# Patient Record
Sex: FEMALE | Race: BLACK OR AFRICAN AMERICAN | NOT HISPANIC OR LATINO | Employment: PART TIME | URBAN - METROPOLITAN AREA
[De-identification: names, ages, dates, MRNs, and addresses within clinical notes are randomized per-mention and may not be internally consistent; named-entity substitution may affect disease eponyms.]

---

## 2017-07-17 ENCOUNTER — LAB VISIT (OUTPATIENT)
Dept: LAB | Facility: HOSPITAL | Age: 57
End: 2017-07-17
Attending: INTERNAL MEDICINE
Payer: OTHER GOVERNMENT

## 2017-07-17 ENCOUNTER — OFFICE VISIT (OUTPATIENT)
Dept: INTERNAL MEDICINE | Facility: CLINIC | Age: 57
End: 2017-07-17
Payer: OTHER GOVERNMENT

## 2017-07-17 VITALS
BODY MASS INDEX: 44.59 KG/M2 | SYSTOLIC BLOOD PRESSURE: 146 MMHG | HEART RATE: 77 BPM | HEIGHT: 65 IN | DIASTOLIC BLOOD PRESSURE: 73 MMHG | WEIGHT: 267.63 LBS | TEMPERATURE: 97 F

## 2017-07-17 DIAGNOSIS — E66.01 SEVERE OBESITY (BMI >= 40): ICD-10-CM

## 2017-07-17 DIAGNOSIS — E66.01 SEVERE OBESITY (BMI >= 40): Primary | ICD-10-CM

## 2017-07-17 DIAGNOSIS — E55.9 VITAMIN D DEFICIENCY: ICD-10-CM

## 2017-07-17 DIAGNOSIS — T75.89XA EXPOSURE, INITIAL ENCOUNTER: ICD-10-CM

## 2017-07-17 DIAGNOSIS — K63.5 POLYP OF DESCENDING COLON, UNSPECIFIED TYPE: ICD-10-CM

## 2017-07-17 LAB
25(OH)D3+25(OH)D2 SERPL-MCNC: 12 NG/ML
ALBUMIN SERPL BCP-MCNC: 3.2 G/DL
ALP SERPL-CCNC: 100 U/L
ALT SERPL W/O P-5'-P-CCNC: 15 U/L
ANION GAP SERPL CALC-SCNC: 7 MMOL/L
AST SERPL-CCNC: 16 U/L
BASOPHILS # BLD AUTO: 0.01 K/UL
BASOPHILS NFR BLD: 0.1 %
BILIRUB SERPL-MCNC: 0.4 MG/DL
BUN SERPL-MCNC: 14 MG/DL
CALCIUM SERPL-MCNC: 9.3 MG/DL
CHLORIDE SERPL-SCNC: 102 MMOL/L
CHOLEST/HDLC SERPL: 4.4 {RATIO}
CO2 SERPL-SCNC: 32 MMOL/L
CREAT SERPL-MCNC: 0.9 MG/DL
DIFFERENTIAL METHOD: ABNORMAL
EOSINOPHIL # BLD AUTO: 0.1 K/UL
EOSINOPHIL NFR BLD: 1 %
ERYTHROCYTE [DISTWIDTH] IN BLOOD BY AUTOMATED COUNT: 15.9 %
EST. GFR  (AFRICAN AMERICAN): >60 ML/MIN/1.73 M^2
EST. GFR  (NON AFRICAN AMERICAN): >60 ML/MIN/1.73 M^2
GLUCOSE SERPL-MCNC: 88 MG/DL
HCT VFR BLD AUTO: 41.9 %
HDL/CHOLESTEROL RATIO: 22.6 %
HDLC SERPL-MCNC: 155 MG/DL
HDLC SERPL-MCNC: 35 MG/DL
HGB BLD-MCNC: 12.4 G/DL
LDLC SERPL CALC-MCNC: 102 MG/DL
LYMPHOCYTES # BLD AUTO: 3.8 K/UL
LYMPHOCYTES NFR BLD: 46.8 %
MCH RBC QN AUTO: 26.3 PG
MCHC RBC AUTO-ENTMCNC: 29.6 %
MCV RBC AUTO: 89 FL
MONOCYTES # BLD AUTO: 0.6 K/UL
MONOCYTES NFR BLD: 7.2 %
NEUTROPHILS # BLD AUTO: 3.7 K/UL
NEUTROPHILS NFR BLD: 44.8 %
NONHDLC SERPL-MCNC: 120 MG/DL
PLATELET # BLD AUTO: 272 K/UL
PMV BLD AUTO: 11.7 FL
POTASSIUM SERPL-SCNC: 4.5 MMOL/L
PROT SERPL-MCNC: 7.1 G/DL
RBC # BLD AUTO: 4.72 M/UL
SODIUM SERPL-SCNC: 141 MMOL/L
T4 FREE SERPL-MCNC: 1.04 NG/DL
TRIGL SERPL-MCNC: 90 MG/DL
TSH SERPL DL<=0.005 MIU/L-ACNC: 0.39 UIU/ML
WBC # BLD AUTO: 8.2 K/UL

## 2017-07-17 PROCEDURE — 80053 COMPREHEN METABOLIC PANEL: CPT

## 2017-07-17 PROCEDURE — 84443 ASSAY THYROID STIM HORMONE: CPT

## 2017-07-17 PROCEDURE — 85025 COMPLETE CBC W/AUTO DIFF WBC: CPT

## 2017-07-17 PROCEDURE — 82306 VITAMIN D 25 HYDROXY: CPT

## 2017-07-17 PROCEDURE — 84439 ASSAY OF FREE THYROXINE: CPT

## 2017-07-17 PROCEDURE — 99999 PR PBB SHADOW E&M-NEW PATIENT-LVL II: CPT | Mod: PBBFAC,,, | Performed by: INTERNAL MEDICINE

## 2017-07-17 PROCEDURE — 86703 HIV-1/HIV-2 1 RESULT ANTBDY: CPT

## 2017-07-17 PROCEDURE — 83036 HEMOGLOBIN GLYCOSYLATED A1C: CPT

## 2017-07-17 PROCEDURE — 99202 OFFICE O/P NEW SF 15 MIN: CPT | Mod: PBBFAC,PN | Performed by: INTERNAL MEDICINE

## 2017-07-17 PROCEDURE — 36415 COLL VENOUS BLD VENIPUNCTURE: CPT | Mod: PO

## 2017-07-17 PROCEDURE — 99204 OFFICE O/P NEW MOD 45 MIN: CPT | Mod: S$PBB,,, | Performed by: INTERNAL MEDICINE

## 2017-07-17 PROCEDURE — 80061 LIPID PANEL: CPT

## 2017-07-17 RX ORDER — AMLODIPINE BESYLATE AND ATORVASTATIN CALCIUM 10; 40 MG/1; MG/1
1 TABLET, FILM COATED ORAL DAILY
COMMUNITY
End: 2017-07-17 | Stop reason: ALTCHOICE

## 2017-07-17 RX ORDER — ALBUTEROL SULFATE 0.63 MG/3ML
0.63 SOLUTION RESPIRATORY (INHALATION) EVERY 6 HOURS PRN
COMMUNITY
End: 2019-01-12

## 2017-07-17 RX ORDER — AMLODIPINE AND BENAZEPRIL HYDROCHLORIDE 10; 40 MG/1; MG/1
1 CAPSULE ORAL DAILY
Refills: 11 | COMMUNITY
Start: 2017-04-26 | End: 2018-02-26 | Stop reason: SDUPTHER

## 2017-07-17 RX ORDER — FUROSEMIDE 40 MG/1
40 TABLET ORAL 2 TIMES DAILY
COMMUNITY
End: 2018-08-27 | Stop reason: SDUPTHER

## 2017-07-17 RX ORDER — ORPHENADRINE CITRATE 100 MG/1
100 TABLET, EXTENDED RELEASE ORAL 2 TIMES DAILY
COMMUNITY
End: 2019-01-21

## 2017-07-17 NOTE — PROGRESS NOTES
"Subjective:      Patient ID: Baylee Muñoz is a 57 y.o. female.    Chief Complaint: Establish Care and Neck Pain (back of neck)      HPI  Ms. Muñoz presents to [facility] primary care. She reports having neck pain over the past 3 days, which occurs when rotating her head to the right, describes it as a "pulling" sensation. She reports taking Naprosyn and 1 BC powder.     She also reports having a cramp in the front of her distal left leg, which moved to her proximal leg, which she though might be a blood clot.     Past Medical History:   Diagnosis Date    Allergy     Arthritis     Asthma     Depression     Diverticulitis 2005    Gastritis     Hypertension     Severe obesity (BMI >= 40)      Past Surgical History:   Procedure Laterality Date    CHOLECYSTECTOMY      cyst removal       Social History     Social History    Marital status:      Spouse name: N/A    Number of children: N/A    Years of education: N/A     Occupational History    Not on file.     Social History Main Topics    Smoking status: Light Tobacco Smoker     Packs/day: 0.25     Years: 27.00    Smokeless tobacco: Never Used    Alcohol use No    Drug use: No    Sexual activity: No     Other Topics Concern    Not on file     Social History Narrative    No narrative on file     Family History   Problem Relation Age of Onset    Stroke Mother     Heart disease Mother     Kidney disease Father     Heart disease Father     Hypertension Father     HIV Sister     COPD Sister     Bipolar disorder Sister        Current Outpatient Prescriptions:     albuterol (ACCUNEB) 0.63 mg/3 mL Nebu, Take 0.63 mg by nebulization every 6 (six) hours as needed. Rescue, Disp: , Rfl:     amlodipine-benazepril (LOTREL) 10-40 mg per capsule, Take 1 capsule by mouth once daily at 6am., Disp: , Rfl: 11    furosemide (LASIX) 40 MG tablet, Take 40 mg by mouth 2 (two) times daily., Disp: , Rfl:     orphenadrine (NORFLEX) 100 mg tablet, Take 100 mg " "by mouth 2 (two) times daily., Disp: , Rfl:     Review of patient's allergies indicates:   Allergen Reactions    Coconut     Dairy aid [lactase]     Iodine and iodide containing products     Penicillins      Review of Systems   Cardiovascular: Negative, except intermittent CP over the past 2 years.  Respiratory: Negative, except intermittent sob over the past 2 years.   Psychiatric/Behavioral: Negative, except her usual depressive sx.     Objective:     BP (!) 146/73 (BP Location: Left arm, Patient Position: Sitting, BP Method: Manual)   Pulse 77   Temp 96.9 °F (36.1 °C) (Tympanic)   Ht 5' 5" (1.651 m)   Wt 121.4 kg (267 lb 10.2 oz)   BMI 44.54 kg/m²     Physical Exam  GEN: A&O fully, NAD  PSYC: Normal affect  CV: RRR, no M/G/R  PULM: CTA bilaterally, no wheezes, rales  GI: S/NT/ND, normal bowel sounds  EXT: No C/C/E    Assessment:     1. Severe obesity (BMI >= 40): Discussed strategies for lifestyle modifications, including systematically increasing water, yogurt, whole fruits, unsalted nuts, non-starchy vegetables, beans, weight logging and physical activity; and avoiding potatoes, red/processed meats, sugar sweetened beverages, and fast food. She plans to completely replace potatoes with brocholi and Pepsi with water.    2. Vitamin D deficiency    3.      Cramps, neck, leg: Likely 2/2 dehydration.   4.      HM: Due to colonoscopy since last was 5 years ago, which had polyps.  5.      CP: Will check stress test. She has an appointment with a cardiologist, Dr. Chacon on 7/17/17.  6.      HTN: Not at goal. She skipped taking her amlodipine/benazepril this am, which I recommended she take.           Will check in 2 weeks.    Plan:   Severe obesity (BMI >= 40)  -     Hemoglobin A1c; Future; Expected date: 07/17/2017  -     CBC auto differential; Future; Expected date: 07/17/2017  -     Comprehensive metabolic panel; Future; Expected date: 07/17/2017  -     Lipid panel; Future; Expected date: 07/17/2017  -     " T4, free; Future; Expected date: 07/17/2017  -     TSH; Future; Expected date: 07/17/2017  -     Vitamin D; Future; Expected date: 07/17/2017  -     Cardiac treadmill stress test; Future    Vitamin D deficiency  -     Vitamin D; Future; Expected date: 07/17/2017    Polyp of descending colon, unspecified type  -     Case request GI: COLONOSCOPY      RTC in 2 weeks RE HTN or sooner as needed

## 2017-07-18 DIAGNOSIS — E55.9 VITAMIN D DEFICIENCY: Primary | ICD-10-CM

## 2017-07-18 LAB
ESTIMATED AVG GLUCOSE: 120 MG/DL
HBA1C MFR BLD HPLC: 5.8 %
HIV 1+2 AB+HIV1 P24 AG SERPL QL IA: NEGATIVE

## 2017-07-18 RX ORDER — ERGOCALCIFEROL 1.25 MG/1
50000 CAPSULE ORAL
Qty: 8 CAPSULE | Refills: 1 | Status: SHIPPED | OUTPATIENT
Start: 2017-07-18 | End: 2017-11-27 | Stop reason: SDUPTHER

## 2017-07-18 NOTE — LETTER
July 18, 2017    Baylee Wanda  [address]  [facility] [address]             [facility]  [address] [place] [id number]  Phone: [phone] Dear Ms. Baylee Muñoz:    Below are the results from your recent visit:    Resulted Orders   Hemoglobin A1c   Result Value Ref Range    Hemoglobin A1C 5.8 (H) 4.0 - 5.6 %      Comment:      According to ADA guidelines, hemoglobin A1c <7.0% represents  optimal control in non-pregnant diabetic patients. Different  metrics may apply to specific patient populations.   Standards of Medical Care in Diabetes-2016.  For the purpose of screening for the presence of diabetes:  <5.7%     Consistent with the absence of diabetes  5.7-6.4%  Consistent with increasing risk for diabetes   (prediabetes)  >or=6.5%  Consistent with diabetes  Currently, no consensus exists for use of hemoglobin A1c  for diagnosis of diabetes for children.  This Hemoglobin A1c assay has significant interference with fetal   hemoglobin   (HbF). The results are invalid for patients with abnormal amounts of   HbF,   including those with known Hereditary Persistence   of Fetal Hemoglobin. Heterozygous hemoglobin variants (HbAS, HbAC,   HbAD, HbAE, HbA2) do not significantly interfere with this assay;   however, presence of multiple variants in a sample may impact the %   interference.      Estimated Avg Glucose 120 68 - 131 mg/dL   CBC auto differential   Result Value Ref Range    WBC 8.20 3.90 - 12.70 K/uL    RBC 4.72 4.00 - 5.40 M/uL    Hemoglobin 12.4 12.0 - 16.0 g/dL    Hematocrit 41.9 37.0 - 48.5 %    MCV 89 82 - 98 fL    MCH 26.3 (L) 27.0 - 31.0 pg    MCHC 29.6 (L) 32.0 - 36.0 %    RDW 15.9 (H) 11.5 - 14.5 %    Platelets 272 150 - 350 K/uL    MPV 11.7 9.2 - 12.9 fL    Gran # 3.7 1.8 - 7.7 K/uL    Lymph # 3.8 1.0 - 4.8 K/uL    Mono # 0.6 0.3 - 1.0 K/uL    Eos # 0.1 0.0 - 0.5 K/uL    Baso # 0.01 0.00 - 0.20 K/uL    Gran% 44.8 38.0 - 73.0 %    Lymph% 46.8 18.0 - 48.0 %    Mono% 7.2  4.0 - 15.0 %    Eosinophil% 1.0 0.0 - 8.0 %    Basophil% 0.1 0.0 - 1.9 %    Differential Method Automated    Comprehensive metabolic panel   Result Value Ref Range    Sodium 141 136 - 145 mmol/L    Potassium 4.5 3.5 - 5.1 mmol/L    Chloride 102 95 - 110 mmol/L    CO2 32 (H) 23 - 29 mmol/L    Glucose 88 70 - 110 mg/dL    BUN, Bld 14 6 - 20 mg/dL    Creatinine 0.9 0.5 - 1.4 mg/dL    Calcium 9.3 8.7 - 10.5 mg/dL    Total Protein 7.1 6.0 - 8.4 g/dL    Albumin 3.2 (L) 3.5 - 5.2 g/dL    Total Bilirubin 0.4 0.1 - 1.0 mg/dL      Comment:      For infants and newborns, interpretation of results should be based  on gestational age, weight and in agreement with clinical  observations.  Premature Infant recommended reference ranges:  Up to 24 hours.............<8.0 mg/dL  Up to 48 hours............<12.0 mg/dL  3-5 days..................<15.0 mg/dL  6-29 days.................<15.0 mg/dL      Alkaline Phosphatase 100 55 - 135 U/L    AST 16 10 - 40 U/L    ALT 15 10 - 44 U/L    Anion Gap 7 (L) 8 - 16 mmol/L    eGFR if African American >60.0 >60 mL/min/1.73 m^2    eGFR if non African American >60.0 >60 mL/min/1.73 m^2      Comment:      Calculation used to obtain the estimated glomerular filtration  rate (eGFR) is the CKD-EPI equation. Since race is unknown   in our information system, the eGFR values for   -American and Non--American patients are given   for each creatinine result.     Lipid panel   Result Value Ref Range    Cholesterol 155 120 - 199 mg/dL      Comment:      The National Cholesterol Education Program (NCEP) has set the  following guidelines (reference ranges) for Cholesterol:  Optimal.....................<200 mg/dL  Borderline High.............200-239 mg/dL  High........................> or = 240 mg/dL      Triglycerides 90 30 - 150 mg/dL      Comment:      The National Cholesterol Education Program (NCEP) has set the  following guidelines (reference values) for  triglycerides:  Normal......................<150 mg/dL  Borderline High.............150-199 mg/dL  High........................200-499 mg/dL      HDL 35 (L) 40 - 75 mg/dL      Comment:      The National Cholesterol Education Program (NCEP) has set the  following guidelines (reference values) for HDL Cholesterol:  Low...............<40 mg/dL  Optimal...........>60 mg/dL      LDL Cholesterol 102.0 63.0 - 159.0 mg/dL      Comment:      The National Cholesterol Education Program (NCEP) has set the  following guidelines (reference values) for LDL Cholesterol:  Optimal.......................<130 mg/dL  Borderline High...............130-159 mg/dL  High..........................160-189 mg/dL  Very High.....................>190 mg/dL      HDL/Chol Ratio 22.6 20.0 - 50.0 %    Total Cholesterol/HDL Ratio 4.4 2.0 - 5.0    Non-HDL Cholesterol 120 mg/dL      Comment:      Risk category and Non-HDL cholesterol goals:  Coronary heart disease (CHD)or equivalent (10-year risk of CHD >20%):  Non-HDL cholesterol goal     <130 mg/dL  Two or more CHD risk factors and 10-year risk of CHD <= 20%:  Non-HDL cholesterol goal     <160 mg/dL  0 to 1 CHD risk factor:  Non-HDL cholesterol goal     <190 mg/dL     T4, free   Result Value Ref Range    Free T4 1.04 0.71 - 1.51 ng/dL   TSH   Result Value Ref Range    TSH 0.387 (L) 0.400 - 4.000 uIU/mL   Vitamin D   Result Value Ref Range    Vit D, 25-Hydroxy 12 (L) 30 - 96 ng/mL      Comment:      Vitamin D deficiency.........<10 ng/mL                              Vitamin D insufficiency......10-29 ng/mL       Vitamin D sufficiency........> or equal to 30 ng/mL  Vitamin D toxicity............>100 ng/mL     HIV-1 and HIV-2 antibodies   Result Value Ref Range    HIV 1/2 Ag/Ab Negative Negative     Your results are normal.  Please don't hesitate to call our office if you have any questions or concerns.      Sincerely,        Washington Cm MD

## 2017-07-18 NOTE — LETTER
July 18, 2017    Baylee Wanda  [address]  [facility] [address]             [facility]  [address] [place] [id number]  Phone: [phone] Dear Ms. Baylee Muñoz:    Below are the results from your recent visit:    Resulted Orders   Hemoglobin A1c   Result Value Ref Range    Hemoglobin A1C 5.8 (H) 4.0 - 5.6 %      Comment:      According to ADA guidelines, hemoglobin A1c <7.0% represents  optimal control in non-pregnant diabetic patients. Different  metrics may apply to specific patient populations.   Standards of Medical Care in Diabetes-2016.  For the purpose of screening for the presence of diabetes:  <5.7%     Consistent with the absence of diabetes  5.7-6.4%  Consistent with increasing risk for diabetes   (prediabetes)  >or=6.5%  Consistent with diabetes  Currently, no consensus exists for use of hemoglobin A1c  for diagnosis of diabetes for children.  This Hemoglobin A1c assay has significant interference with fetal   hemoglobin   (HbF). The results are invalid for patients with abnormal amounts of   HbF,   including those with known Hereditary Persistence   of Fetal Hemoglobin. Heterozygous hemoglobin variants (HbAS, HbAC,   HbAD, HbAE, HbA2) do not significantly interfere with this assay;   however, presence of multiple variants in a sample may impact the %   interference.      Estimated Avg Glucose 120 68 - 131 mg/dL   CBC auto differential   Result Value Ref Range    WBC 8.20 3.90 - 12.70 K/uL    RBC 4.72 4.00 - 5.40 M/uL    Hemoglobin 12.4 12.0 - 16.0 g/dL    Hematocrit 41.9 37.0 - 48.5 %    MCV 89 82 - 98 fL    MCH 26.3 (L) 27.0 - 31.0 pg    MCHC 29.6 (L) 32.0 - 36.0 %    RDW 15.9 (H) 11.5 - 14.5 %    Platelets 272 150 - 350 K/uL    MPV 11.7 9.2 - 12.9 fL    Gran # 3.7 1.8 - 7.7 K/uL    Lymph # 3.8 1.0 - 4.8 K/uL    Mono # 0.6 0.3 - 1.0 K/uL    Eos # 0.1 0.0 - 0.5 K/uL    Baso # 0.01 0.00 - 0.20 K/uL    Gran% 44.8 38.0 - 73.0 %    Lymph% 46.8 18.0 - 48.0 %    Mono% 7.2  4.0 - 15.0 %    Eosinophil% 1.0 0.0 - 8.0 %    Basophil% 0.1 0.0 - 1.9 %    Differential Method Automated    Comprehensive metabolic panel   Result Value Ref Range    Sodium 141 136 - 145 mmol/L    Potassium 4.5 3.5 - 5.1 mmol/L    Chloride 102 95 - 110 mmol/L    CO2 32 (H) 23 - 29 mmol/L    Glucose 88 70 - 110 mg/dL    BUN, Bld 14 6 - 20 mg/dL    Creatinine 0.9 0.5 - 1.4 mg/dL    Calcium 9.3 8.7 - 10.5 mg/dL    Total Protein 7.1 6.0 - 8.4 g/dL    Albumin 3.2 (L) 3.5 - 5.2 g/dL    Total Bilirubin 0.4 0.1 - 1.0 mg/dL      Comment:      For infants and newborns, interpretation of results should be based  on gestational age, weight and in agreement with clinical  observations.  Premature Infant recommended reference ranges:  Up to 24 hours.............<8.0 mg/dL  Up to 48 hours............<12.0 mg/dL  3-5 days..................<15.0 mg/dL  6-29 days.................<15.0 mg/dL      Alkaline Phosphatase 100 55 - 135 U/L    AST 16 10 - 40 U/L    ALT 15 10 - 44 U/L    Anion Gap 7 (L) 8 - 16 mmol/L    eGFR if African American >60.0 >60 mL/min/1.73 m^2    eGFR if non African American >60.0 >60 mL/min/1.73 m^2      Comment:      Calculation used to obtain the estimated glomerular filtration  rate (eGFR) is the CKD-EPI equation. Since race is unknown   in our information system, the eGFR values for   -American and Non--American patients are given   for each creatinine result.     Lipid panel   Result Value Ref Range    Cholesterol 155 120 - 199 mg/dL      Comment:      The National Cholesterol Education Program (NCEP) has set the  following guidelines (reference ranges) for Cholesterol:  Optimal.....................<200 mg/dL  Borderline High.............200-239 mg/dL  High........................> or = 240 mg/dL      Triglycerides 90 30 - 150 mg/dL      Comment:      The National Cholesterol Education Program (NCEP) has set the  following guidelines (reference values) for  "triglycerides:  Normal......................<150 mg/dL  Borderline High.............150-199 mg/dL  High........................200-499 mg/dL      HDL 35 (L) 40 - 75 mg/dL      Comment:      The National Cholesterol Education Program (NCEP) has set the  following guidelines (reference values) for HDL Cholesterol:  Low...............<40 mg/dL  Optimal...........>60 mg/dL      LDL Cholesterol 102.0 63.0 - 159.0 mg/dL      Comment:      The National Cholesterol Education Program (NCEP) has set the  following guidelines (reference values) for LDL Cholesterol:  Optimal.......................<130 mg/dL  Borderline High...............130-159 mg/dL  High..........................160-189 mg/dL  Very High.....................>190 mg/dL      HDL/Chol Ratio 22.6 20.0 - 50.0 %    Total Cholesterol/HDL Ratio 4.4 2.0 - 5.0    Non-HDL Cholesterol 120 mg/dL      Comment:      Risk category and Non-HDL cholesterol goals:  Coronary heart disease (CHD)or equivalent (10-year risk of CHD >20%):  Non-HDL cholesterol goal     <130 mg/dL  Two or more CHD risk factors and 10-year risk of CHD <= 20%:  Non-HDL cholesterol goal     <160 mg/dL  0 to 1 CHD risk factor:  Non-HDL cholesterol goal     <190 mg/dL     T4, free   Result Value Ref Range    Free T4 1.04 0.71 - 1.51 ng/dL   TSH   Result Value Ref Range    TSH 0.387 (L) 0.400 - 4.000 uIU/mL   Vitamin D   Result Value Ref Range    Vit D, 25-Hydroxy 12 (L) 30 - 96 ng/mL      Comment:      Vitamin D deficiency.........<10 ng/mL                              Vitamin D insufficiency......10-29 ng/mL       Vitamin D sufficiency........> or equal to 30 ng/mL  Vitamin D toxicity............>100 ng/mL       Your results are abnormal.      Regarding your low HDL (High Density Lipoprotein or "Good Cholesterol"; normally 40-75 mg/dL in men and 50-75 mg/dL in women), this increases your risk for stroke and heart attack. I recommend:    1. Increase dietary olives, olive oil and unsalted nuts.  2. Increase " "your favorite physical activity by 5-30 minutes per day.   3. If you smoke or are around second-hand smoke, avoid it.      Regarding your vitamin D deficiency, this may be due to your skin's low absorption of UV light &/or your weight if you are overweight. Vitamin D deficiency increases your risk for bone fracture, chronic fatigue, cardiovascular disease and colon cancer. I have sent a prescription for vitamin D2 50,000 IU (International Units) to be taken as 1 tablet by mouth per week over the next 2 months (8 tablets total). In two months, I recommend you take over-the-counter vitamin D3 1000 IU 1 tablet by mouth daily at least throughout the winter months. It is best to purchase a brand that has the "USP" or "NSF" emblem, which ensures an effective and safe supplement.      Regarding your elevated estimated average blood sugar, I recommend:    1. Increase water intake 6-8 glasses per day.  2. Increase vegetables (but, avoid potatoes, yams & corn).   3. Avoid white bread / rice / pasta.  4. Avoid bakery goods i.e. Pastries, donuts, pizza, etc.  5. Increase physical activity to 30 minutes at least 3 times per week.  6. Avoid alcohol & sugar sweetened drinks i.e. Soda (even 100% fruit juice)   7. Avoid processed foods with trans fats ("partically hydrogenated oils")      Regarding your high BMI (Body Mass Index; normally 18-24.9 kg/m2) and weight, this increases your chances of early suffering and death. I encourage these permanent lifestyle modifications in the coming months-years:    1. Increase your favorite physical activity 5-30 minutes/day (i.e. tracking steps)  2. Increase water intake to 6-8 bottles/day  3. Avoid potatoes (i.e. fries, chips, baked potatoes, potato salad)   4. Avoid meat (i.e. cold cuts, steak, hamburgers, hot dogs, meade, beef)  5. Increase yogurt intake to >3 servings/day  6. Increase whole fruit to >3 servings/day (i.e. berries, melons, apples, etc.)  7. Eat 3 servings of unsalted " nuts/day (i.e. 16 almonds/serving 2-3 times/day)  8. Increase whole grains & non-starchy vegetables (i.e. chickpeas, olives, etc.)   9. Log your weight (i.e. with a free dax) every morning (goal: net -3 lbs/month).      Your other labs are either unremarkable or normal, including kidney, liver and thyroid function.        Please don't hesitate to call our office if you have any questions or concerns.      Sincerely,        Washington Cm MD

## 2017-07-18 NOTE — ADDENDUM NOTE
Encounter addended by: Washington Cm MD on: 7/18/2017 12:50 PM<BR>    Actions taken: Letter status changed

## 2017-07-21 DIAGNOSIS — Z12.31 OTHER SCREENING MAMMOGRAM: ICD-10-CM

## 2017-08-14 ENCOUNTER — OFFICE VISIT (OUTPATIENT)
Dept: INTERNAL MEDICINE | Facility: CLINIC | Age: 57
End: 2017-08-14
Payer: OTHER GOVERNMENT

## 2017-08-14 VITALS
HEIGHT: 65 IN | RESPIRATION RATE: 16 BRPM | WEIGHT: 266.13 LBS | BODY MASS INDEX: 44.34 KG/M2 | HEART RATE: 99 BPM | OXYGEN SATURATION: 100 % | TEMPERATURE: 99 F

## 2017-08-14 DIAGNOSIS — Z72.0 TOBACCO ABUSE: ICD-10-CM

## 2017-08-14 DIAGNOSIS — K04.7 ABSCESSED TOOTH: Primary | ICD-10-CM

## 2017-08-14 DIAGNOSIS — E66.01 SEVERE OBESITY (BMI >= 40): ICD-10-CM

## 2017-08-14 DIAGNOSIS — E55.9 VITAMIN D DEFICIENCY: ICD-10-CM

## 2017-08-14 DIAGNOSIS — Z71.89 COUNSELING ON HEALTH PROMOTION AND DISEASE PREVENTION: ICD-10-CM

## 2017-08-14 PROBLEM — I10 HYPERTENSION: Status: ACTIVE | Noted: 2017-08-14

## 2017-08-14 PROCEDURE — 99213 OFFICE O/P EST LOW 20 MIN: CPT | Mod: PBBFAC,PN | Performed by: INTERNAL MEDICINE

## 2017-08-14 PROCEDURE — 99214 OFFICE O/P EST MOD 30 MIN: CPT | Mod: S$PBB,,, | Performed by: INTERNAL MEDICINE

## 2017-08-14 PROCEDURE — 3008F BODY MASS INDEX DOCD: CPT | Mod: ,,, | Performed by: INTERNAL MEDICINE

## 2017-08-14 PROCEDURE — 99999 PR PBB SHADOW E&M-EST. PATIENT-LVL III: CPT | Mod: PBBFAC,,, | Performed by: INTERNAL MEDICINE

## 2017-08-14 RX ORDER — CLINDAMYCIN HYDROCHLORIDE 300 MG/1
300 CAPSULE ORAL EVERY 8 HOURS
Qty: 21 CAPSULE | Refills: 0 | Status: SHIPPED | OUTPATIENT
Start: 2017-08-14 | End: 2017-08-21

## 2017-08-14 RX ORDER — FAMOTIDINE 20 MG/1
TABLET, FILM COATED ORAL
COMMUNITY
Start: 2017-05-27 | End: 2019-01-12

## 2017-08-14 RX ORDER — IBUPROFEN 200 MG
1 TABLET ORAL DAILY
Qty: 14 PATCH | Refills: 1 | Status: SHIPPED | OUTPATIENT
Start: 2017-08-14 | End: 2019-01-12

## 2017-08-14 NOTE — PROGRESS NOTES
Subjective:      Patient ID: Baylee Muñoz is a 57 y.o. female.    Chief Complaint: Follow-up; Obesity; and Dental Pain      HPI  Ms. Muñoz is a patient of mine, who presents for f/u on HTN, obesity and now p/w dental pain. She reports having tooth pain over the past week. She feels she may have injured her filling while try to floss.     Past Medical History:   Diagnosis Date    Allergy     Arthritis     Asthma     Depression     Diverticulitis 2005    Gastritis     Hypertension     Severe obesity (BMI >= 40)     Smoking      Past Surgical History:   Procedure Laterality Date    CHOLECYSTECTOMY      cyst removal       Social History     Social History    Marital status:      Spouse name: N/A    Number of children: N/A    Years of education: N/A     Occupational History    Not on file.     Social History Main Topics    Smoking status: Light Tobacco Smoker     Packs/day: 0.25     Years: 27.00    Smokeless tobacco: Never Used    Alcohol use No    Drug use: No    Sexual activity: No     Other Topics Concern    Not on file     Social History Narrative    No narrative on file     Family History   Problem Relation Age of Onset    Stroke Mother     Heart disease Mother     Kidney disease Father     Heart disease Father     Hypertension Father     HIV Sister     COPD Sister     Bipolar disorder Sister        Current Outpatient Prescriptions:     albuterol (ACCUNEB) 0.63 mg/3 mL Nebu, Take 0.63 mg by nebulization every 6 (six) hours as needed. Rescue, Disp: , Rfl:     amlodipine-benazepril (LOTREL) 10-40 mg per capsule, Take 1 capsule by mouth once daily at 6am., Disp: , Rfl: 11    ergocalciferol (ERGOCALCIFEROL) 50,000 unit Cap, Take 1 capsule (50,000 Units total) by mouth every 7 days., Disp: 8 capsule, Rfl: 1    famotidine (PEPCID) 20 MG tablet, , Disp: , Rfl:     furosemide (LASIX) 40 MG tablet, Take 40 mg by mouth 2 (two) times daily., Disp: , Rfl:     orphenadrine (NORFLEX)  "100 mg tablet, Take 100 mg by mouth 2 (two) times daily., Disp: , Rfl:     clindamycin (CLEOCIN) 300 MG capsule, Take 1 capsule (300 mg total) by mouth every 8 (eight) hours., Disp: 21 capsule, Rfl: 0    nicotine (NICODERM CQ) 14 mg/24 hr, Place 1 patch onto the skin once daily., Disp: 14 patch, Rfl: 1    Review of patient's allergies indicates:   Allergen Reactions    Coconut     Dairy aid [lactase]     Iodine and iodide containing products     Penicillins      Review of Systems   Negative.     Objective:     Pulse 99   Temp 98.7 °F (37.1 °C) (Tympanic)   Resp 16   Ht 5' 5" (1.651 m)   Wt 120.7 kg (266 lb 1.5 oz)   SpO2 100%   BMI 44.28 kg/m²     Physical Exam  GEN: A&O fully, NAD  PSYC: Normal affect  HEENT: OP: Clear, no LAD, left mandibular (lateral incisor/pre-molar) is TTP    Lab Results   Component Value Date    WBC 8.20 07/17/2017    HGB 12.4 07/17/2017    HCT 41.9 07/17/2017     07/17/2017    CHOL 155 07/17/2017    TRIG 90 07/17/2017    HDL 35 (L) 07/17/2017    ALT 15 07/17/2017    AST 16 07/17/2017     07/17/2017    K 4.5 07/17/2017     07/17/2017    CREATININE 0.9 07/17/2017    BUN 14 07/17/2017    CO2 32 (H) 07/17/2017    TSH 0.387 (L) 07/17/2017    HGBA1C 5.8 (H) 07/17/2017       Assessment:      1. Abscessed tooth: Likely partially treated with doxycycline 100 mg over the past 4 days. Risks and benefits discussed and patient chose to move forward with clindamycin 300 mg TID x7d and to f/u with a dentist, which she plans to do either in  or in [place].   2. Severe obesity (BMI >= 40): Congratulated her on her 1 lb wt loss since last visits. Discussed strategies for lifestyle modifications, including systematically increasing physical activity, water; and avoiding potatoes, sugar sweetened beverages, red/processed meats, and fast food; and increasing yogurt, whole fruits, whole grains, non-starchy vegetables, beans, weight logging.   3. Vitamin D deficiency    4.      " HTN: At goal. Continue current regimen.  5.      Tobacco use: Only smoking 3-4 cigarettes per day. She plans to quit in 2 weeks. Will prescribe                 Plan:   Abscessed tooth  -     clindamycin (CLEOCIN) 300 MG capsule; Take 1 capsule (300 mg total) by mouth every 8 (eight) hours.  Dispense: 21 capsule; Refill: 0    Severe obesity (BMI >= 40)    Vitamin D deficiency    Counseling on health promotion and disease prevention  -     Ambulatory consult to Obstetrics / Gynecology  -     Case request GI: COLONOSCOPY    Tobacco abuse  -     nicotine (NICODERM CQ) 14 mg/24 hr; Place 1 patch onto the skin once daily.  Dispense: 14 patch; Refill: 1      RTC in RE 3 months or sooner as needed

## 2017-08-15 ENCOUNTER — OFFICE VISIT (OUTPATIENT)
Dept: INTERNAL MEDICINE | Facility: CLINIC | Age: 57
End: 2017-08-15
Payer: OTHER GOVERNMENT

## 2017-08-15 VITALS
WEIGHT: 266.13 LBS | HEIGHT: 65 IN | DIASTOLIC BLOOD PRESSURE: 72 MMHG | SYSTOLIC BLOOD PRESSURE: 129 MMHG | OXYGEN SATURATION: 98 % | RESPIRATION RATE: 20 BRPM | BODY MASS INDEX: 44.34 KG/M2 | HEART RATE: 79 BPM | TEMPERATURE: 99 F

## 2017-08-15 DIAGNOSIS — M25.561 ACUTE PAIN OF RIGHT KNEE: Primary | ICD-10-CM

## 2017-08-15 PROCEDURE — 99213 OFFICE O/P EST LOW 20 MIN: CPT | Mod: PBBFAC,PN | Performed by: INTERNAL MEDICINE

## 2017-08-15 PROCEDURE — 99999 PR PBB SHADOW E&M-EST. PATIENT-LVL III: CPT | Mod: PBBFAC,,, | Performed by: INTERNAL MEDICINE

## 2017-08-15 PROCEDURE — 3008F BODY MASS INDEX DOCD: CPT | Mod: ,,, | Performed by: INTERNAL MEDICINE

## 2017-08-15 PROCEDURE — 99213 OFFICE O/P EST LOW 20 MIN: CPT | Mod: S$PBB,,, | Performed by: INTERNAL MEDICINE

## 2017-08-15 RX ORDER — DEXTROMETHORPHAN HYDROBROMIDE, GUAIFENESIN 5; 100 MG/5ML; MG/5ML
650 LIQUID ORAL EVERY 8 HOURS
Refills: 0 | COMMUNITY
Start: 2017-08-15 | End: 2019-02-28

## 2017-08-15 RX ORDER — DICLOFENAC SODIUM 10 MG/G
2 GEL TOPICAL 4 TIMES DAILY
Qty: 100 G | Refills: 11 | Status: SHIPPED | OUTPATIENT
Start: 2017-08-15 | End: 2017-08-28 | Stop reason: SDUPTHER

## 2017-08-15 NOTE — PROGRESS NOTES
Subjective:      Patient ID: Baylee Muñoz is a 57 y.o. female.    Chief Complaint: Knee Pain (right**) and Toe Pain (right**)      HPI  Ms. Muñoz is a patient of mine, who presents for right knee and toe pain. She reports stubbing her right toe and then falling, while twisting at the right knee while her right foot was planted. She has been able to bear her weight, but walks with a limp.     Past Medical History:   Diagnosis Date    Allergy     Arthritis     Asthma     Depression     Diverticulitis 2005    Gastritis     Hypertension     Severe obesity (BMI >= 40)     Smoking      Past Surgical History:   Procedure Laterality Date    CHOLECYSTECTOMY      cyst removal       Social History     Social History    Marital status:      Spouse name: N/A    Number of children: N/A    Years of education: N/A     Occupational History    Not on file.     Social History Main Topics    Smoking status: Light Tobacco Smoker     Packs/day: 0.25     Years: 27.00    Smokeless tobacco: Never Used    Alcohol use No    Drug use: No    Sexual activity: No     Other Topics Concern    Not on file     Social History Narrative    No narrative on file     Family History   Problem Relation Age of Onset    Stroke Mother     Heart disease Mother     Kidney disease Father     Heart disease Father     Hypertension Father     HIV Sister     COPD Sister     Bipolar disorder Sister        Current Outpatient Prescriptions:     albuterol (ACCUNEB) 0.63 mg/3 mL Nebu, Take 0.63 mg by nebulization every 6 (six) hours as needed. Rescue, Disp: , Rfl:     amlodipine-benazepril (LOTREL) 10-40 mg per capsule, Take 1 capsule by mouth once daily at 6am., Disp: , Rfl: 11    clindamycin (CLEOCIN) 300 MG capsule, Take 1 capsule (300 mg total) by mouth every 8 (eight) hours., Disp: 21 capsule, Rfl: 0    ergocalciferol (ERGOCALCIFEROL) 50,000 unit Cap, Take 1 capsule (50,000 Units total) by mouth every 7 days., Disp: 8  "capsule, Rfl: 1    famotidine (PEPCID) 20 MG tablet, , Disp: , Rfl:     furosemide (LASIX) 40 MG tablet, Take 40 mg by mouth 2 (two) times daily., Disp: , Rfl:     nicotine (NICODERM CQ) 14 mg/24 hr, Place 1 patch onto the skin once daily., Disp: 14 patch, Rfl: 1    orphenadrine (NORFLEX) 100 mg tablet, Take 100 mg by mouth 2 (two) times daily., Disp: , Rfl:     acetaminophen (TYLENOL) 650 MG TbSR, Take 1 tablet (650 mg total) by mouth every 8 (eight) hours., Disp: , Rfl: 0    leg brace (KNEE SUPPORT BRACE) Misc, 1 application by Misc.(Non-Drug; Combo Route) route once daily., Disp: 1 each, Rfl: 0    Review of patient's allergies indicates:   Allergen Reactions    Penicillins Hives    Coconut     Dairy aid [lactase]     Iodine and iodide containing products      Review of Systems   Negative.     Objective:     /72 (BP Location: Left arm, Patient Position: Sitting, BP Method: Medium (Automatic))   Pulse 79   Temp 98.9 °F (37.2 °C) (Tympanic)   Resp 20   Ht 5' 5" (1.651 m)   Wt 120.7 kg (266 lb 1.5 oz)   SpO2 98%   BMI 44.28 kg/m²     Physical Exam  GEN: A&O fully, NAD  PSYC: Normal affect  MSK: Right knee pain with valgus maneuver/stress; right toe with medial, distal echymosis and erythema in a 1x1 cm region. No pain with flexion of right 1st distal phalynx    Lab Results   Component Value Date    WBC 8.20 07/17/2017    HGB 12.4 07/17/2017    HCT 41.9 07/17/2017     07/17/2017    CHOL 155 07/17/2017    TRIG 90 07/17/2017    HDL 35 (L) 07/17/2017    ALT 15 07/17/2017    AST 16 07/17/2017     07/17/2017    K 4.5 07/17/2017     07/17/2017    CREATININE 0.9 07/17/2017    BUN 14 07/17/2017    CO2 32 (H) 07/17/2017    TSH 0.387 (L) 07/17/2017    HGBA1C 5.8 (H) 07/17/2017       Assessment:      1. Acute pain of right knee: Likely grade 1 sprain injury of her right knee ligaments. Will tx supportively now and if not improved in 1 week, will consider MRI.     Plan:   Acute pain of " right knee  -     acetaminophen (TYLENOL) 650 MG TbSR; Take 1 tablet (650 mg total) by mouth every 8 (eight) hours.; Refill: 0  -     CRUTCHES FOR HOME USE    Other orders  -     leg brace (KNEE SUPPORT BRACE) Misc; 1 application by Misc.(Non-Drug; Combo Route) route once daily.  Dispense: 1 each; Refill: 0      RTC already scheduled or sooner as needed

## 2017-08-28 ENCOUNTER — OFFICE VISIT (OUTPATIENT)
Dept: INTERNAL MEDICINE | Facility: CLINIC | Age: 57
End: 2017-08-28
Payer: OTHER GOVERNMENT

## 2017-08-28 ENCOUNTER — LAB VISIT (OUTPATIENT)
Dept: LAB | Facility: HOSPITAL | Age: 57
End: 2017-08-28
Attending: INTERNAL MEDICINE
Payer: OTHER GOVERNMENT

## 2017-08-28 VITALS
TEMPERATURE: 98 F | HEIGHT: 65 IN | BODY MASS INDEX: 44.22 KG/M2 | DIASTOLIC BLOOD PRESSURE: 58 MMHG | HEART RATE: 96 BPM | SYSTOLIC BLOOD PRESSURE: 120 MMHG | OXYGEN SATURATION: 97 % | WEIGHT: 265.44 LBS

## 2017-08-28 DIAGNOSIS — M25.561 ACUTE PAIN OF RIGHT KNEE: ICD-10-CM

## 2017-08-28 DIAGNOSIS — M25.561 ACUTE PAIN OF RIGHT KNEE: Primary | ICD-10-CM

## 2017-08-28 LAB
ANION GAP SERPL CALC-SCNC: 9 MMOL/L
BUN SERPL-MCNC: 11 MG/DL
CALCIUM SERPL-MCNC: 8.9 MG/DL
CHLORIDE SERPL-SCNC: 102 MMOL/L
CO2 SERPL-SCNC: 32 MMOL/L
CREAT SERPL-MCNC: 1 MG/DL
EST. GFR  (AFRICAN AMERICAN): >60 ML/MIN/1.73 M^2
EST. GFR  (NON AFRICAN AMERICAN): >60 ML/MIN/1.73 M^2
GLUCOSE SERPL-MCNC: 125 MG/DL
POTASSIUM SERPL-SCNC: 3.9 MMOL/L
SODIUM SERPL-SCNC: 143 MMOL/L

## 2017-08-28 PROCEDURE — 3008F BODY MASS INDEX DOCD: CPT | Mod: ,,, | Performed by: INTERNAL MEDICINE

## 2017-08-28 PROCEDURE — 99999 PR PBB SHADOW E&M-EST. PATIENT-LVL III: CPT | Mod: PBBFAC,,, | Performed by: INTERNAL MEDICINE

## 2017-08-28 PROCEDURE — 36415 COLL VENOUS BLD VENIPUNCTURE: CPT | Mod: PO

## 2017-08-28 PROCEDURE — 80048 BASIC METABOLIC PNL TOTAL CA: CPT

## 2017-08-28 PROCEDURE — 99213 OFFICE O/P EST LOW 20 MIN: CPT | Mod: PBBFAC,PN | Performed by: INTERNAL MEDICINE

## 2017-08-28 PROCEDURE — 99213 OFFICE O/P EST LOW 20 MIN: CPT | Mod: S$PBB,,, | Performed by: INTERNAL MEDICINE

## 2017-08-28 RX ORDER — DICLOFENAC SODIUM 10 MG/G
2 GEL TOPICAL 4 TIMES DAILY
Qty: 100 G | Refills: 11 | Status: SHIPPED | OUTPATIENT
Start: 2017-08-28 | End: 2019-01-12

## 2017-08-28 NOTE — PROGRESS NOTES
Subjective:      Patient ID: Baylee Muñoz is a 57 y.o. female.    Chief Complaint: Knee Pain (follow up)      HPI  Ms. Muñoz is a patient of mine, who presents for f/u on right knee pain. She reports not being able to  her Voltaren gel due to insurance and prefers it is sent to [facility]. She notes her right knee pain and swelling has not improved, but has become a little worse, which she attributes to walking at work. She has been using crutches at home. Pain worse with trying to cross her legs while sitting or when trying to stand up from a sitting position.     Past Medical History:   Diagnosis Date    Allergy     Arthritis     Asthma     Depression     Diverticulitis 2005    Gastritis     Hypertension     Severe obesity (BMI >= 40)     Smoking      Past Surgical History:   Procedure Laterality Date    CHOLECYSTECTOMY      cyst removal       Social History     Social History    Marital status:      Spouse name: N/A    Number of children: N/A    Years of education: N/A     Occupational History    Not on file.     Social History Main Topics    Smoking status: Light Tobacco Smoker     Packs/day: 0.25     Years: 27.00    Smokeless tobacco: Never Used    Alcohol use No    Drug use: No    Sexual activity: No     Other Topics Concern    Not on file     Social History Narrative    No narrative on file     Family History   Problem Relation Age of Onset    Stroke Mother     Heart disease Mother     Kidney disease Father     Heart disease Father     Hypertension Father     HIV Sister     COPD Sister     Bipolar disorder Sister        Current Outpatient Prescriptions:     albuterol (ACCUNEB) 0.63 mg/3 mL Nebu, Take 0.63 mg by nebulization every 6 (six) hours as needed. Rescue, Disp: , Rfl:     amlodipine-benazepril (LOTREL) 10-40 mg per capsule, Take 1 capsule by mouth once daily at 6am., Disp: , Rfl: 11    ergocalciferol (ERGOCALCIFEROL) 50,000 unit Cap, Take 1 capsule  "(50,000 Units total) by mouth every 7 days., Disp: 8 capsule, Rfl: 1    famotidine (PEPCID) 20 MG tablet, , Disp: , Rfl:     furosemide (LASIX) 40 MG tablet, Take 40 mg by mouth 2 (two) times daily., Disp: , Rfl:     leg brace (KNEE SUPPORT BRACE) Misc, 1 application by Misc.(Non-Drug; Combo Route) route once daily., Disp: 1 each, Rfl: 0    acetaminophen (TYLENOL) 650 MG TbSR, Take 1 tablet (650 mg total) by mouth every 8 (eight) hours., Disp: , Rfl: 0    diclofenac sodium 1 % Gel, Apply 2 g topically 4 (four) times daily., Disp: 100 g, Rfl: 11    nicotine (NICODERM CQ) 14 mg/24 hr, Place 1 patch onto the skin once daily., Disp: 14 patch, Rfl: 1    orphenadrine (NORFLEX) 100 mg tablet, Take 100 mg by mouth 2 (two) times daily., Disp: , Rfl:     Review of patient's allergies indicates:   Allergen Reactions    Penicillins Hives    Coconut     Dairy aid [lactase]     Iodine and iodide containing products      Review of Systems   Negative.     Objective:     BP (!) 120/58 (BP Location: Left arm, Patient Position: Sitting, BP Method: Large (Automatic))   Pulse 96   Temp 98 °F (36.7 °C) (Tympanic)   Ht 5' 5" (1.651 m)   Wt 120.4 kg (265 lb 6.9 oz)   SpO2 97%   BMI 44.17 kg/m²     Physical Exam  GEN: A&O fully, NAD  PSYC: Normal affect  MSK: Right knee pain with valgus maneuver.     Lab Results   Component Value Date    WBC 8.20 07/17/2017    HGB 12.4 07/17/2017    HCT 41.9 07/17/2017     07/17/2017    CHOL 155 07/17/2017    TRIG 90 07/17/2017    HDL 35 (L) 07/17/2017    ALT 15 07/17/2017    AST 16 07/17/2017     07/17/2017    K 4.5 07/17/2017     07/17/2017    CREATININE 0.9 07/17/2017    BUN 14 07/17/2017    CO2 32 (H) 07/17/2017    TSH 0.387 (L) 07/17/2017    HGBA1C 5.8 (H) 07/17/2017       Assessment:      1. Acute pain of right knee: Given worsening over the past month (injury 8/13/17) and h/o right knee orthoscopic surgery in the past with Dr. Khoa Soria, will check MRI and refer " to Dr. Soria.      Plan:   Acute pain of right knee  -     MRI Lower Extremity Joint W WO Contrast Right; Future; Expected date: 08/28/2017  -     Basic metabolic panel; Future; Expected date: 08/28/2017  -     diclofenac sodium 1 % Gel; Apply 2 g topically 4 (four) times daily.  Dispense: 100 g; Refill: 11  -     Cancel: Ambulatory consult to Orthopedics  -     Ambulatory consult to Orthopedics        RTC in 4 weeks RE right knee pain and HM or sooner as needed

## 2017-09-06 ENCOUNTER — TELEPHONE (OUTPATIENT)
Dept: RADIOLOGY | Facility: HOSPITAL | Age: 57
End: 2017-09-06

## 2017-09-14 ENCOUNTER — TELEPHONE (OUTPATIENT)
Dept: INTERNAL MEDICINE | Facility: CLINIC | Age: 57
End: 2017-09-14

## 2017-09-14 RX ORDER — HYDROXYZINE PAMOATE 50 MG/1
50 CAPSULE ORAL ONCE AS NEEDED
Qty: 2 CAPSULE | Refills: 0 | Status: SHIPPED | OUTPATIENT
Start: 2017-09-14 | End: 2017-09-14

## 2017-09-14 NOTE — TELEPHONE ENCOUNTER
Pt is requesting that something be sent to her pharmacy so that she may be calm during her MRI please advise

## 2017-09-28 ENCOUNTER — TELEPHONE (OUTPATIENT)
Dept: RADIOLOGY | Facility: HOSPITAL | Age: 57
End: 2017-09-28

## 2017-09-29 ENCOUNTER — HOSPITAL ENCOUNTER (OUTPATIENT)
Dept: RADIOLOGY | Facility: HOSPITAL | Age: 57
Discharge: HOME | End: 2017-09-29
Attending: INTERNAL MEDICINE
Payer: OTHER GOVERNMENT

## 2017-09-29 DIAGNOSIS — M25.561 ACUTE PAIN OF RIGHT KNEE: ICD-10-CM

## 2017-09-29 PROCEDURE — 73721 MRI JNT OF LWR EXTRE W/O DYE: CPT | Mod: TC,PO,RT

## 2017-09-29 PROCEDURE — 73721 MRI JNT OF LWR EXTRE W/O DYE: CPT | Mod: 26,RT,, | Performed by: RADIOLOGY

## 2017-10-04 ENCOUNTER — TELEPHONE (OUTPATIENT)
Dept: INTERNAL MEDICINE | Facility: CLINIC | Age: 57
End: 2017-10-04

## 2017-10-04 NOTE — TELEPHONE ENCOUNTER
----- Message from Sirisha Bowens sent at 10/4/2017  3:26 PM CDT -----  Contact: pt  She's calling to get her MRI results, please advise [phone] (home)

## 2017-10-06 ENCOUNTER — TELEPHONE (OUTPATIENT)
Dept: INTERNAL MEDICINE | Facility: CLINIC | Age: 57
End: 2017-10-06

## 2017-10-06 NOTE — TELEPHONE ENCOUNTER
----- Message from Aida Martinez sent at 10/5/2017  3:03 PM CDT -----  Contact: self [phone]  States that she is returning call please call back at [phone]//thank you acc

## 2017-11-27 DIAGNOSIS — E55.9 VITAMIN D DEFICIENCY: ICD-10-CM

## 2017-11-27 RX ORDER — ERGOCALCIFEROL 1.25 MG/1
50000 CAPSULE ORAL
Qty: 8 CAPSULE | Refills: 3 | Status: SHIPPED | OUTPATIENT
Start: 2017-11-27 | End: 2018-08-27 | Stop reason: SDUPTHER

## 2017-11-27 NOTE — TELEPHONE ENCOUNTER
----- Message from Yaanae Stiles sent at 11/27/2017  8:49 AM CST -----  Contact: Patient  1. What is the name of the medication you are requesting? Vitamin D  2. What is the dose? Does not know  3. How do you take the medication? Orally, topically, etc? orally  4. How often do you take this medication? every 7 days  5. Do you need a 30 day or 90 day supply? 90  6. How many refills are you requesting? 1  7. What is your preferred pharmacy and location of the [facility]? [facility]  8. Who can we contact with further questions? Patient at [phone]

## 2017-12-05 ENCOUNTER — TELEPHONE (OUTPATIENT)
Dept: INTERNAL MEDICINE | Facility: CLINIC | Age: 57
End: 2017-12-05

## 2017-12-05 NOTE — TELEPHONE ENCOUNTER
Pt is requesting that something be sent to her pharmacy for an abscess tooth please advise on if pt needs an apt./db**

## 2017-12-11 ENCOUNTER — PATIENT OUTREACH (OUTPATIENT)
Dept: ADMINISTRATIVE | Facility: HOSPITAL | Age: 57
End: 2017-12-11

## 2017-12-11 NOTE — PROGRESS NOTES
Spoke with pt re scheduling mammogram. Pt has md appt 12/12/18 with Dr. Cm and will discuss at md appt.

## 2017-12-12 ENCOUNTER — OFFICE VISIT (OUTPATIENT)
Dept: INTERNAL MEDICINE | Facility: CLINIC | Age: 57
End: 2017-12-12
Payer: OTHER GOVERNMENT

## 2017-12-12 VITALS
SYSTOLIC BLOOD PRESSURE: 115 MMHG | WEIGHT: 264.13 LBS | BODY MASS INDEX: 44.01 KG/M2 | HEART RATE: 88 BPM | TEMPERATURE: 97 F | OXYGEN SATURATION: 98 % | RESPIRATION RATE: 18 BRPM | DIASTOLIC BLOOD PRESSURE: 58 MMHG | HEIGHT: 65 IN

## 2017-12-12 DIAGNOSIS — K04.7 TOOTH ABSCESS: Primary | ICD-10-CM

## 2017-12-12 DIAGNOSIS — Z71.89 COUNSELING ON HEALTH PROMOTION AND DISEASE PREVENTION: ICD-10-CM

## 2017-12-12 PROCEDURE — 90732 PPSV23 VACC 2 YRS+ SUBQ/IM: CPT | Mod: PBBFAC,PN

## 2017-12-12 PROCEDURE — 99999 PR PBB SHADOW E&M-EST. PATIENT-LVL IV: CPT | Mod: PBBFAC,,, | Performed by: INTERNAL MEDICINE

## 2017-12-12 PROCEDURE — 99214 OFFICE O/P EST MOD 30 MIN: CPT | Mod: PBBFAC,PN | Performed by: INTERNAL MEDICINE

## 2017-12-12 PROCEDURE — 99214 OFFICE O/P EST MOD 30 MIN: CPT | Mod: S$PBB,,, | Performed by: INTERNAL MEDICINE

## 2017-12-12 RX ORDER — SULFAMETHOXAZOLE AND TRIMETHOPRIM 800; 160 MG/1; MG/1
1 TABLET ORAL 2 TIMES DAILY
Qty: 20 TABLET | Refills: 0 | Status: SHIPPED | OUTPATIENT
Start: 2017-12-12 | End: 2017-12-26

## 2017-12-12 RX ORDER — CLINDAMYCIN HYDROCHLORIDE 300 MG/1
300 CAPSULE ORAL EVERY 8 HOURS
Qty: 30 CAPSULE | Refills: 0 | Status: SHIPPED | OUTPATIENT
Start: 2017-12-12 | End: 2017-12-26

## 2017-12-12 NOTE — PROGRESS NOTES
Subjective:      Patient ID: Baylee Muñoz is a 57 y.o. female.    Chief Complaint: Follow-up (1 month**) and Dental Pain      HPI  Ms. Muñoz is a patient of mine, who presents for tooth pain over the past 1 month. Her bottom left tooth has been draining pus and she hasn't been able to see her dentist due to financial reason until next month. +chills. +pain. She has been taking Advil and rinsing it with   Peroxide and Dr. Kirkland.     Past Medical History:   Diagnosis Date    Allergy     Arthritis     Asthma     Depression     Diverticulitis 2005    Gastritis     Hypertension     Severe obesity (BMI >= 40)     Smoking      Past Surgical History:   Procedure Laterality Date    CHOLECYSTECTOMY      cyst removal       Social History     Social History    Marital status:      Spouse name: N/A    Number of children: N/A    Years of education: N/A     Occupational History    Not on file.     Social History Main Topics    Smoking status: Light Tobacco Smoker     Packs/day: 0.25     Years: 27.00    Smokeless tobacco: Never Used    Alcohol use No    Drug use: No    Sexual activity: No     Other Topics Concern    Not on file     Social History Narrative    No narrative on file     Family History   Problem Relation Age of Onset    Stroke Mother     Heart disease Mother     Kidney disease Father     Heart disease Father     Hypertension Father     HIV Sister     COPD Sister     Bipolar disorder Sister        Current Outpatient Prescriptions:     acetaminophen (TYLENOL) 650 MG TbSR, Take 1 tablet (650 mg total) by mouth every 8 (eight) hours., Disp: , Rfl: 0    albuterol (ACCUNEB) 0.63 mg/3 mL Nebu, Take 0.63 mg by nebulization every 6 (six) hours as needed. Rescue, Disp: , Rfl:     amlodipine-benazepril (LOTREL) 10-40 mg per capsule, Take 1 capsule by mouth once daily at 6am., Disp: , Rfl: 11    ergocalciferol (ERGOCALCIFEROL) 50,000 unit Cap, Take 1 capsule (50,000 Units total) by mouth  "every 7 days., Disp: 8 capsule, Rfl: 3    famotidine (PEPCID) 20 MG tablet, , Disp: , Rfl:     furosemide (LASIX) 40 MG tablet, Take 40 mg by mouth 2 (two) times daily., Disp: , Rfl:     orphenadrine (NORFLEX) 100 mg tablet, Take 100 mg by mouth 2 (two) times daily., Disp: , Rfl:     clindamycin (CLEOCIN) 300 MG capsule, Take 1 capsule (300 mg total) by mouth every 8 (eight) hours., Disp: 30 capsule, Rfl: 0    diclofenac sodium 1 % Gel, Apply 2 g topically 4 (four) times daily., Disp: 100 g, Rfl: 11    nicotine (NICODERM CQ) 14 mg/24 hr, Place 1 patch onto the skin once daily., Disp: 14 patch, Rfl: 1    sulfamethoxazole-trimethoprim 800-160mg (BACTRIM DS) 800-160 mg Tab, Take 1 tablet by mouth 2 (two) times daily., Disp: 20 tablet, Rfl: 0    Review of patient's allergies indicates:   Allergen Reactions    Penicillins Hives    Coconut     Dairy aid [lactase]     Iodine and iodide containing products       Review of Systems   Constitutional: Negative.   Cardiovascular: Negative.   Respiratory: Negative.   Gastrointestinal: Negative.   Genitourinary: Negative.   Musculoskeletal: Negative.   Derm: Negative.  Allergic/Immunologic: Negative.   Endocrine: Negative.   Hematological: Negative.   Neurological: Negative.   Psychiatric/Behavioral: Negative.     Objective:     BP (!) 115/58 (BP Location: Left arm, Patient Position: Sitting, BP Method: Large (Automatic))   Pulse 88   Temp 97.2 °F (36.2 °C) (Tympanic)   Resp 18   Ht 5' 5" (1.651 m)   Wt 119.8 kg (264 lb 1.8 oz)   SpO2 98%   BMI 43.95 kg/m²     Physical Exam  GEN: A&O fully, NAD  PSYC: Normal affect  HEENT: OP: left mandibular pus draining from 3x3 mm papule on exterior gingiva, +anterior LAD    Lab Results   Component Value Date    WBC 8.20 07/17/2017    HGB 12.4 07/17/2017    HCT 41.9 07/17/2017     07/17/2017    CHOL 155 07/17/2017    TRIG 90 07/17/2017    HDL 35 (L) 07/17/2017    ALT 15 07/17/2017    AST 16 07/17/2017     " 08/28/2017    K 3.9 08/28/2017     08/28/2017    CREATININE 1.0 08/28/2017    BUN 11 08/28/2017    CO2 32 (H) 08/28/2017    TSH 0.387 (L) 07/17/2017    HGBA1C 5.8 (H) 07/17/2017       Assessment:      1. Tooth abscess: Risks and benefits discussed and patient chose to move forward with clindamycin TID and bactrim DS BID x10 days. Encouraged to take an OTC probiotic supplement.      Plan:   Tooth abscess  -     clindamycin (CLEOCIN) 300 MG capsule; Take 1 capsule (300 mg total) by mouth every 8 (eight) hours.  Dispense: 30 capsule; Refill: 0  -     sulfamethoxazole-trimethoprim 800-160mg (BACTRIM DS) 800-160 mg Tab; Take 1 tablet by mouth 2 (two) times daily.  Dispense: 20 tablet; Refill: 0    Counseling on health promotion and disease prevention  -     (In Office Administered) Pneumococcal Polysaccharide Vaccine (23 Valent) (SQ/IM)      RTC in 3 months RE obesity or sooner as needed

## 2017-12-26 ENCOUNTER — OFFICE VISIT (OUTPATIENT)
Dept: URGENT CARE | Facility: CLINIC | Age: 57
End: 2017-12-26
Payer: OTHER GOVERNMENT

## 2017-12-26 ENCOUNTER — TELEPHONE (OUTPATIENT)
Dept: URGENT CARE | Facility: CLINIC | Age: 57
End: 2017-12-26

## 2017-12-26 ENCOUNTER — HOSPITAL ENCOUNTER (OUTPATIENT)
Dept: RADIOLOGY | Facility: HOSPITAL | Age: 57
Discharge: HOME | End: 2017-12-26
Attending: PHYSICIAN ASSISTANT
Payer: OTHER GOVERNMENT

## 2017-12-26 VITALS
OXYGEN SATURATION: 98 % | BODY MASS INDEX: 43.34 KG/M2 | WEIGHT: 260.13 LBS | HEART RATE: 94 BPM | HEIGHT: 65 IN | SYSTOLIC BLOOD PRESSURE: 115 MMHG | DIASTOLIC BLOOD PRESSURE: 75 MMHG | TEMPERATURE: 100 F

## 2017-12-26 DIAGNOSIS — B34.9 VIRAL SYNDROME: Primary | ICD-10-CM

## 2017-12-26 DIAGNOSIS — R05.9 COUGH: ICD-10-CM

## 2017-12-26 DIAGNOSIS — Z20.828 EXPOSURE TO THE FLU: ICD-10-CM

## 2017-12-26 DIAGNOSIS — R06.02 SHORTNESS OF BREATH: Primary | ICD-10-CM

## 2017-12-26 LAB
CTP QC/QA: YES
FLUAV AG NPH QL: NEGATIVE
FLUBV AG NPH QL: NEGATIVE

## 2017-12-26 PROCEDURE — 99214 OFFICE O/P EST MOD 30 MIN: CPT | Mod: S$PBB,,, | Performed by: PHYSICIAN ASSISTANT

## 2017-12-26 PROCEDURE — 99213 OFFICE O/P EST LOW 20 MIN: CPT | Mod: PBBFAC,25,PN | Performed by: PHYSICIAN ASSISTANT

## 2017-12-26 PROCEDURE — 99999 PR PBB SHADOW E&M-EST. PATIENT-LVL III: CPT | Mod: PBBFAC,,, | Performed by: PHYSICIAN ASSISTANT

## 2017-12-26 RX ORDER — ALBUTEROL SULFATE 90 UG/1
2 AEROSOL, METERED RESPIRATORY (INHALATION) EVERY 6 HOURS PRN
Qty: 1 INHALER | Refills: 0 | Status: SHIPPED | OUTPATIENT
Start: 2017-12-26 | End: 2017-12-27 | Stop reason: SDUPTHER

## 2017-12-26 RX ORDER — PROMETHAZINE HYDROCHLORIDE AND DEXTROMETHORPHAN HYDROBROMIDE 6.25; 15 MG/5ML; MG/5ML
5 SYRUP ORAL NIGHTLY PRN
Qty: 120 ML | Refills: 0 | Status: SHIPPED | OUTPATIENT
Start: 2017-12-26 | End: 2019-02-28

## 2017-12-26 RX ORDER — BENZONATATE 200 MG/1
200 CAPSULE ORAL 3 TIMES DAILY PRN
Qty: 30 CAPSULE | Refills: 0 | Status: SHIPPED | OUTPATIENT
Start: 2017-12-26 | End: 2018-01-05

## 2017-12-26 RX ORDER — FLUTICASONE PROPIONATE 50 MCG
2 SPRAY, SUSPENSION (ML) NASAL DAILY
Qty: 1 BOTTLE | Refills: 0 | Status: SHIPPED | OUTPATIENT
Start: 2017-12-26 | End: 2017-12-27

## 2017-12-26 NOTE — TELEPHONE ENCOUNTER
Spoke with patient regarding CXR.  Explain findings of possible bronchitis and discuss that this is likely still a viral process.  Will send inhaler to use as needed.  If symptoms persist or worsen in the next few days, follow up with PCP.  Patient expresses understanding and agrees with treatment plan.

## 2017-12-26 NOTE — PATIENT INSTRUCTIONS
"  Viral Syndrome (Adult)  A viral illness may cause a number of symptoms. The symptoms depend on the part of the body that the virus affects. If it settles in your nose, throat, and lungs, it may cause cough, sore throat, congestion, and sometimes headache. If it settles in your stomach and intestinal tract, it may cause vomiting and diarrhea. Sometimes it causes vague symptoms like "aching all over," feeling tired, loss of appetite, or fever.  A viral illness usually lasts 1 to 2 weeks, but sometimes it lasts longer. In some cases, a more serious infection can look like a viral syndrome in the first few days of the illness. You may need another exam and additional tests to know the difference. Watch for the warning signs listed below.  Home care  Follow these guidelines for taking care of yourself at home:  · If symptoms are severe, rest at home for the first 2 to 3 days.  · Stay away from cigarette smoke - both your smoke and the smoke from others.  · You may use over-the-counter acetaminophen or ibuprofen for fever, muscle aching, and headache, unless another medicine was prescribed for this. If you have chronic liver or kidney disease or ever had a stomach ulcer or GI bleeding, talk with your doctor before using these medicines. No one who is younger than 18 and ill with a fever should take aspirin. It may cause severe disease or death.  · Your appetite may be poor, so a light diet is fine. Avoid dehydration by drinking 8 to 12 8-ounce glasses of fluids each day. This may include water; orange juice; lemonade; apple, grape, and cranberry juice; clear fruit drinks; electrolyte replacement and sports drinks; and decaffeinated teas and coffee. If you have been diagnosed with a kidney disease, ask your doctor how much and what types of fluids you should drink to prevent dehydration. If you have kidney disease, drinking too much fluid can cause it build up in the your body and be dangerous to your " health.  · Over-the-counter remedies won't shorten the length of the illness but may be helpful for cough, sore throat; and nasal and sinus congestion. Don't use decongestants if you have high blood pressure.  Follow-up care  Follow up with your healthcare provider if you do not improve over the next week.  Call 911  Get emergency medical care if any of the following occur:  · Convulsion  · Feeling weak, dizzy, or like you are going to faint  · Chest pain, shortness of breath, wheezing, or difficulty breathing  When to seek medical advice  Call your healthcare provider right away if any of these occur:  · Cough with lots of colored sputum (mucus) or blood in your sputum  · Chest pain, shortness of breath, wheezing, or difficulty breathing  · Severe headache; face, neck, or ear pain  · Severe, constant pain in the lower right side of your belly (abdominal)  · Continued vomiting (cant keep liquids down)  · Frequent diarrhea (more than 5 times a day); blood (red or black color) or mucus in diarrhea  · Feeling weak, dizzy, or like you are going to faint  · Extreme thirst  · Fever of 100.4°F (38°C) or higher, or as directed by your healthcare provider  Date Last Reviewed: 9/25/2015  © 5525-8224 [employer]. [address] [place] [address], [place] [address]. All rights reserved. This information is not intended as a substitute for professional medical care. Always follow your healthcare professional's instructions.    Acetaminophen or ibuprofen will help ease your fever, muscle aches, and headache. Dont give aspirin to anyone younger than 18 who has the flu. Aspirin can harm the liver.  Nausea and loss of appetite are common. Eat light meals. Drink 6 to 8 glasses of liquids every day. Good choices are water, sport drinks, soft drinks without caffeine, juices, tea, and soup. Extra fluids will also help loosen secretions in your nose and lungs.  Over-the-counter cold medicines will not make the illness go away  faster. But the medicines may help with coughing, sore throat, and congestion in your nose and sinuses. Dont use a decongestant if you have high blood pressure.  Stay home until your fever has been gone for at least 24 hours without using medicine to reduce fever.  If symptoms worsen or do not improve, follow up with PCP.   For severe symptoms (fever unresolved by OTC meds, chest pain, difficulty breathing, dehydration of intractable nausea/vomiting), go to the ER.

## 2017-12-26 NOTE — PROGRESS NOTES
"Subjective:      Patient ID: Baylee Muñoz is a 57 y.o. female.    Chief Complaint: Fever and Chills    Fever    This is a new problem. The current episode started in the past 7 days. Associated symptoms include congestion, coughing (productive), diarrhea, ear pain (L), headaches, a sore throat and wheezing (improving). Pertinent negatives include no abdominal pain, nausea, rash or vomiting. Treatments tried: Nyquil. The treatment provided no relief.   Risk factors: sick contacts (grandchild sick)      Review of Systems   Constitutional: Positive for chills, diaphoresis and fever.   HENT: Positive for congestion, ear pain (L), rhinorrhea and sore throat. Negative for sinus pain and sinus pressure.    Respiratory: Positive for cough (productive), shortness of breath and wheezing (improving).    Gastrointestinal: Positive for diarrhea. Negative for abdominal pain, constipation, nausea and vomiting.   Musculoskeletal:        Body aches   Skin: Negative for rash.   Neurological: Positive for dizziness, light-headedness and headaches.       Objective:   /75   Pulse 94   Temp 99.9 °F (37.7 °C) (Tympanic)   Ht 5' 5" (1.651 m)   Wt 118 kg (260 lb 2.3 oz)   SpO2 98%   BMI 43.29 kg/m²   Physical Exam   Constitutional: She appears well-developed and well-nourished. She does not appear ill. No distress.   HENT:   Head: Normocephalic and atraumatic.   Right Ear: Tympanic membrane and ear canal normal. Tympanic membrane is not erythematous. No middle ear effusion.   Left Ear: Tympanic membrane and ear canal normal. Tympanic membrane is not erythematous.  No middle ear effusion.   Nose: Nose normal. Right sinus exhibits no maxillary sinus tenderness and no frontal sinus tenderness. Left sinus exhibits no maxillary sinus tenderness and no frontal sinus tenderness.   Mouth/Throat: Uvula is midline and oropharynx is clear and moist.   Cardiovascular: Normal rate, regular rhythm and normal heart sounds.    No murmur " heard.  Pulmonary/Chest: Effort normal. No respiratory distress. She has decreased breath sounds (possibly d/t body habitus). She has no wheezes. She has no rhonchi. She has no rales.   Lymphadenopathy:     She has no cervical adenopathy.   Skin: Skin is warm and dry. No rash noted. She is not diaphoretic.   Psychiatric: She has a normal mood and affect. Her speech is normal and behavior is normal. Thought content normal.     Assessment:      1. Viral syndrome    2. Cough    3. Exposure to the flu       Plan:   Viral syndrome  -     fluticasone (FLONASE) 50 mcg/actuation nasal spray; 2 sprays by Each Nare route once daily.  Dispense: 1 Bottle; Refill: 0    Cough  -     X-Ray Chest PA And Lateral; Future; Expected date: 12/26/2017  -     benzonatate (TESSALON) 200 MG capsule; Take 1 capsule (200 mg total) by mouth 3 (three) times daily as needed for Cough.  Dispense: 30 capsule; Refill: 0  -     promethazine-dextromethorphan (PROMETHAZINE-DM) 6.25-15 mg/5 mL Syrp; Take 5 mLs by mouth nightly as needed (Cough).  Dispense: 120 mL; Refill: 0    Exposure to the flu  -     POCT Influenza A/B    Reviewed negative flu.  Due to duration of illness, do not feel Tamiflu would be beneficial in treating symptoms given change of false negative.   Will call with any abnormality of CXR.   Discussed this is likely viral illness.  Recommend symptomatic treatment.     Discussed with patient taking cough syrup at night due to potential drowsiness.  Explained that patient should monitor their BP and watch for signs of elevated BP (headache, dizziness, light headedness, etc.)  If symptoms occur, discontinue this medication.      Gave handout on viral syndrome.  Printed AVS and reviewed treatment plan in detail.    Discussed worsening signs/symptoms and when to return to clinic or go to ED.   Patient expresses understanding and agrees with treatment plan.

## 2017-12-27 ENCOUNTER — TELEPHONE (OUTPATIENT)
Dept: INTERNAL MEDICINE | Facility: CLINIC | Age: 57
End: 2017-12-27

## 2017-12-27 ENCOUNTER — NURSE TRIAGE (OUTPATIENT)
Dept: ADMINISTRATIVE | Facility: CLINIC | Age: 57
End: 2017-12-27

## 2017-12-27 ENCOUNTER — OFFICE VISIT (OUTPATIENT)
Dept: INTERNAL MEDICINE | Facility: CLINIC | Age: 57
End: 2017-12-27
Payer: OTHER GOVERNMENT

## 2017-12-27 VITALS
TEMPERATURE: 98 F | DIASTOLIC BLOOD PRESSURE: 60 MMHG | BODY MASS INDEX: 44.01 KG/M2 | OXYGEN SATURATION: 94 % | HEART RATE: 62 BPM | RESPIRATION RATE: 20 BRPM | SYSTOLIC BLOOD PRESSURE: 115 MMHG | WEIGHT: 264.13 LBS | HEIGHT: 65 IN

## 2017-12-27 DIAGNOSIS — J45.21 MILD INTERMITTENT ASTHMA WITH ACUTE EXACERBATION: Primary | ICD-10-CM

## 2017-12-27 DIAGNOSIS — J20.9 ACUTE BRONCHITIS, UNSPECIFIED ORGANISM: Primary | ICD-10-CM

## 2017-12-27 DIAGNOSIS — R06.02 SHORTNESS OF BREATH: ICD-10-CM

## 2017-12-27 PROCEDURE — 99213 OFFICE O/P EST LOW 20 MIN: CPT | Mod: PBBFAC,PN | Performed by: INTERNAL MEDICINE

## 2017-12-27 PROCEDURE — 96372 THER/PROPH/DIAG INJ SC/IM: CPT | Mod: PBBFAC,PN

## 2017-12-27 PROCEDURE — 99213 OFFICE O/P EST LOW 20 MIN: CPT | Mod: S$PBB,,, | Performed by: INTERNAL MEDICINE

## 2017-12-27 PROCEDURE — 99999 PR PBB SHADOW E&M-EST. PATIENT-LVL III: CPT | Mod: PBBFAC,,, | Performed by: INTERNAL MEDICINE

## 2017-12-27 RX ORDER — GUAIFENESIN 600 MG/1
600 TABLET, EXTENDED RELEASE ORAL 2 TIMES DAILY
Qty: 60 TABLET | Refills: 2 | Status: SHIPPED | OUTPATIENT
Start: 2017-12-27 | End: 2018-12-27

## 2017-12-27 RX ORDER — METHYLPREDNISOLONE ACETATE 80 MG/ML
80 INJECTION, SUSPENSION INTRA-ARTICULAR; INTRALESIONAL; INTRAMUSCULAR; SOFT TISSUE
Status: COMPLETED | OUTPATIENT
Start: 2017-12-27 | End: 2017-12-27

## 2017-12-27 RX ORDER — ALBUTEROL SULFATE 90 UG/1
2 AEROSOL, METERED RESPIRATORY (INHALATION) EVERY 6 HOURS PRN
Qty: 1 INHALER | Refills: 11 | Status: SHIPPED | OUTPATIENT
Start: 2017-12-27 | End: 2018-01-05 | Stop reason: SDUPTHER

## 2017-12-27 RX ADMIN — METHYLPREDNISOLONE ACETATE 80 MG: 80 INJECTION, SUSPENSION INTRALESIONAL; INTRAMUSCULAR; INTRASYNOVIAL; SOFT TISSUE at 03:12

## 2017-12-27 NOTE — TELEPHONE ENCOUNTER
----- Message from Ania Valdez sent at 12/27/2017  7:56 AM CST -----  Pt is requesting a call from nurse to discuss some health concerns.          Please call pt back at [phone]

## 2017-12-27 NOTE — PROGRESS NOTES
Subjective:      Patient ID: Baylee Muñoz is a 57 y.o. female.    Chief Complaint: Injections      HPI  Ms. Muñoz is a patient of mine, who presents for hemoptasis. She reports the reddish colored sputum has resolved today. She had also noted some mild tongue swelling, which has also subsided. She continues to cough and has a sore throat/neck.    Past Medical History:   Diagnosis Date    Allergy     Arthritis     Asthma     Depression     Diverticulitis 2005    Gastritis     Hypertension     Severe obesity (BMI >= 40)     Smoking      Past Surgical History:   Procedure Laterality Date    CHOLECYSTECTOMY      cyst removal       Social History     Social History    Marital status:      Spouse name: N/A    Number of children: N/A    Years of education: N/A     Occupational History    Not on file.     Social History Main Topics    Smoking status: Light Tobacco Smoker     Packs/day: 0.25     Years: 27.00    Smokeless tobacco: Never Used    Alcohol use No    Drug use: No    Sexual activity: No     Other Topics Concern    Not on file     Social History Narrative    No narrative on file     Family History   Problem Relation Age of Onset    Stroke Mother     Heart disease Mother     Kidney disease Father     Heart disease Father     Hypertension Father     HIV Sister     COPD Sister     Bipolar disorder Sister        Current Outpatient Prescriptions:     acetaminophen (TYLENOL) 650 MG TbSR, Take 1 tablet (650 mg total) by mouth every 8 (eight) hours., Disp: , Rfl: 0    albuterol (ACCUNEB) 0.63 mg/3 mL Nebu, Take 0.63 mg by nebulization every 6 (six) hours as needed. Rescue, Disp: , Rfl:     albuterol 90 mcg/actuation inhaler, Inhale 2 puffs into the lungs every 6 (six) hours as needed for Wheezing., Disp: 1 Inhaler, Rfl: 11    amlodipine-benazepril (LOTREL) 10-40 mg per capsule, Take 1 capsule by mouth once daily at 6am., Disp: , Rfl: 11    benzonatate (TESSALON) 200 MG capsule,  "Take 1 capsule (200 mg total) by mouth 3 (three) times daily as needed for Cough., Disp: 30 capsule, Rfl: 0    ergocalciferol (ERGOCALCIFEROL) 50,000 unit Cap, Take 1 capsule (50,000 Units total) by mouth every 7 days., Disp: 8 capsule, Rfl: 3    famotidine (PEPCID) 20 MG tablet, , Disp: , Rfl:     furosemide (LASIX) 40 MG tablet, Take 40 mg by mouth 2 (two) times daily., Disp: , Rfl:     nicotine (NICODERM CQ) 14 mg/24 hr, Place 1 patch onto the skin once daily., Disp: 14 patch, Rfl: 1    orphenadrine (NORFLEX) 100 mg tablet, Take 100 mg by mouth 2 (two) times daily., Disp: , Rfl:     promethazine-dextromethorphan (PROMETHAZINE-DM) 6.25-15 mg/5 mL Syrp, Take 5 mLs by mouth nightly as needed (Cough)., Disp: 120 mL, Rfl: 0    diclofenac sodium 1 % Gel, Apply 2 g topically 4 (four) times daily., Disp: 100 g, Rfl: 11    guaiFENesin (MUCINEX) 600 mg 12 hr tablet, Take 1 tablet (600 mg total) by mouth 2 (two) times daily., Disp: 60 tablet, Rfl: 2    Current Facility-Administered Medications:     methylPREDNISolone acetate injection 80 mg, 80 mg, Intramuscular, 1 time in Clinic/HOD, [facility] C[facility] MD    Review of patient's allergies indicates:   Allergen Reactions    Penicillins Hives    Coconut     Dairy aid [lactase]     Iodine and iodide containing products         Review of Systems   Negative.     Objective:     /60 (BP Location: Left arm, Patient Position: Sitting, BP Method: Large (Automatic))   Pulse 62   Temp 98.4 °F (36.9 °C) (Tympanic)   Resp 20   Ht 5' 5" (1.651 m)   Wt 119.8 kg (264 lb 1.8 oz)   SpO2 (!) 94%   BMI 43.95 kg/m²     Physical Exam  GEN: A&O fully, NAD  PSYC: Normal affect  HEENT: OP: Clear, no LAD, no thyroid masses  CV: RRR, no M/G/R  PULM: +wheezes bilaterally, no rales      Lab Results   Component Value Date    WBC 8.20 07/17/2017    HGB 12.4 07/17/2017    HCT 41.9 07/17/2017     07/17/2017    CHOL 155 07/17/2017    TRIG 90 07/17/2017    HDL 35 (L) 07/17/2017 "    ALT 15 07/17/2017    AST 16 07/17/2017     08/28/2017    K 3.9 08/28/2017     08/28/2017    CREATININE 1.0 08/28/2017    BUN 11 08/28/2017    CO2 32 (H) 08/28/2017    TSH 0.387 (L) 07/17/2017    HGBA1C 5.8 (H) 07/17/2017       Assessment:      1. Acute bronchitis, unspecified organism             Likely viral etiology. Risks and benefits discussed and patient chose to move forward with steroid inj            & Mucinex.     Plan:   Acute bronchitis, unspecified organism  -     methylPREDNISolone acetate injection 80 mg; Inject 1 mL (80 mg total) into the muscle one time.  -     guaiFENesin (MUCINEX) 600 mg 12 hr tablet; Take 1 tablet (600 mg total) by mouth 2 (two) times daily.  Dispense: 60 tablet; Refill: 2      RTC as needed

## 2017-12-27 NOTE — TELEPHONE ENCOUNTER
Returned pt's phone call no answer unable to leave VM message from RN in regards to pt has been sent to doctor./db**

## 2017-12-27 NOTE — TELEPHONE ENCOUNTER
Reason for Disposition   [1] Coughed up blood AND [2] > 1 tablespoon (15 ml) (Exception: blood-tinged sputum)    Protocols used:  COUGHING UP BLOOD-RIMA Samuelssa is calling to report she is coughing up grapefruit colored sputum.  States is more than 1 teaspoon at a time.  States is not streaked with mucous but is coughing this up pure.  Advised to go to ER.

## 2018-01-05 ENCOUNTER — TELEPHONE (OUTPATIENT)
Dept: INTERNAL MEDICINE | Facility: CLINIC | Age: 58
End: 2018-01-05

## 2018-01-05 DIAGNOSIS — R06.02 SHORTNESS OF BREATH: ICD-10-CM

## 2018-01-05 RX ORDER — ALBUTEROL SULFATE 90 UG/1
2 AEROSOL, METERED RESPIRATORY (INHALATION) EVERY 6 HOURS PRN
Qty: 3 INHALER | Refills: 11 | Status: SHIPPED | OUTPATIENT
Start: 2018-01-05 | End: 2019-02-11 | Stop reason: SDUPTHER

## 2018-01-05 NOTE — TELEPHONE ENCOUNTER
----- Message from Sirisha Bowens sent at 1/5/2018  9:40 AM CST -----  Contact: pt  She's calling stating that her Rx for ProAir inhaler was sent to [facility] for only 1 when she usually gets 3 at a time, she stated that she had to pay $20 for that 1 when its normally 3 for that price, please advise [phone] (home)

## 2018-01-05 NOTE — TELEPHONE ENCOUNTER
Pt asking can you send two more inhalers into the pharmacy because the price is better with three instead of just one inhalers. Please advise./db**

## 2018-01-11 ENCOUNTER — HOSPITAL ENCOUNTER (OUTPATIENT)
Dept: RADIOLOGY | Facility: HOSPITAL | Age: 58
Discharge: HOME | End: 2018-01-11
Attending: INTERNAL MEDICINE
Payer: OTHER GOVERNMENT

## 2018-01-11 DIAGNOSIS — Z12.31 SCREENING MAMMOGRAM, ENCOUNTER FOR: ICD-10-CM

## 2018-01-11 PROCEDURE — 77067 SCR MAMMO BI INCL CAD: CPT | Mod: 26,,, | Performed by: RADIOLOGY

## 2018-01-11 PROCEDURE — 77067 SCR MAMMO BI INCL CAD: CPT | Mod: TC

## 2018-02-06 ENCOUNTER — TELEPHONE (OUTPATIENT)
Dept: INTERNAL MEDICINE | Facility: CLINIC | Age: 58
End: 2018-02-06

## 2018-02-06 NOTE — TELEPHONE ENCOUNTER
Spoke with pt, let her the two antibiotics that were prescribed to her in December. Pt verbalized understanding.

## 2018-02-06 NOTE — TELEPHONE ENCOUNTER
----- Message from Suzy Childs sent at 2/6/2018  3:27 PM CST -----  Contact: patient  Calling concerning what is the name of antibiotic patient was on in December 2017. Please call patient ASAP today URGENT!! @ [phone]. Thanks, erika

## 2018-02-26 ENCOUNTER — PATIENT OUTREACH (OUTPATIENT)
Dept: ADMINISTRATIVE | Facility: HOSPITAL | Age: 58
End: 2018-02-26

## 2018-02-26 RX ORDER — AMLODIPINE AND BENAZEPRIL HYDROCHLORIDE 10; 40 MG/1; MG/1
1 CAPSULE ORAL DAILY
Qty: 30 CAPSULE | Refills: 0 | Status: SHIPPED | OUTPATIENT
Start: 2018-02-26 | End: 2018-02-28 | Stop reason: SDUPTHER

## 2018-02-26 NOTE — TELEPHONE ENCOUNTER
Spoke with pt, she stated she needs 2 weeks of Lotensin sent into [facility] in [place] to last her until her [facility] (90 day refill) is approved from [facility] and sent in. Last appt was 12/27/17. Please review and advise.

## 2018-02-26 NOTE — TELEPHONE ENCOUNTER
30 day supply sent.  Please see if NP can take care of these issues.  Coverage should be at the location.

## 2018-02-26 NOTE — TELEPHONE ENCOUNTER
----- Message from Suzy Childs sent at 2/26/2018  9:39 AM CST -----  Contact: patient  Requesting a renewal: Need two weeks of Lotrel (/Amlodipine/Benazepril)    [id number] - [place], [place] - [address] [facility] [address]  [address]   [place] [address]  Phone: [phone] Fax: [phone]    1. What is the name of the medication you are requesting? Lotrel  2. What is the dose? 10/40 mg  3. How do you take the medication? Orally, topically, etc? oral  4. How often do you take this medication? 1 x a day  5. Do you need a 30 day or 90 day supply? 90 day  6. How many refills are you requesting? 4  7. What is your preferred pharmacy and location of the pharmacy?   [facility] - [address] [address]  Phone: [phone] Fax: [phone]    8. Who can we contact with further questions? Patient @ [phone]. Thanks, erika

## 2018-02-28 NOTE — TELEPHONE ENCOUNTER
Received refill request from [facility] for  Amlodipine/benazepril 10/40mg- last visit 12/27/2017.

## 2018-03-05 RX ORDER — AMLODIPINE AND BENAZEPRIL HYDROCHLORIDE 10; 40 MG/1; MG/1
1 CAPSULE ORAL DAILY
Qty: 90 CAPSULE | Refills: 3 | Status: SHIPPED | OUTPATIENT
Start: 2018-03-05 | End: 2018-08-27 | Stop reason: SDUPTHER

## 2018-08-27 ENCOUNTER — LAB VISIT (OUTPATIENT)
Dept: LAB | Facility: HOSPITAL | Age: 58
End: 2018-08-27
Attending: INTERNAL MEDICINE
Payer: OTHER GOVERNMENT

## 2018-08-27 ENCOUNTER — OFFICE VISIT (OUTPATIENT)
Dept: INTERNAL MEDICINE | Facility: CLINIC | Age: 58
End: 2018-08-27
Payer: OTHER GOVERNMENT

## 2018-08-27 ENCOUNTER — APPOINTMENT (OUTPATIENT)
Dept: RADIOLOGY | Facility: HOSPITAL | Age: 58
End: 2018-08-27
Attending: INTERNAL MEDICINE
Payer: OTHER GOVERNMENT

## 2018-08-27 VITALS
OXYGEN SATURATION: 98 % | BODY MASS INDEX: 44.63 KG/M2 | RESPIRATION RATE: 18 BRPM | TEMPERATURE: 99 F | WEIGHT: 267.88 LBS | HEIGHT: 65 IN | DIASTOLIC BLOOD PRESSURE: 68 MMHG | HEART RATE: 88 BPM | SYSTOLIC BLOOD PRESSURE: 130 MMHG

## 2018-08-27 DIAGNOSIS — Z00.00 PHYSICAL EXAM, ANNUAL: Primary | ICD-10-CM

## 2018-08-27 DIAGNOSIS — M25.572 CHRONIC PAIN OF BOTH ANKLES: ICD-10-CM

## 2018-08-27 DIAGNOSIS — M25.571 CHRONIC PAIN OF BOTH ANKLES: ICD-10-CM

## 2018-08-27 DIAGNOSIS — E55.9 VITAMIN D DEFICIENCY: ICD-10-CM

## 2018-08-27 DIAGNOSIS — G89.29 CHRONIC PAIN OF BOTH ANKLES: ICD-10-CM

## 2018-08-27 DIAGNOSIS — Z00.00 PHYSICAL EXAM, ANNUAL: ICD-10-CM

## 2018-08-27 LAB
25(OH)D3+25(OH)D2 SERPL-MCNC: 20 NG/ML
ALBUMIN SERPL BCP-MCNC: 3.4 G/DL
ALP SERPL-CCNC: 91 U/L
ALT SERPL W/O P-5'-P-CCNC: 13 U/L
ANION GAP SERPL CALC-SCNC: 8 MMOL/L
AST SERPL-CCNC: 16 U/L
BASOPHILS # BLD AUTO: 0.02 K/UL
BASOPHILS NFR BLD: 0.2 %
BILIRUB SERPL-MCNC: 0.3 MG/DL
BUN SERPL-MCNC: 14 MG/DL
CALCIUM SERPL-MCNC: 9.3 MG/DL
CHLORIDE SERPL-SCNC: 102 MMOL/L
CHOLEST SERPL-MCNC: 157 MG/DL
CHOLEST/HDLC SERPL: 4.8 {RATIO}
CO2 SERPL-SCNC: 34 MMOL/L
CREAT SERPL-MCNC: 1.2 MG/DL
DIFFERENTIAL METHOD: ABNORMAL
EOSINOPHIL # BLD AUTO: 0.1 K/UL
EOSINOPHIL NFR BLD: 0.8 %
ERYTHROCYTE [DISTWIDTH] IN BLOOD BY AUTOMATED COUNT: 16.6 %
EST. GFR  (AFRICAN AMERICAN): 58.4 ML/MIN/1.73 M^2
EST. GFR  (NON AFRICAN AMERICAN): 50.6 ML/MIN/1.73 M^2
GLUCOSE SERPL-MCNC: 69 MG/DL
HCT VFR BLD AUTO: 44.8 %
HDLC SERPL-MCNC: 33 MG/DL
HDLC SERPL: 21 %
HGB BLD-MCNC: 12.8 G/DL
IMM GRANULOCYTES # BLD AUTO: 0.02 K/UL
IMM GRANULOCYTES NFR BLD AUTO: 0.2 %
LDLC SERPL CALC-MCNC: 92 MG/DL
LYMPHOCYTES # BLD AUTO: 3.1 K/UL
LYMPHOCYTES NFR BLD: 37.5 %
MCH RBC QN AUTO: 26.5 PG
MCHC RBC AUTO-ENTMCNC: 28.6 G/DL
MCV RBC AUTO: 93 FL
MONOCYTES # BLD AUTO: 0.7 K/UL
MONOCYTES NFR BLD: 8.3 %
NEUTROPHILS # BLD AUTO: 4.4 K/UL
NEUTROPHILS NFR BLD: 53 %
NONHDLC SERPL-MCNC: 124 MG/DL
NRBC BLD-RTO: 0 /100 WBC
PLATELET # BLD AUTO: 272 K/UL
PMV BLD AUTO: 12.7 FL
POTASSIUM SERPL-SCNC: 4.4 MMOL/L
PROT SERPL-MCNC: 7.4 G/DL
RBC # BLD AUTO: 4.83 M/UL
SODIUM SERPL-SCNC: 144 MMOL/L
T4 FREE SERPL-MCNC: 0.94 NG/DL
TRIGL SERPL-MCNC: 160 MG/DL
TSH SERPL DL<=0.005 MIU/L-ACNC: 0.59 UIU/ML
WBC # BLD AUTO: 8.27 K/UL

## 2018-08-27 PROCEDURE — 82306 VITAMIN D 25 HYDROXY: CPT

## 2018-08-27 PROCEDURE — 80053 COMPREHEN METABOLIC PANEL: CPT

## 2018-08-27 PROCEDURE — 36415 COLL VENOUS BLD VENIPUNCTURE: CPT | Mod: PO

## 2018-08-27 PROCEDURE — 73110 X-RAY EXAM OF WRIST: CPT | Mod: 26,LT,, | Performed by: RADIOLOGY

## 2018-08-27 PROCEDURE — 84443 ASSAY THYROID STIM HORMONE: CPT

## 2018-08-27 PROCEDURE — 73110 X-RAY EXAM OF WRIST: CPT | Mod: TC,PO,LT

## 2018-08-27 PROCEDURE — 73610 X-RAY EXAM OF ANKLE: CPT | Mod: 26,50,, | Performed by: RADIOLOGY

## 2018-08-27 PROCEDURE — 99213 OFFICE O/P EST LOW 20 MIN: CPT | Mod: PBBFAC,PN,25 | Performed by: INTERNAL MEDICINE

## 2018-08-27 PROCEDURE — 85025 COMPLETE CBC W/AUTO DIFF WBC: CPT

## 2018-08-27 PROCEDURE — 80061 LIPID PANEL: CPT

## 2018-08-27 PROCEDURE — 84439 ASSAY OF FREE THYROXINE: CPT

## 2018-08-27 PROCEDURE — 73610 X-RAY EXAM OF ANKLE: CPT | Mod: 50,TC,PO

## 2018-08-27 PROCEDURE — 99396 PREV VISIT EST AGE 40-64: CPT | Mod: S$PBB,,, | Performed by: INTERNAL MEDICINE

## 2018-08-27 PROCEDURE — 99999 PR PBB SHADOW E&M-EST. PATIENT-LVL III: CPT | Mod: PBBFAC,,, | Performed by: INTERNAL MEDICINE

## 2018-08-27 RX ORDER — NAPROXEN 500 MG/1
500 TABLET ORAL 2 TIMES DAILY
Start: 2018-08-27 | End: 2019-01-15

## 2018-08-27 RX ORDER — AMLODIPINE AND BENAZEPRIL HYDROCHLORIDE 10; 40 MG/1; MG/1
1 CAPSULE ORAL DAILY
Qty: 90 CAPSULE | Refills: 3 | Status: SHIPPED | OUTPATIENT
Start: 2018-08-27 | End: 2019-01-12

## 2018-08-27 RX ORDER — ERGOCALCIFEROL 1.25 MG/1
50000 CAPSULE ORAL
Qty: 8 CAPSULE | Refills: 3 | Status: SHIPPED | OUTPATIENT
Start: 2018-08-27 | End: 2018-11-15 | Stop reason: SDUPTHER

## 2018-08-27 RX ORDER — FUROSEMIDE 40 MG/1
40 TABLET ORAL 2 TIMES DAILY PRN
Qty: 90 TABLET | Refills: 3 | Status: SHIPPED | OUTPATIENT
Start: 2018-08-27 | End: 2021-02-01

## 2018-08-27 NOTE — PROGRESS NOTES
Subjective:      Patient ID: Baylee Muñoz is a 58 y.o. female.    Chief Complaint: Annual Exam      HPI     Ms. Baylee Muñoz is a patient of [facility] Cm MD, who presents for annual.    She reports having intermittent pains in her ankles and left wrist pain, for which she has been taking Naproxen 500 mg 1 po qd.     She reports dropping her phone or other items ~1x/day, which is more than in the past.      Past Medical History:   Diagnosis Date    Allergy     Arthritis     Asthma     Depression     Diverticulitis 2005    Gastritis     Hypertension     Severe obesity (BMI >= 40)     Smoking      Past Surgical History:   Procedure Laterality Date    CHOLECYSTECTOMY      cyst removal       Social History     Socioeconomic History    Marital status:      Spouse name: Not on file    Number of children: Not on file    Years of education: Not on file    Highest education level: Not on file   Social Needs    Financial resource strain: Not on file    Food insecurity - worry: Not on file    Food insecurity - inability: Not on file    Transportation needs - medical: Not on file    Transportation needs - non-medical: Not on file   Occupational History    Not on file   Tobacco Use    Smoking status: Light Tobacco Smoker     Packs/day: 0.25     Years: 27.00     Pack years: 6.75    Smokeless tobacco: Never Used   Substance and Sexual Activity    Alcohol use: No    Drug use: No    Sexual activity: No   Other Topics Concern    Not on file   Social History Narrative    Not on file     Family History   Problem Relation Age of Onset    Stroke Mother     Heart disease Mother     Kidney disease Father     Heart disease Father     Hypertension Father     HIV Sister     COPD Sister     Bipolar disorder Sister        Current Outpatient Medications:     acetaminophen (TYLENOL) 650 MG TbSR, Take 1 tablet (650 mg total) by mouth every 8 (eight) hours., Disp: , Rfl: 0    albuterol (ACCUNEB)  "0.63 mg/3 mL Nebu, Take 0.63 mg by nebulization every 6 (six) hours as needed. Rescue, Disp: , Rfl:     albuterol 90 mcg/actuation inhaler, Inhale 2 puffs into the lungs every 6 (six) hours as needed for Wheezing., Disp: 3 Inhaler, Rfl: 11    amlodipine-benazepril (LOTREL) 10-40 mg per capsule, Take 1 capsule by mouth once daily., Disp: 90 capsule, Rfl: 3    ergocalciferol (ERGOCALCIFEROL) 50,000 unit Cap, Take 1 capsule (50,000 Units total) by mouth every 7 days., Disp: 8 capsule, Rfl: 3    famotidine (PEPCID) 20 MG tablet, , Disp: , Rfl:     furosemide (LASIX) 40 MG tablet, Take 1 tablet (40 mg total) by mouth 2 (two) times daily as needed., Disp: 90 tablet, Rfl: 3    guaiFENesin (MUCINEX) 600 mg 12 hr tablet, Take 1 tablet (600 mg total) by mouth 2 (two) times daily., Disp: 60 tablet, Rfl: 2    nicotine (NICODERM CQ) 14 mg/24 hr, Place 1 patch onto the skin once daily., Disp: 14 patch, Rfl: 1    orphenadrine (NORFLEX) 100 mg tablet, Take 100 mg by mouth 2 (two) times daily., Disp: , Rfl:     promethazine-dextromethorphan (PROMETHAZINE-DM) 6.25-15 mg/5 mL Syrp, Take 5 mLs by mouth nightly as needed (Cough)., Disp: 120 mL, Rfl: 0    diclofenac sodium 1 % Gel, Apply 2 g topically 4 (four) times daily., Disp: 100 g, Rfl: 11    naproxen (NAPROSYN) 500 MG tablet, Take 1 tablet (500 mg total) by mouth 2 (two) times daily., Disp: , Rfl:     Review of patient's allergies indicates:   Allergen Reactions    Penicillins Hives    Coconut     Dairy aid [lactase]     Iodine and iodide containing products         Review of Systems   All remaining systems negative    Objective:     /68 (BP Location: Left arm, Patient Position: Sitting, BP Method: Large (Manual))   Pulse 88   Temp 98.7 °F (37.1 °C) (Tympanic)   Resp 18   Ht 5' 5" (1.651 m)   Wt 121.5 kg (267 lb 13.7 oz)   SpO2 98%   BMI 44.57 kg/m²     Physical Exam  GEN: A&O fully, NAD  PSYC: Normal affect  HEENT: OP: Clear, no LAD, no thyroid masses, " auditory canals and TMs WNL  CV: RRR, no M/G/R  PULM: CTA bilaterally, no wheezes, rales, crackles   GI: S/NT/ND, normal bowel sounds  EXT: No C/C/E, normal DP pulses bilaterally  NEURO: CN II-XII intact, 5/5 strength globally, no sensory losses, normal tandem gait, normal Romberg, 2+ DTRs globally      Lab Results   Component Value Date    WBC 8.20 07/17/2017    HGB 12.4 07/17/2017    HCT 41.9 07/17/2017     07/17/2017    CHOL 155 07/17/2017    TRIG 90 07/17/2017    HDL 35 (L) 07/17/2017    LDLCALC 102.0 07/17/2017    ALT 15 07/17/2017    AST 16 07/17/2017     08/28/2017    K 3.9 08/28/2017     08/28/2017    CREATININE 1.0 08/28/2017    BUN 11 08/28/2017    CO2 32 (H) 08/28/2017    CALCIUM 8.9 08/28/2017    HGBA1C 5.8 (H) 07/17/2017       Assessment:      1. Physical exam, annual    2. Chronic pain of both ankles    3. Vitamin D deficiency        Plan:   Physical exam, annual  -     CBC auto differential; Future; Expected date: 08/27/2018  -     Comprehensive metabolic panel; Future; Expected date: 08/27/2018  -     Lipid panel; Future; Expected date: 08/27/2018  -     TSH; Future; Expected date: 08/27/2018  -     Vitamin D; Future; Expected date: 08/27/2018  -     T4, free; Future; Expected date: 08/27/2018    Chronic pain of both ankles  -     naproxen (NAPROSYN) 500 MG tablet; Take 1 tablet (500 mg total) by mouth 2 (two) times daily.  -     X-Ray Ankle Complete Bilateral; Future; Expected date: 08/27/2018  -     X-Ray Wrist Complete 3 views Left; Future; Expected date: 08/27/2018    Vitamin D deficiency  -     ergocalciferol (ERGOCALCIFEROL) 50,000 unit Cap; Take 1 capsule (50,000 Units total) by mouth every 7 days.  Dispense: 8 capsule; Refill: 3    Other orders  -     amlodipine-benazepril (LOTREL) 10-40 mg per capsule; Take 1 capsule by mouth once daily.  Dispense: 90 capsule; Refill: 3  -     furosemide (LASIX) 40 MG tablet; Take 1 tablet (40 mg total) by mouth 2 (two) times daily as  needed.  Dispense: 90 tablet; Refill: 3        No Follow-up on file.    I spent 25 minutes of time with patient 50% or more of which was discussing labs and plans of care.

## 2018-08-31 ENCOUNTER — PATIENT OUTREACH (OUTPATIENT)
Dept: ADMINISTRATIVE | Facility: HOSPITAL | Age: 58
End: 2018-08-31

## 2018-11-13 ENCOUNTER — PATIENT OUTREACH (OUTPATIENT)
Dept: ADMINISTRATIVE | Facility: HOSPITAL | Age: 58
End: 2018-11-13

## 2018-11-15 ENCOUNTER — PATIENT OUTREACH (OUTPATIENT)
Dept: ADMINISTRATIVE | Facility: HOSPITAL | Age: 58
End: 2018-11-15

## 2018-11-15 DIAGNOSIS — E55.9 VITAMIN D DEFICIENCY: ICD-10-CM

## 2018-11-15 DIAGNOSIS — Z12.11 SCREENING FOR COLON CANCER: Primary | ICD-10-CM

## 2018-11-15 RX ORDER — ERGOCALCIFEROL 1.25 MG/1
50000 CAPSULE ORAL
Qty: 8 CAPSULE | Refills: 3 | Status: SHIPPED | OUTPATIENT
Start: 2018-11-15 | End: 2021-02-01

## 2018-11-15 NOTE — PROGRESS NOTES
Contacted patient to follow up on overdue Colonoscopy. Patient opted for a fit kit. Patient will  fit kit from the office on 11/16/18.

## 2018-11-15 NOTE — TELEPHONE ENCOUNTER
----- Message from Chary Maher sent at 11/15/2018 12:02 PM CST -----  Contact: Baylee [phone]  1. What is the name of the medication you are requesting? Vitamin D  2. What is the dose?   3. How do you take the medication? Orally, topically, etc? Orally  4. How often do you take this medication? daily  5. Do you need a 30 day or 90 day supply? 30  6. How many refills are you requesting? 1  7. What is your preferred pharmacy and location of the pharmacy?   8. Who can we contact with further questions? patient

## 2019-01-04 ENCOUNTER — TELEPHONE (OUTPATIENT)
Dept: INTERNAL MEDICINE | Facility: CLINIC | Age: 59
End: 2019-01-04

## 2019-01-04 NOTE — TELEPHONE ENCOUNTER
Spoke with santo and apt was set for pt on the 14th of jan for hospital follow up. Santo vocied understanding and stated that she will inform the pt of date and time of apt./db**

## 2019-01-04 NOTE — TELEPHONE ENCOUNTER
----- Message from Will Allen sent at 1/4/2019 11:08 AM CST -----  Contact: Galilea( [facility] )   Trying to get pt worked in next week for hospital f/u. Pt admitted for asthma exacerbation pt admitted 1/3.       [phone]

## 2019-01-12 ENCOUNTER — HOSPITAL ENCOUNTER (EMERGENCY)
Facility: HOSPITAL | Age: 59
Discharge: HOME | End: 2019-01-12
Attending: FAMILY MEDICINE
Payer: OTHER GOVERNMENT

## 2019-01-12 ENCOUNTER — NURSE TRIAGE (OUTPATIENT)
Dept: ADMINISTRATIVE | Facility: CLINIC | Age: 59
End: 2019-01-12

## 2019-01-12 VITALS
OXYGEN SATURATION: 93 % | BODY MASS INDEX: 44.44 KG/M2 | HEIGHT: 65 IN | TEMPERATURE: 99 F | RESPIRATION RATE: 50 BRPM | HEART RATE: 96 BPM | SYSTOLIC BLOOD PRESSURE: 142 MMHG | WEIGHT: 266.75 LBS | DIASTOLIC BLOOD PRESSURE: 77 MMHG

## 2019-01-12 DIAGNOSIS — R06.02 SOB (SHORTNESS OF BREATH): ICD-10-CM

## 2019-01-12 DIAGNOSIS — B37.0 ORAL THRUSH: Primary | ICD-10-CM

## 2019-01-12 LAB
ALBUMIN SERPL BCP-MCNC: 3.3 G/DL
ALLENS TEST: ABNORMAL
ALP SERPL-CCNC: 86 U/L
ALT SERPL W/O P-5'-P-CCNC: 26 U/L
ANION GAP SERPL CALC-SCNC: 12 MMOL/L
AST SERPL-CCNC: 23 U/L
BACTERIA #/AREA URNS HPF: ABNORMAL /HPF
BASOPHILS # BLD AUTO: 0.01 K/UL
BASOPHILS NFR BLD: 0.1 %
BILIRUB SERPL-MCNC: 0.5 MG/DL
BILIRUB UR QL STRIP: NEGATIVE
BNP SERPL-MCNC: 13 PG/ML
BUN SERPL-MCNC: 22 MG/DL
CALCIUM SERPL-MCNC: 9.8 MG/DL
CHLORIDE SERPL-SCNC: 100 MMOL/L
CLARITY UR: CLEAR
CO2 SERPL-SCNC: 33 MMOL/L
COLOR UR: YELLOW
CREAT SERPL-MCNC: 1.1 MG/DL
D DIMER PPP IA.FEU-MCNC: 0.29 MG/L FEU
DELSYS: ABNORMAL
DIFFERENTIAL METHOD: ABNORMAL
EOSINOPHIL # BLD AUTO: 0 K/UL
EOSINOPHIL NFR BLD: 0.1 %
ERYTHROCYTE [DISTWIDTH] IN BLOOD BY AUTOMATED COUNT: 17.2 %
EST. GFR  (AFRICAN AMERICAN): >60 ML/MIN/1.73 M^2
EST. GFR  (NON AFRICAN AMERICAN): 56 ML/MIN/1.73 M^2
FIO2: 21
GLUCOSE SERPL-MCNC: 106 MG/DL
GLUCOSE UR QL STRIP: NEGATIVE
HCO3 UR-SCNC: 35.6 MMOL/L (ref 24–28)
HCT VFR BLD AUTO: 44.8 %
HGB BLD-MCNC: 13.6 G/DL
HGB UR QL STRIP: ABNORMAL
KETONES UR QL STRIP: NEGATIVE
LEUKOCYTE ESTERASE UR QL STRIP: NEGATIVE
LYMPHOCYTES # BLD AUTO: 4.8 K/UL
LYMPHOCYTES NFR BLD: 36.3 %
MCH RBC QN AUTO: 27.6 PG
MCHC RBC AUTO-ENTMCNC: 30.4 G/DL
MCV RBC AUTO: 91 FL
MICROSCOPIC COMMENT: ABNORMAL
MODE: ABNORMAL
MONOCYTES # BLD AUTO: 1 K/UL
MONOCYTES NFR BLD: 7.9 %
NEUTROPHILS # BLD AUTO: 7.3 K/UL
NEUTROPHILS NFR BLD: 55.6 %
NITRITE UR QL STRIP: NEGATIVE
PCO2 BLDA: 38.7 MMHG (ref 35–45)
PH SMN: 7.57 [PH] (ref 7.35–7.45)
PH UR STRIP: 7 [PH] (ref 5–8)
PLATELET # BLD AUTO: 315 K/UL
PMV BLD AUTO: 11.3 FL
PO2 BLDA: 76 MMHG (ref 80–100)
POC BE: 14 MMOL/L
POC SATURATED O2: 97 % (ref 95–100)
POTASSIUM SERPL-SCNC: 4.1 MMOL/L
PROT SERPL-MCNC: 7 G/DL
PROT UR QL STRIP: NEGATIVE
RBC # BLD AUTO: 4.92 M/UL
RBC #/AREA URNS HPF: 5 /HPF (ref 0–4)
SAMPLE: ABNORMAL
SITE: ABNORMAL
SODIUM SERPL-SCNC: 145 MMOL/L
SP GR UR STRIP: 1.01 (ref 1–1.03)
SQUAMOUS #/AREA URNS HPF: 2 /HPF
TROPONIN I SERPL DL<=0.01 NG/ML-MCNC: <0.006 NG/ML
URN SPEC COLLECT METH UR: ABNORMAL
UROBILINOGEN UR STRIP-ACNC: NEGATIVE EU/DL
WBC # BLD AUTO: 13.11 K/UL
WBC #/AREA URNS HPF: 0 /HPF (ref 0–5)

## 2019-01-12 PROCEDURE — 96365 THER/PROPH/DIAG IV INF INIT: CPT

## 2019-01-12 PROCEDURE — 25000242 PHARM REV CODE 250 ALT 637 W/ HCPCS: Performed by: FAMILY MEDICINE

## 2019-01-12 PROCEDURE — 99285 EMERGENCY DEPT VISIT HI MDM: CPT | Mod: 25

## 2019-01-12 PROCEDURE — 93010 EKG 12-LEAD: ICD-10-PCS | Mod: ,,, | Performed by: INTERNAL MEDICINE

## 2019-01-12 PROCEDURE — 94640 AIRWAY INHALATION TREATMENT: CPT

## 2019-01-12 PROCEDURE — 85025 COMPLETE CBC W/AUTO DIFF WBC: CPT

## 2019-01-12 PROCEDURE — 80053 COMPREHEN METABOLIC PANEL: CPT

## 2019-01-12 PROCEDURE — 63600175 PHARM REV CODE 636 W HCPCS: Performed by: FAMILY MEDICINE

## 2019-01-12 PROCEDURE — 96375 TX/PRO/DX INJ NEW DRUG ADDON: CPT

## 2019-01-12 PROCEDURE — 83880 ASSAY OF NATRIURETIC PEPTIDE: CPT

## 2019-01-12 PROCEDURE — 36600 WITHDRAWAL OF ARTERIAL BLOOD: CPT

## 2019-01-12 PROCEDURE — 84484 ASSAY OF TROPONIN QUANT: CPT

## 2019-01-12 PROCEDURE — 82803 BLOOD GASES ANY COMBINATION: CPT

## 2019-01-12 PROCEDURE — 99900035 HC TECH TIME PER 15 MIN (STAT)

## 2019-01-12 PROCEDURE — 81000 URINALYSIS NONAUTO W/SCOPE: CPT

## 2019-01-12 PROCEDURE — 36415 COLL VENOUS BLD VENIPUNCTURE: CPT

## 2019-01-12 PROCEDURE — 93010 ELECTROCARDIOGRAM REPORT: CPT | Mod: ,,, | Performed by: INTERNAL MEDICINE

## 2019-01-12 PROCEDURE — 85379 FIBRIN DEGRADATION QUANT: CPT

## 2019-01-12 PROCEDURE — 96366 THER/PROPH/DIAG IV INF ADDON: CPT

## 2019-01-12 RX ORDER — FLUCONAZOLE 200 MG/1
200 TABLET ORAL DAILY
Qty: 7 TABLET | Refills: 0 | Status: SHIPPED | OUTPATIENT
Start: 2019-01-12 | End: 2019-01-19

## 2019-01-12 RX ORDER — IPRATROPIUM BROMIDE AND ALBUTEROL SULFATE 2.5; .5 MG/3ML; MG/3ML
3 SOLUTION RESPIRATORY (INHALATION)
Status: COMPLETED | OUTPATIENT
Start: 2019-01-12 | End: 2019-01-12

## 2019-01-12 RX ORDER — HYDROCODONE BITARTRATE AND ACETAMINOPHEN 5; 325 MG/1; MG/1
1 TABLET ORAL
Status: DISCONTINUED | OUTPATIENT
Start: 2019-01-12 | End: 2019-01-12

## 2019-01-12 RX ORDER — ATORVASTATIN CALCIUM 40 MG/1
40 TABLET, FILM COATED ORAL DAILY
COMMUNITY
End: 2019-02-28 | Stop reason: SDUPTHER

## 2019-01-12 RX ORDER — PREDNISONE 10 MG/1
10 TABLET ORAL DAILY
COMMUNITY
End: 2019-01-21

## 2019-01-12 RX ORDER — FUROSEMIDE 10 MG/ML
40 INJECTION INTRAMUSCULAR; INTRAVENOUS
Status: COMPLETED | OUTPATIENT
Start: 2019-01-12 | End: 2019-01-12

## 2019-01-12 RX ORDER — FLUCONAZOLE 2 MG/ML
200 INJECTION, SOLUTION INTRAVENOUS
Status: COMPLETED | OUTPATIENT
Start: 2019-01-12 | End: 2019-01-12

## 2019-01-12 RX ORDER — ASPIRIN 81 MG/1
81 TABLET ORAL DAILY
COMMUNITY
End: 2019-02-28 | Stop reason: SDUPTHER

## 2019-01-12 RX ADMIN — IPRATROPIUM BROMIDE AND ALBUTEROL SULFATE 3 ML: .5; 3 SOLUTION RESPIRATORY (INHALATION) at 07:01

## 2019-01-12 RX ADMIN — FUROSEMIDE 40 MG: 10 INJECTION, SOLUTION INTRAVENOUS at 07:01

## 2019-01-12 RX ADMIN — FLUCONAZOLE, SODIUM CHLORIDE 200 MG: 2 INJECTION INTRAVENOUS at 07:01

## 2019-01-12 NOTE — TELEPHONE ENCOUNTER
"Release from hospital-   On antibiotics and steroids.  noticed mouth pain for about 1 week.  Now sees whites patches to tongue and cheeck and very uncomfortable to eat.  Care advise given.  encouraged to go to urgent care or [facility] open till 3pm..  Voiced understanding.    Reason for Disposition   White patches that stick to tongue or inner cheek    Answer Assessment - Initial Assessment Questions  1. ONSET: "When did the mouth start hurting?" (e.g., hours or days ago)       noticied it in hospital-  About 1 week  2. SEVERITY: "How bad is the pain?" (Scale 1-10; mild, moderate or severe)    - MILD (1-3):  doesn't interfere with eating or normal activities    - MODERATE (4-7): interferes with eating some solids and normal activities    - SEVERE (8-10):  excruciating pain, interferes with most normal activities    - SEVERE DYSPHAGIA: can't swallow liquids, drooling  7-8  3. SORES: "Are there any sores or ulcers in the mouth?" If so, ask: "What part of the mouth are the sores in?"    no  4. FEVER: "Do you have a fever?" If so, ask: "What is your temperature, how was it measured, and when did it start?"      no  5. CAUSE: "What do you think is causing the mouth pain?"  Thrush-  Being on antibiotics and steroids  6. OTHER SYMPTOMS: "Do you have any other symptoms?" (e.g., difficulty breathing)      Yes because tongue gets stuck to roof of mouth and get dry mouth    Protocols used: ST MOUTH PAIN-A-AH      "

## 2019-01-13 NOTE — ED PROVIDER NOTES
SCRIBE #1 NOTE: I, Stefania Laws, am scribing for, and in the presence of, Merle Oliveira MD. I have scribed the HPI, ROS, and PEx.      SCRIBE #2 NOTE: I, Fadia Kaye, am scribing for, and in the presence of,  Dom Mcknight MD. I have scribed the remaining portions of the note not scribed by Scribe #1.      History   No chief complaint on file.    Review of patient's allergies indicates:   Allergen Reactions    Penicillins Hives    Coconut     Dairy aid [lactase]     Iodine and iodide containing products          History of Present Illness     HPI    1/12/2019, 6:48 PM  History obtained from the patient      History of Present Illness: Baylee Muñoz is a 58 y.o. female patient with a PMHx of diverticulitis, dyslipidemia, diastolic heart failure, asthma, CKD, depression, HTN, and severe obesity who presents to the Emergency Department for evaluation of SOB which onset suddenly just PTA. Pt states she has had white areas on her tongue with extreme dryness. Symptoms are constant and moderate in severity. No mitigating or exacerbating factors reported. It is noted pt's O2 sat was 88-89 prior to O2 tx. Associated sxs include wheezing and bilat leg swelling. Patient denies any cough, CP, fever, chills, abd pain, n/v/d, weakness, fatigue, HA, and all other sxs at this time. Prior Tx includes inhaler with minimal relief. Pt notes she finished rx abx x2 days ago. Pt also notes she does not have at home oxygen. No further complaints or concerns at this time.         Arrival mode: Personal vehicle     PCP: Washington Cm MD        Past Medical History:  Past Medical History:   Diagnosis Date    Allergy     Arthritis     Asthma     Admit to [facility] 1/6/19 by Dr. Michael Randall for asthma exaceration, acute bronchitis, acute dCHF, hypoxia, elevated troponin/BNP (thought to be 2/2 asthma exac & acute dCHF by cards), tx'd w/ IV lasix, duonebs, IV solumedrol and DC'd on Z-prema, prednisone taper    CKD (chronic  kidney disease) stage 3, GFR 30-59 ml/min     vs ARF; encouraged to increase water intake and avoid NSAIDS & contrast dye    Depression     Diastolic heart failure 01/06/2019    Dx'd at [facility] during asthma exacerbation    Diverticulitis 2005    Dyslipidemia 08/28/2018    Gastritis     Hypertension     Severe obesity (BMI >= 40)     Smoking     Vitamin D deficiency        Past Surgical History:  Past Surgical History:   Procedure Laterality Date    cyst removal           Family History:  Family History   Problem Relation Age of Onset    Stroke Mother     Heart disease Mother     Kidney disease Father     Heart disease Father     Hypertension Father     HIV Sister     COPD Sister     Bipolar disorder Sister        Social History:  Social History     Tobacco Use    Smoking status: Former Smoker     Packs/day: 0.25     Years: 27.00     Pack years: 6.75    Smokeless tobacco: Never Used   Substance and Sexual Activity    Alcohol use: No    Drug use: No    Sexual activity: No        Review of Systems     Review of Systems   Constitutional: Negative for chills, fatigue and fever.   HENT: Negative for sore throat.         (+) white areas on tongue   (+) mouth dryness   Respiratory: Positive for shortness of breath and wheezing. Negative for cough.    Cardiovascular: Positive for leg swelling (bilat). Negative for chest pain.   Gastrointestinal: Negative for abdominal pain, diarrhea, nausea and vomiting.   Genitourinary: Negative for dysuria.   Musculoskeletal: Negative for back pain.   Skin: Negative for rash.   Neurological: Negative for weakness and headaches.   Hematological: Does not bruise/bleed easily.   All other systems reviewed and are negative.     Physical Exam     Initial Vitals [01/12/19 1807]   BP Pulse Resp Temp SpO2   (!) 164/95 82 20 98.5 °F (36.9 °C) (!) 89 %      MAP       --          Physical Exam  Nursing Notes and Vital Signs Reviewed.  Constitutional: Patient is in no acute  "distress. Well-developed and well-nourished.  Head: Atraumatic. Normocephalic.  Eyes: PERRL. EOM intact. Conjunctivae are not pale. No scleral icterus.  ENT: Mucous membranes are moist. Oropharynx is clear and symmetric. Oral thrush on tongue.     Neck: Supple. Full ROM. No lymphadenopathy.  Cardiovascular: Regular rate. Regular rhythm. No murmurs, rubs, or gallops. Distal pulses are 2+ and symmetric. 1+ edema in bilat legs.   Pulmonary/Chest:  No rales. Inspiratory and expiratory wheezing at bilat bases with decreased breath sounds at bilat bases.   Abdominal: Soft and non-distended.  There is no tenderness.  No rebound, guarding, or rigidity. Good bowel sounds.  Genitourinary: No CVA tenderness  Musculoskeletal: Moves all extremities. No obvious deformities. No edema. No calf tenderness.  Skin: Warm and dry.  Neurological:  Alert, awake, and appropriate.  Normal speech.  No acute focal neurological deficits are appreciated.  Psychiatric: Normal affect. Good eye contact. Appropriate in content.     ED Course   Procedures  ED Vital Signs:  Vitals:    01/12/19 1807 01/12/19 1821 01/12/19 1828 01/12/19 1849   BP: (!) 164/95  134/76 (!) 144/80   Pulse: 82 84 77 85   Resp: 20 20     Temp: 98.5 °F (36.9 °C)      SpO2: (!) 89% 95% (!) 92% 95%   Weight: 121 kg (266 lb 12.1 oz)      Height: 5' 5" (1.651 m)       01/12/19 1900 01/12/19 1905 01/12/19 1910 01/12/19 1951   BP:    (!) 142/77   Pulse: 77 90 74 75   Resp: 20 18 18    Temp:       SpO2: 99% 100% 100% (!) 94%   Weight:       Height:        01/12/19 2013 01/12/19 2017 01/12/19 2026 01/12/19 2035   BP:       Pulse: 79 76 77 93   Resp:       Temp:       SpO2: (!) 93% (!) 88% (!) 90% (!) 91%   Weight:       Height:        01/12/19 2120   BP:    Pulse: 96   Resp: (!) 50   Temp:    SpO2: (!) 93%   Weight:    Height:        Abnormal Lab Results:  Labs Reviewed   CBC W/ AUTO DIFFERENTIAL - Abnormal; Notable for the following components:       Result Value    WBC 13.11 (*)  "    MCHC 30.4 (*)     RDW 17.2 (*)     All other components within normal limits   COMPREHENSIVE METABOLIC PANEL - Abnormal; Notable for the following components:    CO2 33 (*)     BUN, Bld 22 (*)     Albumin 3.3 (*)     eGFR if non  56 (*)     All other components within normal limits   URINALYSIS - Abnormal; Notable for the following components:    Occult Blood UA 1+ (*)     All other components within normal limits   URINALYSIS MICROSCOPIC - Abnormal; Notable for the following components:    RBC, UA 5 (*)     All other components within normal limits   ISTAT PROCEDURE - Abnormal; Notable for the following components:    POC PH 7.571 (*)     POC PO2 76 (*)     POC HCO3 35.6 (*)     All other components within normal limits   TROPONIN I   B-TYPE NATRIURETIC PEPTIDE   D DIMER, QUANTITATIVE   D DIMER, QUANTITATIVE        All Lab Results:  Results for orders placed or performed during the hospital encounter of 01/12/19   CBC auto differential   Result Value Ref Range    WBC 13.11 (H) 3.90 - 12.70 K/uL    RBC 4.92 4.00 - 5.40 M/uL    Hemoglobin 13.6 12.0 - 16.0 g/dL    Hematocrit 44.8 37.0 - 48.5 %    MCV 91 82 - 98 fL    MCH 27.6 27.0 - 31.0 pg    MCHC 30.4 (L) 32.0 - 36.0 g/dL    RDW 17.2 (H) 11.5 - 14.5 %    Platelets 315 150 - 350 K/uL    MPV 11.3 9.2 - 12.9 fL    Gran # (ANC) 7.3 1.8 - 7.7 K/uL    Lymph # 4.8 1.0 - 4.8 K/uL    Mono # 1.0 0.3 - 1.0 K/uL    Eos # 0.0 0.0 - 0.5 K/uL    Baso # 0.01 0.00 - 0.20 K/uL    Gran% 55.6 38.0 - 73.0 %    Lymph% 36.3 18.0 - 48.0 %    Mono% 7.9 4.0 - 15.0 %    Eosinophil% 0.1 0.0 - 8.0 %    Basophil% 0.1 0.0 - 1.9 %    Differential Method Automated    Comprehensive metabolic panel   Result Value Ref Range    Sodium 145 136 - 145 mmol/L    Potassium 4.1 3.5 - 5.1 mmol/L    Chloride 100 95 - 110 mmol/L    CO2 33 (H) 23 - 29 mmol/L    Glucose 106 70 - 110 mg/dL    BUN, Bld 22 (H) 6 - 20 mg/dL    Creatinine 1.1 0.5 - 1.4 mg/dL    Calcium 9.8 8.7 - 10.5 mg/dL    Total  Protein 7.0 6.0 - 8.4 g/dL    Albumin 3.3 (L) 3.5 - 5.2 g/dL    Total Bilirubin 0.5 0.1 - 1.0 mg/dL    Alkaline Phosphatase 86 55 - 135 U/L    AST 23 10 - 40 U/L    ALT 26 10 - 44 U/L    Anion Gap 12 8 - 16 mmol/L    eGFR if African American >60 >60 mL/min/1.73 m^2    eGFR if non African American 56 (A) >60 mL/min/1.73 m^2   Urinalysis - Clean Catch   Result Value Ref Range    Specimen UA Urine, Clean Catch     Color, UA Yellow Yellow, Straw, Martha    Appearance, UA Clear Clear    pH, UA 7.0 5.0 - 8.0    Specific Gravity, UA 1.010 1.005 - 1.030    Protein, UA Negative Negative    Glucose, UA Negative Negative    Ketones, UA Negative Negative    Bilirubin (UA) Negative Negative    Occult Blood UA 1+ (A) Negative    Nitrite, UA Negative Negative    Urobilinogen, UA Negative <2.0 EU/dL    Leukocytes, UA Negative Negative   Troponin I   Result Value Ref Range    Troponin I <0.006 0.000 - 0.026 ng/mL   B-Type natriuretic peptide (BNP)   Result Value Ref Range    BNP 13 0 - 99 pg/mL   Urinalysis Microscopic   Result Value Ref Range    RBC, UA 5 (H) 0 - 4 /hpf    WBC, UA 0 0 - 5 /hpf    Bacteria, UA None None-Occ /hpf    Squam Epithel, UA 2 /hpf    Microscopic Comment SEE COMMENT    D dimer, quantitative   Result Value Ref Range    D-Dimer 0.29 <0.50 mg/L FEU   ISTAT PROCEDURE   Result Value Ref Range    POC PH 7.571 (HH) 7.35 - 7.45    POC PCO2 38.7 35 - 45 mmHg    POC PO2 76 (L) 80 - 100 mmHg    POC HCO3 35.6 (H) 24 - 28 mmol/L    POC BE 14 -2 to 2 mmol/L    POC SATURATED O2 97 95 - 100 %    Sample ARTERIAL     Site LR     Allens Test Pass     DelSys Room Air     Mode SPONT     FiO2 21          Imaging Results:  Imaging Results          X-Ray Chest AP Portable (Final result)  Result time 01/12/19 18:55:47    Final result by Matt Nice MD (01/12/19 18:55:47)                 Impression:      Stable chest x-ray.      Electronically signed by: Matt Nice MD  Date:    01/12/2019  Time:    18:55             Narrative:     EXAMINATION:  XR CHEST AP PORTABLE    CLINICAL HISTORY:  Chest Pain;    FINDINGS:  Comparison study 12/26/2017.  No change.  Normal sized heart.  No congestion.  Lungs are clear.  Degenerative spine.                                 The EKG was ordered, reviewed, and independently interpreted by the ED provider.  Interpretation time: 1828  Rate: 73 BPM  Rhythm: normal sinus rhythm  Interpretation: No acute ST changes. No STEMI.         The Emergency Provider reviewed the vital signs and test results, which are outlined above.     ED Discussion     7:43 PM: Dr. Oliveira transfers care of pt to Dr. Mcknight pending lab results.    8:13 PM: Re-evaluated pt. Pt is resting comfortably and is in no acute distress.  Pt states that she is feeling better at this time.  D/w pt all pertinent results. D/w pt any concerns expressed at this time. Answered all questions. Pt expresses understanding at this time.    9:43 PM: Reassessed pt at this time.  Pt is awake, alert, and in NAD at this time. Discussed with pt all pertinent ED information and results. Discussed pt dx and plan of tx. Gave pt all f/u and return to the ED instructions. All questions and concerns were addressed at this time. Pt expresses understanding of information and instructions, and is comfortable with plan to discharge. Pt is stable for discharge.    I discussed with patient and/or family/caretaker that evaluation in the ED does not suggest any emergent or life threatening medical conditions requiring immediate intervention beyond what was provided in the ED, and I believe patient is safe for discharge.  Regardless, an unremarkable evaluation in the ED does not preclude the development or presence of a serious of life threatening condition. As such, patient was instructed to return immediately for any worsening or change in current symptoms.    ED Medication(s):  Medications   albuterol-ipratropium 2.5 mg-0.5 mg/3 mL nebulizer solution 3 mL (3 mLs  Nebulization Given 1/12/19 1910)   fluconazole (DIFLUCAN) IVPB 200 mg 100 mL (0 mg Intravenous Stopped 1/12/19 2144)   furosemide injection 40 mg (40 mg Intravenous Given 1/12/19 1928)     Current Discharge Medication List         Discharge Medication List as of 1/12/2019  9:26 PM      START taking these medications    Details   fluconazole (DIFLUCAN) 200 MG Tab Take 1 tablet (200 mg total) by mouth once daily. for 7 days, Starting Sat 1/12/2019, Until Sat 1/19/2019, Print         CONTINUE these medications which have NOT CHANGED    Details   acetaminophen (TYLENOL) 650 MG TbSR Take 1 tablet (650 mg total) by mouth every 8 (eight) hours., Starting Tue 8/15/2017, OTC      albuterol 90 mcg/actuation inhaler Inhale 2 puffs into the lungs every 6 (six) hours as needed for Wheezing., Starting Fri 1/5/2018, Normal      aspirin (ECOTRIN) 81 MG EC tablet Take 81 mg by mouth once daily., Historical Med      atorvastatin (LIPITOR) 40 MG tablet Take 40 mg by mouth once daily., Historical Med      ergocalciferol (ERGOCALCIFEROL) 50,000 unit Cap Take 1 capsule (50,000 Units total) by mouth every 7 days., Starting Thu 11/15/2018, Normal      furosemide (LASIX) 40 MG tablet Take 1 tablet (40 mg total) by mouth 2 (two) times daily as needed., Starting Mon 8/27/2018, Normal      orphenadrine (NORFLEX) 100 mg tablet Take 100 mg by mouth 2 (two) times daily., Historical Med      predniSONE (DELTASONE) 10 MG tablet Take 10 mg by mouth once daily., Historical Med      naproxen (NAPROSYN) 500 MG tablet Take 1 tablet (500 mg total) by mouth 2 (two) times daily., Starting Mon 8/27/2018, No Print      promethazine-dextromethorphan (PROMETHAZINE-DM) 6.25-15 mg/5 mL Syrp Take 5 mLs by mouth nightly as needed (Cough)., Starting Tue 12/26/2017, Normal         STOP taking these medications       albuterol (ACCUNEB) 0.63 mg/3 mL Nebu Comments:   Reason for Stopping:         amlodipine-benazepril (LOTREL) 10-40 mg per capsule Comments:   Reason  for Stopping:         diclofenac sodium 1 % Gel Comments:   Reason for Stopping:         famotidine (PEPCID) 20 MG tablet Comments:   Reason for Stopping:         nicotine (NICODERM CQ) 14 mg/24 hr Comments:   Reason for Stopping:                 Follow-up Information     Washington Cm MD. Call in 1 day.    Specialty:  Internal Medicine  Contact information:  [id number]  [place] [id number]  [phone]             [facility] - .    Specialty:  Emergency Medicine  Why:  If symptoms worsen  Contact information:  [address]  [facility] [address] [phone]                       Medical Decision Making:   Clinical Tests:   Lab Tests: Reviewed and Ordered  Radiological Study: Reviewed and Ordered  Medical Tests: Reviewed and Ordered             Scribe Attestation:   Scribe #1: I performed the above scribed service and the documentation accurately describes the services I performed. I attest to the accuracy of the note.     Attending:   Physician Attestation Statement for Scribe #1: I, Merle Oliveira MD, personally performed the services described in this documentation, as scribed by Stefania Laws, in my presence, and it is both accurate and complete.       Scribe Attestation:   Scribe #2: I performed the above scribed service and the documentation accurately describes the services I performed. I attest to the accuracy of the note.    Attending Attestation:           Physician Attestation for Scribe:    Physician Attestation Statement for Scribe #2: I, Dom Mcknight MD, reviewed documentation, as scribed by Fadia Kaye in my presence, and it is both accurate and complete. I also acknowledge and confirm the content of the note done by Scribe #1.           Clinical Impression       ICD-10-CM ICD-9-CM   1. Oral thrush B37.0 112.0   2. SOB (shortness of breath) R06.02 786.05       Disposition:   Disposition: Discharged  Condition: Stable       Dom  GLENNA Mcknight MD  01/12/19 8540

## 2019-01-13 NOTE — PROGRESS NOTES
Respiratory here for eval. Spo2 on RA 92-95%. She appears unlabored, normal breathing. BBS slightly decreased--no wheezing heard. Will await for orders per ER MD at this time.

## 2019-01-15 ENCOUNTER — OFFICE VISIT (OUTPATIENT)
Dept: INTERNAL MEDICINE | Facility: CLINIC | Age: 59
End: 2019-01-15
Payer: OTHER GOVERNMENT

## 2019-01-15 ENCOUNTER — LAB VISIT (OUTPATIENT)
Dept: LAB | Facility: HOSPITAL | Age: 59
End: 2019-01-15
Attending: INTERNAL MEDICINE
Payer: OTHER GOVERNMENT

## 2019-01-15 VITALS
WEIGHT: 269.19 LBS | HEIGHT: 65 IN | BODY MASS INDEX: 44.85 KG/M2 | DIASTOLIC BLOOD PRESSURE: 78 MMHG | SYSTOLIC BLOOD PRESSURE: 116 MMHG

## 2019-01-15 DIAGNOSIS — E55.9 VITAMIN D DEFICIENCY: ICD-10-CM

## 2019-01-15 DIAGNOSIS — I10 ESSENTIAL HYPERTENSION: ICD-10-CM

## 2019-01-15 DIAGNOSIS — Z12.39 SCREENING FOR BREAST CANCER: ICD-10-CM

## 2019-01-15 DIAGNOSIS — R73.9 HYPERGLYCEMIA: ICD-10-CM

## 2019-01-15 DIAGNOSIS — Z12.11 SCREENING FOR COLON CANCER: Primary | ICD-10-CM

## 2019-01-15 DIAGNOSIS — J45.41 MODERATE PERSISTENT ASTHMA WITH EXACERBATION: ICD-10-CM

## 2019-01-15 LAB
ALBUMIN SERPL BCP-MCNC: 3 G/DL
ALP SERPL-CCNC: 93 U/L
ALT SERPL W/O P-5'-P-CCNC: 21 U/L
ANION GAP SERPL CALC-SCNC: 7 MMOL/L
AST SERPL-CCNC: 18 U/L
BASOPHILS # BLD AUTO: 0.01 K/UL
BASOPHILS NFR BLD: 0.1 %
BILIRUB SERPL-MCNC: 0.4 MG/DL
BUN SERPL-MCNC: 18 MG/DL
CALCIUM SERPL-MCNC: 9.8 MG/DL
CHLORIDE SERPL-SCNC: 102 MMOL/L
CO2 SERPL-SCNC: 36 MMOL/L
CREAT SERPL-MCNC: 1 MG/DL
DIFFERENTIAL METHOD: ABNORMAL
EOSINOPHIL # BLD AUTO: 0.1 K/UL
EOSINOPHIL NFR BLD: 0.7 %
ERYTHROCYTE [DISTWIDTH] IN BLOOD BY AUTOMATED COUNT: 17.2 %
EST. GFR  (AFRICAN AMERICAN): >60 ML/MIN/1.73 M^2
EST. GFR  (NON AFRICAN AMERICAN): >60 ML/MIN/1.73 M^2
GLUCOSE SERPL-MCNC: 94 MG/DL
HCT VFR BLD AUTO: 44.9 %
HGB BLD-MCNC: 13 G/DL
IMM GRANULOCYTES # BLD AUTO: 0.03 K/UL
IMM GRANULOCYTES NFR BLD AUTO: 0.3 %
LYMPHOCYTES # BLD AUTO: 4.2 K/UL
LYMPHOCYTES NFR BLD: 38.6 %
MCH RBC QN AUTO: 26.7 PG
MCHC RBC AUTO-ENTMCNC: 29 G/DL
MCV RBC AUTO: 92 FL
MONOCYTES # BLD AUTO: 1 K/UL
MONOCYTES NFR BLD: 8.9 %
NEUTROPHILS # BLD AUTO: 5.6 K/UL
NEUTROPHILS NFR BLD: 51.4 %
NRBC BLD-RTO: 0 /100 WBC
PLATELET # BLD AUTO: 292 K/UL
PMV BLD AUTO: 12.4 FL
POTASSIUM SERPL-SCNC: 4.9 MMOL/L
PROT SERPL-MCNC: 6.8 G/DL
RBC # BLD AUTO: 4.87 M/UL
SODIUM SERPL-SCNC: 145 MMOL/L
WBC # BLD AUTO: 10.93 K/UL

## 2019-01-15 PROCEDURE — 99213 OFFICE O/P EST LOW 20 MIN: CPT | Mod: PBBFAC,PN | Performed by: INTERNAL MEDICINE

## 2019-01-15 PROCEDURE — 99999 PR PBB SHADOW E&M-EST. PATIENT-LVL III: ICD-10-PCS | Mod: PBBFAC,,, | Performed by: INTERNAL MEDICINE

## 2019-01-15 PROCEDURE — 99999 PR PBB SHADOW E&M-EST. PATIENT-LVL III: CPT | Mod: PBBFAC,,, | Performed by: INTERNAL MEDICINE

## 2019-01-15 PROCEDURE — 36415 COLL VENOUS BLD VENIPUNCTURE: CPT | Mod: PO

## 2019-01-15 PROCEDURE — 82306 VITAMIN D 25 HYDROXY: CPT

## 2019-01-15 PROCEDURE — 99495 TRANSJ CARE MGMT MOD F2F 14D: CPT | Mod: PBBFAC,PN | Performed by: INTERNAL MEDICINE

## 2019-01-15 PROCEDURE — 80053 COMPREHEN METABOLIC PANEL: CPT

## 2019-01-15 PROCEDURE — 85025 COMPLETE CBC W/AUTO DIFF WBC: CPT

## 2019-01-15 PROCEDURE — 99214 PR OFFICE/OUTPT VISIT, EST, LEVL IV, 30-39 MIN: ICD-10-PCS | Mod: S$PBB,,, | Performed by: INTERNAL MEDICINE

## 2019-01-15 PROCEDURE — 99214 OFFICE O/P EST MOD 30 MIN: CPT | Mod: S$PBB,,, | Performed by: INTERNAL MEDICINE

## 2019-01-15 PROCEDURE — 83036 HEMOGLOBIN GLYCOSYLATED A1C: CPT

## 2019-01-15 RX ORDER — ALBUTEROL SULFATE 1.25 MG/3ML
1.25 SOLUTION RESPIRATORY (INHALATION) EVERY 6 HOURS PRN
Qty: 1 BOX | Refills: 0 | Status: SHIPPED | OUTPATIENT
Start: 2019-01-15 | End: 2020-01-15

## 2019-01-15 NOTE — PROGRESS NOTES
Subjective:      Patient ID: Baylee Muñoz is a 58 y.o. female.    Chief Complaint: Hospital Follow Up      HPI     Ms. Baylee Muñoz is a patient of Washington Cm MD, who presents for hospital f/u at [facility], at which time she was found to have asthma exacerbation, was tx'd with nebs, which helped and Z-pack, which she completed, but 7 days later was seen in the ER here on 1/12/19 for oral discomfort, which was dx'd to be oral thrush and was tx'd with IV fluconazole and IV lasix and O2 2L, which was changed to PO and she was DC home with diflucan 200 1 po qd x7d, furosemide 40 mg bid (previously taken prn). She reports her mouth feels blistered inside.       Past Medical History:   Diagnosis Date    Allergy     Arthritis     Asthma     Admit to [facility] 1/6/19 by Dr. Michael Randall for asthma exaceration, acute bronchitis, acute dCHF, hypoxia, elevated troponin/BNP (thought to be 2/2 asthma exac & acute dCHF by cards), tx'd w/ IV lasix, duonebs, IV solumedrol and DC'd on Z-prema, prednisone taper    CKD (chronic kidney disease) stage 3, GFR 30-59 ml/min     vs ARF; encouraged to increase water intake and avoid NSAIDS & contrast dye    Depression     Diastolic heart failure 01/06/2019    Dx'd at [facility] during asthma exacerbation    Diverticulitis 2005    Dyslipidemia 08/28/2018    Former smoker     Quit 12/31/18; smoked 0.12 packs/day x 37 years    Gastritis     Hypertension     Severe obesity (BMI >= 40)     Vitamin D deficiency      Past Surgical History:   Procedure Laterality Date    cyst removal       Social History     Socioeconomic History    Marital status:      Spouse name: Not on file    Number of children: Not on file    Years of education: Not on file    Highest education level: Not on file   Social Needs    Financial resource strain: Not on file    Food insecurity - worry: Not on file    Food insecurity - inability: Not on file    Transportation needs - medical: Not on file     Transportation needs - non-medical: Not on file   Occupational History    Not on file   Tobacco Use    Smoking status: Former Smoker     Packs/day: 0.25     Years: 27.00     Pack years: 6.75    Smokeless tobacco: Never Used   Substance and Sexual Activity    Alcohol use: No    Drug use: No    Sexual activity: No   Other Topics Concern    Not on file   Social History Narrative    Not on file     Family History   Problem Relation Age of Onset    Stroke Mother     Heart disease Mother     Kidney disease Father     Heart disease Father     Hypertension Father     HIV Sister     COPD Sister     Bipolar disorder Sister        Current Outpatient Medications:     acetaminophen (TYLENOL) 650 MG TbSR, Take 1 tablet (650 mg total) by mouth every 8 (eight) hours., Disp: , Rfl: 0    albuterol 90 mcg/actuation inhaler, Inhale 2 puffs into the lungs every 6 (six) hours as needed for Wheezing., Disp: 3 Inhaler, Rfl: 11    aspirin (ECOTRIN) 81 MG EC tablet, Take 81 mg by mouth once daily., Disp: , Rfl:     atorvastatin (LIPITOR) 40 MG tablet, Take 40 mg by mouth once daily., Disp: , Rfl:     ergocalciferol (ERGOCALCIFEROL) 50,000 unit Cap, Take 1 capsule (50,000 Units total) by mouth every 7 days., Disp: 8 capsule, Rfl: 3    fluconazole (DIFLUCAN) 200 MG Tab, Take 1 tablet (200 mg total) by mouth once daily. for 7 days, Disp: 7 tablet, Rfl: 0    albuterol (ACCUNEB) 1.25 mg/3 mL Nebu, Take 3 mLs (1.25 mg total) by nebulization every 6 (six) hours as needed. Rescue, Disp: 1 Box, Rfl: 0    furosemide (LASIX) 40 MG tablet, Take 1 tablet (40 mg total) by mouth 2 (two) times daily as needed., Disp: 90 tablet, Rfl: 3    orphenadrine (NORFLEX) 100 mg tablet, Take 100 mg by mouth 2 (two) times daily., Disp: , Rfl:     predniSONE (DELTASONE) 10 MG tablet, Take 10 mg by mouth once daily., Disp: , Rfl:     promethazine-dextromethorphan (PROMETHAZINE-DM) 6.25-15 mg/5 mL Syrp, Take 5 mLs by mouth nightly as needed  "(Cough)., Disp: 120 mL, Rfl: 0    Review of patient's allergies indicates:   Allergen Reactions    Penicillins Hives    Coconut     Dairy aid [lactase]     Iodine and iodide containing products         Review of Systems   All remaining systems negative    Objective:     /78 (BP Location: Left arm, Patient Position: Sitting, BP Method: Large (Manual))   Ht 5' 5" (1.651 m)   Wt 122.1 kg (269 lb 2.9 oz)   BMI 44.79 kg/m²     Physical Exam  GEN: A&O fully, NAD  PSYC: Normal affect  HEENT: OP: mild white exudate on tongue    Lab Results   Component Value Date    WBC 13.11 (H) 01/12/2019    HGB 13.6 01/12/2019    HCT 44.8 01/12/2019     01/12/2019    CHOL 157 08/27/2018    TRIG 160 (H) 08/27/2018    HDL 33 (L) 08/27/2018    LDLCALC 92.0 08/27/2018    ALT 26 01/12/2019    AST 23 01/12/2019     01/12/2019    K 4.1 01/12/2019     01/12/2019    CREATININE 1.1 01/12/2019    BUN 22 (H) 01/12/2019    CO2 33 (H) 01/12/2019    CALCIUM 9.8 01/12/2019    HGBA1C 5.8 (H) 07/17/2017       Assessment:      1. Screening for colon cancer    2. Screening for breast cancer    3. Hyperglycemia    4. Essential hypertension    5. Vitamin D deficiency    6. Moderate persistent asthma with exacerbation        Plan:   Screening for colon cancer  -     Case request GI: COLONOSCOPY    Screening for breast cancer  -     Mammo Digital Screening Bilat; Future; Expected date: 01/15/2019  -     Ambulatory consult to Obstetrics / Gynecology    Hyperglycemia  -     Hemoglobin A1c; Future; Expected date: 01/15/2019    Essential hypertension  -     CBC auto differential; Future; Expected date: 01/15/2019  -     Comprehensive metabolic panel; Future; Expected date: 01/15/2019    Vitamin D deficiency  -     Vitamin D; Future; Expected date: 01/15/2019    Moderate persistent asthma with exacerbation  -     albuterol (ACCUNEB) 1.25 mg/3 mL Nebu; Take 3 mLs (1.25 mg total) by nebulization every 6 (six) hours as needed. Rescue  " Dispense: 1 Box; Refill: 0  -     NEBULIZER FOR HOME USE        Follow-up if symptoms worsen or fail to improve, for FU on obesity.    I spent 25 minutes of time with patient 50% or more of which was discussing labs and plans of care.

## 2019-01-16 ENCOUNTER — TELEPHONE (OUTPATIENT)
Dept: INTERNAL MEDICINE | Facility: CLINIC | Age: 59
End: 2019-01-16

## 2019-01-16 DIAGNOSIS — J45.41 MODERATE PERSISTENT ASTHMA WITH EXACERBATION: Primary | ICD-10-CM

## 2019-01-16 LAB
25(OH)D3+25(OH)D2 SERPL-MCNC: 23 NG/ML
ESTIMATED AVG GLUCOSE: 131 MG/DL
HBA1C MFR BLD HPLC: 6.2 %

## 2019-01-16 NOTE — TELEPHONE ENCOUNTER
Pt came into lobby today stating she bought the Albuterol ordered yesterday but she does not have a machine to put the albuterol in. Pt wants to know if  will send in a order for a machine? Or was this suppose to be an inhaler. Please review and advise.

## 2019-01-17 ENCOUNTER — HOSPITAL ENCOUNTER (EMERGENCY)
Facility: HOSPITAL | Age: 59
Discharge: HOME | End: 2019-01-17
Attending: EMERGENCY MEDICINE
Payer: OTHER GOVERNMENT

## 2019-01-17 VITALS
WEIGHT: 269.19 LBS | SYSTOLIC BLOOD PRESSURE: 140 MMHG | OXYGEN SATURATION: 95 % | RESPIRATION RATE: 20 BRPM | DIASTOLIC BLOOD PRESSURE: 80 MMHG | BODY MASS INDEX: 44.85 KG/M2 | HEIGHT: 65 IN | TEMPERATURE: 98 F | HEART RATE: 78 BPM

## 2019-01-17 DIAGNOSIS — K57.92 DIVERTICULITIS: Primary | ICD-10-CM

## 2019-01-17 LAB
ALBUMIN SERPL BCP-MCNC: 3.3 G/DL
ALP SERPL-CCNC: 78 U/L
ALT SERPL W/O P-5'-P-CCNC: 20 U/L
ANION GAP SERPL CALC-SCNC: 10 MMOL/L
AST SERPL-CCNC: 18 U/L
BASOPHILS # BLD AUTO: 0.01 K/UL
BASOPHILS NFR BLD: 0.1 %
BILIRUB SERPL-MCNC: 0.5 MG/DL
BILIRUB UR QL STRIP: NEGATIVE
BUN SERPL-MCNC: 18 MG/DL
CALCIUM SERPL-MCNC: 10.5 MG/DL
CHLORIDE SERPL-SCNC: 99 MMOL/L
CLARITY UR: CLEAR
CO2 SERPL-SCNC: 35 MMOL/L
COLOR UR: YELLOW
CREAT SERPL-MCNC: 1 MG/DL
DIFFERENTIAL METHOD: ABNORMAL
EOSINOPHIL # BLD AUTO: 0.1 K/UL
EOSINOPHIL NFR BLD: 0.6 %
ERYTHROCYTE [DISTWIDTH] IN BLOOD BY AUTOMATED COUNT: 17.3 %
EST. GFR  (AFRICAN AMERICAN): >60 ML/MIN/1.73 M^2
EST. GFR  (NON AFRICAN AMERICAN): >60 ML/MIN/1.73 M^2
GLUCOSE SERPL-MCNC: 82 MG/DL
GLUCOSE UR QL STRIP: NEGATIVE
HCT VFR BLD AUTO: 43 %
HGB BLD-MCNC: 12.9 G/DL
HGB UR QL STRIP: ABNORMAL
KETONES UR QL STRIP: NEGATIVE
LEUKOCYTE ESTERASE UR QL STRIP: NEGATIVE
LYMPHOCYTES # BLD AUTO: 3.6 K/UL
LYMPHOCYTES NFR BLD: 37.8 %
MCH RBC QN AUTO: 27.4 PG
MCHC RBC AUTO-ENTMCNC: 30 G/DL
MCV RBC AUTO: 92 FL
MICROSCOPIC COMMENT: ABNORMAL
MONOCYTES # BLD AUTO: 0.7 K/UL
MONOCYTES NFR BLD: 7 %
NEUTROPHILS # BLD AUTO: 5.2 K/UL
NEUTROPHILS NFR BLD: 54.5 %
NITRITE UR QL STRIP: NEGATIVE
PH UR STRIP: 7 [PH] (ref 5–8)
PLATELET # BLD AUTO: 268 K/UL
PMV BLD AUTO: 11.1 FL
POTASSIUM SERPL-SCNC: 4.7 MMOL/L
PROT SERPL-MCNC: 7.1 G/DL
PROT UR QL STRIP: NEGATIVE
RBC # BLD AUTO: 4.7 M/UL
RBC #/AREA URNS HPF: 11 /HPF (ref 0–4)
SODIUM SERPL-SCNC: 144 MMOL/L
SP GR UR STRIP: 1.01 (ref 1–1.03)
URN SPEC COLLECT METH UR: ABNORMAL
UROBILINOGEN UR STRIP-ACNC: NEGATIVE EU/DL
WBC # BLD AUTO: 9.58 K/UL

## 2019-01-17 PROCEDURE — 99284 EMERGENCY DEPT VISIT MOD MDM: CPT | Mod: 25

## 2019-01-17 PROCEDURE — 81000 URINALYSIS NONAUTO W/SCOPE: CPT

## 2019-01-17 PROCEDURE — 85025 COMPLETE CBC W/AUTO DIFF WBC: CPT

## 2019-01-17 PROCEDURE — 36000 PLACE NEEDLE IN VEIN: CPT

## 2019-01-17 PROCEDURE — 80053 COMPREHEN METABOLIC PANEL: CPT

## 2019-01-17 RX ORDER — TRAMADOL HYDROCHLORIDE 50 MG/1
50 TABLET ORAL EVERY 6 HOURS PRN
Qty: 12 TABLET | Refills: 0 | Status: SHIPPED | OUTPATIENT
Start: 2019-01-17 | End: 2019-02-25 | Stop reason: SDUPTHER

## 2019-01-17 RX ORDER — METRONIDAZOLE 500 MG/1
500 TABLET ORAL 3 TIMES DAILY
Qty: 21 TABLET | Refills: 0 | Status: SHIPPED | OUTPATIENT
Start: 2019-01-17 | End: 2019-01-24

## 2019-01-17 RX ORDER — CIPROFLOXACIN 500 MG/1
500 TABLET ORAL 2 TIMES DAILY
Qty: 20 TABLET | Refills: 0 | Status: SHIPPED | OUTPATIENT
Start: 2019-01-17 | End: 2019-01-27

## 2019-01-17 NOTE — ED PROVIDER NOTES
"Encounter Date: 1/17/2019       History     Chief Complaint   Patient presents with    Flank Pain     Pt states, "I am having pain in my left side."     58 year old female with complaint of left side pain X 5 days.  Reports gradual onset of pain.  No fall. No trauma. No fever or chills.  Reports pain worse with movement and laying down.  Reports pain relief with Advil.            Review of patient's allergies indicates:   Allergen Reactions    Penicillins Hives    Coconut     Dairy aid [lactase]     Iodine and iodide containing products      Past Medical History:   Diagnosis Date    Allergy     Arthritis     Asthma     Admit to [facility] 1/6/19 by Dr. Michael Randall for asthma exaceration, acute bronchitis, acute dCHF, hypoxia, elevated troponin/BNP (thought to be 2/2 asthma exac & acute dCHF by cards), tx'd w/ IV lasix, duonebs, IV solumedrol and DC'd on Z-prema, prednisone taper    CKD (chronic kidney disease) stage 3, GFR 30-59 ml/min     vs ARF; encouraged to increase water intake and avoid NSAIDS & contrast dye    Depression     Diastolic heart failure 01/06/2019    Dx'd at [facility] during asthma exacerbation    Diverticulitis 2005    Dyslipidemia 08/28/2018    Former smoker     Quit 12/31/18; smoked 0.12 packs/day x 37 years    Gastritis     Hypertension     Severe obesity (BMI >= 40)     Vitamin D deficiency      Past Surgical History:   Procedure Laterality Date    cyst removal       Family History   Problem Relation Age of Onset    Stroke Mother     Heart disease Mother     Kidney disease Father     Heart disease Father     Hypertension Father     HIV Sister     COPD Sister     Bipolar disorder Sister      Social History     Tobacco Use    Smoking status: Former Smoker     Packs/day: 0.25     Years: 27.00     Pack years: 6.75    Smokeless tobacco: Never Used   Substance Use Topics    Alcohol use: No    Drug use: No     Review of Systems   Constitutional: Negative for fever.   HENT: " Negative for sore throat.    Respiratory: Negative for shortness of breath.    Cardiovascular: Negative for chest pain.   Gastrointestinal: Positive for abdominal pain. Negative for nausea.   Genitourinary: Negative for dysuria.   Musculoskeletal: Negative for back pain.   Skin: Negative for rash.   Neurological: Negative for weakness.   Hematological: Does not bruise/bleed easily.       Physical Exam     Initial Vitals [01/17/19 1219]   BP Pulse Resp Temp SpO2   (!) 163/73 80 20 98.1 °F (36.7 °C) 96 %      MAP       --         Physical Exam    Nursing note and vitals reviewed.  Constitutional: She appears well-developed and well-nourished.   HENT:   Head: Normocephalic and atraumatic.   Eyes: Conjunctivae and EOM are normal. Pupils are equal, round, and reactive to light.   Neck: Normal range of motion. Neck supple.   Cardiovascular: Normal rate, regular rhythm, normal heart sounds and intact distal pulses.   Pulmonary/Chest: Breath sounds normal.   Abdominal: Soft. There is tenderness. There is no rebound and no guarding.   Mild left lower abdominal tenderness, no guarding or rebound   Musculoskeletal: Normal range of motion.   Neurological: She is alert and oriented to person, place, and time. She has normal strength and normal reflexes.   Skin: Skin is warm and dry.   Psychiatric: She has a normal mood and affect. Her behavior is normal. Thought content normal.         ED Course   Procedures  Labs Reviewed   CBC W/ AUTO DIFFERENTIAL - Abnormal; Notable for the following components:       Result Value    MCHC 30.0 (*)     RDW 17.3 (*)     All other components within normal limits   COMPREHENSIVE METABOLIC PANEL - Abnormal; Notable for the following components:    CO2 35 (*)     Albumin 3.3 (*)     All other components within normal limits   URINALYSIS - Abnormal; Notable for the following components:    Occult Blood UA 2+ (*)     All other components within normal limits   URINALYSIS MICROSCOPIC - Abnormal;  Notable for the following components:    RBC, UA 11 (*)     All other components within normal limits          Imaging Results          CT Renal Stone Study Abd Pelvis WO (Final result)  Result time 01/17/19 14:25:33    Final result by Michael Lowe MD (01/17/19 14:25:33)                 Impression:      Moderate constipation and extensive diverticulosis throughout the large bowel.  Minimal stranding is seen within the descending colon therefore mild diverticulitis is not entirely excluded.  No evidence of perforation or free air. Shotty nodes are seen within the mesentery and the left hemiabdomen which may be reactive.    All CT scans at this facility use dose modulation, iterative reconstruction and/or weight based dosing when appropriate to reduce radiation dose to as low as reasonably achievable.      Electronically signed by: Michael Lowe MD  Date:    01/17/2019  Time:    14:25             Narrative:    EXAMINATION:  CT RENAL STONE STUDY ABD PELVIS WO    CLINICAL HISTORY:  Abdominal pain, unspecified;    TECHNIQUE:  Routine 5 mm non-contrast images of the abdomen and pelvis were done.  Sagittal and coronal reformats were also submitted for interpretation.    COMPARISON:  None    FINDINGS:  The lung bases are unremarkable.  There is no pleural fluid present.  The visualized portions of the heart appear normal.    The liver is normal in size and attenuation with no focal hepatic abnormality.  The gallbladder shows no evidence of stones or pericholecystic fluid.  There is no intra-or extrahepatic biliary ductal dilatation.    The spleen, pancreas, and adrenal glands are unremarkable.    The kidneys are normal in size and location.  There is no evidence of hydronephrosis. Bladder is grossly unremarkable.    Stomach is unremarkable.   The visualized loops of small bowel show no evidence of obstruction or inflammation. Large bowel demonstrates mild constipation.  Extensive diverticulosis is seen throughout the  descending and sigmoid colon as well as within the ascending colon.  No evidence of appendicitis.  There is no ascites, free fluid, or intraperitoneal free air.  Small fat containing umbilical hernia.    Shotty nodes are seen within the mesentery and the left hemiabdomen which may be reactive.  There is no evidence of lymph node enlargement in the abdomen or pelvis.    The abdominal aorta is normal in course and caliber with mild atherosclerotic calcifications.    Mild multilevel degenerative changes greatest at L5/S1.    The extraperitoneal soft tissues are unremarkable.                                        2:29 PM  Pt resting comfortably, will treat for diverticulitis, will return for [facility]              Clinical Impression:   The encounter diagnosis was Diverticulitis.                             Derek Mercedes NP  01/17/19 4517

## 2019-01-20 ENCOUNTER — HOSPITAL ENCOUNTER (EMERGENCY)
Facility: HOSPITAL | Age: 59
Discharge: HOME | End: 2019-01-20
Attending: EMERGENCY MEDICINE
Payer: OTHER GOVERNMENT

## 2019-01-20 VITALS
OXYGEN SATURATION: 97 % | DIASTOLIC BLOOD PRESSURE: 55 MMHG | WEIGHT: 267 LBS | RESPIRATION RATE: 15 BRPM | TEMPERATURE: 98 F | SYSTOLIC BLOOD PRESSURE: 118 MMHG | HEART RATE: 77 BPM | BODY MASS INDEX: 44.48 KG/M2 | HEIGHT: 65 IN

## 2019-01-20 DIAGNOSIS — K62.5 RECTAL BLEEDING: Primary | ICD-10-CM

## 2019-01-20 DIAGNOSIS — E66.01 MORBID OBESITY: ICD-10-CM

## 2019-01-20 DIAGNOSIS — K57.30 DIVERTICULOSIS OF LARGE INTESTINE WITHOUT HEMORRHAGE: ICD-10-CM

## 2019-01-20 LAB
ALBUMIN SERPL BCP-MCNC: 3.1 G/DL
ALP SERPL-CCNC: 74 U/L
ALT SERPL W/O P-5'-P-CCNC: 18 U/L
ANION GAP SERPL CALC-SCNC: 9 MMOL/L
APTT BLDCRRT: 26.8 SEC
AST SERPL-CCNC: 16 U/L
BASOPHILS # BLD AUTO: 0.01 K/UL
BASOPHILS NFR BLD: 0.1 %
BILIRUB SERPL-MCNC: 0.4 MG/DL
BUN SERPL-MCNC: 13 MG/DL
CALCIUM SERPL-MCNC: 9.4 MG/DL
CHLORIDE SERPL-SCNC: 102 MMOL/L
CO2 SERPL-SCNC: 33 MMOL/L
CREAT SERPL-MCNC: 1.1 MG/DL
DIFFERENTIAL METHOD: ABNORMAL
EOSINOPHIL # BLD AUTO: 0.1 K/UL
EOSINOPHIL NFR BLD: 0.7 %
ERYTHROCYTE [DISTWIDTH] IN BLOOD BY AUTOMATED COUNT: 17.1 %
EST. GFR  (AFRICAN AMERICAN): >60 ML/MIN/1.73 M^2
EST. GFR  (NON AFRICAN AMERICAN): 56 ML/MIN/1.73 M^2
GLUCOSE SERPL-MCNC: 203 MG/DL
HCT VFR BLD AUTO: 43.3 %
HGB BLD-MCNC: 12.8 G/DL
INR PPP: 0.9
LYMPHOCYTES # BLD AUTO: 2.5 K/UL
LYMPHOCYTES NFR BLD: 36.6 %
MCH RBC QN AUTO: 27.2 PG
MCHC RBC AUTO-ENTMCNC: 29.6 G/DL
MCV RBC AUTO: 92 FL
MONOCYTES # BLD AUTO: 0.5 K/UL
MONOCYTES NFR BLD: 6.6 %
NEUTROPHILS # BLD AUTO: 3.8 K/UL
NEUTROPHILS NFR BLD: 56 %
PLATELET # BLD AUTO: 271 K/UL
PMV BLD AUTO: 11.6 FL
POTASSIUM SERPL-SCNC: 4.1 MMOL/L
PROT SERPL-MCNC: 6.6 G/DL
PROTHROMBIN TIME: 10.2 SEC
RBC # BLD AUTO: 4.7 M/UL
SODIUM SERPL-SCNC: 144 MMOL/L
WBC # BLD AUTO: 6.85 K/UL

## 2019-01-20 PROCEDURE — 80053 COMPREHEN METABOLIC PANEL: CPT

## 2019-01-20 PROCEDURE — 36415 COLL VENOUS BLD VENIPUNCTURE: CPT

## 2019-01-20 PROCEDURE — 85025 COMPLETE CBC W/AUTO DIFF WBC: CPT

## 2019-01-20 PROCEDURE — 85610 PROTHROMBIN TIME: CPT

## 2019-01-20 PROCEDURE — 99283 EMERGENCY DEPT VISIT LOW MDM: CPT

## 2019-01-20 PROCEDURE — 85730 THROMBOPLASTIN TIME PARTIAL: CPT

## 2019-01-20 NOTE — ED PROVIDER NOTES
SCRIBE #1 NOTE: I, Bibi Hutson, am scribing for, and in the presence of, Iraida Malone MD. I have scribed the entire note.      History      Chief Complaint   Patient presents with    Rectal Bleeding     onset yesterday. reports some intermittent abdominal pressure. hx diverticulitis       Review of patient's allergies indicates:   Allergen Reactions    Penicillins Hives    Coconut     Dairy aid [lactase]     Iodine and iodide containing products         HPI   HPI    1/20/2019, 12:55 PM   History obtained from the patient      History of Present Illness: Baylee Muñoz is a 58 y.o. female patient with a PMHx of CKD, Diverticulitis, Gastritis, HTN who presents to the Emergency Department for blood in the stool which onset suddenly last PM. Pt reports she has had 3 episodes of maroon colored stool since last PM. Sxs are episodic and moderate, no modifying factors. Pt reports she has had abdominal pressure but denies abd pain. Pt was diagnosed with diverticulitis on 1/17/19, she was prescribed Cipro, Flagyl, and Tramadol. Pt denies any fever, chills, nausea, emesis, hematemesis, abd pain, CP, palpitations, SOB, urinary sxs, syncope, fatigue, dizziness, and all other sxs. She takes ASA 81mg qd but does not take any other anti-coagulants. No further complaints or concerns.       Arrival mode: Personal vehicle      PCP: Washington Cm MD       Past Medical History:  Past Medical History:   Diagnosis Date    Allergy     Arthritis     Asthma     Admit to [facility] 1/6/19 by Dr. Michael Randall for asthma exaceration, acute bronchitis, acute dCHF, hypoxia, elevated troponin/BNP (thought to be 2/2 asthma exac & acute dCHF by cards), tx'd w/ IV lasix, duonebs, IV solumedrol and DC'd on Z-prema, prednisone taper    CKD (chronic kidney disease) stage 3, GFR 30-59 ml/min     vs ARF; encouraged to increase water intake and avoid NSAIDS & contrast dye    Depression     Diastolic heart failure 01/06/2019    Dx'd at [facility]  during asthma exacerbation    Diverticulitis 2005    Dyslipidemia 08/28/2018    Former smoker     Quit 12/31/18; smoked 0.12 packs/day x 37 years    Gastritis     Hypertension     Severe obesity (BMI >= 40)     Vitamin D deficiency        Past Surgical History:  Past Surgical History:   Procedure Laterality Date    cyst removal           Family History:  Family History   Problem Relation Age of Onset    Stroke Mother     Heart disease Mother     Kidney disease Father     Heart disease Father     Hypertension Father     HIV Sister     COPD Sister     Bipolar disorder Sister        Social History:  Social History     Tobacco Use    Smoking status: Former Smoker     Packs/day: 0.25     Years: 27.00     Pack years: 6.75    Smokeless tobacco: Never Used   Substance and Sexual Activity    Alcohol use: No    Drug use: No    Sexual activity: No       ROS   Review of Systems   Constitutional: Negative for chills and fever.   HENT: Negative for sore throat.    Respiratory: Negative for shortness of breath.    Cardiovascular: Negative for chest pain and palpitations.   Gastrointestinal: Positive for blood in stool. Negative for abdominal pain, nausea and vomiting.   Genitourinary: Negative for dysuria, frequency and hematuria.   Musculoskeletal: Negative for back pain.   Skin: Negative for rash.   Neurological: Negative for dizziness, syncope and weakness.   Hematological: Does not bruise/bleed easily.   All other systems reviewed and are negative.      Physical Exam      Initial Vitals [01/20/19 1239]   BP Pulse Resp Temp SpO2   (!) 150/93 80 18 98 °F (36.7 °C) (!) 93 %      MAP       --          Physical Exam  Nursing Notes and Vital Signs Reviewed.  Constitutional: Patient is in no acute distress. Well-developed and well-nourished. Morbidly obese.   Head: Atraumatic. Normocephalic.  Eyes: PERRL. EOM intact. Conjunctivae are not pale. No scleral icterus.  ENT: Mucous membranes are moist. Oropharynx is  "clear and symmetric.    Neck: Supple. Full ROM. No lymphadenopathy.  Cardiovascular: Regular rate. Regular rhythm. No murmurs, rubs, or gallops. Distal pulses are 2+ and symmetric.  Pulmonary/Chest: No respiratory distress. Clear to auscultation bilaterally. No wheezing or rales.  Abdominal: Soft and non-distended.  There is no tenderness.  No rebound, guarding, or rigidity.   Musculoskeletal: Moves all extremities. No obvious deformities. No edema. No calf tenderness.  /external recal: Scant amount of maroon-colored blood on diaper. No hemorrhoids.   Skin: Warm and dry.  Neurological:  Alert, awake, and appropriate.  Normal speech.  No acute focal neurological deficits are appreciated.  Psychiatric: Normal affect. Good eye contact. Appropriate in content.    ED Course    Procedures  ED Vital Signs:  Vitals:    01/20/19 1239 01/20/19 1314 01/20/19 1315 01/20/19 1316   BP: (!) 150/93 137/63 128/66 135/74   Pulse: 80 77 78 89   Resp: 18      Temp: 98 °F (36.7 °C)      TempSrc: Oral      SpO2: (!) 93%      Weight: 121.1 kg (266 lb 15.6 oz)      Height: 5' 5" (1.651 m)       01/20/19 1441   BP: (!) 118/55   Pulse: 77   Resp: 15   Temp: 98 °F (36.7 °C)   TempSrc: Oral   SpO2: 97%   Weight:    Height:        Abnormal Lab Results:  Labs Reviewed   CBC W/ AUTO DIFFERENTIAL - Abnormal; Notable for the following components:       Result Value    MCHC 29.6 (*)     RDW 17.1 (*)     All other components within normal limits   COMPREHENSIVE METABOLIC PANEL - Abnormal; Notable for the following components:    CO2 33 (*)     Glucose 203 (*)     Albumin 3.1 (*)     eGFR if non  56 (*)     All other components within normal limits   PROTIME-INR   APTT        All Lab Results:  Results for orders placed or performed during the hospital encounter of 01/20/19   CBC auto differential   Result Value Ref Range    WBC 6.85 3.90 - 12.70 K/uL    RBC 4.70 4.00 - 5.40 M/uL    Hemoglobin 12.8 12.0 - 16.0 g/dL    Hematocrit 43.3 " 37.0 - 48.5 %    MCV 92 82 - 98 fL    MCH 27.2 27.0 - 31.0 pg    MCHC 29.6 (L) 32.0 - 36.0 g/dL    RDW 17.1 (H) 11.5 - 14.5 %    Platelets 271 150 - 350 K/uL    MPV 11.6 9.2 - 12.9 fL    Gran # (ANC) 3.8 1.8 - 7.7 K/uL    Lymph # 2.5 1.0 - 4.8 K/uL    Mono # 0.5 0.3 - 1.0 K/uL    Eos # 0.1 0.0 - 0.5 K/uL    Baso # 0.01 0.00 - 0.20 K/uL    Gran% 56.0 38.0 - 73.0 %    Lymph% 36.6 18.0 - 48.0 %    Mono% 6.6 4.0 - 15.0 %    Eosinophil% 0.7 0.0 - 8.0 %    Basophil% 0.1 0.0 - 1.9 %    Differential Method Automated    Comprehensive metabolic panel   Result Value Ref Range    Sodium 144 136 - 145 mmol/L    Potassium 4.1 3.5 - 5.1 mmol/L    Chloride 102 95 - 110 mmol/L    CO2 33 (H) 23 - 29 mmol/L    Glucose 203 (H) 70 - 110 mg/dL    BUN, Bld 13 6 - 20 mg/dL    Creatinine 1.1 0.5 - 1.4 mg/dL    Calcium 9.4 8.7 - 10.5 mg/dL    Total Protein 6.6 6.0 - 8.4 g/dL    Albumin 3.1 (L) 3.5 - 5.2 g/dL    Total Bilirubin 0.4 0.1 - 1.0 mg/dL    Alkaline Phosphatase 74 55 - 135 U/L    AST 16 10 - 40 U/L    ALT 18 10 - 44 U/L    Anion Gap 9 8 - 16 mmol/L    eGFR if African American >60 >60 mL/min/1.73 m^2    eGFR if non African American 56 (A) >60 mL/min/1.73 m^2   Protime-INR   Result Value Ref Range    Prothrombin Time 10.2 9.0 - 12.5 sec    INR 0.9 0.8 - 1.2   APTT   Result Value Ref Range    aPTT 26.8 21.0 - 32.0 sec            The Emergency Provider reviewed the vital signs and test results, which are outlined above.    ED Discussion     2:27 PM: Reassessed pt at this time. Pt has had no bleeding in the ED. She is hemodynamically stable. She has close GI f/u. Discussed with pt all pertinent ED information and results. Discussed pt dx and plan of tx. Gave pt all f/u and return to the ED instructions. All questions and concerns were addressed at this time. Pt expresses understanding of information and instructions, and is comfortable with plan to discharge. Pt is stable for discharge.    I discussed with patient and/or family/caretaker  that evaluation in the ED does not suggest any emergent or life threatening medical conditions requiring immediate intervention beyond what was provided in the ED, and I believe patient is safe for discharge.  Regardless, an unremarkable evaluation in the ED does not preclude the development or presence of a serious of life threatening condition. As such, patient was instructed to return immediately for any worsening or change in current symptoms.    ED Medication(s):  Medications - No data to display    Current Discharge Medication List   none       Follow-up Information     Washington Cm MD. Schedule an appointment as soon as possible for a visit in 1 day.    Specialty:  Internal Medicine  Why:  Return to the [facility], If symptoms worsen  Contact information:  [address]  [place] [address]  [phone]                     Medical Decision Making    Medical Decision Making:   History:   Old Medical Records: I decided to obtain old medical records.  Old Records Summarized: records from previous admission(s).       <> Summary of Records: Reading Physician Reading Date Result Priority  Michael Lowe MD 1/17/2019     Narrative    EXAMINATION:  CT RENAL STONE STUDY ABD PELVIS WO    CLINICAL HISTORY:  Abdominal pain, unspecified;    TECHNIQUE:  Routine 5 mm non-contrast images of the abdomen and pelvis were done.  Sagittal and coronal reformats were also submitted for interpretation.    COMPARISON:  None    FINDINGS:  The lung bases are unremarkable.  There is no pleural fluid present.  The visualized portions of the heart appear normal.    The liver is normal in size and attenuation with no focal hepatic abnormality.  The gallbladder shows no evidence of stones or pericholecystic fluid.  There is no intra-or extrahepatic biliary ductal dilatation.    The spleen, pancreas, and adrenal glands are unremarkable.    The kidneys are normal in size and location.  There is no evidence of hydronephrosis.  Bladder is grossly unremarkable.    Stomach is unremarkable.   The visualized loops of small bowel show no evidence of obstruction or inflammation. Large bowel demonstrates mild constipation.  Extensive diverticulosis is seen throughout the descending and sigmoid colon as well as within the ascending colon.  No evidence of appendicitis.  There is no ascites, free fluid, or intraperitoneal free air.  Small fat containing umbilical hernia.    Shotty nodes are seen within the mesentery and the left hemiabdomen which may be reactive.  There is no evidence of lymph node enlargement in the abdomen or pelvis.    The abdominal aorta is normal in course and caliber with mild atherosclerotic calcifications.    Mild multilevel degenerative changes greatest at L5/S1.    The extraperitoneal soft tissues are unremarkable.    Impression      Moderate constipation and extensive diverticulosis throughout the large bowel.  Minimal stranding is seen within the descending colon therefore mild diverticulitis is not entirely excluded.  No evidence of perforation or free air. Shotty nodes are seen within the mesentery and the left hemiabdomen which may be reactive.    All CT scans at this facility use dose modulation, iterative reconstruction and/or weight based dosing when appropriate to reduce radiation dose to as low as reasonably achievable.      Electronically signed by: Michael Lowe MD  Date: 01/17/2019  Time: 14:25      Clinical Tests:   Lab Tests: Ordered and Reviewed           Scribe Attestation:   Scribe #1: I performed the above scribed service and the documentation accurately describes the services I performed. I attest to the accuracy of the note.    Attending:   Physician Attestation Statement for Scribe #1: I, Iraida Malone MD, personally performed the services described in this documentation, as scribed by Bibi Hutson, in my presence, and it is both accurate and complete.          Clinical Impression        ICD-10-CM ICD-9-CM   1. Rectal bleeding K62.5 569.3   2. Diverticulosis of large intestine without hemorrhage K57.30 562.10   3. Morbid obesity E66.01 278.01       Disposition:   Disposition: Discharged  Condition: Stable         Iraida Malone MD  01/20/19 3306

## 2019-01-20 NOTE — ED NOTES
Pt presents to ED with c/o rectal bleeding. Pt reports recent dx of diverticulosis (currently on abx) and states that she is concerned that something is wrong. Pt denies weakness/dizziness/fatigue.    Patient identifiers verified and correct for Baylee Muñoz.    LOC: The patient is awake, alert and aware of environment with an appropriate affect, the patient is oriented x 3 and speaking appropriately.  APPEARANCE: Patient resting comfortably and in no acute distress, patient is clean and well groomed, patient's clothing is properly fastened.  SKIN: The skin is warm and dry, color consistent with ethnicity, patient has normal skin turgor and moist mucus membranes, skin intact, no breakdown or bruising noted.  MUSCULOSKELETAL: Patient moving all extremities spontaneously.  RESPIRATORY: Airway is open and patent, respirations are spontaneous.  CARDIAC: Patient has a normal rate, no periphreal edema noted, capillary refill < 3 seconds.  ABDOMEN: Soft and non tender to palpation.   GI: pt reports rectal bleeding when passing stool x3 since last night. Pt states that the stool is a maroon color, pt w/ hx diverticulosis.

## 2019-01-21 ENCOUNTER — TELEPHONE (OUTPATIENT)
Dept: INTERNAL MEDICINE | Facility: CLINIC | Age: 59
End: 2019-01-21

## 2019-01-21 ENCOUNTER — OFFICE VISIT (OUTPATIENT)
Dept: INTERNAL MEDICINE | Facility: CLINIC | Age: 59
End: 2019-01-21

## 2019-01-21 VITALS
WEIGHT: 268.5 LBS | OXYGEN SATURATION: 97 % | HEIGHT: 65 IN | HEART RATE: 102 BPM | BODY MASS INDEX: 44.73 KG/M2 | DIASTOLIC BLOOD PRESSURE: 78 MMHG | SYSTOLIC BLOOD PRESSURE: 116 MMHG | RESPIRATION RATE: 16 BRPM | TEMPERATURE: 98 F

## 2019-01-21 DIAGNOSIS — M21.42 FLAT FEET, BILATERAL: ICD-10-CM

## 2019-01-21 DIAGNOSIS — J45.40 MODERATE PERSISTENT ASTHMA WITHOUT STATUS ASTHMATICUS WITHOUT COMPLICATION: Primary | ICD-10-CM

## 2019-01-21 DIAGNOSIS — E66.01 SEVERE OBESITY (BMI >= 40): ICD-10-CM

## 2019-01-21 DIAGNOSIS — K92.2 GASTROINTESTINAL HEMORRHAGE, UNSPECIFIED GASTROINTESTINAL HEMORRHAGE TYPE: ICD-10-CM

## 2019-01-21 DIAGNOSIS — M21.41 FLAT FEET, BILATERAL: ICD-10-CM

## 2019-01-21 PROBLEM — Z87.891 FORMER SMOKER: Status: ACTIVE | Noted: 2017-08-14

## 2019-01-21 PROCEDURE — 99214 PR OFFICE/OUTPT VISIT, EST, LEVL IV, 30-39 MIN: ICD-10-PCS | Mod: S$PBB,,, | Performed by: INTERNAL MEDICINE

## 2019-01-21 PROCEDURE — 99999 PR PBB SHADOW E&M-EST. PATIENT-LVL III: ICD-10-PCS | Mod: PBBFAC,,, | Performed by: INTERNAL MEDICINE

## 2019-01-21 PROCEDURE — 99214 OFFICE O/P EST MOD 30 MIN: CPT | Mod: S$PBB,,, | Performed by: INTERNAL MEDICINE

## 2019-01-21 PROCEDURE — 99999 PR PBB SHADOW E&M-EST. PATIENT-LVL III: CPT | Mod: PBBFAC,,, | Performed by: INTERNAL MEDICINE

## 2019-01-21 PROCEDURE — 99213 OFFICE O/P EST LOW 20 MIN: CPT | Mod: PBBFAC,PN | Performed by: INTERNAL MEDICINE

## 2019-01-21 NOTE — TELEPHONE ENCOUNTER
Called pt to inform her of needing to schedule colonoscopy per providers request no answer LM to return call./db**

## 2019-01-21 NOTE — PROGRESS NOTES
"Subjective:      Patient ID: Baylee Muñoz is a 58 y.o. female.    Chief Complaint: Follow-up      HPI     Ms. Baylee Muñoz is a patient of Washington Cm MD, who presents for f/u on ER visit for BRBPR. She was seen in the ER on Thursday 1/17/19, at which time she had LUQ abn pain x5 days, which was thought to be 2/2 "extensive diverticulosis", which was noted on abn CT at that time. She was tx'd with Cipro/Flagyl and tramadol for diverticulitis/pain. She re-presented to the ER on Sunday 1/20/19 due to BRBPR (LUQ abn pain had resolved), at which time CBC showed mild anemia and she was found to be HD stable and was DC to home with instructions to see GI. She notes her abn pain continues to be subsided and has 2-3 BMs/day, which have lately been maroon color (initially BRBPR). She continues to take Cipro/Flagyl for her diverticulitis and has not yet heard back from gastroenterology to schedule her colonoscopy. She endorses eating some nuts last week, which tried to chew thoroughly.     She reports having albuterol nebs solution, but not the nebulizer or supplies. She prefers the mouthpiece over the mask, which causes her a sensation of suffocation.    She also requests a podiatry referral for orthotic due to flat feet.    She promises to see Dr. Korey AGEE so she can have her MMG/PAP/breast exam done.      Past Medical History:   Diagnosis Date    Allergy     Arthritis     Asthma     Admit to [facility] 1/6/19 by Dr. Michael Randall for asthma exaceration, acute bronchitis, acute dCHF, hypoxia, elevated troponin/BNP (thought to be 2/2 asthma exac & acute dCHF by cards), tx'd w/ IV lasix, duonebs, IV solumedrol and DC'd on Z-prema, prednisone taper    CKD (chronic kidney disease) stage 3, GFR 30-59 ml/min     vs ARF; encouraged to increase water intake and avoid NSAIDS & contrast dye    Depression     Diastolic heart failure 01/06/2019    Dx'd at [place] during asthma exacerbation    Diverticulitis 2005    Dyslipidemia " 08/28/2018    Former smoker     Quit 12/31/18; smoked 0.12 packs/day x 37 years    Gastritis     Hypertension     Severe obesity (BMI >= 40)     Vitamin D deficiency      Past Surgical History:   Procedure Laterality Date    cyst removal       Social History     Socioeconomic History    Marital status:      Spouse name: Not on file    Number of children: Not on file    Years of education: Not on file    Highest education level: Not on file   Social Needs    Financial resource strain: Not on file    Food insecurity - worry: Not on file    Food insecurity - inability: Not on file    Transportation needs - medical: Not on file    Transportation needs - non-medical: Not on file   Occupational History    Not on file   Tobacco Use    Smoking status: Former Smoker     Packs/day: 0.25     Years: 27.00     Pack years: 6.75    Smokeless tobacco: Never Used   Substance and Sexual Activity    Alcohol use: No    Drug use: No    Sexual activity: No   Other Topics Concern    Not on file   Social History Narrative    Not on file     Family History   Problem Relation Age of Onset    Stroke Mother     Heart disease Mother     Kidney disease Father     Heart disease Father     Hypertension Father     HIV Sister     COPD Sister     Bipolar disorder Sister        Current Outpatient Medications:     acetaminophen (TYLENOL) 650 MG TbSR, Take 1 tablet (650 mg total) by mouth every 8 (eight) hours., Disp: , Rfl: 0    albuterol 90 mcg/actuation inhaler, Inhale 2 puffs into the lungs every 6 (six) hours as needed for Wheezing., Disp: 3 Inhaler, Rfl: 11    aspirin (ECOTRIN) 81 MG EC tablet, Take 81 mg by mouth once daily., Disp: , Rfl:     atorvastatin (LIPITOR) 40 MG tablet, Take 40 mg by mouth once daily., Disp: , Rfl:     ciprofloxacin HCl (CIPRO) 500 MG tablet, Take 1 tablet (500 mg total) by mouth 2 (two) times daily. for 10 days, Disp: 20 tablet, Rfl: 0    ergocalciferol (ERGOCALCIFEROL) 50,000  "unit Cap, Take 1 capsule (50,000 Units total) by mouth every 7 days., Disp: 8 capsule, Rfl: 3    furosemide (LASIX) 40 MG tablet, Take 1 tablet (40 mg total) by mouth 2 (two) times daily as needed., Disp: 90 tablet, Rfl: 3    metroNIDAZOLE (FLAGYL) 500 MG tablet, Take 1 tablet (500 mg total) by mouth 3 (three) times daily. for 7 days, Disp: 21 tablet, Rfl: 0    pediatric multivitamin chewable tablet, Take 1 tablet by mouth once daily., Disp: , Rfl:     promethazine-dextromethorphan (PROMETHAZINE-DM) 6.25-15 mg/5 mL Syrp, Take 5 mLs by mouth nightly as needed (Cough)., Disp: 120 mL, Rfl: 0    traMADol (ULTRAM) 50 mg tablet, Take 1 tablet (50 mg total) by mouth every 6 (six) hours as needed., Disp: 12 tablet, Rfl: 0    albuterol (ACCUNEB) 1.25 mg/3 mL Nebu, Take 3 mLs (1.25 mg total) by nebulization every 6 (six) hours as needed. Rescue, Disp: 1 Box, Rfl: 0    Review of patient's allergies indicates:   Allergen Reactions    Penicillins Hives    Coconut     Dairy aid [lactase]     Iodine and iodide containing products         Review of Systems   All remaining systems negative    Objective:     /78 (BP Location: Left arm, Patient Position: Sitting, BP Method: Large (Manual))   Pulse 102   Temp 98.2 °F (36.8 °C) (Oral)   Resp 16   Ht 5' 5" (1.651 m)   Wt 121.8 kg (268 lb 8.3 oz)   SpO2 97%   BMI 44.68 kg/m²     Physical Exam  GEN: A&O fully, NAD  PSYC: Normal affect  GI: Obese; S/NT/ND, normal bowel sounds      Lab Results   Component Value Date    WBC 6.85 01/20/2019    HGB 12.8 01/20/2019    HCT 43.3 01/20/2019     01/20/2019    CHOL 157 08/27/2018    TRIG 160 (H) 08/27/2018    HDL 33 (L) 08/27/2018    LDLCALC 92.0 08/27/2018    ALT 18 01/20/2019    AST 16 01/20/2019     01/20/2019    K 4.1 01/20/2019     01/20/2019    CREATININE 1.1 01/20/2019    BUN 13 01/20/2019    CO2 33 (H) 01/20/2019    CALCIUM 9.4 01/20/2019    INR 0.9 01/20/2019    HGBA1C 6.2 (H) 01/15/2019 "       Assessment:      1. Moderate persistent asthma without status asthmaticus without complication    2. Flat feet, bilateral    3. Gastrointestinal hemorrhage, unspecified gastrointestinal hemorrhage type    4. Severe obesity (BMI >= 40)        Plan:   Moderate persistent asthma without status asthmaticus without complication  -     NEBULIZER FOR HOME USE  -     NEBULIZER KIT (SUPPLIES) FOR HOME USE    Flat feet, bilateral  -     Ambulatory consult to Podiatry    Gastrointestinal hemorrhage, unspecified gastrointestinal hemorrhage type    Severe obesity (BMI >= 40)      Follow-up in about 4 weeks (around 2/18/2019), or if symptoms worsen or fail to improve, for FU on BRBPR.    I spent 25 minutes of time with patient 50% or more of which was discussing labs and plans of care.

## 2019-01-21 NOTE — TELEPHONE ENCOUNTER
----- Message from Washington Cm MD sent at 1/21/2019  7:45 AM CST -----  Would you please call Ms. Muñoz to let her know I was informed of her ER visit for rectal bleeding and I recommend she schedule a colonoscopy at her earliest convenience?    Thanks!

## 2019-01-22 ENCOUNTER — DOCUMENTATION ONLY (OUTPATIENT)
Dept: ENDOSCOPY | Facility: HOSPITAL | Age: 59
End: 2019-01-22

## 2019-01-22 NOTE — TELEPHONE ENCOUNTER
----- Message from Mame Peacock sent at 1/22/2019  2:54 PM CST -----  Contact: [phone]   Pt would  Like to consult with the nurse about Nebulizer Rx. Please call back at [phone].  Markx-

## 2019-01-22 NOTE — PROGRESS NOTES
Incoming call. Pt stated she is currently being tx for diverticulitis and has been seen in the ER since beginning tx. I advised pt she needs to set up an appt to be seen in the GI clinic for further evaluation. Pt stated understanding. GI office appt scheduled for 2/4/19.     Colonoscopy case canceled.

## 2019-01-24 ENCOUNTER — TELEPHONE (OUTPATIENT)
Dept: ENDOSCOPY | Facility: HOSPITAL | Age: 59
End: 2019-01-24

## 2019-02-04 ENCOUNTER — PATIENT OUTREACH (OUTPATIENT)
Dept: ADMINISTRATIVE | Facility: HOSPITAL | Age: 59
End: 2019-02-04

## 2019-02-05 ENCOUNTER — HOSPITAL ENCOUNTER (OUTPATIENT)
Dept: RADIOLOGY | Facility: HOSPITAL | Age: 59
Discharge: HOME | End: 2019-02-05
Attending: INTERNAL MEDICINE
Payer: OTHER GOVERNMENT

## 2019-02-05 DIAGNOSIS — Z12.39 SCREENING FOR BREAST CANCER: ICD-10-CM

## 2019-02-05 PROCEDURE — 77067 SCR MAMMO BI INCL CAD: CPT | Mod: 26,,, | Performed by: RADIOLOGY

## 2019-02-05 PROCEDURE — 77067 MAMMO DIGITAL SCREENING BILAT WITH CAD: ICD-10-PCS | Mod: 26,,, | Performed by: RADIOLOGY

## 2019-02-05 PROCEDURE — 77067 SCR MAMMO BI INCL CAD: CPT | Mod: TC

## 2019-02-11 ENCOUNTER — HOSPITAL ENCOUNTER (OUTPATIENT)
Dept: RADIOLOGY | Facility: HOSPITAL | Age: 59
Discharge: HOME | End: 2019-02-11
Attending: PODIATRIST
Payer: OTHER GOVERNMENT

## 2019-02-11 ENCOUNTER — OFFICE VISIT (OUTPATIENT)
Dept: PODIATRY | Facility: CLINIC | Age: 59
End: 2019-02-11
Payer: OTHER GOVERNMENT

## 2019-02-11 VITALS
DIASTOLIC BLOOD PRESSURE: 82 MMHG | SYSTOLIC BLOOD PRESSURE: 125 MMHG | BODY MASS INDEX: 44.65 KG/M2 | HEIGHT: 65 IN | RESPIRATION RATE: 16 BRPM | HEART RATE: 76 BPM | WEIGHT: 268 LBS

## 2019-02-11 DIAGNOSIS — Z87.19 HISTORY OF GI BLEED: ICD-10-CM

## 2019-02-11 DIAGNOSIS — E55.9 VITAMIN D DEFICIENCY: ICD-10-CM

## 2019-02-11 DIAGNOSIS — M24.572 CONTRACTURE, LEFT ANKLE: ICD-10-CM

## 2019-02-11 DIAGNOSIS — M25.572 PAIN IN LEFT ANKLE AND JOINTS OF LEFT FOOT: ICD-10-CM

## 2019-02-11 DIAGNOSIS — M79.672 BILATERAL FOOT PAIN: Primary | ICD-10-CM

## 2019-02-11 DIAGNOSIS — K57.92 DIVERTICULITIS: ICD-10-CM

## 2019-02-11 DIAGNOSIS — M79.671 BILATERAL FOOT PAIN: Primary | ICD-10-CM

## 2019-02-11 DIAGNOSIS — M76.822 POSTERIOR TIBIAL TENDON DYSFUNCTION, LEFT: ICD-10-CM

## 2019-02-11 DIAGNOSIS — M79.671 BILATERAL FOOT PAIN: ICD-10-CM

## 2019-02-11 DIAGNOSIS — M25.571 PAIN IN RIGHT ANKLE AND JOINTS OF RIGHT FOOT: ICD-10-CM

## 2019-02-11 DIAGNOSIS — M79.672 BILATERAL FOOT PAIN: ICD-10-CM

## 2019-02-11 DIAGNOSIS — M76.821 POSTERIOR TIBIAL TENDON DYSFUNCTION, RIGHT: Primary | ICD-10-CM

## 2019-02-11 DIAGNOSIS — R06.02 SHORTNESS OF BREATH: ICD-10-CM

## 2019-02-11 DIAGNOSIS — J45.909 ASTHMA, UNSPECIFIED ASTHMA SEVERITY, UNSPECIFIED WHETHER COMPLICATED, UNSPECIFIED WHETHER PERSISTENT: ICD-10-CM

## 2019-02-11 PROCEDURE — 99243 OFF/OP CNSLTJ NEW/EST LOW 30: CPT | Mod: S$PBB,,, | Performed by: PODIATRIST

## 2019-02-11 PROCEDURE — 99999 PR PBB SHADOW E&M-EST. PATIENT-LVL III: ICD-10-PCS | Mod: PBBFAC,,, | Performed by: PODIATRIST

## 2019-02-11 PROCEDURE — 99213 OFFICE O/P EST LOW 20 MIN: CPT | Mod: PBBFAC,25 | Performed by: PODIATRIST

## 2019-02-11 PROCEDURE — 99999 PR PBB SHADOW E&M-EST. PATIENT-LVL III: CPT | Mod: PBBFAC,,, | Performed by: PODIATRIST

## 2019-02-11 PROCEDURE — 73630 X-RAY EXAM OF FOOT: CPT | Mod: 26,50,, | Performed by: RADIOLOGY

## 2019-02-11 PROCEDURE — 73630 X-RAY EXAM OF FOOT: CPT | Mod: 50,TC

## 2019-02-11 PROCEDURE — 99243 PR OFFICE CONSULTATION,LEVEL III: ICD-10-PCS | Mod: S$PBB,,, | Performed by: PODIATRIST

## 2019-02-11 PROCEDURE — 73630 XR FOOT COMPLETE 3 VIEW BILATERAL: ICD-10-PCS | Mod: 26,50,, | Performed by: RADIOLOGY

## 2019-02-11 RX ORDER — PREDNISONE 20 MG/1
20 TABLET ORAL DAILY
Qty: 4 TABLET | Refills: 0 | Status: SHIPPED | OUTPATIENT
Start: 2019-02-11 | End: 2019-02-15

## 2019-02-11 RX ORDER — ALBUTEROL SULFATE 90 UG/1
AEROSOL, METERED RESPIRATORY (INHALATION)
Qty: 25.5 G | Refills: 11 | Status: SHIPPED | OUTPATIENT
Start: 2019-02-11 | End: 2021-03-09 | Stop reason: SDUPTHER

## 2019-02-11 NOTE — LETTER
February 11, 2019      Washington Cm MD  [address]  [place] [address]           [facility]  [address] [place] [id number]  Phone: [phone]  Fax: [phone]          Patient: Baylee Muñoz   MR Number: [id number]   YOB: 1960   Date of Visit: 2/11/2019       Dear Dr. Washington Cm:    Thank you for referring Baylee Muñoz to me for evaluation. Attached you will find relevant portions of my assessment and plan of care.    If you have questions, please do not hesitate to call me. I look forward to following Baylee Muñoz along with you.    Sincerely,    Luc Rangel, CHARLETTE    Enclosure  CC:  No Recipients    If you would like to receive this communication electronically, please contact [email or web address] or [phone] to request more information on [employer] access.    For providers and/or their staff who would like to refer a patient to [facility], please contact us through our one-stop-shop provider referral line, [facility] , at [phone].    If you feel you have received this communication in error or would no longer like to receive these types of communications, please e-mail [email or web address]

## 2019-02-11 NOTE — PROGRESS NOTES
Subjective:       Patient ID: Baylee Muñoz is a 58 y.o. female.    Chief Complaint: Foot Pain (Bilateral pes plantus, rates pain 10/10 after prolonged standing, wears tennis shoes with inserts, ambulates without assistance, Diabetic PT, PCP Dr. Cm)      HPI: Baylee Muñoz presents to the office with the chief complaint of pains to the right foot and ankle at the medial aspect. The pains are rated as 10/10 and are described as sharp and achy  in nature. The pains have been on going now for the past several weeks to a month or so, and are worsening. The patient denies trauma. The patient states in frequent NSAIDs for management due to a recent history of GI bleed and diverticulitis. No PCP evaluation of this condition as of yet. The patient states that prolonged walking and standing exacerbates and causes the symptoms. The patient does state mild associated swelling as well. Patient's Primary Care Provider is Washington Cm MD.  Recent discharge from hospital, after 4 days stay due to the aforementioned as well as an asthma exacerbation.  Patient states symptoms on the left foot as well, though not as pronounced as right foot.    Review of patient's allergies indicates:   Allergen Reactions    Penicillins Hives    Coconut     Dairy aid [lactase]     Iodine and iodide containing products        Past Medical History:   Diagnosis Date    Allergy     Arthritis     Asthma     Admit to [facility] 1/6/19 by Dr. Michael Randall for asthma exaceration, acute bronchitis, acute dCHF, hypoxia, elevated troponin/BNP (thought to be 2/2 asthma exac & acute dCHF by cards), tx'd w/ IV lasix, duonebs, IV solumedrol and DC'd on Z-prema, prednisone taper    CKD (chronic kidney disease) stage 3, GFR 30-59 ml/min     vs ARF; encouraged to increase water intake and avoid NSAIDS & contrast dye    Depression     Diastolic heart failure 01/06/2019    Dx'd at [place] during asthma exacerbation    Diverticulitis 2005    Dyslipidemia  08/28/2018    Former smoker     Quit 12/31/18; smoked 0.12 packs/day x 37 years    Gastritis     Hypertension     Severe obesity (BMI >= 40)     Vitamin D deficiency        Family History   Problem Relation Age of Onset    Stroke Mother     Heart disease Mother     Kidney disease Father     Heart disease Father     Hypertension Father     HIV Sister     COPD Sister     Bipolar disorder Sister        Social History     Socioeconomic History    Marital status:      Spouse name: Not on file    Number of children: Not on file    Years of education: Not on file    Highest education level: Not on file   Social Needs    Financial resource strain: Not on file    Food insecurity - worry: Not on file    Food insecurity - inability: Not on file    Transportation needs - medical: Not on file    Transportation needs - non-medical: Not on file   Occupational History    Not on file   Tobacco Use    Smoking status: Former Smoker     Packs/day: 0.25     Years: 27.00     Pack years: 6.75    Smokeless tobacco: Never Used   Substance and Sexual Activity    Alcohol use: No    Drug use: No    Sexual activity: No   Other Topics Concern    Not on file   Social History Narrative    Not on file       Past Surgical History:   Procedure Laterality Date    cyst removal         Review of Systems   Constitutional: Negative for chills, fatigue and fever.   HENT: Negative for hearing loss.    Eyes: Negative for photophobia and visual disturbance.   Respiratory: Negative for cough, chest tightness, shortness of breath and wheezing.    Cardiovascular: Negative for chest pain and palpitations.   Gastrointestinal: Negative for constipation, diarrhea, nausea and vomiting.   Endocrine: Negative for cold intolerance and heat intolerance.   Genitourinary: Negative for flank pain.   Musculoskeletal: Positive for gait problem. Negative for neck pain and neck stiffness.   Neurological: Negative for light-headedness and  "headaches.   Psychiatric/Behavioral: Negative for sleep disturbance.         Objective:   /82 (BP Location: Right arm, Patient Position: Sitting, BP Method: Large (Automatic))   Pulse 76   Resp 16   Ht 5' 5" (1.651 m)   Wt 121.6 kg (268 lb)   BMI 44.60 kg/m²     X-Ray Foot Complete Bilateral  Narrative: EXAMINATION:  XR FOOT COMPLETE 3 VIEW BILATERAL    CLINICAL HISTORY:  Pain in right foot    TECHNIQUE:  AP, lateral, and oblique views of both feet were performed.    COMPARISON:  None    FINDINGS:  Bilateral hallux valgus deformities noted with 1st metatarsal median eminence prominence.  No acute osseous abnormality.  Mild degenerative changes present.  Prominent right dorsal calcaneal enthesophyte noted.  Tiny left plantar calcaneal enthesophyte present.  Impression: As above    Electronically signed by: Vince Carmona MD  Date:    02/11/2019  Time:    10:20      LOWER EXTREMITY PHYSICAL EXAMINATION    VASCULAR: Pulses are palpable to the B/L lower extremity. The right dorsalis pedis pulse is 2/4 and the posterior tibial pulse is 2/4. The left dorsalis pedis pulse is 2/4 and the posterior tibial pulse is 2/4. Hair growth is noted on the dorsal foot and digits. Proximal to distal, warm to warm. Capillary refill time is WNL at less than 3s.    DERMATOLOGY: Skin is supple, dry and intact. No ecchymosis is noted. No hypertrophic skin formation. No erythema or cellulitis is noted.     ORTHOPEDIC: There is moderate collapsing pes planovalgus on the right foot. There is moderate tenderness to palpation of the navicular tuberosity on the right foot. There is mild edema noted along the course of the PT tendon on the right foot. There is mild retro-malleolar edema (medial malleolus). There is mild pain to palpation at the spring ligament and the deltoid ligaments. There is no apparent pains to palpation of the medial malleolus.  Equinus contracture is noted.  No pain with palpation and/or range of motion of the " ankle joint.  There is no crepitus noted with range of motion of the ankle joint. The ankle is not in valgus.  There is mild limitation to ROM of the STJ and the MTJ. The hindfoot is in valgus. Upon standing, there is mild olya-talar subluxation noted on the left foot. There is moderate forefoot/midfoot abduction on the hindfoot.  RCSP is valgus. The deformity is supple. The patient is able to double heel rise, but has difficulties with single heel rise on the right foot. Gait pattern is antalgic at this time.     NEUROLOGY: Protective sensation is intact via 5.07 [place] Trisha monofilament. Proprioception is intact. Sensation to light touch is intact. Upon palpation of the interspaces, there are no neurological sensations stated that radiate proximal or distal. Upon compression of the metatarsal heads from medial to lateral, no neurological sensations or symptoms are stated.      Assessment:     1. Posterior tibial tendon dysfunction, right    2. Posterior tibial tendon dysfunction, left    3. Contracture, left ankle    4. Pain in left ankle and joints of left foot    5. Pain in right ankle and joints of right foot    6. Vitamin D deficiency    7. Asthma, unspecified asthma severity, unspecified whether complicated, unspecified whether persistent    8. Diverticulitis    9. History of GI bleed        Plan:     Posterior tibial tendon dysfunction, right  Posterior tibial tendon dysfunction, left  Contracture, left ankle  Pain in left ankle and joints of left foot  Pain in right ankle and joints of right foot  -     predniSONE (DELTASONE) 20 MG tablet; Take 1 tablet (20 mg total) by mouth once daily. for 4 days  Dispense: 4 tablet; Refill: 0    Thorough discussion is had with the patient today, concerning the diagnosis, its etiology, and the treatment algorithm at present.  XRAYS are reviewed in detail with the patient. All questions and concerns regarding findings and its/their implications are outlined and  discussed.  Patient will need protected weight-bearing for duration of 14 days.  CAM Walker (Walking Boot), short/tall is dispensed to the patient. The CAM Walker is appropriately fitted and customized to the patient's lower extremity physique by the LPN/MA. Patient to ambulate with the CAM Walker at all times. The patient should not sleep with the device or shower with the device, or drive with the device (if dispensed for right ankle/foot pathology).    No NSAID due to recent history.  Patient's past medical history is thoroughly reviewed today, in light of the fact that surgical intervention is discussed/planned/initiated.  Prescription is written for low-dose Prednisone for duration of 4 days.  We will initiate outpatient physical therapy upon follow-up.       Vitamin D deficiency  Asthma, unspecified asthma severity, unspecified whether complicated, unspecified whether persistent  Patient advised to follow up with Primary Care Physician for management of comorbid states.    Diverticulitis  History of GI bleed   Patient advised to follow up with Gastroenterologist for management of the aforementioned pathology.          Future Appointments   Date Time Provider Department Center   2/18/2019 11:00 AM [facility] [place]   2/25/2019  1:00 PM Luc Rangel DPM [facility]

## 2019-02-25 ENCOUNTER — OFFICE VISIT (OUTPATIENT)
Dept: PODIATRY | Facility: CLINIC | Age: 59
End: 2019-02-25
Payer: OTHER GOVERNMENT

## 2019-02-25 VITALS
HEIGHT: 65 IN | DIASTOLIC BLOOD PRESSURE: 68 MMHG | SYSTOLIC BLOOD PRESSURE: 139 MMHG | RESPIRATION RATE: 16 BRPM | BODY MASS INDEX: 45.67 KG/M2 | HEART RATE: 77 BPM | WEIGHT: 274.13 LBS

## 2019-02-25 DIAGNOSIS — M25.571 PAIN IN RIGHT ANKLE AND JOINTS OF RIGHT FOOT: ICD-10-CM

## 2019-02-25 DIAGNOSIS — M76.821 POSTERIOR TIBIAL TENDON DYSFUNCTION, RIGHT: Primary | ICD-10-CM

## 2019-02-25 DIAGNOSIS — M24.571 CONTRACTURE, RIGHT ANKLE: ICD-10-CM

## 2019-02-25 PROCEDURE — 99999 PR PBB SHADOW E&M-EST. PATIENT-LVL III: CPT | Mod: PBBFAC,,, | Performed by: PODIATRIST

## 2019-02-25 PROCEDURE — 99999 PR PBB SHADOW E&M-EST. PATIENT-LVL III: ICD-10-PCS | Mod: PBBFAC,,, | Performed by: PODIATRIST

## 2019-02-25 PROCEDURE — 99214 OFFICE O/P EST MOD 30 MIN: CPT | Mod: S$PBB,,, | Performed by: PODIATRIST

## 2019-02-25 PROCEDURE — 99214 PR OFFICE/OUTPT VISIT, EST, LEVL IV, 30-39 MIN: ICD-10-PCS | Mod: S$PBB,,, | Performed by: PODIATRIST

## 2019-02-25 PROCEDURE — 99213 OFFICE O/P EST LOW 20 MIN: CPT | Mod: PBBFAC | Performed by: PODIATRIST

## 2019-02-25 RX ORDER — TRAMADOL HYDROCHLORIDE 50 MG/1
50 TABLET ORAL EVERY 12 HOURS PRN
Qty: 20 TABLET | Refills: 0 | Status: SHIPPED | OUTPATIENT
Start: 2019-02-25 | End: 2019-02-27

## 2019-02-25 RX ORDER — LORAZEPAM 2 MG/1
2 TABLET ORAL ONCE AS NEEDED
Qty: 1 TABLET | Refills: 0 | Status: SHIPPED | OUTPATIENT
Start: 2019-02-25 | End: 2019-02-28

## 2019-02-25 NOTE — PROGRESS NOTES
Subjective:       Patient ID: Baylee Muñoz is a 58 y.o. female.    Chief Complaint: Foot Pain (2 wek boot check, right foot, rates pain 0/10 when wearing walking boot, wearing walking boot today, ambulates without assistance, Non-Diabetic Pt, PCP Dr. Cox )    HPI: Baylee Muñoz presents to the office today, for follow-up concerning posterior tibial tendon dysfunction, right lower extremity.  At this time, patient states no pains.  She states her pains are approximately 9/10 with walking and standing, even for just a few minutes.  Does state weakness of the right foot. Presents wearing a walking boot on the right foot. Did complete the course of oral steroid that I did prescribe her last evaluation approximately 2.5 weeks ago. Denies any outpatient physical therapy for this ailment.  Did purchase more supportive shoe gear.  Denies local or systemic signs of infection. Washington Cm MD is her primary care provider.    Review of patient's allergies indicates:   Allergen Reactions    Penicillins Hives    Coconut     Dairy aid [lactase]     Iodine and iodide containing products        Past Medical History:   Diagnosis Date    Allergy     Arthritis     Asthma     Admit to [facility] 1/6/19 by Dr. Michael Randall for asthma exaceration, acute bronchitis, acute dCHF, hypoxia, elevated troponin/BNP (thought to be 2/2 asthma exac & acute dCHF by cards), tx'd w/ IV lasix, duonebs, IV solumedrol and DC'd on Z-prema, prednisone taper    CKD (chronic kidney disease) stage 3, GFR 30-59 ml/min     vs ARF; encouraged to increase water intake and avoid NSAIDS & contrast dye    Depression     Diastolic heart failure 01/06/2019    Dx'd at [facility] during asthma exacerbation    Diverticulitis 2005    Dyslipidemia 08/28/2018    Former smoker     Quit 12/31/18; smoked 0.12 packs/day x 37 years    Gastritis     Hypertension     Severe obesity (BMI >= 40)     Vitamin D deficiency        Family History   Problem Relation Age of  Onset    Stroke Mother     Heart disease Mother     Kidney disease Father     Heart disease Father     Hypertension Father     HIV Sister     COPD Sister     Bipolar disorder Sister        Social History     Socioeconomic History    Marital status:      Spouse name: Not on file    Number of children: Not on file    Years of education: Not on file    Highest education level: Not on file   Social Needs    Financial resource strain: Not on file    Food insecurity - worry: Not on file    Food insecurity - inability: Not on file    Transportation needs - medical: Not on file    Transportation needs - non-medical: Not on file   Occupational History    Not on file   Tobacco Use    Smoking status: Former Smoker     Packs/day: 0.25     Years: 27.00     Pack years: 6.75    Smokeless tobacco: Never Used   Substance and Sexual Activity    Alcohol use: No    Drug use: No    Sexual activity: No   Other Topics Concern    Not on file   Social History Narrative    Not on file       Past Surgical History:   Procedure Laterality Date    cyst removal         Review of Systems   Constitutional: Negative for chills, fatigue and fever.   HENT: Negative for hearing loss.    Eyes: Negative for photophobia and visual disturbance.   Respiratory: Negative for cough, chest tightness, shortness of breath and wheezing.    Cardiovascular: Negative for chest pain and palpitations.   Gastrointestinal: Negative for constipation, diarrhea, nausea and vomiting.   Endocrine: Negative for cold intolerance and heat intolerance.   Genitourinary: Negative for flank pain.   Musculoskeletal: Positive for gait problem. Negative for neck pain and neck stiffness.   Skin: Negative for wound.   Neurological: Negative for light-headedness, numbness and headaches.   Psychiatric/Behavioral: Negative for sleep disturbance.          Objective:   /68 (BP Location: Right arm, Patient Position: Sitting, BP Method: Large (Automatic))   " Pulse 77   Resp 16   Ht 5' 5" (1.651 m)   Wt 124.3 kg (274 lb 2.3 oz)   BMI 45.62 kg/m²     X-Ray Foot Complete Bilateral  Narrative: EXAMINATION:  XR FOOT COMPLETE 3 VIEW BILATERAL    CLINICAL HISTORY:  Pain in right foot    TECHNIQUE:  AP, lateral, and oblique views of both feet were performed.    COMPARISON:  None    FINDINGS:  Bilateral hallux valgus deformities noted with 1st metatarsal median eminence prominence.  No acute osseous abnormality.  Mild degenerative changes present.  Prominent right dorsal calcaneal enthesophyte noted.  Tiny left plantar calcaneal enthesophyte present.  Impression: As above    Electronically signed by: Vince Carmona MD  Date:    02/11/2019  Time:    10:20       LOWER EXTREMITY PHYSICAL EXAMINATION  VASCULAR: On the right foot, the dorsalis pedis pulse is 2/4 and the posterior tibial pulse is 2/4. Capillary refill time is less than 3 seconds. Hair growth is present on the dorsum of the foot and at the digits. No rubor is present. Proximal to distal temperature is warm to warm.    NEUROLOGY: Proprioception is intact. Sensation to light touch is intact. Protective sensation is intact via 5.07 [place] Trisha monofilament. Straight leg raise is negative. Negative Tinel's Sign and negative Valleix Sign. No neurological sensations with compression of the area of Chowdary's Nerve in the area of the Abductor Hallucis muscle belly. Upon palpation of the interspaces, there are no neurological sensations stated that radiate proximal or distal. Upon compression of the metatarsal heads from medial to lateral, no neurological sensations or symptoms are stated. Deep tendon reflexes to the lower extremity are WNL.    DERMATOLOGY: Skin is supple, dry and intact. No ecchymosis is noted. No hypertrophic skin formation. No erythema or cellulitis is noted.     ORTHOPEDIC: There is moderate collapsing pes planovalgus on the right foot. There is severe tenderness to palpation of the navicular " tuberosity on the right foot. There is moderate edema noted along the course of the PT tendon on the right foot. There is mild retro-malleolar edema (medial malleolus). There is mild pain to palpation at the spring ligament and the deltoid ligaments. There is no apparent pains to palpation of the medial malleolus.  Equinus contracture is noted.  No pain with palpation and/or range of motion of the ankle joint.  There is no crepitus noted with range of motion of the ankle joint. The ankle is not in valgus.  There is mild limitation to ROM of the STJ and the MTJ. The hindfoot is in valgus. Upon standing, there is mild olya-talar subluxation noted on the right foot. There is mild forefoot/midfoot abduction on the hindfoot.  RCSP is valgus. The deformity is supple. The patient is able to double heel rise, but has difficulties with single heel rise on the right foot. Gait pattern is antalgic at this time.      Assessment:     1. Posterior tibial tendon dysfunction, right    2. Pain in right ankle and joints of right foot    3. Contracture, right ankle        Plan:     Posterior tibial tendon dysfunction, right  Pain in right ankle and joints of right foot  Contracture, right ankle  -     MRI Foot (Hindfoot) Right Without Contrast; Future; Expected date: 03/04/2019  -     traMADol (ULTRAM) 50 mg tablet; Take 1 tablet (50 mg total) by mouth every 12 (twelve) hours as needed.  Dispense: 20 tablet; Refill: 0    Other orders  -     LORazepam (ATIVAN) 2 MG Tab; Take 1 tablet (2 mg total) by mouth once as needed.  Dispense: 1 tablet; Refill: 0    Thorough discussion is had with the patient today, concerning the diagnosis, its etiology, and the treatment algorithm at present.  XRAYS are reviewed in detail with the patient. All questions and concerns regarding findings and its/their implications are outlined and discussed.    Clinically, PT tendonitis vs. tenosynovitis vs. partial rupture. Cont. CAM Walker with NSAIDs for now.   Orders placed for MRI evaluation.  Patient will follow up after testing.        Future Appointments   Date Time Provider Department Center   3/8/2019  4:00 PM [facility] MRI1 [facility] MRI [place]   3/12/2019  9:15 AM Luc Rangel DPM [facility]

## 2019-02-27 DIAGNOSIS — M76.821 POSTERIOR TIBIAL TENDON DYSFUNCTION, RIGHT: Primary | ICD-10-CM

## 2019-02-27 DIAGNOSIS — M25.571 PAIN IN RIGHT ANKLE AND JOINTS OF RIGHT FOOT: ICD-10-CM

## 2019-02-27 RX ORDER — TRAMADOL HYDROCHLORIDE 50 MG/1
50 TABLET ORAL EVERY 6 HOURS PRN
Qty: 20 TABLET | Refills: 0 | Status: SHIPPED | OUTPATIENT
Start: 2019-02-27 | End: 2019-03-09

## 2019-02-28 ENCOUNTER — OFFICE VISIT (OUTPATIENT)
Dept: INTERNAL MEDICINE | Facility: CLINIC | Age: 59
End: 2019-02-28
Payer: OTHER GOVERNMENT

## 2019-02-28 VITALS
OXYGEN SATURATION: 97 % | HEART RATE: 89 BPM | SYSTOLIC BLOOD PRESSURE: 132 MMHG | WEIGHT: 275.56 LBS | TEMPERATURE: 97 F | BODY MASS INDEX: 45.91 KG/M2 | HEIGHT: 65 IN | DIASTOLIC BLOOD PRESSURE: 82 MMHG

## 2019-02-28 DIAGNOSIS — J45.41 MODERATE PERSISTENT ASTHMA WITH EXACERBATION: ICD-10-CM

## 2019-02-28 DIAGNOSIS — E66.01 SEVERE OBESITY (BMI >= 40): ICD-10-CM

## 2019-02-28 DIAGNOSIS — J45.909 ASTHMA, UNSPECIFIED ASTHMA SEVERITY, UNSPECIFIED WHETHER COMPLICATED, UNSPECIFIED WHETHER PERSISTENT: ICD-10-CM

## 2019-02-28 DIAGNOSIS — I50.30 DIASTOLIC HEART FAILURE, UNSPECIFIED HF CHRONICITY: Primary | ICD-10-CM

## 2019-02-28 DIAGNOSIS — R73.03 PREDIABETES: ICD-10-CM

## 2019-02-28 PROCEDURE — 99214 OFFICE O/P EST MOD 30 MIN: CPT | Mod: S$PBB,,, | Performed by: INTERNAL MEDICINE

## 2019-02-28 PROCEDURE — 99213 OFFICE O/P EST LOW 20 MIN: CPT | Mod: PBBFAC,PN | Performed by: INTERNAL MEDICINE

## 2019-02-28 PROCEDURE — 99214 PR OFFICE/OUTPT VISIT, EST, LEVL IV, 30-39 MIN: ICD-10-PCS | Mod: S$PBB,,, | Performed by: INTERNAL MEDICINE

## 2019-02-28 PROCEDURE — 99999 PR PBB SHADOW E&M-EST. PATIENT-LVL III: ICD-10-PCS | Mod: PBBFAC,,, | Performed by: INTERNAL MEDICINE

## 2019-02-28 PROCEDURE — 99999 PR PBB SHADOW E&M-EST. PATIENT-LVL III: CPT | Mod: PBBFAC,,, | Performed by: INTERNAL MEDICINE

## 2019-02-28 RX ORDER — ATORVASTATIN CALCIUM 40 MG/1
40 TABLET, FILM COATED ORAL DAILY
Qty: 90 TABLET | Refills: 3 | Status: SHIPPED | OUTPATIENT
Start: 2019-02-28 | End: 2020-02-05

## 2019-02-28 RX ORDER — ASPIRIN 81 MG/1
81 TABLET ORAL DAILY
Qty: 90 TABLET | Refills: 3 | Status: ON HOLD | OUTPATIENT
Start: 2019-02-28 | End: 2022-08-10

## 2019-02-28 NOTE — PROGRESS NOTES
Subjective:      Patient ID: Baylee Muñoz is a 58 y.o. female.    Chief Complaint: Medication Problem (questions)      HPI     Ms. Baylee Muñoz is a patient of Washington Cm MD, who presents for Medication Problem (questions)    She asks if she needs to stay on atorvastatin and aspirin 81 mg, which were started at [facility] during hosp for dCHF.    VS, labs & imaging reviewed and discussed with patient, A1c 6.1% 1/2019, CKD2.       Past Medical History:   Diagnosis Date    Allergy     Arthritis     Asthma     Admit to [facility] 1/6/19 by Dr. Michael Randall for asthma exaceration, acute bronchitis, acute dCHF, hypoxia, elevated troponin/BNP (thought to be 2/2 asthma exac & acute dCHF by cards), tx'd w/ IV lasix, duonebs, IV solumedrol and DC'd on Z-prema, prednisone taper    CKD (chronic kidney disease) stage 2, GFR 60-89 ml/min     H/o ARF 2/2 poor water intake; encouraged to increase water intake and avoid NSAIDS & contrast dye    Depression     Diastolic heart failure 01/06/2019    Dx'd at [facility] during asthma exacerbation    Diverticulitis 2005    Dyslipidemia 08/28/2018    Former smoker     Quit 12/31/18; smoked 0.12 packs/day x 37 years    Gastritis     Hypertension     Prediabetes     Severe obesity (BMI >= 40)     Vitamin D deficiency      Past Surgical History:   Procedure Laterality Date    cyst removal       Social History     Socioeconomic History    Marital status:      Spouse name: Not on file    Number of children: Not on file    Years of education: Not on file    Highest education level: Not on file   Social Needs    Financial resource strain: Not on file    Food insecurity - worry: Not on file    Food insecurity - inability: Not on file    Transportation needs - medical: Not on file    Transportation needs - non-medical: Not on file   Occupational History    Not on file   Tobacco Use    Smoking status: Former Smoker     Packs/day: 0.25     Years: 27.00     Pack years: 6.75     Smokeless tobacco: Never Used   Substance and Sexual Activity    Alcohol use: No    Drug use: No    Sexual activity: No   Other Topics Concern    Not on file   Social History Narrative    Patient goal(s): Weight 175 lbs    Breakfast: sausage biscuit or croissant and 1 egg; black coffee w/ 1/2 tsp sugar or OJ    Lunch: Eats out: mashed potatoes, gravy, vegetable, fish or baked chicken; water or swea or kiwi/strawberry juice    Dinner: Grilled chicken salad or taco with ground beef, beans, ltteuce, cheese, tomatoes; kiwi/eetened tstrawberry juice    Snacks: None    Eating out: 3-4x/wk    Water (oz/day): 160 oz/d    Physical Activity: None     Family History   Problem Relation Age of Onset    Stroke Mother     Heart disease Mother     Kidney disease Father     Heart disease Father     Hypertension Father     HIV Sister     COPD Sister     Bipolar disorder Sister        Current Outpatient Medications:     ergocalciferol (ERGOCALCIFEROL) 50,000 unit Cap, Take 1 capsule (50,000 Units total) by mouth every 7 days., Disp: 8 capsule, Rfl: 3    furosemide (LASIX) 40 MG tablet, Take 1 tablet (40 mg total) by mouth 2 (two) times daily as needed., Disp: 90 tablet, Rfl: 3    pediatric multivitamin chewable tablet, Take 1 tablet by mouth once daily., Disp: , Rfl:     PROAIR HFA 90 mcg/actuation inhaler, USE 2 INHALATIONS EVERY 6 HOURS AS NEEDED FOR WHEEZING, Disp: 25.5 g, Rfl: 11    traMADol (ULTRAM) 50 mg tablet, Take 1 tablet (50 mg total) by mouth every 6 (six) hours as needed for Pain., Disp: 20 tablet, Rfl: 0    albuterol (ACCUNEB) 1.25 mg/3 mL Nebu, Take 3 mLs (1.25 mg total) by nebulization every 6 (six) hours as needed. Rescue, Disp: 1 Box, Rfl: 0    aspirin (ECOTRIN) 81 MG EC tablet, Take 1 tablet (81 mg total) by mouth once daily., Disp: 90 tablet, Rfl: 3    atorvastatin (LIPITOR) 40 MG tablet, Take 1 tablet (40 mg total) by mouth once daily., Disp: 90 tablet, Rfl: 3    Review of patient's  "allergies indicates:   Allergen Reactions    Penicillins Hives    Coconut     Dairy aid [lactase]     Iodine and iodide containing products         Review of Systems   All remaining systems negative    Objective:     /82 (BP Location: Left arm, Patient Position: Sitting, BP Method: Large (Manual))   Pulse 89   Temp 97.4 °F (36.3 °C) (Tympanic)   Ht 5' 5" (1.651 m)   Wt 125 kg (275 lb 9.2 oz)   SpO2 97%   BMI 45.86 kg/m²     Physical Exam  GEN: A&O fully, NAD  PSYC: Normal affect      Lab Results   Component Value Date    WBC 6.85 01/20/2019    HGB 12.8 01/20/2019    HCT 43.3 01/20/2019     01/20/2019    CHOL 157 08/27/2018    TRIG 160 (H) 08/27/2018    HDL 33 (L) 08/27/2018    LDLCALC 92.0 08/27/2018    ALT 18 01/20/2019    AST 16 01/20/2019     01/20/2019    K 4.1 01/20/2019     01/20/2019    CREATININE 1.1 01/20/2019    BUN 13 01/20/2019    CO2 33 (H) 01/20/2019    CALCIUM 9.4 01/20/2019    INR 0.9 01/20/2019    HGBA1C 6.2 (H) 01/15/2019       Assessment:      1. Diastolic heart failure, unspecified HF chronicity    2. Prediabetes: Recommended:  1. Increase physical activity to 30 minutes at least 3 times per week.  2. Increase water intake to 1/2 your weight in oz/day (i.e. 200 lbs -> 100 oz/day).  3. Increase organic yogurt, whole fruits to 3-5 servings/day.  4. Increase vegetables (but, avoid potatoes, yams, corn & peas).   5. Avoid potatoes / bread / rice / pasta.  6. Avoid bakery goods i.e. Pastries, donuts, pizza, cakes, cookies, biscuits, etc.  7. Avoid alcohol & sugary drinks i.e. soda, fruit juice, sweet tea, lemonade, etc.   8. Avoid processed foods with trans fats ("partically hydrogenated oils")  9. Track weight (i.e. "My Fitness Pal") & decrease portions for a net -3 lbs/mo    3. Asthma, unspecified asthma severity, unspecified whether complicated, unspecified whether persistent: Wrote hand-written prescription for nebulizer machine and supplies to be obtained from " [facility].    4. Moderate persistent asthma with exacerbation: As above.   5. Severe obesity (BMI >= 40): Weight increased by 2 lb since last visit. Discussed strategies for lifestyle modifications, particularly systematically increasing physical activity (5-90 min/episode, 3-5 times/week), water (1/2 of body weight in ounces) and avoiding potatoes, sugar sweetened beverages, red/processed meats (including chicken), fast food, grains (rice/bread/pasta); and increasing yogurt, whole fruits, unsalted nuts to 3-5 servings/day, and daily weight logging; non-starchy vegetables, cooked beans, and un-fried seafood have weak effects on weight.       Plan:   Diastolic heart failure, unspecified HF chronicity  -     2D Echo Only; Future    Prediabetes    Asthma, unspecified asthma severity, unspecified whether complicated, unspecified whether persistent    Moderate persistent asthma with exacerbation    Severe obesity (BMI >= 40)    Other orders  -     atorvastatin (LIPITOR) 40 MG tablet; Take 1 tablet (40 mg total) by mouth once daily.  Dispense: 90 tablet; Refill: 3  -     aspirin (ECOTRIN) 81 MG EC tablet; Take 1 tablet (81 mg total) by mouth once daily.  Dispense: 90 tablet; Refill: 3        Follow-up in about 3 months (around 5/28/2019), or if symptoms worsen or fail to improve, for FU on obesity, pre-DM2.    I spent 25 minutes of time with patient 50% or more of which was discussing labs and plans of care.

## 2019-03-08 ENCOUNTER — HOSPITAL ENCOUNTER (OUTPATIENT)
Dept: RADIOLOGY | Facility: HOSPITAL | Age: 59
Discharge: HOME | End: 2019-03-08
Attending: PODIATRIST
Payer: OTHER GOVERNMENT

## 2019-03-08 DIAGNOSIS — M25.571 PAIN IN RIGHT ANKLE AND JOINTS OF RIGHT FOOT: ICD-10-CM

## 2019-03-08 DIAGNOSIS — M24.571 CONTRACTURE, RIGHT ANKLE: ICD-10-CM

## 2019-03-08 DIAGNOSIS — M76.821 POSTERIOR TIBIAL TENDON DYSFUNCTION, RIGHT: ICD-10-CM

## 2019-03-08 PROCEDURE — 73718 MRI LOWER EXTREMITY W/O DYE: CPT | Mod: TC,RT

## 2019-03-25 ENCOUNTER — CLINICAL SUPPORT (OUTPATIENT)
Dept: CARDIOLOGY | Facility: CLINIC | Age: 59
End: 2019-03-25
Attending: INTERNAL MEDICINE
Payer: OTHER GOVERNMENT

## 2019-03-25 ENCOUNTER — OFFICE VISIT (OUTPATIENT)
Dept: PODIATRY | Facility: CLINIC | Age: 59
End: 2019-03-25
Payer: OTHER GOVERNMENT

## 2019-03-25 VITALS
BODY MASS INDEX: 45.77 KG/M2 | HEIGHT: 65 IN | DIASTOLIC BLOOD PRESSURE: 81 MMHG | WEIGHT: 274.69 LBS | RESPIRATION RATE: 17 BRPM | SYSTOLIC BLOOD PRESSURE: 137 MMHG | HEART RATE: 80 BPM

## 2019-03-25 DIAGNOSIS — M76.821 POSTERIOR TIBIAL TENDON DYSFUNCTION, RIGHT: Primary | ICD-10-CM

## 2019-03-25 DIAGNOSIS — I50.30 DIASTOLIC HEART FAILURE, UNSPECIFIED HF CHRONICITY: ICD-10-CM

## 2019-03-25 DIAGNOSIS — M24.571 CONTRACTURE, RIGHT ANKLE: ICD-10-CM

## 2019-03-25 DIAGNOSIS — M25.571 PAIN IN RIGHT ANKLE AND JOINTS OF RIGHT FOOT: ICD-10-CM

## 2019-03-25 DIAGNOSIS — M76.821 POSTERIOR TIBIAL TENDINITIS OF RIGHT LEG: ICD-10-CM

## 2019-03-25 LAB
DIASTOLIC DYSFUNCTION: NO
ESTIMATED PA SYSTOLIC PRESSURE: 37.81
RETIRED EF AND QEF - SEE NOTES: 65 (ref 55–65)
TRICUSPID VALVE REGURGITATION: NORMAL

## 2019-03-25 PROCEDURE — 93307 2D ECHO ONLY: ICD-10-PCS | Mod: 26,S$PBB,, | Performed by: NUCLEAR MEDICINE

## 2019-03-25 PROCEDURE — 99999 PR PBB SHADOW E&M-EST. PATIENT-LVL III: CPT | Mod: PBBFAC,,, | Performed by: PODIATRIST

## 2019-03-25 PROCEDURE — 99214 OFFICE O/P EST MOD 30 MIN: CPT | Mod: S$PBB,,, | Performed by: PODIATRIST

## 2019-03-25 PROCEDURE — 93307 TTE W/O DOPPLER COMPLETE: CPT | Mod: PBBFAC | Performed by: NUCLEAR MEDICINE

## 2019-03-25 PROCEDURE — 99999 PR PBB SHADOW E&M-EST. PATIENT-LVL III: ICD-10-PCS | Mod: PBBFAC,,, | Performed by: PODIATRIST

## 2019-03-25 PROCEDURE — 99214 PR OFFICE/OUTPT VISIT, EST, LEVL IV, 30-39 MIN: ICD-10-PCS | Mod: S$PBB,,, | Performed by: PODIATRIST

## 2019-03-25 PROCEDURE — 99213 OFFICE O/P EST LOW 20 MIN: CPT | Mod: PBBFAC,25 | Performed by: PODIATRIST

## 2019-03-25 RX ORDER — IBUPROFEN 400 MG/1
400 TABLET ORAL 2 TIMES DAILY
Qty: 60 TABLET | Refills: 1 | Status: SHIPPED | OUTPATIENT
Start: 2019-03-25 | End: 2019-04-24

## 2019-03-25 NOTE — PROGRESS NOTES
Subjective:       Patient ID: Baylee Muñoz is a 58 y.o. female.    Chief Complaint: Routine Foot Care (MRI Rvw, wear boot,pain 0/10, ambulation without kasey, diabetic, PCP Dr. Cm) and Follow-up    HPI: Baylee Muñoz presents to the office today, for follow-up concerning MRI evaluation of the right foot ankle.  Patient states her pains are 1/10 at this time.  Patient presents ambulatory with a walking boot.  States NSAIDs and or Tylenol for symptomatic relief.  I did see her in the office approximately 1 month ago with a walking boot was dispensed.  She states that since that time, she has had profound improvement.  No outpatient physical therapy at this juncture. Washington Cm MD is her primary care provider.  She is a well controlled DMII.     Hemoglobin A1C   Date Value Ref Range Status   01/15/2019 6.2 (H) 4.0 - 5.6 % Final     Comment:     ADA Screening Guidelines:  5.7-6.4%  Consistent with prediabetes  >or=6.5%  Consistent with diabetes  High levels of fetal hemoglobin interfere with the HbA1C  assay. Heterozygous hemoglobin variants (HbS, HgC, etc)do  not significantly interfere with this assay.   However, presence of multiple variants may affect accuracy.     07/17/2017 5.8 (H) 4.0 - 5.6 % Final     Comment:     According to ADA guidelines, hemoglobin A1c <7.0% represents  optimal control in non-pregnant diabetic patients. Different  metrics may apply to specific patient populations.   Standards of Medical Care in Diabetes-2016.  For the purpose of screening for the presence of diabetes:  <5.7%     Consistent with the absence of diabetes  5.7-6.4%  Consistent with increasing risk for diabetes   (prediabetes)  >or=6.5%  Consistent with diabetes  Currently, no consensus exists for use of hemoglobin A1c  for diagnosis of diabetes for children.  This Hemoglobin A1c assay has significant interference with fetal   hemoglobin   (HbF). The results are invalid for patients with abnormal amounts of   HbF,    including those with known Hereditary Persistence   of Fetal Hemoglobin. Heterozygous hemoglobin variants (HbAS, HbAC,   HbAD, HbAE, HbA2) do not significantly interfere with this assay;   however, presence of multiple variants in a sample may impact the %   interference.         Review of patient's allergies indicates:   Allergen Reactions    Penicillins Hives    Coconut     Dairy aid [lactase]     Iodine and iodide containing products        Past Medical History:   Diagnosis Date    Allergy     Arthritis     Asthma     Admit to [place] 1/6/19 by Dr. Michael Randall for asthma exaceration, acute bronchitis, acute dCHF, hypoxia, elevated troponin/BNP (thought to be 2/2 asthma exac & acute dCHF by cards), tx'd w/ IV lasix, duonebs, IV solumedrol and DC'd on Z-prema, prednisone taper    CKD (chronic kidney disease) stage 2, GFR 60-89 ml/min     H/o ARF 2/2 poor water intake; encouraged to increase water intake and avoid NSAIDS & contrast dye    Depression     Diastolic heart failure 01/06/2019    Dx'd at [place] during asthma exacerbation    Diverticulitis 2005    Dyslipidemia 08/28/2018    Former smoker     Quit 12/31/18; smoked 0.12 packs/day x 37 years    Gastritis     Hypertension     Prediabetes     Severe obesity (BMI >= 40)     Vitamin D deficiency        Family History   Problem Relation Age of Onset    Stroke Mother     Heart disease Mother     Kidney disease Father     Heart disease Father     Hypertension Father     HIV Sister     COPD Sister     Bipolar disorder Sister        Social History     Socioeconomic History    Marital status:      Spouse name: Not on file    Number of children: Not on file    Years of education: Not on file    Highest education level: Not on file   Occupational History    Not on file   Social Needs    Financial resource strain: Not on file    Food insecurity:     Worry: Not on file     Inability: Not on file    Transportation needs:     Medical:  Not on file     Non-medical: Not on file   Tobacco Use    Smoking status: Former Smoker     Packs/day: 0.25     Years: 27.00     Pack years: 6.75    Smokeless tobacco: Never Used   Substance and Sexual Activity    Alcohol use: No    Drug use: No    Sexual activity: Never   Lifestyle    Physical activity:     Days per week: Not on file     Minutes per session: Not on file    Stress: Not on file   Relationships    Social connections:     Talks on phone: Not on file     Gets together: Not on file     Attends Mandaen service: Not on file     Active member of club or organization: Not on file     Attends meetings of clubs or organizations: Not on file     Relationship status: Not on file    Intimate partner violence:     Fear of current or ex partner: Not on file     Emotionally abused: Not on file     Physically abused: Not on file     Forced sexual activity: Not on file   Other Topics Concern    Not on file   Social History Narrative    Patient goal(s): Weight 175 lbs    Breakfast: sausage biscuit or croissant and 1 egg; black coffee w/ 1/2 tsp sugar or OJ    Lunch: Eats out: mashed potatoes, gravy, vegetable, fish or baked chicken; water or swea or kiwi/strawberry juice    Dinner: Grilled chicken salad or taco with ground beef, beans, ltteuce, cheese, tomatoes; kiwi/eetened tstrawberry juice    Snacks: None    Eating out: 3-4x/wk    Water (oz/day): 160 oz/d    Physical Activity: None       Past Surgical History:   Procedure Laterality Date    cyst removal         Review of Systems   Constitutional: Negative for chills, fatigue and fever.   HENT: Negative for hearing loss.    Eyes: Negative for photophobia and visual disturbance.   Respiratory: Negative for cough, chest tightness, shortness of breath and wheezing.    Cardiovascular: Negative for chest pain and palpitations.   Gastrointestinal: Negative for constipation, diarrhea, nausea and vomiting.   Endocrine: Negative for cold intolerance and heat  "intolerance.   Genitourinary: Negative for flank pain.   Musculoskeletal: Positive for gait problem. Negative for neck pain and neck stiffness.   Skin: Negative for wound.   Neurological: Negative for light-headedness and headaches.   Psychiatric/Behavioral: Negative for sleep disturbance.          Objective:   /81 (BP Location: Right arm, Patient Position: Sitting, BP Method: Medium (Automatic))   Pulse 80   Resp 17   Ht 5' 5" (1.651 m)   Wt 124.6 kg (274 lb 11.1 oz)   BMI 45.71 kg/m²     2D Echo Only  Date of Procedure: 03/25/2019    TEST DESCRIPTION     Aorta: The aortic root is normal in size, measuring 2.5 cm at sinotubular junction and 2.8 cm at Sinuses of Valsalva. The proximal ascending aorta is normal in size, measuring 2.8 cm across.     Left Atrium: The left atrial volume index is normal, measuring 25.95 cc/m2.     Left Ventricle: The left ventricle is normal in size, with an end-diastolic diameter of 4.4 cm, and an end-systolic diameter of 2.9 cm. LV wall thickness is normal, with the septum and the posterior wall each measuring 1.2 cm across. Relative wall   thickness was increased at 0.55, and the LV mass index was 99.5 g/m2 consistent with concentric remodeling. There are no regional wall motion abnormalities. Left ventricular systolic function appears normal. Visually estimated ejection fraction is   60-65%. The LV Doppler derived stroke volume equals 87.0 ccs.     Diastolic indices: E wave velocity 0.9 m/s, E/A ratio 1.2,  msec., E/e' ratio(avg) 7. Diastolic function is normal.     Right Atrium: The right atrium is normal in size, measuring 5.2 cm in length and 3.5 cm in width in the apical view.     Right Ventricle: The right ventricle is normal in size measuring 3.8 cm at the base in the apical right ventricle-focused view. Global right ventricular systolic function appears normal. Tricuspid annular plane systolic excursion (TAPSE) is 1.9 cm. The   estimated PA systolic pressure " is 38 mmHg.     Aortic Valve:  Aortic valve is normal in structure with normal leaflet mobility.     Mitral Valve:  Mitral valve is normal in structure with normal leaflet mobility.     Tricuspid Valve:  Tricuspid valve is normal in structure with normal leaflet mobility. The tricuspid valve is normal in structure. There is mild tricuspid regurgitation.     Pulmonary Valve:  Pulmonary valve is normal in structure with normal leaflet mobility.     IVC: IVC is enlarged but collapses > 50% with a sniff, suggesting intermediate right atrial pressure of 8 mmHg.     Intracavitary: There is no evidence of pericardial effusion, intracavity mass, thrombi, or vegetation.     CONCLUSIONS     1 - No wall motion abnormalities.     2 - Normal left ventricular systolic function (EF 60-65%).     3 - Normal left ventricular diastolic function.     4 - Normal right ventricular systolic function .     5 - The estimated PA systolic pressure is 38 mmHg.     6 - Mild tricuspid regurgitation.     7 - Intermediate central venous pressure.     This document has been electronically    SIGNED BY: John Smith MD On: 03/25/2019 14:20         LOWER EXTREMITY PHYSICAL EXAMINATION  VASCULAR: On the right foot, the dorsalis pedis pulse is 2/4 and the posterior tibial pulse is 2/4. Capillary refill time is less than 3 seconds. Hair growth is present on the dorsum of the foot and at the digits. No rubor is present. Proximal to distal temperature is warm to warm.     NEUROLOGY: Proprioception is intact. Sensation to light touch is intact. Protective sensation is intact via 5.07 [place] Trisha monofilament. Straight leg raise is negative. Negative Tinel's Sign and negative Valleix Sign. No neurological sensations with compression of the area of Chowdary's Nerve in the area of the Abductor Hallucis muscle belly. Upon palpation of the interspaces, there are no neurological sensations stated that radiate proximal or distal. Upon compression of the  metatarsal heads from medial to lateral, no neurological sensations or symptoms are stated. Deep tendon reflexes to the lower extremity are WNL.     DERMATOLOGY: Skin is supple, dry and intact. No ecchymosis is noted. No hypertrophic skin formation. No erythema or cellulitis is noted.      ORTHOPEDIC: MMT to the RLE is 5/5 at this time, even with isolation of the posterior tibial tendon and forced inversion.  Equinus contracture is appreciated.  Gait pattern appears to be slightly to non-antalgic at this time, greatly improved this time.  Discomfort to  palpation along the course of the posterior tibial tendon, approximately 1 cm proximal to its insertion on the navicular bone.    Assessment:     1. Posterior tibial tendon dysfunction, right    2. Pain in right ankle and joints of right foot    3. Contracture, right ankle    4. Posterior tibial tendinitis of right leg        Plan:     Posterior tibial tendon dysfunction, right  -     ibuprofen (ADVIL,MOTRIN) 400 MG tablet; Take 1 tablet (400 mg total) by mouth 2 (two) times daily.  Dispense: 60 tablet; Refill: 1  -     Ambulatory Referral to Physical/Occupational Therapy    Pain in right ankle and joints of right foot  -     ibuprofen (ADVIL,MOTRIN) 400 MG tablet; Take 1 tablet (400 mg total) by mouth 2 (two) times daily.  Dispense: 60 tablet; Refill: 1  -     Ambulatory Referral to Physical/Occupational Therapy    Contracture, right ankle  -     ibuprofen (ADVIL,MOTRIN) 400 MG tablet; Take 1 tablet (400 mg total) by mouth 2 (two) times daily.  Dispense: 60 tablet; Refill: 1  -     Ambulatory Referral to Physical/Occupational Therapy    Posterior tibial tendinitis of right leg  -     ibuprofen (ADVIL,MOTRIN) 400 MG tablet; Take 1 tablet (400 mg total) by mouth 2 (two) times daily.  Dispense: 60 tablet; Refill: 1  -     Ambulatory Referral to Physical/Occupational Therapy        Thorough discussion is had with the patient today, concerning the diagnosis, its  etiology, and the treatment algorithm at present.  XRAYS are reviewed in detail with the patient. All questions and concerns regarding findings and its/their implications are outlined and discussed.  MRI is reviewed in detail with the patient. All questions and concerns regarding findings and its/their implications are outlined and discussed.  Prescription is written for initiation outpatient physical therapy.  Continue NSAIDs as needed.  Follow up in approximately 6 weeks.          Future Appointments   Date Time Provider Department Center   5/6/2019  1:00 PM Luc Rangel DPM [facility]   5/28/2019  9:20 AM MD [facility] Joe [facility] Ferrer

## 2019-05-14 ENCOUNTER — PATIENT OUTREACH (OUTPATIENT)
Dept: ADMINISTRATIVE | Facility: HOSPITAL | Age: 59
End: 2019-05-14

## 2019-05-14 NOTE — LETTER
May 14, 2019        Baylee Muñoz  [address]  [place] [address]      Dear Ms. Muñoz,    You have an upcoming appointment with Washington Cm MD on 05/28/19.      Your chart is indicating you may be due for the following and I will be happy to assist you in scheduling any needed appointments:  Health Maintenance Due   Topic    Pap Smear with HPV Cotest     Colonoscopy           If you have had any of the above done at another facility, please bring the records or information with you so that your record at [facility] will be complete.    We will be happy to assist you with scheduling any necessary appointments or you may contact the [facility] appointment desk at [phone] to schedule at your convenience.     Thank you for choosing [facility] for your healthcare needs,      Merle C., LPN Care Coordinator  [facility]  [phone]

## 2019-05-22 ENCOUNTER — OFFICE VISIT (OUTPATIENT)
Dept: INTERNAL MEDICINE | Facility: CLINIC | Age: 59
End: 2019-05-22
Payer: OTHER GOVERNMENT

## 2019-05-22 VITALS
WEIGHT: 267.63 LBS | TEMPERATURE: 99 F | SYSTOLIC BLOOD PRESSURE: 114 MMHG | HEIGHT: 65 IN | BODY MASS INDEX: 44.59 KG/M2 | HEART RATE: 81 BPM | DIASTOLIC BLOOD PRESSURE: 78 MMHG

## 2019-05-22 DIAGNOSIS — I10 ESSENTIAL HYPERTENSION: ICD-10-CM

## 2019-05-22 DIAGNOSIS — R73.03 PREDIABETES: ICD-10-CM

## 2019-05-22 DIAGNOSIS — N39.41 URGE INCONTINENCE OF URINE: ICD-10-CM

## 2019-05-22 DIAGNOSIS — R42 DIZZINESS: Primary | ICD-10-CM

## 2019-05-22 DIAGNOSIS — E66.01 SEVERE OBESITY (BMI >= 40): ICD-10-CM

## 2019-05-22 PROCEDURE — 99213 OFFICE O/P EST LOW 20 MIN: CPT | Mod: PBBFAC,PN | Performed by: INTERNAL MEDICINE

## 2019-05-22 PROCEDURE — 99999 PR PBB SHADOW E&M-EST. PATIENT-LVL III: ICD-10-PCS | Mod: PBBFAC,,, | Performed by: INTERNAL MEDICINE

## 2019-05-22 PROCEDURE — 99215 PR OFFICE/OUTPT VISIT, EST, LEVL V, 40-54 MIN: ICD-10-PCS | Mod: S$PBB,,, | Performed by: INTERNAL MEDICINE

## 2019-05-22 PROCEDURE — 99999 PR PBB SHADOW E&M-EST. PATIENT-LVL III: CPT | Mod: PBBFAC,,, | Performed by: INTERNAL MEDICINE

## 2019-05-22 PROCEDURE — 99215 OFFICE O/P EST HI 40 MIN: CPT | Mod: S$PBB,,, | Performed by: INTERNAL MEDICINE

## 2019-05-22 RX ORDER — ESTRADIOL 0.1 MG/G
1 CREAM VAGINAL DAILY
Qty: 30 G | Refills: 11 | Status: SHIPPED | OUTPATIENT
Start: 2019-05-22 | End: 2019-07-25

## 2019-05-22 NOTE — PROGRESS NOTES
Subjective:      Patient ID: Baylee Muñoz is a 58 y.o. female.    Chief Complaint: Dizziness      HPI     Ms. Baylee Muñoz is a patient of Washington Cm MD, who presents for Dizziness     She reports feeling intermittent dizziness since this morning. She reports feeling tired and sinus congestion on Monday, necessitating staying in bed most of the day. On Tuesday she used her alb NEBs, which helped with the chest congestion. She notes having a loose stool yesterday. Today her BM was of a normal consistency. She reports drinking 1 gal water yesterday due to feeling over-heated from her AC going out Saturday, which was repaired on Monday. She reports her dizziness has improved since this morning. She also reports having high blood sugars due to increased family-related stress.     She also reports noting a small bubble-like thing in her left eye, which she just notice while looking in the mirror today. No eye pain. No visual changes.    VS, labs & imaging reviewed and discussed with patient, including A1c 6.2% on 1/15/19.    Of note, EPIC indicates due preventive measures, including PAP & sCRC.      Past Medical History:   Diagnosis Date    Allergy     Arthritis     Asthma     Admit to [place] 1/6/19 by Dr. Michael Randall for asthma exaceration, acute bronchitis, acute dCHF, hypoxia, elevated troponin/BNP (thought to be 2/2 asthma exac & acute dCHF by cards), tx'd w/ IV lasix, duonebs, IV solumedrol and DC'd on Z-prema, prednisone taper    CKD (chronic kidney disease) stage 2, GFR 60-89 ml/min     H/o ARF 2/2 poor water intake; encouraged to increase water intake and avoid NSAIDS & contrast dye    Depression     Diastolic heart failure 01/06/2019    Dx'd at [place] during asthma exacerbation    Diverticulitis 2005    Dyslipidemia 08/28/2018    Former smoker     Quit 12/31/18; smoked 0.12 packs/day x 37 years    Gastritis     Hypertension     Prediabetes     Severe obesity (BMI >= 40)     Vitamin D  deficiency      Past Surgical History:   Procedure Laterality Date    cyst removal       Social History     Socioeconomic History    Marital status:      Spouse name: Not on file    Number of children: Not on file    Years of education: Not on file    Highest education level: Not on file   Occupational History    Not on file   Social Needs    Financial resource strain: Not on file    Food insecurity:     Worry: Not on file     Inability: Not on file    Transportation needs:     Medical: Not on file     Non-medical: Not on file   Tobacco Use    Smoking status: Former Smoker     Packs/day: 0.25     Years: 27.00     Pack years: 6.75    Smokeless tobacco: Never Used   Substance and Sexual Activity    Alcohol use: No    Drug use: No    Sexual activity: Never   Lifestyle    Physical activity:     Days per week: Not on file     Minutes per session: Not on file    Stress: Not on file   Relationships    Social connections:     Talks on phone: Not on file     Gets together: Not on file     Attends Restoration service: Not on file     Active member of club or organization: Not on file     Attends meetings of clubs or organizations: Not on file     Relationship status: Not on file   Other Topics Concern    Not on file   Social History Narrative    Patient goal(s): Weight 175 lbs    Breakfast: sausage biscuit or croissant and 1 egg; black coffee w/ 1/2 tsp sugar or OJ    Lunch: Eats out: mashed potatoes, gravy, vegetable, fish or baked chicken; water or swea or kiwi/strawberry juice    Dinner: Grilled chicken salad or taco with ground beef, beans, ltteuce, cheese, tomatoes; kiwi/eetened tstrawberry juice    Snacks: None    Eating out: 3-4x/wk    Water (oz/day): 160 oz/d    Physical Activity: None     Family History   Problem Relation Age of Onset    Stroke Mother     Heart disease Mother     Kidney disease Father     Heart disease Father     Hypertension Father     HIV Sister     COPD Sister      "Bipolar disorder Sister        Current Outpatient Medications:     albuterol (ACCUNEB) 1.25 mg/3 mL Nebu, Take 3 mLs (1.25 mg total) by nebulization every 6 (six) hours as needed. Rescue, Disp: 1 Box, Rfl: 0    aspirin (ECOTRIN) 81 MG EC tablet, Take 1 tablet (81 mg total) by mouth once daily., Disp: 90 tablet, Rfl: 3    atorvastatin (LIPITOR) 40 MG tablet, Take 1 tablet (40 mg total) by mouth once daily., Disp: 90 tablet, Rfl: 3    ergocalciferol (ERGOCALCIFEROL) 50,000 unit Cap, Take 1 capsule (50,000 Units total) by mouth every 7 days., Disp: 8 capsule, Rfl: 3    furosemide (LASIX) 40 MG tablet, Take 1 tablet (40 mg total) by mouth 2 (two) times daily as needed., Disp: 90 tablet, Rfl: 3    pediatric multivitamin chewable tablet, Take 1 tablet by mouth once daily., Disp: , Rfl:     PROAIR HFA 90 mcg/actuation inhaler, USE 2 INHALATIONS EVERY 6 HOURS AS NEEDED FOR WHEEZING, Disp: 25.5 g, Rfl: 11    Review of patient's allergies indicates:   Allergen Reactions    Penicillins Hives    Coconut     Dairy aid [lactase]     Iodine and iodide containing products         Review of Systems   All remaining systems negative    Objective:     /78 (BP Location: Left arm, Patient Position: Sitting, BP Method: Large (Manual))   Pulse 81   Temp 98.8 °F (37.1 °C) (Tympanic)   Ht 5' 5" (1.651 m)   Wt 121.4 kg (267 lb 10.2 oz)   BMI 44.54 kg/m²     Physical Exam  GEN: A&O fully, NAD  PSYC: Normal affect  HEENT: PERRLA, conjunctiva WNL bilaterally; OP: Clear  CV: RRR, no M/G/R  PULM: CTA bilaterally, no wheezes, rales, crackles   GI: S/NT/ND, normal bowel sounds  EXT: No C/C/E, normal DP pulses bilaterally  NEURO: CN II-XII intact, 5/5 strength globally, no sensory losses, +unsteady tandem gait, normal Romberg, 2+ DTRs globally      Lab Results   Component Value Date    WBC 6.85 01/20/2019    HGB 12.8 01/20/2019    HCT 43.3 01/20/2019     01/20/2019    CHOL 157 08/27/2018    TRIG 160 (H) 08/27/2018    HDL " 33 (L) 08/27/2018    LDLCALC 92.0 08/27/2018    ALT 18 01/20/2019    AST 16 01/20/2019     01/20/2019    K 4.1 01/20/2019     01/20/2019    CREATININE 1.1 01/20/2019    BUN 13 01/20/2019    CO2 33 (H) 01/20/2019    CALCIUM 9.4 01/20/2019    INR 0.9 01/20/2019    HGBA1C 6.2 (H) 01/15/2019       Assessment:      1. Dizziness: Currently resolved. Likely multifactorial, including hyperglycemia 2/2 family-related stress, GI loss from diarrhea, respiratory losses 2/2 mucous over-production, sensible losses 2/2 diaphoresis due to AC being out x2d & high need for significant fluid I.e. 1 gal/day 2/2 severe obesity. Encouraged to continue drinking plenty of fluids unless SOB or GIBSON develops.   2. Severe obesity (BMI >= 40): Congratulated her on her recent 8 lb wt loss from 3/25/19-5/22/19, which is likely mostly fluid losses. I encouraged the following permanent lifestyle modifications, particularly gradually and systematically increasing physical activity (5-90 min/episode, 3-5 times/week), water (1/2 of body weight in ounces) and avoiding potatoes, sugar sweetened beverages, red/processed meats (including chicken), fast food, grains (rice/bread/pasta); and increasing yogurt, whole fruits, unsalted nuts to 3-5 servings/day, and daily weight logging; non-starchy vegetables, cooked beans, and un-fried seafood, eggs are ok.   3. Prediabetes: Will recheck in 2 months at f/u.   4. Essential hypertension: Stable. Continue current medical regimen.   5.      Eye concern: No masses or foreign bodies on PX.     Plan:   Dizziness    Severe obesity (BMI >= 40)    Prediabetes    Essential hypertension      Follow up in about 2 months (around 7/22/2019), or if symptoms worsen or fail to improve, for FU on prediabetes and obesity.    I spent >45 minutes of time with patient 50% or more of which was discussing labs and plans of care.

## 2019-05-28 ENCOUNTER — OFFICE VISIT (OUTPATIENT)
Dept: PODIATRY | Facility: CLINIC | Age: 59
End: 2019-05-28
Payer: OTHER GOVERNMENT

## 2019-05-28 VITALS
BODY MASS INDEX: 44.44 KG/M2 | HEART RATE: 63 BPM | WEIGHT: 266.75 LBS | RESPIRATION RATE: 17 BRPM | DIASTOLIC BLOOD PRESSURE: 91 MMHG | HEIGHT: 65 IN | SYSTOLIC BLOOD PRESSURE: 139 MMHG

## 2019-05-28 DIAGNOSIS — M24.571 CONTRACTURE, RIGHT ANKLE: ICD-10-CM

## 2019-05-28 DIAGNOSIS — M76.821 POSTERIOR TIBIAL TENDON DYSFUNCTION, RIGHT: Primary | ICD-10-CM

## 2019-05-28 DIAGNOSIS — E11.42 TYPE 2 DIABETES MELLITUS WITH PERIPHERAL NEUROPATHY: ICD-10-CM

## 2019-05-28 DIAGNOSIS — M25.571 PAIN IN RIGHT ANKLE AND JOINTS OF RIGHT FOOT: ICD-10-CM

## 2019-05-28 PROCEDURE — 99213 OFFICE O/P EST LOW 20 MIN: CPT | Mod: PBBFAC | Performed by: PODIATRIST

## 2019-05-28 PROCEDURE — 99999 PR PBB SHADOW E&M-EST. PATIENT-LVL III: CPT | Mod: PBBFAC,,, | Performed by: PODIATRIST

## 2019-05-28 PROCEDURE — 99214 OFFICE O/P EST MOD 30 MIN: CPT | Mod: S$PBB,,, | Performed by: PODIATRIST

## 2019-05-28 PROCEDURE — 99214 PR OFFICE/OUTPT VISIT, EST, LEVL IV, 30-39 MIN: ICD-10-PCS | Mod: S$PBB,,, | Performed by: PODIATRIST

## 2019-05-28 PROCEDURE — 99999 PR PBB SHADOW E&M-EST. PATIENT-LVL III: ICD-10-PCS | Mod: PBBFAC,,, | Performed by: PODIATRIST

## 2019-05-28 RX ORDER — GABAPENTIN 300 MG/1
300 CAPSULE ORAL DAILY
Qty: 30 CAPSULE | Refills: 1 | Status: SHIPPED | OUTPATIENT
Start: 2019-05-28 | End: 2019-07-25

## 2019-05-28 NOTE — PROGRESS NOTES
Subjective:       Patient ID: Baylee Muñoz is a 58 y.o. female.    Chief Complaint: Follow-up ( Six week follow-up from PT, pt stop PT becouse of her knee was giving out, foot pain 0/10, knee pain 5/10, ambulation without kasey, wear tennis, diabetic, PCP Dr. Cm)    HPI: Baylee Muñoz presents to the office today, for follow-up concerning right posterior tibial tendon dysfunction.  At this time time, patient states no discomfort with palpation, manipulation, or ambulation.  Patient has been doing outpatient physical therapy for past several weeks.  Patient is a DMII with a history of peripheral neuropathy. Washington Cm MD is her primary care provider.  Patient states a new history of DMII does state paresthesias and dysesthesias.  Patient requests education possible medication.    Hemoglobin A1C   Date Value Ref Range Status   01/15/2019 6.2 (H) 4.0 - 5.6 % Final     Comment:     ADA Screening Guidelines:  5.7-6.4%  Consistent with prediabetes  >or=6.5%  Consistent with diabetes  High levels of fetal hemoglobin interfere with the HbA1C  assay. Heterozygous hemoglobin variants (HbS, HgC, etc)do  not significantly interfere with this assay.   However, presence of multiple variants may affect accuracy.     07/17/2017 5.8 (H) 4.0 - 5.6 % Final     Comment:     According to ADA guidelines, hemoglobin A1c <7.0% represents  optimal control in non-pregnant diabetic patients. Different  metrics may apply to specific patient populations.   Standards of Medical Care in Diabetes-2016.  For the purpose of screening for the presence of diabetes:  <5.7%     Consistent with the absence of diabetes  5.7-6.4%  Consistent with increasing risk for diabetes   (prediabetes)  >or=6.5%  Consistent with diabetes  Currently, no consensus exists for use of hemoglobin A1c  for diagnosis of diabetes for children.  This Hemoglobin A1c assay has significant interference with fetal   hemoglobin   (HbF). The results are invalid for  patients with abnormal amounts of   HbF,   including those with known Hereditary Persistence   of Fetal Hemoglobin. Heterozygous hemoglobin variants (HbAS, HbAC,   HbAD, HbAE, HbA2) do not significantly interfere with this assay;   however, presence of multiple variants in a sample may impact the %   interference.         Review of patient's allergies indicates:   Allergen Reactions    Penicillins Hives    Coconut     Dairy aid [lactase]     Iodine and iodide containing products        Past Medical History:   Diagnosis Date    Allergy     Arthritis     Asthma     Admit to [facility] 1/6/19 by Dr. Michael Randall for asthma exaceration, acute bronchitis, acute dCHF, hypoxia, elevated troponin/BNP (thought to be 2/2 asthma exac & acute dCHF by cards), tx'd w/ IV lasix, duonebs, IV solumedrol and DC'd on Z-prema, prednisone taper    CKD (chronic kidney disease) stage 2, GFR 60-89 ml/min     H/o ARF 2/2 poor water intake; encouraged to increase water intake and avoid NSAIDS & contrast dye    Depression     Diastolic heart failure 01/06/2019    Dx'd at [facility] during asthma exacerbation    Diverticulitis 2005    Dyslipidemia 08/28/2018    Former smoker     Quit 12/31/18; smoked 0.12 packs/day x 37 years    Gastritis     Hypertension     Prediabetes     Severe obesity (BMI >= 40)     Vitamin D deficiency        Family History   Problem Relation Age of Onset    Stroke Mother     Heart disease Mother     Kidney disease Father     Heart disease Father     Hypertension Father     HIV Sister     COPD Sister     Bipolar disorder Sister        Social History     Socioeconomic History    Marital status:      Spouse name: Not on file    Number of children: Not on file    Years of education: Not on file    Highest education level: Not on file   Occupational History    Not on file   Social Needs    Financial resource strain: Not on file    Food insecurity:     Worry: Not on file     Inability: Not on file     Transportation needs:     Medical: Not on file     Non-medical: Not on file   Tobacco Use    Smoking status: Former Smoker     Packs/day: 0.25     Years: 27.00     Pack years: 6.75    Smokeless tobacco: Never Used   Substance and Sexual Activity    Alcohol use: No    Drug use: No    Sexual activity: Never   Lifestyle    Physical activity:     Days per week: Not on file     Minutes per session: Not on file    Stress: Not on file   Relationships    Social connections:     Talks on phone: Not on file     Gets together: Not on file     Attends Zoroastrianism service: Not on file     Active member of club or organization: Not on file     Attends meetings of clubs or organizations: Not on file     Relationship status: Not on file   Other Topics Concern    Not on file   Social History Narrative    Patient goal(s): Weight 175 lbs    Breakfast: sausage biscuit or croissant and 1 egg; black coffee w/ 1/2 tsp sugar or OJ    Lunch: Eats out: mashed potatoes, gravy, vegetable, fish or baked chicken; water or swea or kiwi/strawberry juice    Dinner: Grilled chicken salad or taco with ground beef, beans, ltteuce, cheese, tomatoes; kiwi/eetened tstrawberry juice    Snacks: None    Eating out: 3-4x/wk    Water (oz/day): 160 oz/d    Physical Activity: None       Past Surgical History:   Procedure Laterality Date    cyst removal         Review of Systems   Constitutional: Negative for chills, fatigue and fever.   HENT: Negative for hearing loss.    Eyes: Negative for photophobia and visual disturbance.   Respiratory: Negative for cough, chest tightness, shortness of breath and wheezing.    Cardiovascular: Negative for chest pain and palpitations.   Gastrointestinal: Negative for constipation, diarrhea, nausea and vomiting.   Endocrine: Negative for cold intolerance and heat intolerance.   Genitourinary: Negative for flank pain.   Musculoskeletal: Negative for gait problem, neck pain and neck stiffness.   Skin: Negative for  "wound.   Neurological: Negative for light-headedness and headaches.   Psychiatric/Behavioral: Negative for sleep disturbance.          Objective:   BP (!) 139/91 (BP Location: Right arm, Patient Position: Sitting, BP Method: Medium (Automatic))   Pulse 63   Resp 17   Ht 5' 5" (1.651 m)   Wt 121 kg (266 lb 12.1 oz)   BMI 44.39 kg/m²         LOWER EXTREMITY PHYSICAL EXAMINATION  NEUROLOGY: Proprioception is intact. Sensation to light touch is intact. Protective sensation is intact via 5.07 [place] Trisha monofilament. Straight leg raise is negative. Negative Tinel's Sign and negative Valleix Sign. No neurological sensations with compression of the area of Chowdary's Nerve in the area of the Abductor Hallucis muscle belly. Upon palpation of the interspaces, there are no neurological sensations stated that radiate proximal or distal. Upon compression of the metatarsal heads from medial to lateral, no neurological sensations or symptoms are stated. Deep tendon reflexes to the lower extremity are WNL.    DERMATOLOGY: Skin is supple, dry and intact. No ecchymosis is noted. No hypertrophic skin formation. No erythema or cellulitis is noted.    VASCULAR: On the left and right foot, the dorsalis pedis pulse is 2/4 and the posterior tibial pulse is 2/4. Capillary refill time is less than 3 seconds. Hair growth is present on the dorsum of the foot and at the digits. No rubor is present. Proximal to distal temperature is warm to warm.  Mild edema is noted to the lower extremity.    ORTHOPEDIC: Manual Muscle Testing is 5/5 in all planes on the right, without pains, with and without resistance. No pains to palpation of the medial or lateral ankle ligaments. No discomfort to palpation of the posterior tibial tendon, peroneal tendon, Achilles tendon or the anterior ankle tendons.  Slight discomfort palpation at the anterior lateral ankle gutter, left lower extremity.  No ankle crepitus noted. Gait pattern is non-antalgic.   "     Assessment:     1. Posterior tibial tendon dysfunction, right    2. Pain in right ankle and joints of right foot    3. Contracture, right ankle    4. Type 2 diabetes mellitus with peripheral neuropathy        Plan:     Posterior tibial tendon dysfunction, right  Pain in right ankle and joints of right foot  Contracture, right ankle  Resolved symptomology this time.  Patient may discontinue physical therapy.  Did discuss proper and supportive shoe gear in detail and at length with the patient.  These are shoes with firm and robust arch support; medial counter.  Shoes which only bend at the metatarsophalangeal joint and which are rigid in the midfoot and hindfoot. Patient urged to purchase running type or cross training type shoes gear which are designed for pronation control.     Type 2 diabetes mellitus with peripheral neuropathy  -     gabapentin (NEURONTIN) 300 MG capsule; Take 1 capsule (300 mg total) by mouth once daily.  Dispense: 30 capsule; Refill: 1    Patient is educated as to the use and the effectiveness of Gabapentin, as well as the potential side effects, which may include, but is not limited to, mood or behavior changes, anxiety, depression, feelings of agitation or hostility or restlessness, hyperactive (mentally or physically), thoughts of suicide or hurting oneself, increased seizures, fever, swollen glands, body aches, flu symptoms, drowsiness, skin rash, easy bruising or bleeding, severe tingling, numbness, pain, muscle weakness, upper stomach pain, loss of appetite, dark urine, jaundice (yellowing of the skin or eyes), chest pain, irregular heart rhythm, feeling short of breath, confusion, nausea and vomiting, swelling, rapid weight gain, urinating less than usual or not at all, new or worsening cough, fever, trouble breathing, rapid back and forth movement of your eyes. With the initial dosage, please take at night prior to bedtime until getting used to its potential drowsy  effect.            Future Appointments   Date Time Provider Department Center   7/22/2019  9:20 AM Washington Cm MD [facility] LAURO Ferrer

## 2019-07-08 ENCOUNTER — PATIENT OUTREACH (OUTPATIENT)
Dept: ADMINISTRATIVE | Facility: HOSPITAL | Age: 59
End: 2019-07-08

## 2019-07-08 NOTE — PROGRESS NOTES
Health Maintenance reviewed. Spoke with pt re scheduling pap. Appt scheduled with Dr. Nair 07/25/19. Instructed pt on appt. Pt verbalized understanding . Appt letter mailed.

## 2019-07-25 ENCOUNTER — OFFICE VISIT (OUTPATIENT)
Dept: OBSTETRICS AND GYNECOLOGY | Facility: CLINIC | Age: 59
End: 2019-07-25
Payer: OTHER GOVERNMENT

## 2019-07-25 VITALS
SYSTOLIC BLOOD PRESSURE: 142 MMHG | DIASTOLIC BLOOD PRESSURE: 90 MMHG | BODY MASS INDEX: 43.49 KG/M2 | WEIGHT: 261.38 LBS

## 2019-07-25 DIAGNOSIS — Z12.4 SCREENING FOR CERVICAL CANCER: ICD-10-CM

## 2019-07-25 DIAGNOSIS — N95.1 SYMPTOMATIC MENOPAUSAL OR FEMALE CLIMACTERIC STATES: ICD-10-CM

## 2019-07-25 DIAGNOSIS — Z01.419 ENCOUNTER FOR GYNECOLOGICAL EXAMINATION (GENERAL) (ROUTINE) WITHOUT ABNORMAL FINDINGS: Primary | ICD-10-CM

## 2019-07-25 DIAGNOSIS — Z12.31 SCREENING MAMMOGRAM, ENCOUNTER FOR: ICD-10-CM

## 2019-07-25 PROCEDURE — 99213 OFFICE O/P EST LOW 20 MIN: CPT | Mod: PBBFAC,PN | Performed by: OBSTETRICS & GYNECOLOGY

## 2019-07-25 PROCEDURE — 99386 PREV VISIT NEW AGE 40-64: CPT | Mod: S$PBB,,, | Performed by: OBSTETRICS & GYNECOLOGY

## 2019-07-25 PROCEDURE — 99999 PR PBB SHADOW E&M-EST. PATIENT-LVL III: CPT | Mod: PBBFAC,,, | Performed by: OBSTETRICS & GYNECOLOGY

## 2019-07-25 PROCEDURE — 99386 PR PREVENTIVE VISIT,NEW,40-64: ICD-10-PCS | Mod: S$PBB,,, | Performed by: OBSTETRICS & GYNECOLOGY

## 2019-07-25 PROCEDURE — 99999 PR PBB SHADOW E&M-EST. PATIENT-LVL III: ICD-10-PCS | Mod: PBBFAC,,, | Performed by: OBSTETRICS & GYNECOLOGY

## 2019-07-25 PROCEDURE — 88175 CYTOPATH C/V AUTO FLUID REDO: CPT

## 2019-07-25 NOTE — PROGRESS NOTES
Subjective:       Patient ID: Baylee Muñoz is a 59 y.o. female.    Chief Complaint:  Well Woman      History of Present Illness  HPI  Annual Exam-Premenopausal  Patient presents for annual exam. The patient has no complaints today. The patient is not currently sexually active. GYN screening history: last pap: was normal and patient does not recall when last pap was and last mammogram: approximate date 2019 and was normal. The patient wears seatbelts: yes. The patient participates in regular exercise: no. Has the patient ever been transfused or tattooed?: no. The patient reports that there is not domestic violence in her life.    Feels worsening hot flushes/sweats --worse since it has gotten hotter;     Bladder leakage with strong urge      Problems sleeping due to work schedule--plans prison in 3-4 yrs  (works night so sleeps during the day)    Due for colonscopy        GYN & OB History  No LMP recorded. Patient is postmenopausal.--age 52   Date of Last Pap: No result found    OB History    Para Term  AB Living   3 2 2   1     SAB TAB Ectopic Multiple Live Births   1     1        # Outcome Date GA Lbr [place]/2nd Weight Sex Delivery Anes PTL Lv   3A SAB            3B SAB            2 Term            1 Term                Review of Systems  Review of Systems   Endocrine: Positive for hair loss.   All other systems reviewed and are negative.          Objective:      Physical Exam:   Constitutional: She appears well-developed.     Eyes: Pupils are equal, round, and reactive to light. Conjunctivae and EOM are normal.    Neck: Normal range of motion. Neck supple.     Pulmonary/Chest: Effort normal. Right breast exhibits no mass, no nipple discharge, no skin change and no tenderness. Left breast exhibits no mass, no nipple discharge, no skin change and no tenderness. Breasts are symmetrical.        Abdominal: Soft.     Genitourinary: Rectum normal, vagina normal and uterus normal. Pelvic exam was  performed with patient supine. Cervix is normal. Right adnexum displays no mass and no tenderness. Left adnexum displays no mass and no tenderness. No erythema, bleeding, rectocele, cystocele or unspecified prolapse of vaginal walls in the vagina. No vaginal discharge found. Labial bartholins normal.Cervix exhibits no motion tenderness and no friability. Additional cervical findings: pap smear done  Genitourinary Comments: atrophic        Uterus Size: 6 cm   Musculoskeletal: Normal range of motion.       Neurological: She is alert.    Skin: Skin is warm.    Psychiatric: She has a normal mood and affect.           Assessment:     Encounter Diagnoses   Name Primary?    Encounter for gynecological examination (general) (routine) without abnormal findings Yes    Screening mammogram, encounter for     Screening for cervical cancer     Symptomatic menopausal or female climacteric states                  Plan:      Continue annual well woman exam.  Pap today; Reviewed updated recommendations for pap smears (every 3 years) in low risk patients.   Recommend annual pelvic exams.  Reviewed recommendations for annual CBE.  mammo current, continue yearly until age 75  encouraged diet, exercise, weight loss  Osteoporosis prevention; 1200mg calcium/d with source of vitamin d   reviewed options for hot flushes--otc supplements--black cohosh, tri umair, red clover, estroven, amberen; clonidine, brisdelle, hrt; consider avoiding triggers--caffeine, chocolate, spicy foods, red wine

## 2019-09-11 ENCOUNTER — PATIENT OUTREACH (OUTPATIENT)
Dept: ADMINISTRATIVE | Facility: HOSPITAL | Age: 59
End: 2019-09-11

## 2019-09-11 NOTE — PROGRESS NOTES
Progress Notes by Marcy Espana Spoke with pt re scheduling appt with Dr. Toi martinez HTN/Overdue HM/ Appt scheduled 08/23/19. Instructed pt on appt. Pt verbalized understanding.  Pt stated had colonoscopy done at [facility]. Will get DAVID signed at ov.    Saint Francis Hospital Muskogee – Muskogee complains of pain/discomfort

## 2019-09-23 ENCOUNTER — PATIENT OUTREACH (OUTPATIENT)
Dept: ADMINISTRATIVE | Facility: HOSPITAL | Age: 59
End: 2019-09-23

## 2019-09-23 ENCOUNTER — OFFICE VISIT (OUTPATIENT)
Dept: INTERNAL MEDICINE | Facility: CLINIC | Age: 59
End: 2019-09-23
Payer: OTHER GOVERNMENT

## 2019-09-23 VITALS
HEIGHT: 65 IN | BODY MASS INDEX: 44.19 KG/M2 | HEART RATE: 78 BPM | OXYGEN SATURATION: 95 % | DIASTOLIC BLOOD PRESSURE: 81 MMHG | WEIGHT: 265.25 LBS | TEMPERATURE: 98 F | SYSTOLIC BLOOD PRESSURE: 156 MMHG

## 2019-09-23 DIAGNOSIS — E55.9 VITAMIN D DEFICIENCY: ICD-10-CM

## 2019-09-23 DIAGNOSIS — Z12.11 SCREENING FOR COLON CANCER: ICD-10-CM

## 2019-09-23 DIAGNOSIS — D49.2 NEOPLASM OF SKIN: ICD-10-CM

## 2019-09-23 DIAGNOSIS — R73.03 PREDIABETES: ICD-10-CM

## 2019-09-23 DIAGNOSIS — E66.01 SEVERE OBESITY (BMI >= 40): ICD-10-CM

## 2019-09-23 DIAGNOSIS — E78.00 PURE HYPERCHOLESTEROLEMIA: ICD-10-CM

## 2019-09-23 DIAGNOSIS — I10 ESSENTIAL HYPERTENSION: Primary | ICD-10-CM

## 2019-09-23 PROCEDURE — 99215 PR OFFICE/OUTPT VISIT, EST, LEVL V, 40-54 MIN: ICD-10-PCS | Mod: S$PBB,,, | Performed by: INTERNAL MEDICINE

## 2019-09-23 PROCEDURE — 99213 OFFICE O/P EST LOW 20 MIN: CPT | Mod: PBBFAC,PN | Performed by: INTERNAL MEDICINE

## 2019-09-23 PROCEDURE — 99999 PR PBB SHADOW E&M-EST. PATIENT-LVL III: CPT | Mod: PBBFAC,,, | Performed by: INTERNAL MEDICINE

## 2019-09-23 PROCEDURE — 99215 OFFICE O/P EST HI 40 MIN: CPT | Mod: S$PBB,,, | Performed by: INTERNAL MEDICINE

## 2019-09-23 PROCEDURE — 99999 PR PBB SHADOW E&M-EST. PATIENT-LVL III: ICD-10-PCS | Mod: PBBFAC,,, | Performed by: INTERNAL MEDICINE

## 2019-09-23 RX ORDER — AMLODIPINE BESYLATE 5 MG/1
5 TABLET ORAL DAILY
Qty: 30 TABLET | Refills: 11 | Status: SHIPPED | OUTPATIENT
Start: 2019-09-23 | End: 2019-10-01 | Stop reason: SDUPTHER

## 2019-09-23 NOTE — PROGRESS NOTES
Subjective:      Patient ID: Baylee Muñoz is a 59 y.o. female.    Chief Complaint: Follow-up      HPI     Ms. Baylee Muñoz is a patient of Washington Cm MD, who presents for Follow-up  on pre-DM.    VS, labs & imaging reviewed and discussed with patient, high BPs on last 3 visits; A1c 6.2% on 1/15/19, vitamin D 23 ng/mL.    Of note, EPIC indicates due preventive measures, including colonoscopy.      Past Medical History:   Diagnosis Date    Allergy     Arthritis     Asthma     Admit to [facility] 1/6/19 by Dr. Michael Randall for asthma exaceration, acute bronchitis, acute dCHF, hypoxia, elevated troponin/BNP (thought to be 2/2 asthma exac & acute dCHF by cards), tx'd w/ IV lasix, duonebs, IV solumedrol and DC'd on Z-prema, prednisone taper    CKD (chronic kidney disease) stage 2, GFR 60-89 ml/min     H/o ARF 2/2 poor water intake; encouraged to increase water intake and avoid NSAIDS & contrast dye    Depression     Diastolic heart failure 01/06/2019    Dx'd at [place] during asthma exacerbation    Diverticulitis 2005    Dyslipidemia 08/28/2018    Former smoker     Quit 12/31/18; smoked 0.12 packs/day x 37 years    Gastritis     Hypertension     Prediabetes     Severe obesity (BMI >= 40)     Vitamin D deficiency      Past Surgical History:   Procedure Laterality Date    cyst removal      KNEE SURGERY       Social History     Socioeconomic History    Marital status:      Spouse name: Not on file    Number of children: Not on file    Years of education: Not on file    Highest education level: Not on file   Occupational History    Not on file   Social Needs    Financial resource strain: Not on file    Food insecurity:     Worry: Not on file     Inability: Not on file    Transportation needs:     Medical: Not on file     Non-medical: Not on file   Tobacco Use    Smoking status: Current Some Day Smoker     Packs/day: 0.25     Years: 27.00     Pack years: 6.75     Types: Cigarettes    Smokeless  tobacco: Never Used   Substance and Sexual Activity    Alcohol use: No    Drug use: No    Sexual activity: Never   Lifestyle    Physical activity:     Days per week: Not on file     Minutes per session: Not on file    Stress: Not on file   Relationships    Social connections:     Talks on phone: Not on file     Gets together: Not on file     Attends Roman [employer] service: Not on file     Active member of club or organization: Not on file     Attends meetings of clubs or organizations: Not on file     Relationship status: Not on file   Other Topics Concern    Not on file   Social History Narrative    Patient goal(s): Weight 175 lbs    Breakfast: sausage biscuit or croissant and 1 egg; black coffee w/ 1/2 tsp sugar or OJ    Lunch: Eats out: mashed potatoes, gravy, vegetable, fish or baked chicken; water or swea or kiwi/strawberry juice    Dinner: Grilled chicken salad or taco with ground beef, beans, ltteuce, cheese, tomatoes; kiwi/eetened tstrawberry juice    Snacks: None    Eating out: 3-4x/wk    Water (oz/day): 160 oz/d    Physical Activity: None     Family History   Problem Relation Age of Onset    Stroke Mother     Heart disease Mother     Kidney disease Father     Heart disease Father     Hypertension Father     HIV Sister     COPD Sister     Bipolar disorder Sister        Current Outpatient Medications:     albuterol (ACCUNEB) 1.25 mg/3 mL Nebu, Take 3 mLs (1.25 mg total) by nebulization every 6 (six) hours as needed. Rescue, Disp: 1 Box, Rfl: 0    aspirin (ECOTRIN) 81 MG EC tablet, Take 1 tablet (81 mg total) by mouth once daily., Disp: 90 tablet, Rfl: 3    atorvastatin (LIPITOR) 40 MG tablet, Take 1 tablet (40 mg total) by mouth once daily., Disp: 90 tablet, Rfl: 3    furosemide (LASIX) 40 MG tablet, Take 1 tablet (40 mg total) by mouth 2 (two) times daily as needed., Disp: 90 tablet, Rfl: 3    pediatric multivitamin chewable tablet, Take 1 tablet by mouth once daily., Disp: , Rfl:     PROAIR  "HFA 90 mcg/actuation inhaler, USE 2 INHALATIONS EVERY 6 HOURS AS NEEDED FOR WHEEZING, Disp: 25.5 g, Rfl: 11    amLODIPine (NORVASC) 5 MG tablet, Take 1 tablet (5 mg total) by mouth once daily., Disp: 30 tablet, Rfl: 11    ergocalciferol (ERGOCALCIFEROL) 50,000 unit Cap, Take 1 capsule (50,000 Units total) by mouth every 7 days., Disp: 8 capsule, Rfl: 3    Review of patient's allergies indicates:   Allergen Reactions    Penicillins Hives    Coconut     Dairy aid [lactase]     Iodine and iodide containing products         Review of Systems   All remaining systems negative    Objective:     BP (!) 156/81 (BP Location: Left arm, Patient Position: Sitting)   Pulse 78   Temp 98.1 °F (36.7 °C) (Other (see comments))   Ht 5' 5" (1.651 m)   Wt 120.3 kg (265 lb 4.1 oz)   SpO2 95%   BMI 44.14 kg/m²     Physical Exam  GEN: A&O fully, NAD  PSYC: Normal affect  DERM: Posterior left base of neck with a 0.5 x 0.5 cm round, hyperpigmented scab      Lab Results   Component Value Date    WBC 6.85 01/20/2019    HGB 12.8 01/20/2019    HCT 43.3 01/20/2019     01/20/2019    CHOL 157 08/27/2018    TRIG 160 (H) 08/27/2018    HDL 33 (L) 08/27/2018    LDLCALC 92.0 08/27/2018    ALT 18 01/20/2019    AST 16 01/20/2019     01/20/2019    K 4.1 01/20/2019     01/20/2019    CREATININE 1.1 01/20/2019    BUN 13 01/20/2019    CO2 33 (H) 01/20/2019    CALCIUM 9.4 01/20/2019    INR 0.9 01/20/2019    HGBA1C 6.2 (H) 01/15/2019       Assessment:      1. Essential hypertension: Not at goal on manual repeat. Risks and benefits discussed and patient chose to move forward with restarting her amlodipine 5 mg 1 po qd. Will recheck in 2 weeks.     2. Screening for colon cancer   3. Prediabetes: Will check A1c.   4. Pure hypercholesterolemia: Will check FLP.    5. Vitamin D deficiency: She is supplementing OTC vitD. Will check 25OHvitD.   6. Severe obesity (BMI >= 40): I encouraged the following permanent lifestyle modifications, " particularly gradually and systematically increasing physical activity (5-90 min/episode, 3-5 times/week), water (1/2 of body weight in ounces) and avoiding potatoes, sugar sweetened beverages, red/processed meats (including chicken), fast food, grains (rice/bread/pasta); and increasing yogurt, whole fruits, unsalted nuts to 3-5 servings/day, and daily weight logging; non-starchy vegetables, cooked beans, and un-fried seafood have weak effects on weight.       Plan:   Essential hypertension  -     amLODIPine (NORVASC) 5 MG tablet; Take 1 tablet (5 mg total) by mouth once daily.  Dispense: 30 tablet; Refill: 11  -     Comprehensive metabolic panel; Future; Expected date: 09/23/2019    Screening for colon cancer  -     Case request GI: COLONOSCOPY  -     Fecal Immunochemical Test (iFOBT); Future; Expected date: 09/23/2019    Prediabetes  -     Hemoglobin A1c; Future; Expected date: 09/23/2019    Pure hypercholesterolemia  -     Lipid panel; Future; Expected date: 09/23/2019    Vitamin D deficiency  -     Vitamin D; Future; Expected date: 09/23/2019    Severe obesity (BMI >= 40)    Neoplasm of skin  -     Ambulatory consult to Dermatology        Follow up in about 2 weeks (around 10/7/2019), or if symptoms worsen or fail to improve, for FU on HTN.    I spent >45 minutes of time with patient 50% or more of which was discussing labs and plans of care.

## 2019-09-24 ENCOUNTER — LAB VISIT (OUTPATIENT)
Dept: LAB | Facility: HOSPITAL | Age: 59
End: 2019-09-24
Attending: INTERNAL MEDICINE
Payer: OTHER GOVERNMENT

## 2019-09-24 ENCOUNTER — TELEPHONE (OUTPATIENT)
Dept: ENDOSCOPY | Facility: HOSPITAL | Age: 59
End: 2019-09-24

## 2019-09-24 DIAGNOSIS — E78.00 PURE HYPERCHOLESTEROLEMIA: ICD-10-CM

## 2019-09-24 DIAGNOSIS — I10 ESSENTIAL HYPERTENSION: ICD-10-CM

## 2019-09-24 DIAGNOSIS — R73.03 PREDIABETES: ICD-10-CM

## 2019-09-24 DIAGNOSIS — E55.9 VITAMIN D DEFICIENCY: ICD-10-CM

## 2019-09-24 LAB
25(OH)D3+25(OH)D2 SERPL-MCNC: 26 NG/ML (ref 30–96)
ALBUMIN SERPL BCP-MCNC: 3.5 G/DL (ref 3.5–5.2)
ALP SERPL-CCNC: 90 U/L (ref 55–135)
ALT SERPL W/O P-5'-P-CCNC: 12 U/L (ref 10–44)
ANION GAP SERPL CALC-SCNC: 7 MMOL/L (ref 8–16)
AST SERPL-CCNC: 20 U/L (ref 10–40)
BILIRUB SERPL-MCNC: 0.5 MG/DL (ref 0.1–1)
BUN SERPL-MCNC: 16 MG/DL (ref 6–20)
CALCIUM SERPL-MCNC: 9.4 MG/DL (ref 8.7–10.5)
CHLORIDE SERPL-SCNC: 101 MMOL/L (ref 95–110)
CHOLEST SERPL-MCNC: 111 MG/DL (ref 120–199)
CHOLEST/HDLC SERPL: 3.5 {RATIO} (ref 2–5)
CO2 SERPL-SCNC: 34 MMOL/L (ref 23–29)
CREAT SERPL-MCNC: 0.9 MG/DL (ref 0.5–1.4)
EST. GFR  (AFRICAN AMERICAN): >60 ML/MIN/1.73 M^2
EST. GFR  (NON AFRICAN AMERICAN): >60 ML/MIN/1.73 M^2
ESTIMATED AVG GLUCOSE: 128 MG/DL (ref 68–131)
GLUCOSE SERPL-MCNC: 87 MG/DL (ref 70–110)
HBA1C MFR BLD HPLC: 6.1 % (ref 4–5.6)
HDLC SERPL-MCNC: 32 MG/DL (ref 40–75)
HDLC SERPL: 28.8 % (ref 20–50)
LDLC SERPL CALC-MCNC: 61.4 MG/DL (ref 63–159)
NONHDLC SERPL-MCNC: 79 MG/DL
POTASSIUM SERPL-SCNC: 4.3 MMOL/L (ref 3.5–5.1)
PROT SERPL-MCNC: 7.4 G/DL (ref 6–8.4)
SODIUM SERPL-SCNC: 142 MMOL/L (ref 136–145)
TRIGL SERPL-MCNC: 88 MG/DL (ref 30–150)

## 2019-09-24 PROCEDURE — 36415 COLL VENOUS BLD VENIPUNCTURE: CPT | Mod: PO

## 2019-09-24 PROCEDURE — 80053 COMPREHEN METABOLIC PANEL: CPT

## 2019-09-24 PROCEDURE — 82306 VITAMIN D 25 HYDROXY: CPT

## 2019-09-24 PROCEDURE — 80061 LIPID PANEL: CPT

## 2019-09-24 PROCEDURE — 83036 HEMOGLOBIN GLYCOSYLATED A1C: CPT

## 2019-10-01 ENCOUNTER — TELEPHONE (OUTPATIENT)
Dept: INTERNAL MEDICINE | Facility: CLINIC | Age: 59
End: 2019-10-01

## 2019-10-01 ENCOUNTER — APPOINTMENT (OUTPATIENT)
Dept: LAB | Facility: HOSPITAL | Age: 59
End: 2019-10-01
Attending: INTERNAL MEDICINE
Payer: OTHER GOVERNMENT

## 2019-10-01 DIAGNOSIS — I10 ESSENTIAL HYPERTENSION: ICD-10-CM

## 2019-10-01 RX ORDER — AMLODIPINE BESYLATE 5 MG/1
5 TABLET ORAL DAILY
Qty: 30 TABLET | Refills: 0 | Status: SHIPPED | OUTPATIENT
Start: 2019-10-01 | End: 2019-11-08 | Stop reason: SDUPTHER

## 2019-10-01 NOTE — TELEPHONE ENCOUNTER
Spoke with mother, let her know rx was sent into pharmacy as requested. Mother verbalized understanding.

## 2019-10-01 NOTE — TELEPHONE ENCOUNTER
S/w patient. Patient states she did receive her blood pressure medication. Understanding was verbalized.

## 2019-10-01 NOTE — TELEPHONE ENCOUNTER
----- Message from Ania Valdez sent at 10/1/2019 10:14 AM CDT -----  Pt states she has received her blood pressure medication.      Please call pt back at [phone]

## 2019-10-01 NOTE — TELEPHONE ENCOUNTER
Patient should get prescription of the Vyvanse from the psychiatrist until we see her for follow-up. Pt came into clinic today and stated that  and her discussed having a short term [facility] [place] to last her until her [facility] shipment comes in. Please review and advise.

## 2019-10-03 ENCOUNTER — INITIAL CONSULT (OUTPATIENT)
Dept: DERMATOLOGY | Facility: CLINIC | Age: 59
End: 2019-10-03
Payer: OTHER GOVERNMENT

## 2019-10-03 DIAGNOSIS — L30.9 DERMATITIS: Primary | ICD-10-CM

## 2019-10-03 PROCEDURE — 99212 OFFICE O/P EST SF 10 MIN: CPT | Mod: PBBFAC | Performed by: STUDENT IN AN ORGANIZED HEALTH CARE EDUCATION/TRAINING PROGRAM

## 2019-10-03 PROCEDURE — 99202 PR OFFICE/OUTPT VISIT, NEW, LEVL II, 15-29 MIN: ICD-10-PCS | Mod: S$PBB,,, | Performed by: STUDENT IN AN ORGANIZED HEALTH CARE EDUCATION/TRAINING PROGRAM

## 2019-10-03 PROCEDURE — 99999 PR PBB SHADOW E&M-EST. PATIENT-LVL II: CPT | Mod: PBBFAC,,, | Performed by: STUDENT IN AN ORGANIZED HEALTH CARE EDUCATION/TRAINING PROGRAM

## 2019-10-03 PROCEDURE — 99202 OFFICE O/P NEW SF 15 MIN: CPT | Mod: S$PBB,,, | Performed by: STUDENT IN AN ORGANIZED HEALTH CARE EDUCATION/TRAINING PROGRAM

## 2019-10-03 PROCEDURE — 99999 PR PBB SHADOW E&M-EST. PATIENT-LVL II: ICD-10-PCS | Mod: PBBFAC,,, | Performed by: STUDENT IN AN ORGANIZED HEALTH CARE EDUCATION/TRAINING PROGRAM

## 2019-10-03 RX ORDER — TRIAMCINOLONE ACETONIDE 1 MG/G
CREAM TOPICAL
Qty: 80 G | Refills: 0 | Status: SHIPPED | OUTPATIENT
Start: 2019-10-03 | End: 2021-02-01

## 2019-10-03 NOTE — LETTER
October 3, 2019      Washington Cm MD  [address]  [place] [address]           [facility]  [address] [address]  Phone: [phone]  Fax: [phone]          Patient: Baylee Muñoz   MR Number: [id number]   YOB: 1960   Date of Visit: 10/3/2019       Dear Dr. Washington Cm:    Thank you for referring Baylee Muñoz to me for evaluation. Attached you will find relevant portions of my assessment and plan of care.    If you have questions, please do not hesitate to call me. I look forward to following Baylee Muñoz along with you.    Sincerely,    Dequan Carbajal MD    Enclosure  CC:  No Recipients    If you would like to receive this communication electronically, please contact [email or web address] or [phone] to request more information on [facility] [employer] access.    For providers and/or their staff who would like to refer a patient to [facility], please contact us through our one-stop-shop provider referral line, [facility], at [phone].    If you feel you have received this communication in error or would no longer like to receive these types of communications, please e-mail [email or web address]

## 2019-10-03 NOTE — PROGRESS NOTES
Subjective:       Patient ID:  Baylee Muñoz is a 59 y.o. female who presents for   Chief Complaint   Patient presents with    Spot     On back, itch      History of Present Illness: The patient presents with chief complaint of skin lesion.  Location: upper back  Duration: months to years  Signs/Symptoms: itching    Prior treatments: none        Review of Systems   Skin: Positive for rash.        Objective:    Physical Exam   Constitutional: She appears well-developed and well-nourished. No distress.   Neurological: She is alert and oriented to person, place, and time. She is not disoriented.   Psychiatric: She has a normal mood and affect.   Skin:   Areas Examined (abnormalities noted in diagram):   Head / Face Inspection Performed  Neck Inspection Performed  Chest / Axilla Inspection Performed  Back Inspection Performed              Diagram Legend     Erythematous scaling macule/papule c/w actinic keratosis       Vascular papule c/w angioma      Pigmented verrucoid papule/plaque c/w seborrheic keratosis      Yellow umbilicated papule c/w sebaceous hyperplasia      Irregularly shaped tan macule c/w lentigo     1-2 mm smooth white papules consistent with Milia      Movable subcutaneous cyst with punctum c/w epidermal inclusion cyst      Subcutaneous movable cyst c/w pilar cyst      Firm pink to brown papule c/w dermatofibroma      Pedunculated fleshy papule(s) c/w skin tag(s)      Evenly pigmented macule c/w junctional nevus     Mildly variegated pigmented, slightly irregular-bordered macule c/w mildly atypical nevus      Flesh colored to evenly pigmented papule c/w intradermal nevus       Pink pearly papule/plaque c/w basal cell carcinoma      Erythematous hyperkeratotic cursted plaque c/w SCC      Surgical scar with no sign of skin cancer recurrence      Open and closed comedones      Inflammatory papules and pustules      Verrucoid papule consistent consistent with wart     Erythematous eczematous patches and  plaques     Dystrophic onycholytic nail with subungual debris c/w onychomycosis     Umbilicated papule    Erythematous-base heme-crusted tan verrucoid plaque consistent with inflamed seborrheic keratosis     Erythematous Silvery Scaling Plaque c/w Psoriasis     See annotation      Assessment / Plan:        Dermatitis  -     triamcinolone acetonide 0.1% (KENALOG) 0.1 % cream; AAA bid  Dispense: 80 g; Refill: 0             Follow up if symptoms worsen or fail to improve.

## 2019-11-05 ENCOUNTER — TELEPHONE (OUTPATIENT)
Dept: ENDOSCOPY | Facility: HOSPITAL | Age: 59
End: 2019-11-05

## 2019-11-05 NOTE — TELEPHONE ENCOUNTER
Spoke with patient regarding procedure on 11-.  Patient requested removal from schedule for now d/t anxiety.  Patient will consult with ordering provider and call office to reschedule when ready.

## 2019-11-08 DIAGNOSIS — I10 ESSENTIAL HYPERTENSION: ICD-10-CM

## 2019-11-08 RX ORDER — AMLODIPINE BESYLATE 5 MG/1
5 TABLET ORAL DAILY
Qty: 90 TABLET | Refills: 3 | Status: SHIPPED | OUTPATIENT
Start: 2019-11-08 | End: 2019-11-14 | Stop reason: SDUPTHER

## 2019-11-14 ENCOUNTER — TELEPHONE (OUTPATIENT)
Dept: INTERNAL MEDICINE | Facility: CLINIC | Age: 59
End: 2019-11-14

## 2019-11-14 DIAGNOSIS — I10 ESSENTIAL HYPERTENSION: ICD-10-CM

## 2019-11-14 RX ORDER — AMLODIPINE BESYLATE 5 MG/1
5 TABLET ORAL DAILY
Qty: 30 TABLET | Refills: 0 | Status: SHIPPED | OUTPATIENT
Start: 2019-11-14 | End: 2020-03-12 | Stop reason: SDUPTHER

## 2019-11-14 NOTE — TELEPHONE ENCOUNTER
Spoke with pt informing her that her RX was sent to [facility] in [place] for a 1 month supply. Also informed her that Dr. Cm recommended she come in to be evaluated. Pt verbalized understanding and said she will call back Monday to schedule.

## 2019-11-14 NOTE — TELEPHONE ENCOUNTER
----- Message from Margaret Larsen sent at 11/14/2019 10:14 AM CST -----  Contact: self  duplicate message regarding blood pressure medication being cancelled at both local & mail order.....[phone] (urgent per patient, none left)

## 2019-12-03 ENCOUNTER — PATIENT OUTREACH (OUTPATIENT)
Dept: ADMINISTRATIVE | Facility: HOSPITAL | Age: 59
End: 2019-12-03

## 2020-02-05 RX ORDER — ATORVASTATIN CALCIUM 40 MG/1
TABLET, FILM COATED ORAL
Qty: 90 TABLET | Refills: 4 | Status: SHIPPED | OUTPATIENT
Start: 2020-02-05 | End: 2020-03-12 | Stop reason: SDUPTHER

## 2020-03-05 ENCOUNTER — TELEPHONE (OUTPATIENT)
Dept: ORTHOPEDICS | Facility: CLINIC | Age: 60
End: 2020-03-05

## 2020-03-05 ENCOUNTER — OFFICE VISIT (OUTPATIENT)
Dept: INTERNAL MEDICINE | Facility: CLINIC | Age: 60
End: 2020-03-05
Payer: OTHER GOVERNMENT

## 2020-03-05 ENCOUNTER — APPOINTMENT (OUTPATIENT)
Dept: RADIOLOGY | Facility: HOSPITAL | Age: 60
End: 2020-03-05
Attending: INTERNAL MEDICINE
Payer: OTHER GOVERNMENT

## 2020-03-05 VITALS
WEIGHT: 269.81 LBS | DIASTOLIC BLOOD PRESSURE: 72 MMHG | OXYGEN SATURATION: 96 % | HEART RATE: 81 BPM | BODY MASS INDEX: 44.95 KG/M2 | HEIGHT: 65 IN | TEMPERATURE: 98 F | SYSTOLIC BLOOD PRESSURE: 126 MMHG

## 2020-03-05 DIAGNOSIS — R05.9 COUGH: Primary | ICD-10-CM

## 2020-03-05 DIAGNOSIS — R06.09 DOE (DYSPNEA ON EXERTION): ICD-10-CM

## 2020-03-05 DIAGNOSIS — M65.312 TRIGGER FINGER OF LEFT THUMB: ICD-10-CM

## 2020-03-05 DIAGNOSIS — R05.9 COUGH: ICD-10-CM

## 2020-03-05 DIAGNOSIS — Z12.39 SCREENING FOR BREAST CANCER: ICD-10-CM

## 2020-03-05 DIAGNOSIS — I10 ESSENTIAL HYPERTENSION: Primary | ICD-10-CM

## 2020-03-05 DIAGNOSIS — Z12.11 SCREENING FOR COLON CANCER: ICD-10-CM

## 2020-03-05 PROCEDURE — 99215 PR OFFICE/OUTPT VISIT, EST, LEVL V, 40-54 MIN: ICD-10-PCS | Mod: S$PBB,,, | Performed by: INTERNAL MEDICINE

## 2020-03-05 PROCEDURE — 71046 X-RAY EXAM CHEST 2 VIEWS: CPT | Mod: TC,PO

## 2020-03-05 PROCEDURE — 99213 OFFICE O/P EST LOW 20 MIN: CPT | Mod: PBBFAC,PN,25 | Performed by: INTERNAL MEDICINE

## 2020-03-05 PROCEDURE — 99999 PR PBB SHADOW E&M-EST. PATIENT-LVL III: ICD-10-PCS | Mod: PBBFAC,,, | Performed by: INTERNAL MEDICINE

## 2020-03-05 PROCEDURE — 71046 X-RAY EXAM CHEST 2 VIEWS: CPT | Mod: 26,,, | Performed by: RADIOLOGY

## 2020-03-05 PROCEDURE — 99215 OFFICE O/P EST HI 40 MIN: CPT | Mod: S$PBB,,, | Performed by: INTERNAL MEDICINE

## 2020-03-05 PROCEDURE — 99999 PR PBB SHADOW E&M-EST. PATIENT-LVL III: CPT | Mod: PBBFAC,,, | Performed by: INTERNAL MEDICINE

## 2020-03-05 PROCEDURE — 71046 XR CHEST PA AND LATERAL: ICD-10-PCS | Mod: 26,,, | Performed by: RADIOLOGY

## 2020-03-05 RX ORDER — MECLIZINE HYDROCHLORIDE 25 MG/1
TABLET ORAL
COMMUNITY
Start: 2020-02-05 | End: 2021-02-01

## 2020-03-05 RX ORDER — AMLODIPINE BESYLATE 5 MG/1
5 TABLET ORAL DAILY
Qty: 30 TABLET | Refills: 0 | Status: CANCELLED | OUTPATIENT
Start: 2020-03-05

## 2020-03-05 RX ORDER — CODEINE PHOSPHATE AND GUAIFENESIN 10; 100 MG/5ML; MG/5ML
5 SOLUTION ORAL NIGHTLY PRN
Qty: 118 ML | Refills: 0 | Status: SHIPPED | OUTPATIENT
Start: 2020-03-05 | End: 2020-03-05 | Stop reason: SDUPTHER

## 2020-03-05 RX ORDER — CODEINE PHOSPHATE AND GUAIFENESIN 10; 100 MG/5ML; MG/5ML
5 SOLUTION ORAL NIGHTLY PRN
Qty: 118 ML | Refills: 0 | Status: SHIPPED | OUTPATIENT
Start: 2020-03-05 | End: 2020-03-15

## 2020-03-05 RX ORDER — ATORVASTATIN CALCIUM 40 MG/1
40 TABLET, FILM COATED ORAL DAILY
Qty: 90 TABLET | Refills: 4 | Status: CANCELLED | OUTPATIENT
Start: 2020-03-05

## 2020-03-05 NOTE — TELEPHONE ENCOUNTER
----- Message from Yaneth Rivers sent at 3/5/2020  3:28 PM CST -----  Contact: pt   Stated she will like a call back about her prescription for cough medication, she can be reached at [phone] Thanks

## 2020-03-05 NOTE — TELEPHONE ENCOUNTER
Spoke with pt, she stated that the insurance wont cover the cough Rx because it's too much that they are requesting that it be written for 7 days.   Please advise. Thank you.

## 2020-03-05 NOTE — PROGRESS NOTES
"Subjective:      Patient ID: Baylee Muñoz is a 59 y.o. female.    Chief Complaint: Follow-up (Medication Refill, Congestion)      HPI     Ms. Baylee Muñoz is a patient of Washington Cm MD, who presents for Follow-up (Medication Refill, Congestion)    She reports having a cough productive of putrid, yellow sputum since 2/20/20, which is exacerbated by laying flat on her back and requires her to sit up to cough up the mucous. She also endorses "a little bit" sob. She is unable to walk 1 block without getting GIBSON. She reports going to [facility] due to vertigo, where EKG, CXR and labs were done, which were normal, was dx'd w/ vertigo and noted fluid in her right ear, was tx'd w/ meclizine, which resolved it.     She also reports her father, who is 92 years old, is hospitalized in [place] with a trach to be on the vent. She feels bad about not being able to visit him in the hospital. She endorses feeling "a little" depressed, but doesn't feel like she is depressed enough for medication or talk tx.     She reports having a sensation of her left thumb getting stuck, which is painful to make it snap into a fully extended position.     VS, labs & imaging reviewed and discussed with patient, A1c 6.1%, HDL 32, vitamin D 26 ng/mL on 9/24/19.     Of note, EPIC indicates due preventive measures, including MMG, FLU, shingles.      Past Medical History:   Diagnosis Date    Allergy     Arthritis     Asthma     Admit to [facility] 1/6/19 by Dr. Michael Randall for asthma exaceration, acute bronchitis, acute dCHF, hypoxia, elevated troponin/BNP (thought to be 2/2 asthma exac & acute dCHF by cards), tx'd w/ IV lasix, duonebs, IV solumedrol and DC'd on Z-prema, prednisone taper    CKD (chronic kidney disease) stage 2, GFR 60-89 ml/min     H/o ARF 2/2 poor water intake; encouraged to increase water intake and avoid NSAIDS & contrast dye    Depression     Diastolic heart failure 01/06/2019    Dx'd at [facility] during asthma exacerbation    " Diverticulitis 2005    Dyslipidemia 08/28/2018    Former smoker     Quit 12/31/18; smoked 0.12 packs/day x 37 years    Gastritis     Hypertension     Prediabetes     A1c 6.1% on 9/29/19    Severe obesity (BMI >= 40)     Vertigo     Vitamin D deficiency      Past Surgical History:   Procedure Laterality Date    cyst removal      KNEE SURGERY       Social History     Socioeconomic History    Marital status:      Spouse name: Not on file    Number of children: Not on file    Years of education: Not on file    Highest education level: Not on file   Occupational History    Not on file   Social Needs    Financial resource strain: Not on file    Food insecurity:     Worry: Not on file     Inability: Not on file    Transportation needs:     Medical: Not on file     Non-medical: Not on file   Tobacco Use    Smoking status: Current Some Day Smoker     Packs/day: 0.25     Years: 27.00     Pack years: 6.75     Types: Cigarettes    Smokeless tobacco: Never Used   Substance and Sexual Activity    Alcohol use: No    Drug use: No    Sexual activity: Never   Lifestyle    Physical activity:     Days per week: Not on file     Minutes per session: Not on file    Stress: Not on file   Relationships    Social connections:     Talks on phone: Not on file     Gets together: Not on file     Attends [employer] service: Not on file     Active member of club or organization: Not on file     Attends meetings of clubs or organizations: Not on file     Relationship status: Not on file   Other Topics Concern    Not on file   Social History Narrative    Patient goal(s): Weight 175 lbs    Breakfast: sausage biscuit or croissant and 1 egg; black coffee w/ 1/2 tsp sugar or OJ    Lunch: Eats out: mashed potatoes, gravy, vegetable, fish or baked chicken; water or swea or kiwi/strawberry juice    Dinner: Grilled chicken salad or taco with ground beef, beans, ltteuce, cheese, tomatoes; kiwi/eetened tstrawberry juice     Snacks: None    Eating out: 3-4x/wk    Water (oz/day): 160 oz/d    Physical Activity: None     Family History   Problem Relation Age of Onset    Stroke Mother     Heart disease Mother     Kidney disease Father     Heart disease Father     Hypertension Father     HIV Sister     COPD Sister     Bipolar disorder Sister        Current Outpatient Medications:     amLODIPine (NORVASC) 5 MG tablet, Take 1 tablet (5 mg total) by mouth once daily., Disp: 30 tablet, Rfl: 0    aspirin (ECOTRIN) 81 MG EC tablet, Take 1 tablet (81 mg total) by mouth once daily., Disp: 90 tablet, Rfl: 3    atorvastatin (LIPITOR) 40 MG tablet, TAKE 1 TABLET DAILY, Disp: 90 tablet, Rfl: 4    calcium carbonate (CALCIUM ANTACID) 300 mg (750 mg) Chew, Take by mouth., Disp: , Rfl:     ergocalciferol (ERGOCALCIFEROL) 50,000 unit Cap, Take 1 capsule (50,000 Units total) by mouth every 7 days., Disp: 8 capsule, Rfl: 3    furosemide (LASIX) 40 MG tablet, Take 1 tablet (40 mg total) by mouth 2 (two) times daily as needed., Disp: 90 tablet, Rfl: 3    meclizine (ANTIVERT) 25 mg tablet, , Disp: , Rfl:     pediatric multivitamin chewable tablet, Take 1 tablet by mouth once daily., Disp: , Rfl:     PROAIR HFA 90 mcg/actuation inhaler, USE 2 INHALATIONS EVERY 6 HOURS AS NEEDED FOR WHEEZING, Disp: 25.5 g, Rfl: 11    guaifenesin-codeine 100-10 mg/5 ml (TUSSI-ORGANIDIN NR)  mg/5 mL syrup, Take 5 mLs by mouth nightly as needed for Cough or Congestion., Disp: 118 mL, Rfl: 0    triamcinolone acetonide 0.1% (KENALOG) 0.1 % cream, AAA bid (Patient not taking: Reported on 3/5/2020), Disp: 80 g, Rfl: 0    Review of patient's allergies indicates:   Allergen Reactions    Penicillins Hives    Coconut     Dairy aid [lactase]     Iodine and iodide containing products         Review of Systems   All remaining systems negative    Objective:     /72 (BP Location: Left arm, Patient Position: Sitting, BP Method: Large (Manual))   Pulse 81   Temp  "98.2 °F (36.8 °C) (Temporal)   Ht 5' 5" (1.651 m)   Wt 122.4 kg (269 lb 13.5 oz)   SpO2 96%   BMI 44.90 kg/m²     Physical Exam  GEN: A&O fully, NAD  PSYC: Normal affect  HEENT: OP: Clear, no LAD, no thyroid masses, auditory canals and TMs WNL, no TTP of sinuses bilaterally  CV: RRR, no M/G/R  PULM: CTA bilaterally, no wheezes, rales, crackles   EXT: +trace pitting ankle edema; No C/C, normal DP pulses bilaterally; left thumb with FROM and no TTP      Lab Results   Component Value Date    WBC 6.85 01/20/2019    HGB 12.8 01/20/2019    HCT 43.3 01/20/2019     01/20/2019    CHOL 111 (L) 09/24/2019    TRIG 88 09/24/2019    HDL 32 (L) 09/24/2019    LDLCALC 61.4 (L) 09/24/2019    ALT 12 09/24/2019    AST 20 09/24/2019     09/24/2019    K 4.3 09/24/2019     09/24/2019    CREATININE 0.9 09/24/2019    BUN 16 09/24/2019    CO2 34 (H) 09/24/2019    CALCIUM 9.4 09/24/2019    INR 0.9 01/20/2019    HGBA1C 6.1 (H) 09/24/2019       Assessment:      1. Cough: Likely viral etiology. Risks and benefits discussed and patient chose to move forward with Robitussin AC po qhs prn cough. I recommend sipping on ginger/lemon/honey Juice:      Ingredients (preferably organic & local)    3-5 Ginger roots   3-5 ricardo   1 bottle honey      Preparation:    Ginger root   - Wash   - Chop   - Add to  with an equal proportion of water   - Blend   - Strain   - Pour to fill ¾ of any size container (i.e. glass bottle)    Ricardo   - Squeeze ricardo    - Pour juice to fill ¼ of glass bottle    Add honey to glass bottle to taste (approximately 1 cm settled to the bottom of the bottle prior to shaking/stirring is usually sufficient)      Storage & Application    Refrigerate   Enjoy   - Shake & sip as needed for cough, congestion, sore throat   - Avoid drinking >3 oz in one sitting, which can lead to gastrointestinal irritation        2. GIBSON (dyspnea on exertion)        Plan:   Cough  -     X-Ray Chest PA And Lateral; " Future; Expected date: 03/05/2020  -     guaifenesin-codeine 100-10 mg/5 ml (TUSSI-ORGANIDIN NR)  mg/5 mL syrup; Take 5 mLs by mouth nightly as needed for Cough or Congestion.  Dispense: 118 mL; Refill: 0    GIBSON (dyspnea on exertion)  -     X-Ray Chest PA And Lateral; Future; Expected date: 03/05/2020    Screening for breast cancer  -     Mammo Digital Screening Bilat; Future; Expected date: 03/05/2020    Screening for colon cancer  -     Case request GI: COLONOSCOPY    Trigger finger of left thumb  -     Ambulatory referral/consult to Orthopedics; Future; Expected date: 03/12/2020        No follow-ups on file.    I spent >25 minutes of time with patient 50% or more of which was discussing labs and plans of care.

## 2020-03-06 ENCOUNTER — TELEPHONE (OUTPATIENT)
Dept: ORTHOPEDICS | Facility: CLINIC | Age: 60
End: 2020-03-06

## 2020-03-06 NOTE — TELEPHONE ENCOUNTER
Called pt in regards to ortho referral and pt states they do not want to schedule at this time. Pt states they will call back to schedule apt.

## 2020-03-09 ENCOUNTER — TELEPHONE (OUTPATIENT)
Dept: INTERNAL MEDICINE | Facility: CLINIC | Age: 60
End: 2020-03-09

## 2020-03-09 ENCOUNTER — TELEPHONE (OUTPATIENT)
Dept: ENDOSCOPY | Facility: HOSPITAL | Age: 60
End: 2020-03-09

## 2020-03-09 NOTE — TELEPHONE ENCOUNTER
Attempted to schedule procedure, no answer. Mail box is full and can not accept messages at this time. ela

## 2020-03-09 NOTE — TELEPHONE ENCOUNTER
"Please see message from patient & pharmacy below. They stated the rx sent in is "being rejected by insurance due to too high of a dosage." please review and advise.   "

## 2020-03-09 NOTE — TELEPHONE ENCOUNTER
"----- Message from Richarddorene Asif sent at 3/9/2020  1:02 PM CDT -----  Contact: Self  Pt calling in regards to her medication the doctor sent it to pharmacy, pt states her insurance rejected in the prescription due to "too high of dosage"  Needs a lower dosage in order for insurance to pay for it       ==guaifenesin-codeine 100-10 mg/5 ml (TUSSI-ORGANIDIN NR)  mg/5 mL syrup    [id number]  [place], [place] - [address] AT [facility] [address]    Please advise pt can be contact [phone]  "

## 2020-03-12 DIAGNOSIS — I10 ESSENTIAL HYPERTENSION: Primary | ICD-10-CM

## 2020-03-12 NOTE — TELEPHONE ENCOUNTER
----- Message from Olga Hall sent at 3/12/2020  8:36 AM CDT -----  Contact: self [phone]  Pt would like return call; states blood pressure and cholesterol medication has not been sent to pharmacy.  Please call back at [phone].  Md Ofe          Pt uses.  [id number] [place] [id number]  AT [facility] [address]  [address] [address]  Phone: [phone] Fax: [phone]

## 2020-03-12 NOTE — TELEPHONE ENCOUNTER
Orders are now pending and ready to be signed.    Called pt to inform status. Pt did not answer. LMOM.

## 2020-03-13 RX ORDER — AMLODIPINE BESYLATE 5 MG/1
5 TABLET ORAL DAILY
Qty: 30 TABLET | Refills: 11 | Status: SHIPPED | OUTPATIENT
Start: 2020-03-13 | End: 2021-03-01 | Stop reason: SDUPTHER

## 2020-03-13 RX ORDER — ATORVASTATIN CALCIUM 40 MG/1
40 TABLET, FILM COATED ORAL DAILY
Qty: 30 TABLET | Refills: 11 | Status: SHIPPED | OUTPATIENT
Start: 2020-03-13 | End: 2021-04-01 | Stop reason: SDUPTHER

## 2020-03-13 NOTE — TELEPHONE ENCOUNTER
----- Message from Vonnie Feliciano sent at 3/13/2020 11:46 AM CDT -----  Contact: Pt   Pt called in regards to getting a refill on her BP and Cholesterol medication refilled. Pt can be reached at [phone] (home)     [facility] #[id number]  [place], [place] - [address] AT [facility] [address]  [address] [facility] [address]  Phone: [phone] Fax: [phone]  Pt is currently out of medication.

## 2020-03-13 NOTE — TELEPHONE ENCOUNTER
----- Message from Alexei Gray sent at 3/13/2020  9:13 AM CDT -----  Contact: pt   Type:  RX Refill Request    Who Called: MARY LOPEZ   Refill or New Rx: refill   RX Name and Strength: amLODIPine 5 MG   How is the patient currently taking it? (ex. 1XDay): 1 daily   Is this a 30 day or 90 day RX: 30 day   Preferred Pharmacy with phone number:     [id number] - [place] - [address]  AT [place] [address] [address] [facility] [address]  Phone: [phone] Fax: [phone]    Local or Mail Order: local   Ordering Provider: bo  Would the patient rather a call back or a response via [facility]? Call   Best Call Back Number: [phone] (home)   Additional Information: pt states that this is her 4th time calling pt is out of this med               Type:  RX Refill Request    Who Called: MARY LOPEZ   Refill or New Rx: refill   RX Name and Strength: LIPITOR 40 MG    How is the patient currently taking it? (ex. 1XDay): 1 daily   Is this a 30 day or 90 day RX: 30 day   Preferred Pharmacy with phone number:     [facility] #[id number] - [place] - [address]  AT [facility] & [address] [facility] [address]  Phone: [phone] Fax: [phone]    Local or Mail Order: local   Ordering Provider: bo  Would the patient rather a call back or a response via [facility]? Call   Best Call Back Number: [phone] (home)   Additional Information: pt states that this is her 4th time calling  pt is out of this med

## 2020-10-07 DIAGNOSIS — Z12.11 COLON CANCER SCREENING: Primary | ICD-10-CM

## 2021-02-01 ENCOUNTER — TELEPHONE (OUTPATIENT)
Dept: OBSTETRICS AND GYNECOLOGY | Facility: CLINIC | Age: 61
End: 2021-02-01

## 2021-02-01 ENCOUNTER — OFFICE VISIT (OUTPATIENT)
Dept: OBSTETRICS AND GYNECOLOGY | Facility: CLINIC | Age: 61
End: 2021-02-01
Payer: OTHER GOVERNMENT

## 2021-02-01 VITALS
BODY MASS INDEX: 42.3 KG/M2 | HEIGHT: 65 IN | WEIGHT: 253.88 LBS | SYSTOLIC BLOOD PRESSURE: 134 MMHG | DIASTOLIC BLOOD PRESSURE: 86 MMHG

## 2021-02-01 DIAGNOSIS — Z12.11 SPECIAL SCREENING FOR MALIGNANT NEOPLASMS, COLON: ICD-10-CM

## 2021-02-01 DIAGNOSIS — Z01.419 ENCOUNTER FOR GYNECOLOGICAL EXAMINATION (GENERAL) (ROUTINE) WITHOUT ABNORMAL FINDINGS: Primary | ICD-10-CM

## 2021-02-01 DIAGNOSIS — Z12.31 SCREENING MAMMOGRAM, ENCOUNTER FOR: ICD-10-CM

## 2021-02-01 DIAGNOSIS — Z12.4 SCREENING FOR CERVICAL CANCER: ICD-10-CM

## 2021-02-01 PROCEDURE — 87624 HPV HI-RISK TYP POOLED RSLT: CPT

## 2021-02-01 PROCEDURE — 88142 CYTOPATH C/V THIN LAYER: CPT

## 2021-02-01 PROCEDURE — 99999 PR PBB SHADOW E&M-EST. PATIENT-LVL III: ICD-10-PCS | Mod: PBBFAC,,, | Performed by: OBSTETRICS & GYNECOLOGY

## 2021-02-01 PROCEDURE — 99396 PR PREVENTIVE VISIT,EST,40-64: ICD-10-PCS | Mod: S$PBB,,, | Performed by: OBSTETRICS & GYNECOLOGY

## 2021-02-01 PROCEDURE — 99999 PR PBB SHADOW E&M-EST. PATIENT-LVL III: CPT | Mod: PBBFAC,,, | Performed by: OBSTETRICS & GYNECOLOGY

## 2021-02-01 PROCEDURE — 99396 PREV VISIT EST AGE 40-64: CPT | Mod: S$PBB,,, | Performed by: OBSTETRICS & GYNECOLOGY

## 2021-02-01 PROCEDURE — 99213 OFFICE O/P EST LOW 20 MIN: CPT | Mod: PBBFAC,PN | Performed by: OBSTETRICS & GYNECOLOGY

## 2021-02-01 RX ORDER — NAPROXEN SODIUM 220 MG
440 TABLET ORAL DAILY
Status: ON HOLD | COMMUNITY
End: 2022-08-10 | Stop reason: HOSPADM

## 2021-02-05 LAB
HPV HR 12 DNA SPEC QL NAA+PROBE: NEGATIVE
HPV16 AG SPEC QL: NEGATIVE
HPV18 DNA SPEC QL NAA+PROBE: NEGATIVE

## 2021-02-24 ENCOUNTER — TELEPHONE (OUTPATIENT)
Dept: ENDOSCOPY | Facility: HOSPITAL | Age: 61
End: 2021-02-24

## 2021-02-26 LAB
FINAL PATHOLOGIC DIAGNOSIS: NORMAL
Lab: NORMAL

## 2021-03-01 DIAGNOSIS — I10 ESSENTIAL HYPERTENSION: ICD-10-CM

## 2021-03-01 RX ORDER — AMLODIPINE BESYLATE 5 MG/1
5 TABLET ORAL DAILY
Qty: 10 TABLET | Refills: 0 | Status: SHIPPED | OUTPATIENT
Start: 2021-03-01 | End: 2021-03-09 | Stop reason: SDUPTHER

## 2021-03-01 RX ORDER — ATORVASTATIN CALCIUM 40 MG/1
40 TABLET, FILM COATED ORAL DAILY
Qty: 30 TABLET | Refills: 0 | Status: CANCELLED | OUTPATIENT
Start: 2021-03-01

## 2021-03-04 ENCOUNTER — TELEPHONE (OUTPATIENT)
Dept: ENDOSCOPY | Facility: HOSPITAL | Age: 61
End: 2021-03-04

## 2021-03-09 ENCOUNTER — TELEPHONE (OUTPATIENT)
Dept: INTERNAL MEDICINE | Facility: CLINIC | Age: 61
End: 2021-03-09

## 2021-03-09 ENCOUNTER — APPOINTMENT (OUTPATIENT)
Dept: RADIOLOGY | Facility: HOSPITAL | Age: 61
End: 2021-03-09
Attending: FAMILY MEDICINE
Payer: OTHER GOVERNMENT

## 2021-03-09 ENCOUNTER — LAB VISIT (OUTPATIENT)
Dept: LAB | Facility: HOSPITAL | Age: 61
End: 2021-03-09
Attending: FAMILY MEDICINE
Payer: OTHER GOVERNMENT

## 2021-03-09 ENCOUNTER — OFFICE VISIT (OUTPATIENT)
Dept: INTERNAL MEDICINE | Facility: CLINIC | Age: 61
End: 2021-03-09
Payer: OTHER GOVERNMENT

## 2021-03-09 VITALS
SYSTOLIC BLOOD PRESSURE: 132 MMHG | DIASTOLIC BLOOD PRESSURE: 64 MMHG | WEIGHT: 262.44 LBS | BODY MASS INDEX: 43.72 KG/M2 | HEART RATE: 80 BPM | TEMPERATURE: 98 F | HEIGHT: 65 IN

## 2021-03-09 DIAGNOSIS — E66.01 SEVERE OBESITY (BMI >= 40): Primary | ICD-10-CM

## 2021-03-09 DIAGNOSIS — I10 ESSENTIAL HYPERTENSION: ICD-10-CM

## 2021-03-09 DIAGNOSIS — R06.02 SHORTNESS OF BREATH: ICD-10-CM

## 2021-03-09 DIAGNOSIS — M17.11 PRIMARY OSTEOARTHRITIS OF RIGHT KNEE: ICD-10-CM

## 2021-03-09 DIAGNOSIS — E66.01 SEVERE OBESITY (BMI >= 40): ICD-10-CM

## 2021-03-09 LAB
BASOPHILS # BLD AUTO: 0.02 K/UL (ref 0–0.2)
BASOPHILS NFR BLD: 0.2 % (ref 0–1.9)
DIFFERENTIAL METHOD: ABNORMAL
EOSINOPHIL # BLD AUTO: 0.1 K/UL (ref 0–0.5)
EOSINOPHIL NFR BLD: 1 % (ref 0–8)
ERYTHROCYTE [DISTWIDTH] IN BLOOD BY AUTOMATED COUNT: 16.6 % (ref 11.5–14.5)
HCT VFR BLD AUTO: 45.1 % (ref 37–48.5)
HGB BLD-MCNC: 13.1 G/DL (ref 12–16)
IMM GRANULOCYTES # BLD AUTO: 0.01 K/UL (ref 0–0.04)
IMM GRANULOCYTES NFR BLD AUTO: 0.1 % (ref 0–0.5)
LYMPHOCYTES # BLD AUTO: 4.2 K/UL (ref 1–4.8)
LYMPHOCYTES NFR BLD: 46.5 % (ref 18–48)
MCH RBC QN AUTO: 26.9 PG (ref 27–31)
MCHC RBC AUTO-ENTMCNC: 29 G/DL (ref 32–36)
MCV RBC AUTO: 93 FL (ref 82–98)
MONOCYTES # BLD AUTO: 0.6 K/UL (ref 0.3–1)
MONOCYTES NFR BLD: 6.3 % (ref 4–15)
NEUTROPHILS # BLD AUTO: 4.1 K/UL (ref 1.8–7.7)
NEUTROPHILS NFR BLD: 45.9 % (ref 38–73)
NRBC BLD-RTO: 0 /100 WBC
PLATELET # BLD AUTO: 293 K/UL (ref 150–350)
PMV BLD AUTO: 12.4 FL (ref 9.2–12.9)
RBC # BLD AUTO: 4.87 M/UL (ref 4–5.4)
WBC # BLD AUTO: 8.99 K/UL (ref 3.9–12.7)

## 2021-03-09 PROCEDURE — 99214 OFFICE O/P EST MOD 30 MIN: CPT | Mod: PBBFAC,25,PN | Performed by: FAMILY MEDICINE

## 2021-03-09 PROCEDURE — 36415 COLL VENOUS BLD VENIPUNCTURE: CPT | Mod: PO | Performed by: FAMILY MEDICINE

## 2021-03-09 PROCEDURE — 99386 PR PREVENTIVE VISIT,NEW,40-64: ICD-10-PCS | Mod: S$PBB,,, | Performed by: FAMILY MEDICINE

## 2021-03-09 PROCEDURE — 80061 LIPID PANEL: CPT | Performed by: FAMILY MEDICINE

## 2021-03-09 PROCEDURE — 73562 X-RAY EXAM OF KNEE 3: CPT | Mod: 26,RT,, | Performed by: RADIOLOGY

## 2021-03-09 PROCEDURE — 73560 X-RAY EXAM OF KNEE 1 OR 2: CPT | Mod: 26,LT,, | Performed by: RADIOLOGY

## 2021-03-09 PROCEDURE — 80053 COMPREHEN METABOLIC PANEL: CPT | Performed by: FAMILY MEDICINE

## 2021-03-09 PROCEDURE — 73560 XR KNEE ORTHO RIGHT: ICD-10-PCS | Mod: 26,LT,, | Performed by: RADIOLOGY

## 2021-03-09 PROCEDURE — 99999 PR PBB SHADOW E&M-EST. PATIENT-LVL IV: CPT | Mod: PBBFAC,,, | Performed by: FAMILY MEDICINE

## 2021-03-09 PROCEDURE — 73562 XR KNEE ORTHO RIGHT: ICD-10-PCS | Mod: 26,RT,, | Performed by: RADIOLOGY

## 2021-03-09 PROCEDURE — 84443 ASSAY THYROID STIM HORMONE: CPT | Performed by: FAMILY MEDICINE

## 2021-03-09 PROCEDURE — 99999 PR PBB SHADOW E&M-EST. PATIENT-LVL IV: ICD-10-PCS | Mod: PBBFAC,,, | Performed by: FAMILY MEDICINE

## 2021-03-09 PROCEDURE — 85025 COMPLETE CBC W/AUTO DIFF WBC: CPT | Performed by: FAMILY MEDICINE

## 2021-03-09 PROCEDURE — 73560 X-RAY EXAM OF KNEE 1 OR 2: CPT | Mod: TC,PO,LT

## 2021-03-09 PROCEDURE — 99386 PREV VISIT NEW AGE 40-64: CPT | Mod: S$PBB,,, | Performed by: FAMILY MEDICINE

## 2021-03-09 RX ORDER — ALBUTEROL SULFATE 90 UG/1
AEROSOL, METERED RESPIRATORY (INHALATION)
Qty: 25.5 G | Refills: 11 | Status: SHIPPED | OUTPATIENT
Start: 2021-03-09 | End: 2022-03-15 | Stop reason: SDUPTHER

## 2021-03-09 RX ORDER — DICLOFENAC SODIUM 10 MG/G
2 GEL TOPICAL 4 TIMES DAILY
Qty: 1 TUBE | Refills: 1 | Status: SHIPPED | OUTPATIENT
Start: 2021-03-09 | End: 2022-03-15

## 2021-03-09 RX ORDER — AMLODIPINE BESYLATE 5 MG/1
5 TABLET ORAL DAILY
Qty: 30 TABLET | Refills: 11 | Status: SHIPPED | OUTPATIENT
Start: 2021-03-09 | End: 2021-07-01 | Stop reason: SDUPTHER

## 2021-03-10 LAB
ALBUMIN SERPL BCP-MCNC: 3.5 G/DL (ref 3.5–5.2)
ALP SERPL-CCNC: 97 U/L (ref 55–135)
ALT SERPL W/O P-5'-P-CCNC: 14 U/L (ref 10–44)
ANION GAP SERPL CALC-SCNC: 10 MMOL/L (ref 8–16)
AST SERPL-CCNC: 20 U/L (ref 10–40)
BILIRUB SERPL-MCNC: 0.3 MG/DL (ref 0.1–1)
BUN SERPL-MCNC: 15 MG/DL (ref 6–20)
CALCIUM SERPL-MCNC: 8.6 MG/DL (ref 8.7–10.5)
CHLORIDE SERPL-SCNC: 101 MMOL/L (ref 95–110)
CHOLEST SERPL-MCNC: 131 MG/DL (ref 120–199)
CHOLEST/HDLC SERPL: 3.5 {RATIO} (ref 2–5)
CO2 SERPL-SCNC: 32 MMOL/L (ref 23–29)
CREAT SERPL-MCNC: 0.9 MG/DL (ref 0.5–1.4)
EST. GFR  (AFRICAN AMERICAN): >60 ML/MIN/1.73 M^2
EST. GFR  (NON AFRICAN AMERICAN): >60 ML/MIN/1.73 M^2
GLUCOSE SERPL-MCNC: 81 MG/DL (ref 70–110)
HDLC SERPL-MCNC: 37 MG/DL (ref 40–75)
HDLC SERPL: 28.2 % (ref 20–50)
LDLC SERPL CALC-MCNC: 74.2 MG/DL (ref 63–159)
NONHDLC SERPL-MCNC: 94 MG/DL
POTASSIUM SERPL-SCNC: 4.6 MMOL/L (ref 3.5–5.1)
PROT SERPL-MCNC: 7.7 G/DL (ref 6–8.4)
SODIUM SERPL-SCNC: 143 MMOL/L (ref 136–145)
TRIGL SERPL-MCNC: 99 MG/DL (ref 30–150)
TSH SERPL DL<=0.005 MIU/L-ACNC: 0.69 UIU/ML (ref 0.4–4)

## 2021-03-19 ENCOUNTER — TELEPHONE (OUTPATIENT)
Dept: INTERNAL MEDICINE | Facility: CLINIC | Age: 61
End: 2021-03-19

## 2021-04-01 ENCOUNTER — OFFICE VISIT (OUTPATIENT)
Dept: INTERNAL MEDICINE | Facility: CLINIC | Age: 61
End: 2021-04-01
Payer: OTHER GOVERNMENT

## 2021-04-01 ENCOUNTER — CLINICAL SUPPORT (OUTPATIENT)
Dept: INTERNAL MEDICINE | Facility: CLINIC | Age: 61
End: 2021-04-01
Payer: OTHER GOVERNMENT

## 2021-04-01 VITALS
SYSTOLIC BLOOD PRESSURE: 128 MMHG | WEIGHT: 265.44 LBS | BODY MASS INDEX: 44.22 KG/M2 | HEIGHT: 65 IN | OXYGEN SATURATION: 92 % | HEART RATE: 78 BPM | DIASTOLIC BLOOD PRESSURE: 80 MMHG | TEMPERATURE: 98 F

## 2021-04-01 DIAGNOSIS — Z12.39 ENCOUNTER FOR SCREENING FOR MALIGNANT NEOPLASM OF BREAST, UNSPECIFIED SCREENING MODALITY: ICD-10-CM

## 2021-04-01 DIAGNOSIS — I10 ESSENTIAL HYPERTENSION: ICD-10-CM

## 2021-04-01 DIAGNOSIS — R07.9 CHEST PAIN, UNSPECIFIED TYPE: Primary | ICD-10-CM

## 2021-04-01 PROCEDURE — 99999 PR PBB SHADOW E&M-EST. PATIENT-LVL IV: CPT | Mod: PBBFAC,,, | Performed by: FAMILY MEDICINE

## 2021-04-01 PROCEDURE — 99214 OFFICE O/P EST MOD 30 MIN: CPT | Mod: S$PBB,,, | Performed by: FAMILY MEDICINE

## 2021-04-01 PROCEDURE — 99214 OFFICE O/P EST MOD 30 MIN: CPT | Mod: PBBFAC,PN | Performed by: FAMILY MEDICINE

## 2021-04-01 PROCEDURE — 93010 EKG 12-LEAD: ICD-10-PCS | Mod: S$PBB,,, | Performed by: INTERNAL MEDICINE

## 2021-04-01 PROCEDURE — 99214 PR OFFICE/OUTPT VISIT, EST, LEVL IV, 30-39 MIN: ICD-10-PCS | Mod: S$PBB,,, | Performed by: FAMILY MEDICINE

## 2021-04-01 PROCEDURE — 93010 ELECTROCARDIOGRAM REPORT: CPT | Mod: S$PBB,,, | Performed by: INTERNAL MEDICINE

## 2021-04-01 PROCEDURE — 99999 PR PBB SHADOW E&M-EST. PATIENT-LVL IV: ICD-10-PCS | Mod: PBBFAC,,, | Performed by: FAMILY MEDICINE

## 2021-04-01 PROCEDURE — 93005 ELECTROCARDIOGRAM TRACING: CPT | Mod: PBBFAC,PN | Performed by: INTERNAL MEDICINE

## 2021-04-01 RX ORDER — ATORVASTATIN CALCIUM 40 MG/1
40 TABLET, FILM COATED ORAL DAILY
Qty: 30 TABLET | Refills: 11 | Status: SHIPPED | OUTPATIENT
Start: 2021-04-01 | End: 2022-03-15 | Stop reason: SDUPTHER

## 2021-05-21 ENCOUNTER — TELEPHONE (OUTPATIENT)
Dept: ADMINISTRATIVE | Facility: HOSPITAL | Age: 61
End: 2021-05-21

## 2021-05-31 ENCOUNTER — PATIENT OUTREACH (OUTPATIENT)
Dept: ADMINISTRATIVE | Facility: HOSPITAL | Age: 61
End: 2021-05-31

## 2021-06-10 ENCOUNTER — OFFICE VISIT (OUTPATIENT)
Dept: INTERNAL MEDICINE | Facility: CLINIC | Age: 61
End: 2021-06-10
Payer: OTHER GOVERNMENT

## 2021-06-10 VITALS
OXYGEN SATURATION: 97 % | DIASTOLIC BLOOD PRESSURE: 82 MMHG | TEMPERATURE: 98 F | WEIGHT: 270.63 LBS | SYSTOLIC BLOOD PRESSURE: 134 MMHG | RESPIRATION RATE: 18 BRPM | BODY MASS INDEX: 45.03 KG/M2 | HEART RATE: 89 BPM

## 2021-06-10 DIAGNOSIS — Z87.898 HISTORY OF CHEST PAIN: Primary | ICD-10-CM

## 2021-06-10 PROCEDURE — 99213 OFFICE O/P EST LOW 20 MIN: CPT | Mod: S$PBB,,, | Performed by: FAMILY MEDICINE

## 2021-06-10 PROCEDURE — 99214 OFFICE O/P EST MOD 30 MIN: CPT | Mod: PBBFAC,PN | Performed by: FAMILY MEDICINE

## 2021-06-10 PROCEDURE — 99213 PR OFFICE/OUTPT VISIT, EST, LEVL III, 20-29 MIN: ICD-10-PCS | Mod: S$PBB,,, | Performed by: FAMILY MEDICINE

## 2021-06-10 PROCEDURE — 99999 PR PBB SHADOW E&M-EST. PATIENT-LVL IV: CPT | Mod: PBBFAC,,, | Performed by: FAMILY MEDICINE

## 2021-06-10 PROCEDURE — 99999 PR PBB SHADOW E&M-EST. PATIENT-LVL IV: ICD-10-PCS | Mod: PBBFAC,,, | Performed by: FAMILY MEDICINE

## 2021-06-30 ENCOUNTER — PATIENT MESSAGE (OUTPATIENT)
Dept: ENDOSCOPY | Facility: HOSPITAL | Age: 61
End: 2021-06-30

## 2021-07-01 DIAGNOSIS — I10 ESSENTIAL HYPERTENSION: ICD-10-CM

## 2021-07-01 RX ORDER — AMLODIPINE BESYLATE 5 MG/1
5 TABLET ORAL DAILY
Qty: 90 TABLET | Refills: 3 | Status: SHIPPED | OUTPATIENT
Start: 2021-07-01 | End: 2022-03-15 | Stop reason: SDUPTHER

## 2021-07-01 RX ORDER — AMLODIPINE BESYLATE 5 MG/1
TABLET ORAL
Qty: 10 TABLET | OUTPATIENT
Start: 2021-07-01

## 2021-07-16 ENCOUNTER — TELEPHONE (OUTPATIENT)
Dept: FAMILY MEDICINE | Facility: CLINIC | Age: 61
End: 2021-07-16

## 2021-07-27 ENCOUNTER — PATIENT OUTREACH (OUTPATIENT)
Dept: ADMINISTRATIVE | Facility: HOSPITAL | Age: 61
End: 2021-07-27

## 2021-08-05 LAB
LEFT EYE DM RETINOPATHY: NEGATIVE
RIGHT EYE DM RETINOPATHY: NEGATIVE

## 2021-10-20 ENCOUNTER — TELEPHONE (OUTPATIENT)
Dept: INTERNAL MEDICINE | Facility: CLINIC | Age: 61
End: 2021-10-20

## 2021-10-21 ENCOUNTER — TELEPHONE (OUTPATIENT)
Dept: ORTHOPEDICS | Facility: CLINIC | Age: 61
End: 2021-10-21

## 2021-10-21 ENCOUNTER — OFFICE VISIT (OUTPATIENT)
Dept: INTERNAL MEDICINE | Facility: CLINIC | Age: 61
End: 2021-10-21
Payer: OTHER GOVERNMENT

## 2021-10-21 ENCOUNTER — LAB VISIT (OUTPATIENT)
Dept: LAB | Facility: HOSPITAL | Age: 61
End: 2021-10-21
Payer: OTHER GOVERNMENT

## 2021-10-21 VITALS
TEMPERATURE: 98 F | SYSTOLIC BLOOD PRESSURE: 130 MMHG | HEIGHT: 65 IN | BODY MASS INDEX: 45.51 KG/M2 | DIASTOLIC BLOOD PRESSURE: 68 MMHG | HEART RATE: 82 BPM | WEIGHT: 273.13 LBS | OXYGEN SATURATION: 91 %

## 2021-10-21 DIAGNOSIS — E55.9 VITAMIN D DEFICIENCY: ICD-10-CM

## 2021-10-21 DIAGNOSIS — Z00.00 ROUTINE MEDICAL EXAM: Primary | ICD-10-CM

## 2021-10-21 DIAGNOSIS — R73.03 PREDIABETES: ICD-10-CM

## 2021-10-21 DIAGNOSIS — M25.562 CHRONIC PAIN OF BOTH KNEES: ICD-10-CM

## 2021-10-21 DIAGNOSIS — M25.561 ACUTE PAIN OF RIGHT KNEE: ICD-10-CM

## 2021-10-21 DIAGNOSIS — G89.29 CHRONIC PAIN OF BOTH KNEES: ICD-10-CM

## 2021-10-21 DIAGNOSIS — Z12.11 COLON CANCER SCREENING: ICD-10-CM

## 2021-10-21 DIAGNOSIS — I10 PRIMARY HYPERTENSION: ICD-10-CM

## 2021-10-21 DIAGNOSIS — M25.561 CHRONIC PAIN OF BOTH KNEES: ICD-10-CM

## 2021-10-21 DIAGNOSIS — R26.9 ABNORMAL GAIT: ICD-10-CM

## 2021-10-21 PROCEDURE — 83036 HEMOGLOBIN GLYCOSYLATED A1C: CPT | Performed by: NURSE PRACTITIONER

## 2021-10-21 PROCEDURE — 82306 VITAMIN D 25 HYDROXY: CPT | Performed by: NURSE PRACTITIONER

## 2021-10-21 PROCEDURE — 99213 PR OFFICE/OUTPT VISIT, EST, LEVL III, 20-29 MIN: ICD-10-PCS | Mod: S$PBB,,, | Performed by: NURSE PRACTITIONER

## 2021-10-21 PROCEDURE — 36415 COLL VENOUS BLD VENIPUNCTURE: CPT | Mod: PO | Performed by: NURSE PRACTITIONER

## 2021-10-21 PROCEDURE — 99999 PR PBB SHADOW E&M-EST. PATIENT-LVL IV: CPT | Mod: PBBFAC,,, | Performed by: NURSE PRACTITIONER

## 2021-10-21 PROCEDURE — 99213 OFFICE O/P EST LOW 20 MIN: CPT | Mod: S$PBB,,, | Performed by: NURSE PRACTITIONER

## 2021-10-21 PROCEDURE — 99214 OFFICE O/P EST MOD 30 MIN: CPT | Mod: PBBFAC,PN | Performed by: NURSE PRACTITIONER

## 2021-10-21 PROCEDURE — 99999 PR PBB SHADOW E&M-EST. PATIENT-LVL IV: ICD-10-PCS | Mod: PBBFAC,,, | Performed by: NURSE PRACTITIONER

## 2021-10-22 ENCOUNTER — TELEPHONE (OUTPATIENT)
Dept: SPORTS MEDICINE | Facility: CLINIC | Age: 61
End: 2021-10-22

## 2021-10-22 LAB
25(OH)D3+25(OH)D2 SERPL-MCNC: 35 NG/ML (ref 30–96)
ESTIMATED AVG GLUCOSE: 128 MG/DL (ref 68–131)
HBA1C MFR BLD: 6.1 % (ref 4–5.6)

## 2021-10-25 ENCOUNTER — TELEPHONE (OUTPATIENT)
Dept: INTERNAL MEDICINE | Facility: CLINIC | Age: 61
End: 2021-10-25
Payer: OTHER GOVERNMENT

## 2021-11-02 ENCOUNTER — PATIENT OUTREACH (OUTPATIENT)
Dept: ADMINISTRATIVE | Facility: OTHER | Age: 61
End: 2021-11-02
Payer: OTHER GOVERNMENT

## 2021-11-02 DIAGNOSIS — M25.562 PAIN IN BOTH KNEES, UNSPECIFIED CHRONICITY: Primary | ICD-10-CM

## 2021-11-02 DIAGNOSIS — Z12.11 ENCOUNTER FOR FIT (FECAL IMMUNOCHEMICAL TEST) SCREENING: Primary | ICD-10-CM

## 2021-11-02 DIAGNOSIS — M25.561 PAIN IN BOTH KNEES, UNSPECIFIED CHRONICITY: Primary | ICD-10-CM

## 2021-11-03 ENCOUNTER — HOSPITAL ENCOUNTER (OUTPATIENT)
Dept: RADIOLOGY | Facility: HOSPITAL | Age: 61
Discharge: HOME | End: 2021-11-03
Attending: ORTHOPAEDIC SURGERY
Payer: OTHER GOVERNMENT

## 2021-11-03 ENCOUNTER — OFFICE VISIT (OUTPATIENT)
Dept: ORTHOPEDICS | Facility: CLINIC | Age: 61
End: 2021-11-03
Payer: OTHER GOVERNMENT

## 2021-11-03 VITALS — WEIGHT: 273 LBS | HEIGHT: 65 IN | BODY MASS INDEX: 45.48 KG/M2

## 2021-11-03 DIAGNOSIS — M25.562 PAIN IN BOTH KNEES, UNSPECIFIED CHRONICITY: ICD-10-CM

## 2021-11-03 DIAGNOSIS — M17.0 PRIMARY OSTEOARTHRITIS OF BOTH KNEES: Primary | ICD-10-CM

## 2021-11-03 DIAGNOSIS — M25.561 PAIN IN BOTH KNEES, UNSPECIFIED CHRONICITY: ICD-10-CM

## 2021-11-03 PROCEDURE — 99203 OFFICE O/P NEW LOW 30 MIN: CPT | Mod: S$PBB,,, | Performed by: ORTHOPAEDIC SURGERY

## 2021-11-03 PROCEDURE — 73564 X-RAY EXAM KNEE 4 OR MORE: CPT | Mod: TC,50

## 2021-11-03 PROCEDURE — 99203 PR OFFICE/OUTPT VISIT, NEW, LEVL III, 30-44 MIN: ICD-10-PCS | Mod: S$PBB,,, | Performed by: ORTHOPAEDIC SURGERY

## 2021-11-03 PROCEDURE — 99999 PR PBB SHADOW E&M-EST. PATIENT-LVL III: CPT | Mod: PBBFAC,,, | Performed by: ORTHOPAEDIC SURGERY

## 2021-11-03 PROCEDURE — 99213 OFFICE O/P EST LOW 20 MIN: CPT | Mod: PBBFAC | Performed by: ORTHOPAEDIC SURGERY

## 2021-11-03 PROCEDURE — 73564 X-RAY EXAM KNEE 4 OR MORE: CPT | Mod: 26,50,, | Performed by: RADIOLOGY

## 2021-11-03 PROCEDURE — 99999 PR PBB SHADOW E&M-EST. PATIENT-LVL III: ICD-10-PCS | Mod: PBBFAC,,, | Performed by: ORTHOPAEDIC SURGERY

## 2021-11-03 PROCEDURE — 73564 XR KNEE ORTHO BILAT WITH FLEXION: ICD-10-PCS | Mod: 26,50,, | Performed by: RADIOLOGY

## 2021-11-09 ENCOUNTER — TELEPHONE (OUTPATIENT)
Dept: INTERNAL MEDICINE | Facility: CLINIC | Age: 61
End: 2021-11-09
Payer: OTHER GOVERNMENT

## 2022-02-18 ENCOUNTER — PATIENT MESSAGE (OUTPATIENT)
Dept: ENDOSCOPY | Facility: HOSPITAL | Age: 62
End: 2022-02-18
Payer: OTHER GOVERNMENT

## 2022-02-25 ENCOUNTER — PATIENT OUTREACH (OUTPATIENT)
Dept: ADMINISTRATIVE | Facility: HOSPITAL | Age: 62
End: 2022-02-25
Payer: OTHER GOVERNMENT

## 2022-03-15 ENCOUNTER — APPOINTMENT (OUTPATIENT)
Dept: RADIOLOGY | Facility: HOSPITAL | Age: 62
End: 2022-03-15
Attending: NURSE PRACTITIONER
Payer: OTHER GOVERNMENT

## 2022-03-15 ENCOUNTER — OFFICE VISIT (OUTPATIENT)
Dept: INTERNAL MEDICINE | Facility: CLINIC | Age: 62
End: 2022-03-15
Payer: OTHER GOVERNMENT

## 2022-03-15 VITALS
BODY MASS INDEX: 46.83 KG/M2 | SYSTOLIC BLOOD PRESSURE: 132 MMHG | OXYGEN SATURATION: 95 % | HEIGHT: 65 IN | DIASTOLIC BLOOD PRESSURE: 74 MMHG | TEMPERATURE: 97 F | RESPIRATION RATE: 18 BRPM | HEART RATE: 78 BPM | WEIGHT: 281.06 LBS

## 2022-03-15 DIAGNOSIS — R06.02 SHORTNESS OF BREATH: ICD-10-CM

## 2022-03-15 DIAGNOSIS — I10 PRIMARY HYPERTENSION: ICD-10-CM

## 2022-03-15 DIAGNOSIS — R73.03 PREDIABETES: ICD-10-CM

## 2022-03-15 DIAGNOSIS — I10 ESSENTIAL HYPERTENSION: ICD-10-CM

## 2022-03-15 DIAGNOSIS — Z12.11 COLON CANCER SCREENING: ICD-10-CM

## 2022-03-15 DIAGNOSIS — J45.909 ASTHMA, UNSPECIFIED ASTHMA SEVERITY, UNSPECIFIED WHETHER COMPLICATED, UNSPECIFIED WHETHER PERSISTENT: ICD-10-CM

## 2022-03-15 DIAGNOSIS — M17.0 PRIMARY OSTEOARTHRITIS OF BOTH KNEES: ICD-10-CM

## 2022-03-15 DIAGNOSIS — Z00.00 ROUTINE MEDICAL EXAM: Primary | ICD-10-CM

## 2022-03-15 DIAGNOSIS — Z12.31 BREAST CANCER SCREENING BY MAMMOGRAM: ICD-10-CM

## 2022-03-15 DIAGNOSIS — R35.0 URINE FREQUENCY: ICD-10-CM

## 2022-03-15 LAB
BILIRUB SERPL-MCNC: ABNORMAL MG/DL
BLOOD URINE, POC: ABNORMAL
COLOR, POC UA: YELLOW
GLUCOSE UR QL STRIP: ABNORMAL
KETONES UR QL STRIP: ABNORMAL
LEUKOCYTE ESTERASE URINE, POC: ABNORMAL
NITRITE, POC UA: ABNORMAL
PH, POC UA: 7
PROTEIN, POC: ABNORMAL
SPECIFIC GRAVITY, POC UA: 1
UROBILINOGEN, POC UA: ABNORMAL

## 2022-03-15 PROCEDURE — 99215 OFFICE O/P EST HI 40 MIN: CPT | Mod: PBBFAC,25,PN | Performed by: NURSE PRACTITIONER

## 2022-03-15 PROCEDURE — 71046 X-RAY EXAM CHEST 2 VIEWS: CPT | Mod: TC,PO

## 2022-03-15 PROCEDURE — 71046 X-RAY EXAM CHEST 2 VIEWS: CPT | Mod: 26,,, | Performed by: RADIOLOGY

## 2022-03-15 PROCEDURE — 81001 URINALYSIS AUTO W/SCOPE: CPT | Mod: PBBFAC,PN | Performed by: NURSE PRACTITIONER

## 2022-03-15 PROCEDURE — 99213 OFFICE O/P EST LOW 20 MIN: CPT | Mod: S$PBB,,, | Performed by: NURSE PRACTITIONER

## 2022-03-15 PROCEDURE — 87086 URINE CULTURE/COLONY COUNT: CPT | Performed by: NURSE PRACTITIONER

## 2022-03-15 PROCEDURE — 99999 PR PBB SHADOW E&M-EST. PATIENT-LVL V: ICD-10-PCS | Mod: PBBFAC,,, | Performed by: NURSE PRACTITIONER

## 2022-03-15 PROCEDURE — 99999 PR PBB SHADOW E&M-EST. PATIENT-LVL V: CPT | Mod: PBBFAC,,, | Performed by: NURSE PRACTITIONER

## 2022-03-15 PROCEDURE — 71046 XR CHEST PA AND LATERAL: ICD-10-PCS | Mod: 26,,, | Performed by: RADIOLOGY

## 2022-03-15 PROCEDURE — 99213 PR OFFICE/OUTPT VISIT, EST, LEVL III, 20-29 MIN: ICD-10-PCS | Mod: S$PBB,,, | Performed by: NURSE PRACTITIONER

## 2022-03-15 RX ORDER — ATORVASTATIN CALCIUM 40 MG/1
40 TABLET, FILM COATED ORAL DAILY
Qty: 90 TABLET | Refills: 3 | Status: SHIPPED | OUTPATIENT
Start: 2022-03-15 | End: 2022-04-12

## 2022-03-15 RX ORDER — ALBUTEROL SULFATE 90 UG/1
AEROSOL, METERED RESPIRATORY (INHALATION)
Qty: 25.5 G | Refills: 11 | Status: SHIPPED | OUTPATIENT
Start: 2022-03-15 | End: 2022-11-08 | Stop reason: SDUPTHER

## 2022-03-15 RX ORDER — AMLODIPINE BESYLATE 5 MG/1
5 TABLET ORAL DAILY
Qty: 90 TABLET | Refills: 3 | Status: SHIPPED | OUTPATIENT
Start: 2022-03-15 | End: 2023-04-13 | Stop reason: SDUPTHER

## 2022-03-15 RX ORDER — PREDNISONE 10 MG/1
10 TABLET ORAL 2 TIMES DAILY
Qty: 10 TABLET | Refills: 0 | Status: SHIPPED | OUTPATIENT
Start: 2022-03-15 | End: 2022-06-15

## 2022-03-15 NOTE — PROGRESS NOTES
Subjective:       Patient ID: Baylee Muñoz is a 61 y.o. female.    Chief Complaint: Medication Refill    Patient presents for medication refill.   Due for labs.     Reports some shortness of breath.     Review of Systems   Constitutional: Negative for chills, fatigue and fever.   Respiratory: Positive for cough and shortness of breath.    Gastrointestinal: Negative for abdominal pain, constipation and diarrhea.   Musculoskeletal: Positive for arthralgias and gait problem.   Psychiatric/Behavioral: Negative for agitation and confusion.         Objective:      Physical Exam  Vitals reviewed.   Constitutional:       Appearance: Normal appearance.   Cardiovascular:      Rate and Rhythm: Normal rate and regular rhythm.   Pulmonary:      Effort: Pulmonary effort is normal.      Breath sounds: Examination of the right-lower field reveals wheezing. Examination of the left-lower field reveals wheezing. Wheezing present.   Skin:     General: Skin is warm.   Neurological:      General: No focal deficit present.      Mental Status: She is alert and oriented to person, place, and time.   Psychiatric:         Behavior: Behavior is cooperative.         Assessment:       Problem List Items Addressed This Visit     Hypertension    Relevant Medications    amLODIPine (NORVASC) 5 MG tablet    atorvastatin (LIPITOR) 40 MG tablet    Other Relevant Orders    Lipid Panel    TSH    Comprehensive Metabolic Panel    CBC Auto Differential    Asthma    Relevant Medications    predniSONE (DELTASONE) 10 MG tablet    Other Relevant Orders    X-Ray Chest PA And Lateral (Completed)    Prediabetes    Relevant Orders    Lipid Panel    Hemoglobin A1C    Comprehensive Metabolic Panel    CBC Auto Differential    Primary osteoarthritis of both knees    Relevant Orders    Ambulatory referral/consult to Orthopedics      Other Visit Diagnoses     Routine medical exam    -  Primary    Relevant Orders    Lipid Panel    TSH    Colon cancer screening         Relevant Orders    Cologuard Screening (Multitarget Stool DNA)    Breast cancer screening by mammogram        Relevant Orders    Mammo Digital Screening Bilat w/ Zac    Shortness of breath        Relevant Medications    albuterol (PROAIR HFA) 90 mcg/actuation inhaler    Essential hypertension        Relevant Medications    amLODIPine (NORVASC) 5 MG tablet    atorvastatin (LIPITOR) 40 MG tablet    Urine frequency        Relevant Orders    POCT URINE DIPSTICK WITH MICROSCOPE, AUTOMATED (Completed)    Urine culture (Completed)          Plan:         Routine medical exam  -     Lipid Panel; Future; Expected date: 03/15/2022  -     TSH; Future; Expected date: 03/15/2022    Primary osteoarthritis of both knees  -     Cancel: Ambulatory referral/consult to Orthopedics; Future; Expected date: 03/22/2022  -     Ambulatory referral/consult to Orthopedics; Future; Expected date: 03/22/2022    Primary hypertension  -     Lipid Panel; Future; Expected date: 03/15/2022  -     TSH; Future; Expected date: 03/15/2022  -     Comprehensive Metabolic Panel; Future; Expected date: 03/15/2022  -     CBC Auto Differential; Future; Expected date: 03/15/2022    Prediabetes  -     Lipid Panel; Future; Expected date: 03/15/2022  -     Hemoglobin A1C; Future; Expected date: 03/15/2022  -     Comprehensive Metabolic Panel; Future; Expected date: 03/15/2022  -     CBC Auto Differential; Future; Expected date: 03/15/2022    Colon cancer screening  -     Cologuard Screening (Multitarget Stool DNA); Future; Expected date: 03/15/2022    Breast cancer screening by mammogram  -     Mammo Digital Screening Bilat w/ Zac; Future; Expected date: 03/15/2022    Shortness of breath  -     albuterol (PROAIR HFA) 90 mcg/actuation inhaler; USE 2 INHALATIONS EVERY 6 HOURS AS NEEDED FOR WHEEZING  Dispense: 25.5 g; Refill: 11    Essential hypertension  -     amLODIPine (NORVASC) 5 MG tablet; Take 1 tablet (5 mg total) by mouth once daily.  Dispense: 90 tablet;  Refill: 3  -     atorvastatin (LIPITOR) 40 MG tablet; Take 1 tablet (40 mg total) by mouth once daily.  Dispense: 90 tablet; Refill: 3    Urine frequency  -     POCT URINE DIPSTICK WITH MICROSCOPE, AUTOMATED  -     Urine culture    Asthma, unspecified asthma severity, unspecified whether complicated, unspecified whether persistent  -     X-Ray Chest PA And Lateral; Future; Expected date: 03/15/2022  -     predniSONE (DELTASONE) 10 MG tablet; Take 1 tablet (10 mg total) by mouth 2 (two) times daily.  Dispense: 10 tablet; Refill: 0        She will schedule labs.      Needs urine sample today     She will schedule mammo     Schedule 3 month f/u     External ortho referral.  Did not care for old provider

## 2022-03-17 DIAGNOSIS — R93.89 ABNORMAL CXR: Primary | ICD-10-CM

## 2022-03-17 LAB — BACTERIA UR CULT: NO GROWTH

## 2022-04-09 DIAGNOSIS — I10 ESSENTIAL HYPERTENSION: ICD-10-CM

## 2022-04-11 NOTE — TELEPHONE ENCOUNTER
Refill Routing Note   Medication(s) are not appropriate for processing by [facility] for the following reason(s):      - Non-participating provider    [facility] action(s):  Route          Medication reconciliation completed: No     Appointments  past 12m or future 3m with PCP    Date Provider   Last Visit   3/15/2022 Becki Hernandez NP   Next Visit   6/15/2022 Becki Hernandez NP   ED visits in past 90 days: 0        Note composed:7:09 AM 04/11/2022

## 2022-04-12 RX ORDER — ATORVASTATIN CALCIUM 40 MG/1
TABLET, FILM COATED ORAL
Qty: 90 TABLET | Refills: 3 | Status: SHIPPED | OUTPATIENT
Start: 2022-04-12 | End: 2023-01-31 | Stop reason: SDUPTHER

## 2022-04-13 ENCOUNTER — TELEPHONE (OUTPATIENT)
Dept: INTERNAL MEDICINE | Facility: CLINIC | Age: 62
End: 2022-04-13
Payer: OTHER GOVERNMENT

## 2022-04-13 NOTE — TELEPHONE ENCOUNTER
----- Message from Felicity Sun MA sent at 4/13/2022 11:52 AM CDT -----  I spoke to the pt and she was informed of xray results in which she said she figured this and her labs were scheduled but, she needs an order for Pulmonary. Please place . Thanks //kah

## 2022-04-26 ENCOUNTER — PATIENT MESSAGE (OUTPATIENT)
Dept: ADMINISTRATIVE | Facility: HOSPITAL | Age: 62
End: 2022-04-26
Payer: OTHER GOVERNMENT

## 2022-04-27 ENCOUNTER — PATIENT MESSAGE (OUTPATIENT)
Dept: SMOKING CESSATION | Facility: CLINIC | Age: 62
End: 2022-04-27
Payer: OTHER GOVERNMENT

## 2022-06-09 ENCOUNTER — LAB VISIT (OUTPATIENT)
Dept: LAB | Facility: HOSPITAL | Age: 62
End: 2022-06-09
Attending: NURSE PRACTITIONER
Payer: OTHER GOVERNMENT

## 2022-06-09 DIAGNOSIS — Z00.00 ROUTINE MEDICAL EXAM: ICD-10-CM

## 2022-06-09 DIAGNOSIS — I10 PRIMARY HYPERTENSION: ICD-10-CM

## 2022-06-09 DIAGNOSIS — R73.03 PREDIABETES: ICD-10-CM

## 2022-06-09 LAB
ALBUMIN SERPL BCP-MCNC: 3.4 G/DL (ref 3.5–5.2)
ALP SERPL-CCNC: 102 U/L (ref 55–135)
ALT SERPL W/O P-5'-P-CCNC: 10 U/L (ref 10–44)
ANION GAP SERPL CALC-SCNC: 10 MMOL/L (ref 8–16)
AST SERPL-CCNC: 17 U/L (ref 10–40)
BASOPHILS # BLD AUTO: 0.03 K/UL (ref 0–0.2)
BASOPHILS NFR BLD: 0.4 % (ref 0–1.9)
BILIRUB SERPL-MCNC: 0.5 MG/DL (ref 0.1–1)
BUN SERPL-MCNC: 13 MG/DL (ref 6–20)
CALCIUM SERPL-MCNC: 9.4 MG/DL (ref 8.7–10.5)
CHLORIDE SERPL-SCNC: 98 MMOL/L (ref 95–110)
CHOLEST SERPL-MCNC: 126 MG/DL (ref 120–199)
CHOLEST/HDLC SERPL: 3.3 {RATIO} (ref 2–5)
CO2 SERPL-SCNC: 35 MMOL/L (ref 23–29)
CREAT SERPL-MCNC: 0.9 MG/DL (ref 0.5–1.4)
DIFFERENTIAL METHOD: ABNORMAL
EOSINOPHIL # BLD AUTO: 0.1 K/UL (ref 0–0.5)
EOSINOPHIL NFR BLD: 1.1 % (ref 0–8)
ERYTHROCYTE [DISTWIDTH] IN BLOOD BY AUTOMATED COUNT: 16.1 % (ref 11.5–14.5)
EST. GFR  (AFRICAN AMERICAN): >60 ML/MIN/1.73 M^2
EST. GFR  (NON AFRICAN AMERICAN): >60 ML/MIN/1.73 M^2
ESTIMATED AVG GLUCOSE: 137 MG/DL (ref 68–131)
GLUCOSE SERPL-MCNC: 111 MG/DL (ref 70–110)
HBA1C MFR BLD: 6.4 % (ref 4–5.6)
HCT VFR BLD AUTO: 45.3 % (ref 37–48.5)
HDLC SERPL-MCNC: 38 MG/DL (ref 40–75)
HDLC SERPL: 30.2 % (ref 20–50)
HGB BLD-MCNC: 12.4 G/DL (ref 12–16)
IMM GRANULOCYTES # BLD AUTO: 0.02 K/UL (ref 0–0.04)
IMM GRANULOCYTES NFR BLD AUTO: 0.3 % (ref 0–0.5)
LDLC SERPL CALC-MCNC: 69 MG/DL (ref 63–159)
LYMPHOCYTES # BLD AUTO: 2 K/UL (ref 1–4.8)
LYMPHOCYTES NFR BLD: 27.7 % (ref 18–48)
MCH RBC QN AUTO: 26.2 PG (ref 27–31)
MCHC RBC AUTO-ENTMCNC: 27.4 G/DL (ref 32–36)
MCV RBC AUTO: 96 FL (ref 82–98)
MONOCYTES # BLD AUTO: 0.6 K/UL (ref 0.3–1)
MONOCYTES NFR BLD: 8.1 % (ref 4–15)
NEUTROPHILS # BLD AUTO: 4.5 K/UL (ref 1.8–7.7)
NEUTROPHILS NFR BLD: 62.4 % (ref 38–73)
NONHDLC SERPL-MCNC: 88 MG/DL
NRBC BLD-RTO: 0 /100 WBC
PLATELET # BLD AUTO: 257 K/UL (ref 150–450)
PMV BLD AUTO: 12.4 FL (ref 9.2–12.9)
POTASSIUM SERPL-SCNC: 4.6 MMOL/L (ref 3.5–5.1)
PROT SERPL-MCNC: 6.7 G/DL (ref 6–8.4)
RBC # BLD AUTO: 4.73 M/UL (ref 4–5.4)
SODIUM SERPL-SCNC: 143 MMOL/L (ref 136–145)
T4 FREE SERPL-MCNC: 0.75 NG/DL (ref 0.71–1.51)
TRIGL SERPL-MCNC: 95 MG/DL (ref 30–150)
TSH SERPL DL<=0.005 MIU/L-ACNC: 0.4 UIU/ML (ref 0.4–4)
WBC # BLD AUTO: 7.15 K/UL (ref 3.9–12.7)

## 2022-06-09 PROCEDURE — 84439 ASSAY OF FREE THYROXINE: CPT | Performed by: NURSE PRACTITIONER

## 2022-06-09 PROCEDURE — 80061 LIPID PANEL: CPT | Performed by: NURSE PRACTITIONER

## 2022-06-09 PROCEDURE — 83036 HEMOGLOBIN GLYCOSYLATED A1C: CPT | Performed by: NURSE PRACTITIONER

## 2022-06-09 PROCEDURE — 85025 COMPLETE CBC W/AUTO DIFF WBC: CPT | Performed by: NURSE PRACTITIONER

## 2022-06-09 PROCEDURE — 84443 ASSAY THYROID STIM HORMONE: CPT | Performed by: NURSE PRACTITIONER

## 2022-06-09 PROCEDURE — 36415 COLL VENOUS BLD VENIPUNCTURE: CPT | Mod: PO | Performed by: NURSE PRACTITIONER

## 2022-06-09 PROCEDURE — 80053 COMPREHEN METABOLIC PANEL: CPT | Performed by: NURSE PRACTITIONER

## 2022-06-15 ENCOUNTER — OFFICE VISIT (OUTPATIENT)
Dept: INTERNAL MEDICINE | Facility: CLINIC | Age: 62
End: 2022-06-15
Payer: OTHER GOVERNMENT

## 2022-06-15 ENCOUNTER — HOSPITAL ENCOUNTER (OUTPATIENT)
Facility: HOSPITAL | Age: 62
Discharge: HOME | End: 2022-06-16
Attending: EMERGENCY MEDICINE | Admitting: INTERNAL MEDICINE
Payer: OTHER GOVERNMENT

## 2022-06-15 VITALS
DIASTOLIC BLOOD PRESSURE: 78 MMHG | HEART RATE: 86 BPM | TEMPERATURE: 99 F | SYSTOLIC BLOOD PRESSURE: 138 MMHG | WEIGHT: 288.5 LBS | BODY MASS INDEX: 48.07 KG/M2 | HEIGHT: 65 IN

## 2022-06-15 DIAGNOSIS — R06.09 DYSPNEA ON EXERTION: Primary | ICD-10-CM

## 2022-06-15 DIAGNOSIS — R06.02 SOB (SHORTNESS OF BREATH): ICD-10-CM

## 2022-06-15 DIAGNOSIS — J96.01 ACUTE RESPIRATORY FAILURE WITH HYPOXIA: Primary | ICD-10-CM

## 2022-06-15 DIAGNOSIS — R06.02 SHORTNESS OF BREATH: ICD-10-CM

## 2022-06-15 DIAGNOSIS — U07.1 COVID-19: ICD-10-CM

## 2022-06-15 LAB
ALBUMIN SERPL BCP-MCNC: 3.4 G/DL (ref 3.5–5.2)
ALP SERPL-CCNC: 95 U/L (ref 55–135)
ALT SERPL W/O P-5'-P-CCNC: 16 U/L (ref 10–44)
ANION GAP SERPL CALC-SCNC: 9 MMOL/L (ref 8–16)
APTT BLDCRRT: 27.7 SEC (ref 21–32)
AST SERPL-CCNC: 33 U/L (ref 10–40)
BACTERIA #/AREA URNS HPF: ABNORMAL /HPF
BASOPHILS # BLD AUTO: 0.02 K/UL (ref 0–0.2)
BASOPHILS NFR BLD: 0.2 % (ref 0–1.9)
BILIRUB SERPL-MCNC: 0.4 MG/DL (ref 0.1–1)
BILIRUB UR QL STRIP: NEGATIVE
BNP SERPL-MCNC: 21 PG/ML (ref 0–99)
BUN SERPL-MCNC: 16 MG/DL (ref 6–20)
CALCIUM SERPL-MCNC: 9.1 MG/DL (ref 8.7–10.5)
CHLORIDE SERPL-SCNC: 98 MMOL/L (ref 95–110)
CK SERPL-CCNC: 184 U/L (ref 20–180)
CLARITY UR: CLEAR
CO2 SERPL-SCNC: 37 MMOL/L (ref 23–29)
COLOR UR: YELLOW
CREAT SERPL-MCNC: 1.1 MG/DL (ref 0.5–1.4)
D DIMER PPP IA.FEU-MCNC: 0.34 MG/L FEU
DIFFERENTIAL METHOD: ABNORMAL
EOSINOPHIL # BLD AUTO: 0.1 K/UL (ref 0–0.5)
EOSINOPHIL NFR BLD: 0.9 % (ref 0–8)
ERYTHROCYTE [DISTWIDTH] IN BLOOD BY AUTOMATED COUNT: 16.2 % (ref 11.5–14.5)
ERYTHROCYTE [SEDIMENTATION RATE] IN BLOOD BY WESTERGREN METHOD: 24 MM/HR (ref 0–20)
EST. GFR  (AFRICAN AMERICAN): >60 ML/MIN/1.73 M^2
EST. GFR  (NON AFRICAN AMERICAN): 55 ML/MIN/1.73 M^2
FERRITIN SERPL-MCNC: 62 NG/ML (ref 20–300)
GLUCOSE SERPL-MCNC: 104 MG/DL (ref 70–110)
GLUCOSE UR QL STRIP: NEGATIVE
HCT VFR BLD AUTO: 41.5 % (ref 37–48.5)
HGB BLD-MCNC: 11.6 G/DL (ref 12–16)
HGB UR QL STRIP: ABNORMAL
IMM GRANULOCYTES # BLD AUTO: 0.02 K/UL (ref 0–0.04)
IMM GRANULOCYTES NFR BLD AUTO: 0.2 % (ref 0–0.5)
INR PPP: 0.9 (ref 0.8–1.2)
KETONES UR QL STRIP: NEGATIVE
LACTATE SERPL-SCNC: 0.6 MMOL/L (ref 0.5–2.2)
LDH SERPL L TO P-CCNC: 236 U/L (ref 110–260)
LEUKOCYTE ESTERASE UR QL STRIP: NEGATIVE
LYMPHOCYTES # BLD AUTO: 3.2 K/UL (ref 1–4.8)
LYMPHOCYTES NFR BLD: 39.4 % (ref 18–48)
MCH RBC QN AUTO: 25.5 PG (ref 27–31)
MCHC RBC AUTO-ENTMCNC: 28 G/DL (ref 32–36)
MCV RBC AUTO: 91 FL (ref 82–98)
MICROSCOPIC COMMENT: ABNORMAL
MONOCYTES # BLD AUTO: 0.6 K/UL (ref 0.3–1)
MONOCYTES NFR BLD: 7 % (ref 4–15)
NEUTROPHILS # BLD AUTO: 4.3 K/UL (ref 1.8–7.7)
NEUTROPHILS NFR BLD: 52.3 % (ref 38–73)
NITRITE UR QL STRIP: NEGATIVE
NRBC BLD-RTO: 0 /100 WBC
PH UR STRIP: 6 [PH] (ref 5–8)
PLATELET # BLD AUTO: 240 K/UL (ref 150–450)
PMV BLD AUTO: 10.7 FL (ref 9.2–12.9)
POTASSIUM SERPL-SCNC: 4.5 MMOL/L (ref 3.5–5.1)
PROCALCITONIN SERPL IA-MCNC: 0.02 NG/ML
PROT SERPL-MCNC: 7.7 G/DL (ref 6–8.4)
PROT UR QL STRIP: NEGATIVE
PROTHROMBIN TIME: 9.9 SEC (ref 9–12.5)
RBC # BLD AUTO: 4.55 M/UL (ref 4–5.4)
RBC #/AREA URNS HPF: 7 /HPF (ref 0–4)
SARS-COV-2 RDRP RESP QL NAA+PROBE: POSITIVE
SODIUM SERPL-SCNC: 144 MMOL/L (ref 136–145)
SP GR UR STRIP: 1.01 (ref 1–1.03)
TROPONIN I SERPL DL<=0.01 NG/ML-MCNC: <0.006 NG/ML (ref 0–0.03)
URN SPEC COLLECT METH UR: ABNORMAL
UROBILINOGEN UR STRIP-ACNC: NEGATIVE EU/DL
WBC # BLD AUTO: 8.19 K/UL (ref 3.9–12.7)

## 2022-06-15 PROCEDURE — 82550 ASSAY OF CK (CPK): CPT | Performed by: NURSE PRACTITIONER

## 2022-06-15 PROCEDURE — 85730 THROMBOPLASTIN TIME PARTIAL: CPT | Performed by: NURSE PRACTITIONER

## 2022-06-15 PROCEDURE — 36415 COLL VENOUS BLD VENIPUNCTURE: CPT | Performed by: NURSE PRACTITIONER

## 2022-06-15 PROCEDURE — 96372 THER/PROPH/DIAG INJ SC/IM: CPT | Mod: 59 | Performed by: NURSE PRACTITIONER

## 2022-06-15 PROCEDURE — 82728 ASSAY OF FERRITIN: CPT | Performed by: NURSE PRACTITIONER

## 2022-06-15 PROCEDURE — 94799 UNLISTED PULMONARY SVC/PX: CPT

## 2022-06-15 PROCEDURE — 84484 ASSAY OF TROPONIN QUANT: CPT | Performed by: NURSE PRACTITIONER

## 2022-06-15 PROCEDURE — 25000242 PHARM REV CODE 250 ALT 637 W/ HCPCS: Performed by: NURSE PRACTITIONER

## 2022-06-15 PROCEDURE — 93010 EKG 12-LEAD: ICD-10-PCS | Mod: ,,, | Performed by: INTERNAL MEDICINE

## 2022-06-15 PROCEDURE — 99999 PR PBB SHADOW E&M-EST. PATIENT-LVL IV: ICD-10-PCS | Mod: PBBFAC,,, | Performed by: NURSE PRACTITIONER

## 2022-06-15 PROCEDURE — 99214 OFFICE O/P EST MOD 30 MIN: CPT | Mod: PBBFAC,PN | Performed by: NURSE PRACTITIONER

## 2022-06-15 PROCEDURE — 99999 PR PBB SHADOW E&M-EST. PATIENT-LVL IV: CPT | Mod: PBBFAC,,, | Performed by: NURSE PRACTITIONER

## 2022-06-15 PROCEDURE — G0378 HOSPITAL OBSERVATION PER HR: HCPCS

## 2022-06-15 PROCEDURE — 63600175 PHARM REV CODE 636 W HCPCS: Mod: TB | Performed by: NURSE PRACTITIONER

## 2022-06-15 PROCEDURE — 99213 OFFICE O/P EST LOW 20 MIN: CPT | Mod: S$PBB,,, | Performed by: NURSE PRACTITIONER

## 2022-06-15 PROCEDURE — 81000 URINALYSIS NONAUTO W/SCOPE: CPT | Performed by: EMERGENCY MEDICINE

## 2022-06-15 PROCEDURE — 96365 THER/PROPH/DIAG IV INF INIT: CPT

## 2022-06-15 PROCEDURE — 83615 LACTATE (LD) (LDH) ENZYME: CPT | Performed by: NURSE PRACTITIONER

## 2022-06-15 PROCEDURE — 87040 BLOOD CULTURE FOR BACTERIA: CPT | Performed by: NURSE PRACTITIONER

## 2022-06-15 PROCEDURE — 63600175 PHARM REV CODE 636 W HCPCS: Performed by: EMERGENCY MEDICINE

## 2022-06-15 PROCEDURE — 93005 ELECTROCARDIOGRAM TRACING: CPT

## 2022-06-15 PROCEDURE — 85651 RBC SED RATE NONAUTOMATED: CPT | Performed by: NURSE PRACTITIONER

## 2022-06-15 PROCEDURE — 85610 PROTHROMBIN TIME: CPT | Performed by: NURSE PRACTITIONER

## 2022-06-15 PROCEDURE — 94761 N-INVAS EAR/PLS OXIMETRY MLT: CPT

## 2022-06-15 PROCEDURE — 84145 PROCALCITONIN (PCT): CPT | Performed by: NURSE PRACTITIONER

## 2022-06-15 PROCEDURE — 83605 ASSAY OF LACTIC ACID: CPT | Performed by: NURSE PRACTITIONER

## 2022-06-15 PROCEDURE — U0002 COVID-19 LAB TEST NON-CDC: HCPCS | Performed by: NURSE PRACTITIONER

## 2022-06-15 PROCEDURE — 80053 COMPREHEN METABOLIC PANEL: CPT | Performed by: NURSE PRACTITIONER

## 2022-06-15 PROCEDURE — 85379 FIBRIN DEGRADATION QUANT: CPT | Performed by: EMERGENCY MEDICINE

## 2022-06-15 PROCEDURE — 25000003 PHARM REV CODE 250: Performed by: NURSE PRACTITIONER

## 2022-06-15 PROCEDURE — 96375 TX/PRO/DX INJ NEW DRUG ADDON: CPT

## 2022-06-15 PROCEDURE — 99291 CRITICAL CARE FIRST HOUR: CPT | Mod: 25

## 2022-06-15 PROCEDURE — 85025 COMPLETE CBC W/AUTO DIFF WBC: CPT | Performed by: NURSE PRACTITIONER

## 2022-06-15 PROCEDURE — 93010 ELECTROCARDIOGRAM REPORT: CPT | Mod: ,,, | Performed by: INTERNAL MEDICINE

## 2022-06-15 PROCEDURE — 27000221 HC OXYGEN, UP TO 24 HOURS

## 2022-06-15 PROCEDURE — 83880 ASSAY OF NATRIURETIC PEPTIDE: CPT | Performed by: NURSE PRACTITIONER

## 2022-06-15 PROCEDURE — 94640 AIRWAY INHALATION TREATMENT: CPT | Mod: XB

## 2022-06-15 PROCEDURE — 99213 PR OFFICE/OUTPT VISIT, EST, LEVL III, 20-29 MIN: ICD-10-PCS | Mod: S$PBB,,, | Performed by: NURSE PRACTITIONER

## 2022-06-15 RX ORDER — DEXTROMETHORPHAN HBR AND PYRILAMINE MALEATE 7.5; 7.5 MG/5ML; MG/5ML
LIQUID ORAL
COMMUNITY
Start: 2022-05-24 | End: 2022-06-15 | Stop reason: ALTCHOICE

## 2022-06-15 RX ORDER — ALBUTEROL SULFATE 90 UG/1
2 AEROSOL, METERED RESPIRATORY (INHALATION) EVERY 6 HOURS
Status: DISCONTINUED | OUTPATIENT
Start: 2022-06-15 | End: 2022-06-16 | Stop reason: HOSPADM

## 2022-06-15 RX ORDER — ASPIRIN 81 MG/1
81 TABLET ORAL DAILY
Status: DISCONTINUED | OUTPATIENT
Start: 2022-06-15 | End: 2022-06-16 | Stop reason: HOSPADM

## 2022-06-15 RX ORDER — HYDROCODONE BITARTRATE AND ACETAMINOPHEN 5; 325 MG/1; MG/1
1 TABLET ORAL EVERY 6 HOURS PRN
Status: DISCONTINUED | OUTPATIENT
Start: 2022-06-15 | End: 2022-06-16 | Stop reason: HOSPADM

## 2022-06-15 RX ORDER — IBUPROFEN 200 MG
24 TABLET ORAL
Status: DISCONTINUED | OUTPATIENT
Start: 2022-06-15 | End: 2022-06-16 | Stop reason: HOSPADM

## 2022-06-15 RX ORDER — AMOXICILLIN 250 MG
1 CAPSULE ORAL 2 TIMES DAILY
Status: DISCONTINUED | OUTPATIENT
Start: 2022-06-15 | End: 2022-06-16 | Stop reason: HOSPADM

## 2022-06-15 RX ORDER — AMLODIPINE BESYLATE 5 MG/1
5 TABLET ORAL DAILY
Status: DISCONTINUED | OUTPATIENT
Start: 2022-06-15 | End: 2022-06-16 | Stop reason: HOSPADM

## 2022-06-15 RX ORDER — GLUCAGON 1 MG
1 KIT INJECTION
Status: DISCONTINUED | OUTPATIENT
Start: 2022-06-15 | End: 2022-06-16 | Stop reason: HOSPADM

## 2022-06-15 RX ORDER — ENOXAPARIN SODIUM 150 MG/ML
1 INJECTION SUBCUTANEOUS 2 TIMES DAILY
Status: DISCONTINUED | OUTPATIENT
Start: 2022-06-15 | End: 2022-06-16 | Stop reason: HOSPADM

## 2022-06-15 RX ORDER — METHYLPREDNISOLONE SOD SUCC 125 MG
125 VIAL (EA) INJECTION
Status: COMPLETED | OUTPATIENT
Start: 2022-06-15 | End: 2022-06-15

## 2022-06-15 RX ORDER — HYDROCODONE BITARTRATE AND ACETAMINOPHEN 10; 325 MG/1; MG/1
1 TABLET ORAL EVERY 6 HOURS PRN
Status: DISCONTINUED | OUTPATIENT
Start: 2022-06-15 | End: 2022-06-16 | Stop reason: HOSPADM

## 2022-06-15 RX ORDER — IPRATROPIUM BROMIDE AND ALBUTEROL SULFATE 2.5; .5 MG/3ML; MG/3ML
3 SOLUTION RESPIRATORY (INHALATION)
Status: COMPLETED | OUTPATIENT
Start: 2022-06-15 | End: 2022-06-15

## 2022-06-15 RX ORDER — SODIUM CHLORIDE 0.9 % (FLUSH) 0.9 %
10 SYRINGE (ML) INJECTION
Status: DISCONTINUED | OUTPATIENT
Start: 2022-06-15 | End: 2022-06-16 | Stop reason: HOSPADM

## 2022-06-15 RX ORDER — ASCORBIC ACID 500 MG
500 TABLET ORAL 2 TIMES DAILY
Status: DISCONTINUED | OUTPATIENT
Start: 2022-06-15 | End: 2022-06-16 | Stop reason: HOSPADM

## 2022-06-15 RX ORDER — IBUPROFEN 200 MG
16 TABLET ORAL
Status: DISCONTINUED | OUTPATIENT
Start: 2022-06-15 | End: 2022-06-16 | Stop reason: HOSPADM

## 2022-06-15 RX ORDER — ASPIRIN 300 MG
1 SUPPOSITORY, RECTAL RECTAL DAILY PRN
Status: ON HOLD | COMMUNITY
End: 2022-08-10

## 2022-06-15 RX ORDER — ATORVASTATIN CALCIUM 40 MG/1
40 TABLET, FILM COATED ORAL DAILY
Status: DISCONTINUED | OUTPATIENT
Start: 2022-06-15 | End: 2022-06-16 | Stop reason: HOSPADM

## 2022-06-15 RX ORDER — GUAIFENESIN/DEXTROMETHORPHAN 100-10MG/5
10 SYRUP ORAL EVERY 4 HOURS PRN
Status: DISCONTINUED | OUTPATIENT
Start: 2022-06-15 | End: 2022-06-16 | Stop reason: HOSPADM

## 2022-06-15 RX ORDER — TALC
6 POWDER (GRAM) TOPICAL NIGHTLY PRN
Status: DISCONTINUED | OUTPATIENT
Start: 2022-06-15 | End: 2022-06-16 | Stop reason: HOSPADM

## 2022-06-15 RX ADMIN — ASPIRIN 81 MG: 81 TABLET, COATED ORAL at 02:06

## 2022-06-15 RX ADMIN — AMLODIPINE BESYLATE 5 MG: 5 TABLET ORAL at 02:06

## 2022-06-15 RX ADMIN — DEXAMETHASONE 6 MG: 4 TABLET ORAL at 02:06

## 2022-06-15 RX ADMIN — METHYLPREDNISOLONE SODIUM SUCCINATE 125 MG: 125 INJECTION, POWDER, FOR SOLUTION INTRAMUSCULAR; INTRAVENOUS at 10:06

## 2022-06-15 RX ADMIN — IPRATROPIUM BROMIDE AND ALBUTEROL SULFATE 3 ML: 2.5; .5 SOLUTION RESPIRATORY (INHALATION) at 09:06

## 2022-06-15 RX ADMIN — THERA TABS 1 TABLET: TAB at 02:06

## 2022-06-15 RX ADMIN — ENOXAPARIN SODIUM 135 MG: 150 INJECTION SUBCUTANEOUS at 09:06

## 2022-06-15 RX ADMIN — ENOXAPARIN SODIUM 135 MG: 150 INJECTION SUBCUTANEOUS at 02:06

## 2022-06-15 RX ADMIN — SENNOSIDES AND DOCUSATE SODIUM 1 TABLET: 50; 8.6 TABLET ORAL at 09:06

## 2022-06-15 RX ADMIN — ALBUTEROL SULFATE 2 PUFF: 90 AEROSOL, METERED RESPIRATORY (INHALATION) at 01:06

## 2022-06-15 RX ADMIN — ATORVASTATIN CALCIUM 40 MG: 40 TABLET, FILM COATED ORAL at 02:06

## 2022-06-15 RX ADMIN — OXYCODONE HYDROCHLORIDE AND ACETAMINOPHEN 500 MG: 500 TABLET ORAL at 09:06

## 2022-06-15 RX ADMIN — ALBUTEROL SULFATE 2 PUFF: 90 AEROSOL, METERED RESPIRATORY (INHALATION) at 08:06

## 2022-06-15 RX ADMIN — REMDESIVIR 200 MG: 100 INJECTION, POWDER, LYOPHILIZED, FOR SOLUTION INTRAVENOUS at 02:06

## 2022-06-15 NOTE — HPI
Baylee Muñoz is a 61 y.o. female patient with a PMHx of HTN, asthma who presents to the Emergency Department for SOB, onset several days PTA. Pt was referred to the ED by her PCP after the pt was noted to be hypoxic in clinic (SpO2 of 89-91% on room air). Pt is not on home O2. Pt states that she was dx with L-sided pneumonia at an urgent care in [place] on 5/24/22, and was started on abx. Sxs are worse when laying flat, or with exertion. No associated sxs reported. Patient denies any fever, chills, n/v/d, CP, weakness, numbness, dizziness, headache, and all other sxs at this time.    In the ED O2 sat 89% on room air. CBC and CMP unremarkable. ; Positive COVID-19; CXR with no acute findings.     Patient placed in observation for acute respiratory failure secondary to COVID-19 infection under the care of hospital medicine.

## 2022-06-15 NOTE — ASSESSMENT & PLAN NOTE
- COVID-19 testing   - Infection Control notified     - Isolation:   - Airborne, Contact and Droplet Precautions  - Cohort patients into COVID units  - N95 mask, wear eye protection  - 20 second hand hygiene              - Limit visitors per hospital policy              - Consolidating lab draws, nursing care, provider visits, and interventions    - Diagnostics: (leukopenia, hyponatremia, hyperferritinemia, elevated troponin, elevated d-dimer, age, and comorbidities are significant predictors of poor clinical outcome)  CBC, CMP, Procalcitonin, Ferritin, CRP, Troponin, Blood Culture x2 and Portable CXR    - Management:  Supplemental O2 to maintain SpO2 >92%  Continuous/intermittent Pulse Ox  Albuterol treatment PRN  Remdesivir, MVI, vitamin C, Full dose Lovenox     Advance Care Planning   Full code

## 2022-06-15 NOTE — PROGRESS NOTES
Subjective:       Patient ID: Baylee Muñoz is a 61 y.o. female.    Chief Complaint: Follow-up (3 mo), Cough (productive), and Leg Pain (Left back)    Patient presents for chest congestion and SOB.      Had recent care visit.  Covid was negative.  Was told she had pneumonia.   Completed medication.  Still feel SOB.      Oxygen in clinic is low 86-90.  Fluctuates while patient is sitting still.      Symptoms are worse on exertion.            Review of Systems   Constitutional: Positive for fatigue. Negative for chills and fever.   Respiratory: Positive for cough and shortness of breath.    Cardiovascular: Negative for chest pain and leg swelling.   Psychiatric/Behavioral: Negative for agitation and confusion.         Objective:      Physical Exam  Constitutional:       Appearance: Normal appearance.   Cardiovascular:      Rate and Rhythm: Normal rate.   Pulmonary:      Effort: Tachypnea present.      Breath sounds: Examination of the right-lower field reveals decreased breath sounds. Examination of the left-lower field reveals decreased breath sounds. Decreased breath sounds present.   Musculoskeletal:         General: Normal range of motion.   Skin:     General: Skin is warm.   Neurological:      Mental Status: She is alert.   Psychiatric:         Behavior: Behavior is cooperative.         Assessment:       Problem List Items Addressed This Visit    None     Visit Diagnoses     Dyspnea on exertion    -  Primary    Low O2 Sat in clinic 89-91%.           Plan:         Dyspnea on exertion  Comments:  Low O2 Sat in clinic 89-91%.         Reports she's not able to get in ambulance/claustrophic.        Report called by ER nurse by ABIOLA Lucas.      transported patient via private care.      AMA signed

## 2022-06-15 NOTE — H&P
[facility] Medicine  History & Physical    Patient Name: Baylee Muñoz  MRN: [id number]  Patient Class: OP- Observation  Admission Date: 6/15/2022  Attending Physician: Hanh Ramey MD   Primary Care Provider: Becki Hernandez NP         Patient information was obtained from patient and ER records.     Subjective:     Principal Problem:Acute respiratory failure with hypoxia    Chief Complaint:   Chief Complaint   Patient presents with    Shortness of Breath     Pt treated with abx for pneumonia and provider believes she needs to be back on abx and sent her to ER. Pt still feeling SOB, denies CP. Pt tested negative for covid.         HPI:  Baylee Muñoz is a 61 y.o. female patient with a PMHx of HTN, asthma who presents to the Emergency Department for SOB, onset several days PTA. Pt was referred to the ED by her PCP after the pt was noted to be hypoxic in clinic (SpO2 of 89-91% on room air). Pt is not on home O2. Pt states that she was dx with L-sided pneumonia at an urgent care in [place] on 5/24/22, and was started on abx. Sxs are worse when laying flat, or with exertion. No associated sxs reported. Patient denies any fever, chills, n/v/d, CP, weakness, numbness, dizziness, headache, and all other sxs at this time.    In the ED O2 sat 89% on room air. CBC and CMP unremarkable. ; Positive COVID-19; CXR with no acute findings.     Patient placed in observation for acute respiratory failure secondary to COVID-19 infection under the care of hospital medicine.       Past Medical History:   Diagnosis Date    Allergy     Arthritis     Asthma     Admit to [facility] 1/6/19 by Dr. Michael Randall for asthma exaceration, acute bronchitis, acute dCHF, hypoxia, elevated troponin/BNP (thought to be 2/2 asthma exac & acute dCHF by cards), tx'd w/ IV lasix, duonebs, IV solumedrol and DC'd on Z-prema, prednisone taper    CKD (chronic kidney disease) stage 2, GFR 60-89 ml/min     H/o ARF 2/2 poor  water intake; encouraged to increase water intake and avoid NSAIDS & contrast dye    Depression     Diastolic heart failure 2019    Dx'd at [facility] during asthma exacerbation    Diverticulitis     Dyslipidemia 2018    Former smoker     Quit 20; smoked 0.12 packs/day x 37 years    Gastritis     Hypertension     Prediabetes     A1c 6.1% on 19    Severe obesity (BMI >= 40)     Vertigo     Vitamin D deficiency        Past Surgical History:   Procedure Laterality Date    DILATION AND CURETTAGE OF UTERUS      missed , bleeding    EXPLORATORY LAPAROTOMY      lysis of adhesions, open tube    KNEE SURGERY Right     torn mcl    laser to cervix         Review of patient's allergies indicates:   Allergen Reactions    Penicillins Hives    Coconut     Dairy aid [lactase]     Iodine and iodide containing products        No current facility-administered medications on file prior to encounter.     Current Outpatient Medications on File Prior to Encounter   Medication Sig    albuterol (PROAIR HFA) 90 mcg/actuation inhaler USE 2 INHALATIONS EVERY 6 HOURS AS NEEDED FOR WHEEZING    amLODIPine (NORVASC) 5 MG tablet Take 1 tablet (5 mg total) by mouth once daily.    aspirin (ECOTRIN) 81 MG EC tablet Take 1 tablet (81 mg total) by mouth once daily.    atorvastatin (LIPITOR) 40 MG tablet TAKE 1 TABLET(40 MG) BY MOUTH EVERY DAY    calcium carbonate/vitamin D3 (VITAMIN D-3 ORAL) Take by mouth.    CAPRON DM 7.5-7.5 mg/5 mL Liqd TAKE 20 ML BY MOUTH THREE TIMES DAILY AS NEEDED    cetirizine HCl (ALLERGY RELIEF, CETIRIZINE, ORAL) Take by mouth.    mv-min/iron/folic/calcium/vitK (WOMEN'S MULTIVITAMIN ORAL) Take by mouth.    naproxen sodium (ALEVE ORAL) Take by mouth.    [DISCONTINUED] predniSONE (DELTASONE) 10 MG tablet Take 1 tablet (10 mg total) by mouth 2 (two) times daily. (Patient not taking: Reported on 6/15/2022)     Family History       Problem Relation (Age of Onset)    Bipolar  disorder Sister    COPD Sister    HIV Sister    Heart disease Mother, Father    Hypertension Father    Kidney disease Father    Stroke Mother          Tobacco Use    Smoking status: Current Some Day Smoker     Packs/day: 0.25     Years: 27.00     Pack years: 6.75     Types: Cigarettes    Smokeless tobacco: Never Used   Substance and Sexual Activity    Alcohol use: No    Drug use: No    Sexual activity: Never     Review of Systems   Constitutional: Negative.    HENT: Negative.     Eyes: Negative.    Respiratory:  Positive for shortness of breath.    Cardiovascular: Negative.    Gastrointestinal: Negative.    Endocrine: Negative.    Genitourinary: Negative.    Musculoskeletal: Negative.    Skin: Negative.    Allergic/Immunologic: Negative.    Neurological: Negative.    Hematological: Negative.    Psychiatric/Behavioral: Negative.     Objective:     Vital Signs (Most Recent):  Temp: 98.8 °F (37.1 °C) (06/15/22 0857)  Pulse: 88 (06/15/22 1046)  Resp: (!) 27 (06/15/22 1027)  BP: (!) 143/75 (06/15/22 1046)  SpO2: 96 % (06/15/22 1046)   Vital Signs (24h Range):  Temp:  [98.8 °F (37.1 °C)-99 °F (37.2 °C)] 98.8 °F (37.1 °C)  Pulse:  [78-88] 88  Resp:  [16-27] 27  SpO2:  [89 %-99 %] 96 %  BP: (138-198)/(66-88) 143/75     Weight: 130.8 kg (288 lb 7.6 oz)  Body mass index is 48 kg/m².    Physical Exam  Vitals and nursing note reviewed.   Constitutional:       Appearance: She is well-developed.      Interventions: Nasal cannula in place.   HENT:      Head: Normocephalic and atraumatic.   Eyes:      Conjunctiva/sclera: Conjunctivae normal.      Pupils: Pupils are equal, round, and reactive to light.   Cardiovascular:      Rate and Rhythm: Normal rate and regular rhythm.      Heart sounds: Normal heart sounds.   Pulmonary:      Effort: Pulmonary effort is normal.      Breath sounds: Rhonchi present.   Abdominal:      General: Bowel sounds are normal.      Palpations: Abdomen is soft.   Musculoskeletal:         General: Normal  range of motion.      Cervical back: Normal range of motion and neck supple.   Skin:     General: Skin is warm and dry.   Neurological:      Mental Status: She is alert and oriented to person, place, and time.      Deep Tendon Reflexes: Reflexes are normal and symmetric.   Psychiatric:         Behavior: Behavior normal.         Thought Content: Thought content normal.         Judgment: Judgment normal.        Significant Labs:   Results for orders placed or performed during the hospital encounter of 06/15/22   CBC auto differential   Result Value Ref Range    WBC 8.19 3.90 - 12.70 K/uL    RBC 4.55 4.00 - 5.40 M/uL    Hemoglobin 11.6 (L) 12.0 - 16.0 g/dL    Hematocrit 41.5 37.0 - 48.5 %    MCV 91 82 - 98 fL    MCH 25.5 (L) 27.0 - 31.0 pg    MCHC 28.0 (L) 32.0 - 36.0 g/dL    RDW 16.2 (H) 11.5 - 14.5 %    Platelets 240 150 - 450 K/uL    MPV 10.7 9.2 - 12.9 fL    Immature Granulocytes 0.2 0.0 - 0.5 %    Gran # (ANC) 4.3 1.8 - 7.7 K/uL    Immature Grans (Abs) 0.02 0.00 - 0.04 K/uL    Lymph # 3.2 1.0 - 4.8 K/uL    Mono # 0.6 0.3 - 1.0 K/uL    Eos # 0.1 0.0 - 0.5 K/uL    Baso # 0.02 0.00 - 0.20 K/uL    nRBC 0 0 /100 WBC    Gran % 52.3 38.0 - 73.0 %    Lymph % 39.4 18.0 - 48.0 %    Mono % 7.0 4.0 - 15.0 %    Eosinophil % 0.9 0.0 - 8.0 %    Basophil % 0.2 0.0 - 1.9 %    Differential Method Automated    Comprehensive metabolic panel   Result Value Ref Range    Sodium 144 136 - 145 mmol/L    Potassium 4.5 3.5 - 5.1 mmol/L    Chloride 98 95 - 110 mmol/L    CO2 37 (H) 23 - 29 mmol/L    Glucose 104 70 - 110 mg/dL    BUN 16 6 - 20 mg/dL    Creatinine 1.1 0.5 - 1.4 mg/dL    Calcium 9.1 8.7 - 10.5 mg/dL    Total Protein 7.7 6.0 - 8.4 g/dL    Albumin 3.4 (L) 3.5 - 5.2 g/dL    Total Bilirubin 0.4 0.1 - 1.0 mg/dL    Alkaline Phosphatase 95 55 - 135 U/L    AST 33 10 - 40 U/L    ALT 16 10 - 44 U/L    Anion Gap 9 8 - 16 mmol/L    eGFR if African American >60 >60 mL/min/1.73 m^2    eGFR if non African American 55 (A) >60 mL/min/1.73 m^2    CPK   Result Value Ref Range     (H) 20 - 180 U/L   Troponin I   Result Value Ref Range    Troponin I <0.006 0.000 - 0.026 ng/mL   Brain natriuretic peptide   Result Value Ref Range    BNP 21 0 - 99 pg/mL   Lactic acid, plasma   Result Value Ref Range    Lactate (Lactic Acid) 0.6 0.5 - 2.2 mmol/L   COVID-19 Rapid Screening   Result Value Ref Range    SARS-CoV-2 RNA, Amplification, Qual Positive (A) Negative   D dimer, quantitative   Result Value Ref Range    D-Dimer 0.34 <0.50 mg/L FEU        Significant Imaging:   Imaging Results              X-Ray Chest 1 View (Final result)  Result time 06/15/22 09:31:38      Final result by Michael Lowe MD (06/15/22 09:31:38)                   Impression:      No acute process seen.      Electronically signed by: Michael Lowe MD  Date:    06/15/2022  Time:    09:31               Narrative:    EXAMINATION:  XR CHEST 1 VIEW    CLINICAL HISTORY:  Shortness of breath    FINDINGS:  Single view of the chest.  Comparison 03/15/2022    Cardiac silhouette is normal.  Aorta demonstrates atherosclerotic disease. The lungs demonstrate no evidence of active disease.  Stable scarring within the left mid lower lung zone. no evidence of pleural effusion or pneumothorax.  Bones appear intact.                                       Assessment/Plan:     * Acute respiratory failure with hypoxia  Patient with Hypoxic Respiratory failure which is Acute.  she is not on home oxygen. Supplemental oxygen was provided and noted-  .   Signs/symptoms of respiratory failure include- wheezing. Contributing diagnoses includes - COVID-19 infection  Labs and images were reviewed. Patient Has not had a recent ABG. Will treat underlying causes and adjust management of respiratory failure as follows- Supplemental oxygen. See plan below     COVID-19  - COVID-19 testing   - Infection Control notified     - Isolation:   - Airborne, Contact and Droplet Precautions  - Cohort patients into COVID units  - N95  mask, wear eye protection  - 20 second hand hygiene              - Limit visitors per hospital policy              - Consolidating lab draws, nursing care, provider visits, and interventions    - Diagnostics: (leukopenia, hyponatremia, hyperferritinemia, elevated troponin, elevated d-dimer, age, and comorbidities are significant predictors of poor clinical outcome)  CBC, CMP, Procalcitonin, Ferritin, CRP, Troponin, Blood Culture x2 and Portable CXR    - Management:  Supplemental O2 to maintain SpO2 >92%  Continuous/intermittent Pulse Ox  Albuterol treatment PRN  Remdesivir, MVI, vitamin C, Full dose Lovenox     Advance Care Planning   Full code              Hypertension  Resume amlodipine   Bp stable       VTE Risk Mitigation (From admission, onward)         Ordered     enoxaparin injection 135 mg  2 times daily         06/15/22 1257                   Jessie Singh NP  Department of Hospital Medicine   [facility] - Telemetry ([facility])

## 2022-06-15 NOTE — ED PROVIDER NOTES
SCRIBE #1 NOTE: I, David Narayan, am scribing for, and in the presence of, Rohith Bullard MD. I have scribed the entire note.      History      Chief Complaint   Patient presents with    Shortness of Breath     Pt treated with abx for pneumonia and provider believes she needs to be back on abx and sent her to ER. Pt still feeling SOB, denies CP. Pt tested negative for covid.        Review of patient's allergies indicates:   Allergen Reactions    Coconut Anaphylaxis    Iodine and iodide containing products Anaphylaxis    Dairy aid [lactase] Diarrhea and Nausea And Vomiting    Penicillins Hives        HPI   HPI    6/15/2022, 9:42 AM   History obtained from the patient      History of Present Illness: Baylee Muñoz is a 61 y.o. female patient with a PMHx of HTN, asthma who presents to the Emergency Department for SOB, onset several days PTA. Pt was referred to the ED by her PCP after the pt was noted to be hypoxic in clinic (SpO2 of 89-91% on room air). Pt is not on home O2. Pt states that she was dx with L-sided pneumonia at an urgent care in [place] on 5/24/22, and was started on abx. Symptoms are constant and moderate in severity. Sxs are worse when laying flat, or with exertion. No associated sxs reported. Patient denies any fever, chills, n/v/d, CP, weakness, numbness, dizziness, headache, and all other sxs at this time. No further complaints or concerns at this time.     Arrival mode: Personal vehicle    PCP: Becki Hernandez NP       Past Medical History:  Past Medical History:   Diagnosis Date    Allergy     Arthritis     Asthma     Admit to [facility] 1/6/19 by Dr. Michael Randall for asthma exaceration, acute bronchitis, acute dCHF, hypoxia, elevated troponin/BNP (thought to be 2/2 asthma exac & acute dCHF by cards), tx'd w/ IV lasix, duonebs, IV solumedrol and DC'd on Z-prema, prednisone taper    CKD (chronic kidney disease) stage 2, GFR 60-89 ml/min     H/o ARF 2/2 poor water intake; encouraged to increase  water intake and avoid NSAIDS & contrast dye    Depression     Diastolic heart failure 2019    Dx'd at [facility] during asthma exacerbation    Diverticulitis 2005    Dyslipidemia 2018    Former smoker     Quit 20; smoked 0.12 packs/day x 37 years    Gastritis     Hypertension     Prediabetes     A1c 6.1% on 19    Severe obesity (BMI >= 40)     Vertigo     Vitamin D deficiency        Past Surgical History:  Past Surgical History:   Procedure Laterality Date    DILATION AND CURETTAGE OF UTERUS      missed , bleeding    EXPLORATORY LAPAROTOMY      lysis of adhesions, open tube    KNEE SURGERY Right     torn mcl    laser to cervix           Family History:  Family History   Problem Relation Age of Onset    Stroke Mother     Heart disease Mother     Kidney disease Father     Heart disease Father     Hypertension Father     HIV Sister     COPD Sister     Bipolar disorder Sister        Social History:  Social History     Tobacco Use    Smoking status: Current Some Day Smoker     Packs/day: 0.25     Years: 27.00     Pack years: 6.75     Types: Cigarettes    Smokeless tobacco: Never Used   Substance and Sexual Activity    Alcohol use: No    Drug use: No    Sexual activity: Never       ROS   Review of Systems   Constitutional: Negative for chills and fever.   HENT: Negative for sore throat.    Respiratory: Positive for shortness of breath.    Cardiovascular: Negative for chest pain.   Gastrointestinal: Negative for diarrhea, nausea and vomiting.   Genitourinary: Negative for dysuria.   Musculoskeletal: Negative for back pain.   Skin: Negative for rash.   Neurological: Negative for dizziness, weakness, light-headedness, numbness and headaches.   Hematological: Does not bruise/bleed easily.   All other systems reviewed and are negative.    Physical Exam      Initial Vitals [06/15/22 0857]   BP Pulse Resp Temp SpO2   (!) 198/88 87 20 98.8 °F (37.1 °C) (!) 89 %      MAP        --          Physical Exam  Nursing Notes and Vital Signs Reviewed.  Constitutional: Patient is in no acute distress. Well-developed and well-nourished.  Head: Atraumatic. Normocephalic.  Eyes: PERRL. EOM intact. Conjunctivae are not pale. No scleral icterus.  ENT: Mucous membranes are moist. Oropharynx is clear and symmetric.    Neck: Supple. Full ROM.  Cardiovascular: Regular rate. Regular rhythm. No murmurs, rubs, or gallops. Distal pulses are 2+ and symmetric.  Pulmonary/Chest: No respiratory distress. Left-sided expiratory wheezing and crackles.  Abdominal: Soft and non-distended.  There is no tenderness.  No rebound, guarding, or rigidity.   Musculoskeletal: Moves all extremities. No obvious deformities. Trace BLE edema.  Skin: Warm and dry.  Neurological:  Alert, awake, and appropriate.  Normal speech.  No acute focal neurological deficits are appreciated.  Psychiatric: Normal affect. Good eye contact. Appropriate in content.    ED Course    Critical Care    Date/Time: 6/15/2022 10:54 AM  Performed by: Rohith Bullard MD  Authorized by: Rohith Bullard MD   Direct patient critical care time: 15 minutes  Additional history critical care time: 5 minutes  Ordering / reviewing critical care time: 10 minutes  Documentation critical care time: 5 minutes  Consulting other physicians critical care time: 5 minutes  Total critical care time (exclusive of procedural time) : 40 minutes  Critical care time was exclusive of separately billable procedures and treating other patients and teaching time.  Critical care was necessary to treat or prevent imminent or life-threatening deterioration of the following conditions: respiratory failure (COVID-19, hypoxia).  Critical care was time spent personally by me on the following activities: blood draw for specimens, development of treatment plan with patient or surrogate, discussions with consultants, interpretation of cardiac output measurements, evaluation of patient's response  to treatment, examination of patient, obtaining history from patient or surrogate, ordering and performing treatments and interventions, ordering and review of laboratory studies, ordering and review of radiographic studies, pulse oximetry, re-evaluation of patient's condition and review of old charts.        ED Vital Signs:  Vitals:    06/15/22 0857 06/15/22 0902 06/15/22 0941 06/15/22 0942   BP: (!) 198/88  (!) 148/74    Pulse: 87  83 83   Resp: 20  16 20   Temp: 98.8 °F (37.1 °C)      TempSrc: Oral      SpO2: (!) 89% (!) 92% 99% 97%   Weight:   130.8 kg (288 lb 7.6 oz)     06/15/22 0949 06/15/22 0953 06/15/22 0954 06/15/22 1003   BP:       Pulse: 78 80  81   Resp: 20 20     Temp:       TempSrc:       SpO2:   98%    Weight:        06/15/22 1027 06/15/22 1046 06/15/22 1355   BP: (!) 140/66 (!) 143/75    Pulse: 84 88 80   Resp: (!) 27  18   Temp:      TempSrc:      SpO2: 96% 96% (!) 94%   Weight:          Abnormal Lab Results:  Labs Reviewed   CBC W/ AUTO DIFFERENTIAL - Abnormal; Notable for the following components:       Result Value    Hemoglobin 11.6 (*)     MCH 25.5 (*)     MCHC 28.0 (*)     RDW 16.2 (*)     All other components within normal limits   COMPREHENSIVE METABOLIC PANEL - Abnormal; Notable for the following components:    CO2 37 (*)     Albumin 3.4 (*)     eGFR if non  55 (*)     All other components within normal limits   CK - Abnormal; Notable for the following components:     (*)     All other components within normal limits   SARS-COV-2 RNA AMPLIFICATION, QUAL - Abnormal; Notable for the following components:    SARS-CoV-2 RNA, Amplification, Qual Positive (*)     All other components within normal limits   CULTURE, BLOOD   TROPONIN I   B-TYPE NATRIURETIC PEPTIDE   LACTIC ACID, PLASMA   D DIMER, QUANTITATIVE        All Lab Results:  Results for orders placed or performed during the hospital encounter of 06/15/22   CBC auto differential   Result Value Ref Range    WBC 8.19  3.90 - 12.70 K/uL    RBC 4.55 4.00 - 5.40 M/uL    Hemoglobin 11.6 (L) 12.0 - 16.0 g/dL    Hematocrit 41.5 37.0 - 48.5 %    MCV 91 82 - 98 fL    MCH 25.5 (L) 27.0 - 31.0 pg    MCHC 28.0 (L) 32.0 - 36.0 g/dL    RDW 16.2 (H) 11.5 - 14.5 %    Platelets 240 150 - 450 K/uL    MPV 10.7 9.2 - 12.9 fL    Immature Granulocytes 0.2 0.0 - 0.5 %    Gran # (ANC) 4.3 1.8 - 7.7 K/uL    Immature Grans (Abs) 0.02 0.00 - 0.04 K/uL    Lymph # 3.2 1.0 - 4.8 K/uL    Mono # 0.6 0.3 - 1.0 K/uL    Eos # 0.1 0.0 - 0.5 K/uL    Baso # 0.02 0.00 - 0.20 K/uL    nRBC 0 0 /100 WBC    Gran % 52.3 38.0 - 73.0 %    Lymph % 39.4 18.0 - 48.0 %    Mono % 7.0 4.0 - 15.0 %    Eosinophil % 0.9 0.0 - 8.0 %    Basophil % 0.2 0.0 - 1.9 %    Differential Method Automated    Comprehensive metabolic panel   Result Value Ref Range    Sodium 144 136 - 145 mmol/L    Potassium 4.5 3.5 - 5.1 mmol/L    Chloride 98 95 - 110 mmol/L    CO2 37 (H) 23 - 29 mmol/L    Glucose 104 70 - 110 mg/dL    BUN 16 6 - 20 mg/dL    Creatinine 1.1 0.5 - 1.4 mg/dL    Calcium 9.1 8.7 - 10.5 mg/dL    Total Protein 7.7 6.0 - 8.4 g/dL    Albumin 3.4 (L) 3.5 - 5.2 g/dL    Total Bilirubin 0.4 0.1 - 1.0 mg/dL    Alkaline Phosphatase 95 55 - 135 U/L    AST 33 10 - 40 U/L    ALT 16 10 - 44 U/L    Anion Gap 9 8 - 16 mmol/L    eGFR if African American >60 >60 mL/min/1.73 m^2    eGFR if non African American 55 (A) >60 mL/min/1.73 m^2   CPK   Result Value Ref Range     (H) 20 - 180 U/L   Troponin I   Result Value Ref Range    Troponin I <0.006 0.000 - 0.026 ng/mL   Brain natriuretic peptide   Result Value Ref Range    BNP 21 0 - 99 pg/mL   Lactic acid, plasma   Result Value Ref Range    Lactate (Lactic Acid) 0.6 0.5 - 2.2 mmol/L   COVID-19 Rapid Screening   Result Value Ref Range    SARS-CoV-2 RNA, Amplification, Qual Positive (A) Negative   D dimer, quantitative   Result Value Ref Range    D-Dimer 0.34 <0.50 mg/L FEU   Urinalysis, Reflex to Urine Culture Urine, Clean Catch    Specimen: Urine    Result Value Ref Range    Specimen UA Urine, Clean Catch     Color, UA Yellow Yellow, Straw, Martha    Appearance, UA Clear Clear    pH, UA 6.0 5.0 - 8.0    Specific Gravity, UA 1.015 1.005 - 1.030    Protein, UA Negative Negative    Glucose, UA Negative Negative    Ketones, UA Negative Negative    Bilirubin (UA) Negative Negative    Occult Blood UA 1+ (A) Negative    Nitrite, UA Negative Negative    Urobilinogen, UA Negative <2.0 EU/dL    Leukocytes, UA Negative Negative   PT/INR   Result Value Ref Range    Prothrombin Time 9.9 9.0 - 12.5 sec    INR 0.9 0.8 - 1.2   PTT   Result Value Ref Range    aPTT 27.7 21.0 - 32.0 sec   Sedimentation rate   Result Value Ref Range    Sed Rate 24 (H) 0 - 20 mm/Hr   Lactate Dehydrogenase   Result Value Ref Range     110 - 260 U/L   Ferritin   Result Value Ref Range    Ferritin 62 20.0 - 300.0 ng/mL   Procalcitonin   Result Value Ref Range    Procalcitonin 0.02 <0.25 ng/mL   Urinalysis Microscopic   Result Value Ref Range    RBC, UA 7 (H) 0 - 4 /hpf    Bacteria Rare None-Occ /hpf    Microscopic Comment SEE COMMENT      Imaging Results:  Imaging Results          X-Ray Chest 1 View (Final result)  Result time 06/15/22 09:31:38    Final result by Michael Lowe MD (06/15/22 09:31:38)                 Impression:      No acute process seen.      Electronically signed by: Michael Lowe MD  Date:    06/15/2022  Time:    09:31             Narrative:    EXAMINATION:  XR CHEST 1 VIEW    CLINICAL HISTORY:  Shortness of breath    FINDINGS:  Single view of the chest.  Comparison 03/15/2022    Cardiac silhouette is normal.  Aorta demonstrates atherosclerotic disease. The lungs demonstrate no evidence of active disease.  Stable scarring within the left mid lower lung zone. no evidence of pleural effusion or pneumothorax.  Bones appear intact.                               The EKG was ordered, reviewed, and independently interpreted by the ED provider.  Interpretation time: 08:50  Rate:  86 BPM  Rhythm: normal sinus rhythm  Interpretation: No acute ST changes. No STEMI.           The Emergency Provider reviewed the vital signs and test results, which are outlined above.    ED Discussion     11:04 AM: Discussed case with Leandra Unger NP ([facility]). Dr. Ramey agrees with current care and management of pt and accepts admission.   Admitting Service: [facility]  Admitting Physician: Dr. Ramey  Admit to: Obs Tele    11:05 AM: Re-evaluated pt. I have discussed test results, shared treatment plan, and the need for admission with patient and family at bedside. Pt and family express understanding at this time and agree with all information. All questions answered. Pt and family have no further questions or concerns at this time. Pt is ready for admit.         ED Medication(s):  Medications   amLODIPine tablet 5 mg (5 mg Oral Given 6/15/22 1443)   aspirin EC tablet 81 mg (81 mg Oral Given 6/15/22 1443)   atorvastatin tablet 40 mg (40 mg Oral Given 6/15/22 1443)   multivitamin tablet (1 tablet Oral Given 6/15/22 1443)   sodium chloride 0.9% flush 10 mL (has no administration in time range)   remdesivir 100 mg in sodium chloride 0.9% 250 mL infusion (has no administration in time range)   glucose chewable tablet 16 g (has no administration in time range)   glucose chewable tablet 24 g (has no administration in time range)   glucagon (human recombinant) injection 1 mg (has no administration in time range)   albuterol inhaler 2 puff (2 puffs Inhalation Given 6/15/22 1355)   ascorbic acid (vitamin C) tablet 500 mg (has no administration in time range)   dexAMETHasone tablet 6 mg (6 mg Oral Given 6/15/22 1443)   enoxaparin injection 135 mg (135 mg Subcutaneous Given 6/15/22 1443)   senna-docusate 8.6-50 mg per tablet 1 tablet (has no administration in time range)   HYDROcodone-acetaminophen 5-325 mg per tablet 1 tablet (has no administration in time range)   HYDROcodone-acetaminophen  mg per  tablet 1 tablet (has no administration in time range)   dextromethorphan-guaiFENesin  mg/5 ml liquid 10 mL (has no administration in time range)   melatonin tablet 6 mg (has no administration in time range)   dextrose 10% bolus 125 mL (has no administration in time range)   dextrose 10% bolus 250 mL (has no administration in time range)   albuterol-ipratropium 2.5 mg-0.5 mg/3 mL nebulizer solution 3 mL (3 mLs Nebulization Given by Other 6/15/22 7179)   methylPREDNISolone sodium succinate injection 125 mg (125 mg Intravenous Given 6/15/22 1009)   remdesivir 200 mg in sodium chloride 0.9% 250 mL infusion (0 mg Intravenous Stopped 6/15/22 6376)     Current Discharge Medication List              Medical Decision Making    Medical Decision Making:   Clinical Tests:   Lab Tests: Ordered and Reviewed  Radiological Study: Ordered and Reviewed  Medical Tests: Ordered and Reviewed           Scribe Attestation:   Scribe #1: I performed the above scribed service and the documentation accurately describes the services I performed. I attest to the accuracy of the note.    Attending:   Physician Attestation Statement for Scribe #1: I, Rohith Bullard MD, personally performed the services described in this documentation, as scribed by David Narayan, in my presence, and it is both accurate and complete.          Clinical Impression       ICD-10-CM ICD-9-CM   1. Shortness of breath  R06.02 786.05   2. SOB (shortness of breath)  R06.02 786.05       Disposition:   Disposition: Placed in Observation  Condition: Fair         Rohith Bullard MD  06/15/22 7429

## 2022-06-15 NOTE — PHARMACY MED REC
"Admission Medication History     The home medication history was taken by Jose Rowe.    You may go to "Admission" then "Reconcile Home Medications" tabs to review and/or act upon these items.      The home medication list has been updated by the Pharmacy department.    Please read ALL comments highlighted in yellow.    Please address this information as you see fit.     Feel free to contact us if you have any questions or require assistance.      The medications listed below were removed from the home medication list. Please reorder if appropriate:  Patient reports no longer taking the following medication(s):   ASPIRIN 81 MG EC TABLET (taking BC Arthritis Powder instead; didn't want to double up ASA.)   CAPRON DM 7.5-7.5 MG/5 mL LIQUID   CETIRIZINE 10 MG TABLET    Medications listed below were obtained from: Patient/family, Medications brought from home, Analytic software- [employer] and Patient's pharmacy  PTA Medications   Medication Sig    albuterol (PROAIR HFA) 90 mcg/actuation inhaler USE 2 INHALATIONS EVERY 6 HOURS AS NEEDED FOR WHEEZING    amLODIPine (NORVASC) 5 MG tablet Take 1 tablet (5 mg total) by mouth once daily.    aspirin (ECOTRIN) 81 MG EC tablet Take 1 tablet (81 mg total) by mouth once daily.    aspirin-caffeine (BC ARTHRITIS) 1,000-65 mg PwPk Take 1 packet by mouth daily as needed.    atorvastatin (LIPITOR) 40 MG tablet TAKE 1 TABLET(40 MG) BY MOUTH EVERY DAY    calcium carbonate/vitamin D3 (VITAMIN D-3 ORAL) Take 1 tablet by mouth once daily. (600 mg/ 400 iu)    ELDERBERRY FRUIT ORAL Take 50 mg by mouth once daily.    mv-min/iron/folic/calcium/vitK (WOMEN'S MULTIVITAMIN ORAL) Take 1 tablet by mouth once daily.    naproxen sodium (ANAPROX) 220 MG tablet Take 440 mg by mouth once daily.       Potential issues to be addressed PRIOR TO DISCHARGE: NONE      Jose Rowe CPhT  Pharmacy Technician Specialist-Medication History  [facility] [phone]  Secure chat preferred. "       Current Outpatient Medications on File Prior to Encounter   Medication Sig Dispense Refill Last Dose    albuterol (PROAIR HFA) 90 mcg/actuation inhaler USE 2 INHALATIONS EVERY 6 HOURS AS NEEDED FOR WHEEZING 25.5 g 11 6/15/2022 at Unknown time    amLODIPine (NORVASC) 5 MG tablet Take 1 tablet (5 mg total) by mouth once daily. 90 tablet 3 6/14/2022 at Unknown time    aspirin (ECOTRIN) 81 MG EC tablet Take 1 tablet (81 mg total) by mouth once daily. 90 tablet 3 Past Month at Unknown time    aspirin-caffeine (BC ARTHRITIS) 1,000-65 mg PwPk Take 1 packet by mouth daily as needed.   6/14/2022 at Unknown time    atorvastatin (LIPITOR) 40 MG tablet TAKE 1 TABLET(40 MG) BY MOUTH EVERY DAY 90 tablet 3 6/14/2022 at Unknown time    calcium carbonate/vitamin D3 (VITAMIN D-3 ORAL) Take 1 tablet by mouth once daily. (600 mg/ 400 iu)   6/14/2022 at Unknown time    ELDERBERRY FRUIT ORAL Take 50 mg by mouth once daily.   6/14/2022 at Unknown time    mv-min/iron/folic/calcium/vitK (WOMEN'S MULTIVITAMIN ORAL) Take 1 tablet by mouth once daily.   6/14/2022 at Unknown time    naproxen sodium (ANAPROX) 220 MG tablet Take 440 mg by mouth once daily.   Past Week at Unknown time    [DISCONTINUED] CAPRON DM 7.5-7.5 mg/5 mL Liqd TAKE 20 ML BY MOUTH THREE TIMES DAILY AS NEEDED       [DISCONTINUED] cetirizine HCl (ALLERGY RELIEF, CETIRIZINE, ORAL) Take by mouth.       [DISCONTINUED] predniSONE (DELTASONE) 10 MG tablet Take 1 tablet (10 mg total) by mouth 2 (two) times daily. (Patient not taking: Reported on 6/15/2022) 10 tablet 0                            .

## 2022-06-15 NOTE — ASSESSMENT & PLAN NOTE
Patient with Hypoxic Respiratory failure which is Acute.  she is not on home oxygen. Supplemental oxygen was provided and noted-  .   Signs/symptoms of respiratory failure include- wheezing. Contributing diagnoses includes - COVID-19 infection  Labs and images were reviewed. Patient Has not had a recent ABG. Will treat underlying causes and adjust management of respiratory failure as follows- Supplemental oxygen. See plan below

## 2022-06-15 NOTE — SUBJECTIVE & OBJECTIVE
Past Medical History:   Diagnosis Date    Allergy     Arthritis     Asthma     Admit to [facility] by Dr. Michael Randall for asthma exaceration, acute bronchitis, acute dCHF, hypoxia, elevated troponin/BNP (thought to be 2/2 asthma exac & acute dCHF by cards), tx'd w/ IV lasix, duonebs, IV solumedrol and DC'd on Z-prema, prednisone taper    CKD (chronic kidney disease) stage 2, GFR 60-89 ml/min     H/o ARF 2/2 poor water intake; encouraged to increase water intake and avoid NSAIDS & contrast dye    Depression     Diastolic heart failure 2019    Dx'd at [facility] during asthma exacerbation    Diverticulitis     Dyslipidemia 2018    Former smoker     Quit 18; smoked 0.12 packs/day x 37 years    Gastritis     Hypertension     Prediabetes     A1c 6.1% on 19    Severe obesity (BMI >= 40)     Vertigo     Vitamin D deficiency        Past Surgical History:   Procedure Laterality Date    DILATION AND CURETTAGE OF UTERUS      missed , bleeding    EXPLORATORY LAPAROTOMY      lysis of adhesions, open tube    KNEE SURGERY Right     torn mcl    laser to cervix         Review of patient's allergies indicates:   Allergen Reactions    Penicillins Hives    Coconut     Dairy aid [lactase]     Iodine and iodide containing products        No current facility-administered medications on file prior to encounter.     Current Outpatient Medications on File Prior to Encounter   Medication Sig    albuterol (PROAIR HFA) 90 mcg/actuation inhaler USE 2 INHALATIONS EVERY 6 HOURS AS NEEDED FOR WHEEZING    amLODIPine (NORVASC) 5 MG tablet Take 1 tablet (5 mg total) by mouth once daily.    aspirin (ECOTRIN) 81 MG EC tablet Take 1 tablet (81 mg total) by mouth once daily.    atorvastatin (LIPITOR) 40 MG tablet TAKE 1 TABLET(40 MG) BY MOUTH EVERY DAY    calcium carbonate/vitamin D3 (VITAMIN D-3 ORAL) Take by mouth.    CAPRON DM 7.5-7.5 mg/5 mL Liqd TAKE 20 ML BY MOUTH THREE TIMES DAILY AS NEEDED    cetirizine HCl (ALLERGY  RELIEF, CETIRIZINE, ORAL) Take by mouth.    mv-min/iron/folic/calcium/vitK (WOMEN'S MULTIVITAMIN ORAL) Take by mouth.    naproxen sodium (ALEVE ORAL) Take by mouth.    [DISCONTINUED] predniSONE (DELTASONE) 10 MG tablet Take 1 tablet (10 mg total) by mouth 2 (two) times daily. (Patient not taking: Reported on 6/15/2022)     Family History       Problem Relation (Age of Onset)    Bipolar disorder Sister    COPD Sister    HIV Sister    Heart disease Mother, Father    Hypertension Father    Kidney disease Father    Stroke Mother          Tobacco Use    Smoking status: Current Some Day Smoker     Packs/day: 0.25     Years: 27.00     Pack years: 6.75     Types: Cigarettes    Smokeless tobacco: Never Used   Substance and Sexual Activity    Alcohol use: No    Drug use: No    Sexual activity: Never     Review of Systems   Constitutional: Negative.    HENT: Negative.     Eyes: Negative.    Respiratory:  Positive for shortness of breath.    Cardiovascular: Negative.    Gastrointestinal: Negative.    Endocrine: Negative.    Genitourinary: Negative.    Musculoskeletal: Negative.    Skin: Negative.    Allergic/Immunologic: Negative.    Neurological: Negative.    Hematological: Negative.    Psychiatric/Behavioral: Negative.     Objective:     Vital Signs (Most Recent):  Temp: 98.8 °F (37.1 °C) (06/15/22 0857)  Pulse: 88 (06/15/22 1046)  Resp: (!) 27 (06/15/22 1027)  BP: (!) 143/75 (06/15/22 1046)  SpO2: 96 % (06/15/22 1046)   Vital Signs (24h Range):  Temp:  [98.8 °F (37.1 °C)-99 °F (37.2 °C)] 98.8 °F (37.1 °C)  Pulse:  [78-88] 88  Resp:  [16-27] 27  SpO2:  [89 %-99 %] 96 %  BP: (138-198)/(66-88) 143/75     Weight: 130.8 kg (288 lb 7.6 oz)  Body mass index is 48 kg/m².    Physical Exam  Vitals and nursing note reviewed.   Constitutional:       Appearance: She is well-developed.      Interventions: Nasal cannula in place.   HENT:      Head: Normocephalic and atraumatic.   Eyes:      Conjunctiva/sclera: Conjunctivae normal.       Pupils: Pupils are equal, round, and reactive to light.   Cardiovascular:      Rate and Rhythm: Normal rate and regular rhythm.      Heart sounds: Normal heart sounds.   Pulmonary:      Effort: Pulmonary effort is normal.      Breath sounds: Rhonchi present.   Abdominal:      General: Bowel sounds are normal.      Palpations: Abdomen is soft.   Musculoskeletal:         General: Normal range of motion.      Cervical back: Normal range of motion and neck supple.   Skin:     General: Skin is warm and dry.   Neurological:      Mental Status: She is alert and oriented to person, place, and time.      Deep Tendon Reflexes: Reflexes are normal and symmetric.   Psychiatric:         Behavior: Behavior normal.         Thought Content: Thought content normal.         Judgment: Judgment normal.        Significant Labs:   Results for orders placed or performed during the hospital encounter of 06/15/22   CBC auto differential   Result Value Ref Range    WBC 8.19 3.90 - 12.70 K/uL    RBC 4.55 4.00 - 5.40 M/uL    Hemoglobin 11.6 (L) 12.0 - 16.0 g/dL    Hematocrit 41.5 37.0 - 48.5 %    MCV 91 82 - 98 fL    MCH 25.5 (L) 27.0 - 31.0 pg    MCHC 28.0 (L) 32.0 - 36.0 g/dL    RDW 16.2 (H) 11.5 - 14.5 %    Platelets 240 150 - 450 K/uL    MPV 10.7 9.2 - 12.9 fL    Immature Granulocytes 0.2 0.0 - 0.5 %    Gran # (ANC) 4.3 1.8 - 7.7 K/uL    Immature Grans (Abs) 0.02 0.00 - 0.04 K/uL    Lymph # 3.2 1.0 - 4.8 K/uL    Mono # 0.6 0.3 - 1.0 K/uL    Eos # 0.1 0.0 - 0.5 K/uL    Baso # 0.02 0.00 - 0.20 K/uL    nRBC 0 0 /100 WBC    Gran % 52.3 38.0 - 73.0 %    Lymph % 39.4 18.0 - 48.0 %    Mono % 7.0 4.0 - 15.0 %    Eosinophil % 0.9 0.0 - 8.0 %    Basophil % 0.2 0.0 - 1.9 %    Differential Method Automated    Comprehensive metabolic panel   Result Value Ref Range    Sodium 144 136 - 145 mmol/L    Potassium 4.5 3.5 - 5.1 mmol/L    Chloride 98 95 - 110 mmol/L    CO2 37 (H) 23 - 29 mmol/L    Glucose 104 70 - 110 mg/dL    BUN 16 6 - 20 mg/dL    Creatinine  1.1 0.5 - 1.4 mg/dL    Calcium 9.1 8.7 - 10.5 mg/dL    Total Protein 7.7 6.0 - 8.4 g/dL    Albumin 3.4 (L) 3.5 - 5.2 g/dL    Total Bilirubin 0.4 0.1 - 1.0 mg/dL    Alkaline Phosphatase 95 55 - 135 U/L    AST 33 10 - 40 U/L    ALT 16 10 - 44 U/L    Anion Gap 9 8 - 16 mmol/L    eGFR if African American >60 >60 mL/min/1.73 m^2    eGFR if non African American 55 (A) >60 mL/min/1.73 m^2   CPK   Result Value Ref Range     (H) 20 - 180 U/L   Troponin I   Result Value Ref Range    Troponin I <0.006 0.000 - 0.026 ng/mL   Brain natriuretic peptide   Result Value Ref Range    BNP 21 0 - 99 pg/mL   Lactic acid, plasma   Result Value Ref Range    Lactate (Lactic Acid) 0.6 0.5 - 2.2 mmol/L   COVID-19 Rapid Screening   Result Value Ref Range    SARS-CoV-2 RNA, Amplification, Qual Positive (A) Negative   D dimer, quantitative   Result Value Ref Range    D-Dimer 0.34 <0.50 mg/L FEU        Significant Imaging:   Imaging Results              X-Ray Chest 1 View (Final result)  Result time 06/15/22 09:31:38      Final result by Michael Lowe MD (06/15/22 09:31:38)                   Impression:      No acute process seen.      Electronically signed by: Michael Lowe MD  Date:    06/15/2022  Time:    09:31               Narrative:    EXAMINATION:  XR CHEST 1 VIEW    CLINICAL HISTORY:  Shortness of breath    FINDINGS:  Single view of the chest.  Comparison 03/15/2022    Cardiac silhouette is normal.  Aorta demonstrates atherosclerotic disease. The lungs demonstrate no evidence of active disease.  Stable scarring within the left mid lower lung zone. no evidence of pleural effusion or pneumothorax.  Bones appear intact.

## 2022-06-16 ENCOUNTER — PATIENT MESSAGE (OUTPATIENT)
Dept: ADMINISTRATIVE | Facility: CLINIC | Age: 62
End: 2022-06-16
Payer: OTHER GOVERNMENT

## 2022-06-16 ENCOUNTER — NURSE TRIAGE (OUTPATIENT)
Dept: ADMINISTRATIVE | Facility: CLINIC | Age: 62
End: 2022-06-16
Payer: OTHER GOVERNMENT

## 2022-06-16 VITALS
DIASTOLIC BLOOD PRESSURE: 57 MMHG | HEIGHT: 65 IN | WEIGHT: 293 LBS | RESPIRATION RATE: 24 BRPM | BODY MASS INDEX: 48.82 KG/M2 | HEART RATE: 60 BPM | SYSTOLIC BLOOD PRESSURE: 116 MMHG | TEMPERATURE: 98 F | OXYGEN SATURATION: 95 %

## 2022-06-16 LAB
BASOPHILS # BLD AUTO: 0.01 K/UL (ref 0–0.2)
BASOPHILS NFR BLD: 0.1 % (ref 0–1.9)
DIFFERENTIAL METHOD: ABNORMAL
EOSINOPHIL # BLD AUTO: 0 K/UL (ref 0–0.5)
EOSINOPHIL NFR BLD: 0 % (ref 0–8)
ERYTHROCYTE [DISTWIDTH] IN BLOOD BY AUTOMATED COUNT: 16.2 % (ref 11.5–14.5)
HCT VFR BLD AUTO: 41.6 % (ref 37–48.5)
HGB BLD-MCNC: 11.4 G/DL (ref 12–16)
IMM GRANULOCYTES # BLD AUTO: 0.06 K/UL (ref 0–0.04)
IMM GRANULOCYTES NFR BLD AUTO: 0.6 % (ref 0–0.5)
LYMPHOCYTES # BLD AUTO: 1.6 K/UL (ref 1–4.8)
LYMPHOCYTES NFR BLD: 14.7 % (ref 18–48)
MCH RBC QN AUTO: 25.7 PG (ref 27–31)
MCHC RBC AUTO-ENTMCNC: 27.4 G/DL (ref 32–36)
MCV RBC AUTO: 94 FL (ref 82–98)
MONOCYTES # BLD AUTO: 0.6 K/UL (ref 0.3–1)
MONOCYTES NFR BLD: 5.5 % (ref 4–15)
NEUTROPHILS # BLD AUTO: 8.6 K/UL (ref 1.8–7.7)
NEUTROPHILS NFR BLD: 79.1 % (ref 38–73)
NRBC BLD-RTO: 0 /100 WBC
PLATELET # BLD AUTO: 265 K/UL (ref 150–450)
PMV BLD AUTO: 11.8 FL (ref 9.2–12.9)
RBC # BLD AUTO: 4.43 M/UL (ref 4–5.4)
WBC # BLD AUTO: 10.8 K/UL (ref 3.9–12.7)

## 2022-06-16 PROCEDURE — 63600175 PHARM REV CODE 636 W HCPCS: Mod: TB | Performed by: NURSE PRACTITIONER

## 2022-06-16 PROCEDURE — 96366 THER/PROPH/DIAG IV INF ADDON: CPT

## 2022-06-16 PROCEDURE — 99900035 HC TECH TIME PER 15 MIN (STAT)

## 2022-06-16 PROCEDURE — 96372 THER/PROPH/DIAG INJ SC/IM: CPT | Performed by: NURSE PRACTITIONER

## 2022-06-16 PROCEDURE — 36415 COLL VENOUS BLD VENIPUNCTURE: CPT | Performed by: NURSE PRACTITIONER

## 2022-06-16 PROCEDURE — 94761 N-INVAS EAR/PLS OXIMETRY MLT: CPT

## 2022-06-16 PROCEDURE — 25000003 PHARM REV CODE 250: Performed by: NURSE PRACTITIONER

## 2022-06-16 PROCEDURE — 94640 AIRWAY INHALATION TREATMENT: CPT

## 2022-06-16 PROCEDURE — 85025 COMPLETE CBC W/AUTO DIFF WBC: CPT | Performed by: NURSE PRACTITIONER

## 2022-06-16 PROCEDURE — 27000221 HC OXYGEN, UP TO 24 HOURS

## 2022-06-16 PROCEDURE — G0378 HOSPITAL OBSERVATION PER HR: HCPCS

## 2022-06-16 RX ORDER — GUAIFENESIN/DEXTROMETHORPHAN 100-10MG/5
10 SYRUP ORAL EVERY 4 HOURS PRN
Qty: 236 ML | Refills: 0 | Status: SHIPPED | OUTPATIENT
Start: 2022-06-16 | End: 2022-06-26

## 2022-06-16 RX ORDER — ASCORBIC ACID 500 MG
500 TABLET ORAL 2 TIMES DAILY
Qty: 14 TABLET | Refills: 0 | Status: SHIPPED | OUTPATIENT
Start: 2022-06-16 | End: 2022-07-26

## 2022-06-16 RX ORDER — ASCORBIC ACID 500 MG
500 TABLET ORAL 2 TIMES DAILY
Qty: 14 TABLET | Refills: 0 | Status: SHIPPED | OUTPATIENT
Start: 2022-06-16 | End: 2022-06-16

## 2022-06-16 RX ADMIN — REMDESIVIR 100 MG: 100 INJECTION, POWDER, LYOPHILIZED, FOR SOLUTION INTRAVENOUS at 08:06

## 2022-06-16 RX ADMIN — ASPIRIN 81 MG: 81 TABLET, COATED ORAL at 08:06

## 2022-06-16 RX ADMIN — ATORVASTATIN CALCIUM 40 MG: 40 TABLET, FILM COATED ORAL at 08:06

## 2022-06-16 RX ADMIN — ALBUTEROL SULFATE 2 PUFF: 90 AEROSOL, METERED RESPIRATORY (INHALATION) at 01:06

## 2022-06-16 RX ADMIN — DEXAMETHASONE 6 MG: 4 TABLET ORAL at 08:06

## 2022-06-16 RX ADMIN — ENOXAPARIN SODIUM 135 MG: 150 INJECTION SUBCUTANEOUS at 08:06

## 2022-06-16 RX ADMIN — AMLODIPINE BESYLATE 5 MG: 5 TABLET ORAL at 08:06

## 2022-06-16 RX ADMIN — OXYCODONE HYDROCHLORIDE AND ACETAMINOPHEN 500 MG: 500 TABLET ORAL at 08:06

## 2022-06-16 RX ADMIN — ALBUTEROL SULFATE 2 PUFF: 90 AEROSOL, METERED RESPIRATORY (INHALATION) at 07:06

## 2022-06-16 RX ADMIN — SENNOSIDES AND DOCUSATE SODIUM 1 TABLET: 50; 8.6 TABLET ORAL at 08:06

## 2022-06-16 RX ADMIN — THERA TABS 1 TABLET: TAB at 08:06

## 2022-06-16 NOTE — PLAN OF CARE
[facility] - Telemetry (Hospital)  Discharge Final Note    Primary Care Provider: Becki Hernandez NP    Expected Discharge Date: 6/16/2022    Final Discharge Note (most recent)     Final Note - 06/16/22 1200        Final Note    Assessment Type Final Discharge Note     Anticipated Discharge Disposition Home or Self Care     Hospital Resources/Appts/Education Provided Appointments scheduled and added to AVS;Appointments scheduled by Navigator/Coordinator                 Important Message from Medicare             Contact Info     Becki Hernandez NP   Specialty: Internal Medicine   Relationship: PCP - General  Nurse Practitioner    [address]  [place] [address]   Phone: [phone]       Next Steps: Follow up    Instructions: for hospital follow-up of COVID-19 infection        DC Dispo: home  PCP f/u: Wednesday next week  Home O2 approved by [facility] and 3 E-tanks delivered to nurses station.

## 2022-06-16 NOTE — NURSING
Discharge orders received and reviewed with pt. AVS given. Tele monitor returned. IV removed from RAC, tip intact. Denied bedside med delivery at this time r/t monetary reasons. Informed pt to  pulse oximeter at [facility] pharmacy next door via curbside pickup. Home O2 delivered to bedside, taken with pt. Reminded to retrieve belongings. Assisted via wheelchair, stable at time of dc.

## 2022-06-16 NOTE — PLAN OF CARE
[facility] - Telemetry (Hospital)  Discharge Assessment    Primary Care Provider: Becki Hernandez NP     Discharge Assessment (most recent)     BRIEF DISCHARGE ASSESSMENT - 06/16/22 1159        Discharge Planning    Assessment Type Discharge Planning Brief Assessment     Resource/Environmental Concerns none     Support Systems Children     Equipment Currently Used at Home none     Current Living Arrangements home/apartment/condo     Patient/Family Anticipated Services at Transition none     DME Needed Upon Discharge  oxygen     Discharge Plan A Home with family;Home

## 2022-06-16 NOTE — NURSING
Discharge instructions given and answered questions, reviewed how to use home O2 along with safety measure  with home O2. 3 tanks were delivered to the patients room. Waiting on medications to be delivered from pharmacy. Patient education review on the Incentive Spirometer. IV removed. Waiting on meds and private vehicle to be d/c. All safety measures in place.

## 2022-06-16 NOTE — PROGRESS NOTES
Home Oxygen Evaluation    Date Performed: 6/16/2022    1) Patient's Home O2 Sat on room air, while at rest: 88        If O2 sats on room air at rest are 88% or below, patient qualifies. No additional testing needed. Document N/A in steps 2 and 3. If 89% or above, complete steps 2.      2) Patient's O2 Sat on room air while exercising: n/a    If O2 sats on room air while exercising remain 89% or above patient does not qualify, no further testing needed Document N/A in step 3. If O2 sats on room air while exercising are 88% or below, continue to step 3.      3) Patient's O2 Sat while exercising on O2: 93% at 2 LPM         (Must show improvement from #2 for patients to qualify)    If O2 sats improve on oxygen, patient qualifies for portable oxygen. If not, the patient does not qualify.

## 2022-06-16 NOTE — HOSPITAL COURSE
60 y/o admited for acute respiratory failure with hypoxia secondary to COVID-19 infection. Patient started on Remdesivir, dexamethasone, and supplemental oxygen. Today pt doing well, symptoms improved. Pt qualified for home oxygen. Oxygen delivered to bedside. COVID surveillance program ordered. Patient to follow-up with PCP in 3 days for hospital follow-up.

## 2022-06-16 NOTE — DISCHARGE SUMMARY
[facility] - Telemetry ([facility])  [facility] Medicine  Discharge Summary      Patient Name: Baylee Muñoz  MRN: [id number]  Patient Class: OP- Observation  Admission Date: 6/15/2022  Hospital Length of Stay: 0 days  Discharge Date and Time:  06/16/2022 6:00 PM  Attending Physician: No att. providers found   Discharging Provider: Lalitha Singh NP  Primary Care Provider: Becki Hernandez NP      HPI:    Baylee Muñoz is a 61 y.o. female patient with a PMHx of HTN, asthma who presents to the Emergency Department for SOB, onset several days PTA. Pt was referred to the ED by her PCP after the pt was noted to be hypoxic in clinic (SpO2 of 89-91% on room air). Pt is not on home O2. Pt states that she was dx with L-sided pneumonia at an urgent care in [place] on 5/24/22, and was started on abx. Sxs are worse when laying flat, or with exertion. No associated sxs reported. Patient denies any fever, chills, n/v/d, CP, weakness, numbness, dizziness, headache, and all other sxs at this time.    In the ED O2 sat 89% on room air. CBC and CMP unremarkable. ; Positive COVID-19; CXR with no acute findings.     Patient placed in observation for acute respiratory failure secondary to COVID-19 infection under the care of hospital medicine.       * No surgery found *      Hospital Course:   60 y/o admited for acute respiratory failure with hypoxia secondary to COVID-19 infection. Patient started on Remdesivir, dexamethasone, and supplemental oxygen. Today pt doing well, symptoms improved. Pt qualified for home oxygen. Oxygen delivered to bedside. COVID surveillance program ordered. Patient to follow-up with PCP in 3 days for hospital follow-up.        Goals of Care Treatment Preferences:  Code Status: Full Code      Consults:   Consults (From admission, onward)        Status Ordering Provider     Inpatient consult to Social Work  Once        Provider:  (Not yet assigned)    Completed LALITHA SINGH          No new Assessment &  "Plan notes have been filed under this hospital service since the last note was generated.  Service: Hospital Medicine    Final Active Diagnoses:    Diagnosis Date Noted POA    PRINCIPAL PROBLEM:  Acute respiratory failure with hypoxia [J96.01] 06/15/2022 Yes    COVID-19 [U07.1] 06/15/2022 Yes    Hypertension [I10] 08/14/2017 Yes      Problems Resolved During this Admission:       Discharged Condition: stable    Disposition: Home or Self Care    Follow Up:   Follow-up Information     Becki Hernandez NP Follow up.    Specialty: Internal Medicine  Why: for hospital follow-up of COVID-19 infection  Contact information:  [address]  [place] [address]  [phone]                       Patient Instructions:      OXYGEN FOR HOME USE     Order Specific Question Answer Comments   Liter Flow 2    Duration Continuous    Qualifying Test Performed at: Rest    Oxygen saturation: 88    Portable mode: continuous    Route nasal cannula    Device: home concentrator with portable tanks    Length of need (in months): 3 mos    Patient condition with qualifying saturation Other - List qualifying diagnosis and code    Select a diagnosis & list the code in the comments COVID-19 [[id number]]    Height: 5' 5" (1.651 m)    Weight: 134.3 kg (296 lb 1.2 oz)    Alternative treatment measures have been tried or considered and deemed clinically ineffective. Yes      Notify your health care provider if you experience any of the following:  temperature >100.4     Notify your health care provider if you experience any of the following:  difficulty breathing or increased cough     Notify your health care provider if you experience any of the following:  persistent dizziness, light-headedness, or visual disturbances     Notify your health care provider if you experience any of the following:  increased confusion or weakness     COVID-19 Home Symptom Monitoring  - Duration (days): 14     Order Specific Question Answer Comments "   Duration (days) 14      COVID-19 Surveillance Program     Order Specific Question Answer Comments   Does patient have a smartphone? Yes    Does patient have the [facility] dax on their smartphone? No    While in surveillance program, will patient be using home oxygen? Yes      Pulse Oximetry   Standing Status: Future Standing Exp. Date: 08/15/23     Pulse Oximetry     Activity as tolerated       Significant Diagnostic Studies: Labs:   BMP:   Recent Labs   Lab 06/15/22  0958         K 4.5   CL 98   CO2 37*   BUN 16   CREATININE 1.1   CALCIUM 9.1   , CMP   Recent Labs   Lab 06/15/22  0958      K 4.5   CL 98   CO2 37*      BUN 16   CREATININE 1.1   CALCIUM 9.1   PROT 7.7   ALBUMIN 3.4*   BILITOT 0.4   ALKPHOS 95   AST 33   ALT 16   ANIONGAP 9   ESTGFRAFRICA >60   EGFRNONAA 55*   , CBC   Recent Labs   Lab 06/15/22  0958 06/16/22  0352   WBC 8.19 10.80   HGB 11.6* 11.4*   HCT 41.5 41.6    265    and All labs within the past 24 hours have been reviewed  Microbiology:   Blood Culture   Lab Results   Component Value Date    LABBLOO No Growth to date 06/15/2022       Pending Diagnostic Studies:     None         Medications:  Reconciled Home Medications:      Medication List      START taking these medications    ascorbic acid (vitamin C) 500 MG tablet  Commonly known as: VITAMIN C  Take 1 tablet (500 mg total) by mouth 2 (two) times daily.     dextromethorphan-guaiFENesin  mg/5 ml  mg/5 mL liquid  Commonly known as: ROBITUSSIN-DM  Take 10 mLs by mouth every 4 (four) hours as needed (cough).     pulse oximeter device  Commonly known as: pulse oximeter  by Apply Externally route 2 (two) times a day. Use twice daily at 8 AM and 3 PM and record the value in Psychiatrict as directed.        CONTINUE taking these medications    albuterol 90 mcg/actuation inhaler  Commonly known as: PROAIR HFA  USE 2 INHALATIONS EVERY 6 HOURS AS NEEDED FOR WHEEZING     amLODIPine 5 MG tablet  Commonly known  as: NORVASC  Take 1 tablet (5 mg total) by mouth once daily.     aspirin 81 MG EC tablet  Commonly known as: ECOTRIN  Take 1 tablet (81 mg total) by mouth once daily.     atorvastatin 40 MG tablet  Commonly known as: LIPITOR  TAKE 1 TABLET(40 MG) BY MOUTH EVERY DAY     BC ARTHRITIS 1,000-65 mg Pwpk  Generic drug: aspirin-caffeine  Take 1 packet by mouth daily as needed.     ELDERBERRY FRUIT ORAL  Take 50 mg by mouth once daily.     naproxen sodium 220 MG tablet  Commonly known as: ANAPROX  Take 440 mg by mouth once daily.     VITAMIN D-3 ORAL  Take 1 tablet by mouth once daily. (600 mg/ 400 iu)     WOMEN'S MULTIVITAMIN ORAL  Take 1 tablet by mouth once daily.            Indwelling Lines/Drains at time of discharge:   Lines/Drains/Airways     None                 Time spent on the discharge of patient: 35 minutes         Jessie Singh NP  Department of Hospital Medicine  [facility] - Telemetry ([facility])

## 2022-06-17 ENCOUNTER — PATIENT MESSAGE (OUTPATIENT)
Dept: ADMINISTRATIVE | Facility: OTHER | Age: 62
End: 2022-06-17
Payer: OTHER GOVERNMENT

## 2022-06-17 ENCOUNTER — TELEPHONE (OUTPATIENT)
Dept: DIABETES | Facility: CLINIC | Age: 62
End: 2022-06-17
Payer: OTHER GOVERNMENT

## 2022-06-17 ENCOUNTER — NURSE TRIAGE (OUTPATIENT)
Dept: ADMINISTRATIVE | Facility: CLINIC | Age: 62
End: 2022-06-17
Payer: OTHER GOVERNMENT

## 2022-06-17 NOTE — TELEPHONE ENCOUNTER
Pt called after she sent a message to us in the surveillance program that she got in the my chart and is ready to be called to complete the registration. Pt called and signed the consent and she put in the vitals and will see if she escalates pt encouraged to get a thermometer and she said that she will but says no fever at this time. Pt is on 2 and 1/2 L of o2

## 2022-06-17 NOTE — TELEPHONE ENCOUNTER
[place]    PCP:  Becki Hernandez NP    Pt reports just discharged from the hospital.  Pt reports meds were sent to the hospital pharmacy instead of [facility]'s in [place].  Vitamin C is the only med that she needs transferred.  Pt requesting meds to be sent to [facility].  Per protocol, care advised is home care.  Pt also had a question regarding whether she is to continue oral steroids or not.  Message route high priority to PCP for advisement.  Instructed to call for questions/concerns.  VU.    Reason for Disposition   [1] Prescription prescribed recently is not at pharmacy AND [2] triager has access to patient's EMR AND [3] prescription is recorded in the EMR    Additional Information   Negative: [1] Intentional drug overdose AND [2] suicidal thoughts or ideas   Negative: MORE THAN A DOUBLE DOSE of a prescription or over-the-counter (OTC) drug   Negative: [1] DOUBLE DOSE (an extra dose or lesser amount) of prescription drug AND [2] any symptoms (e.g., dizziness, nausea, pain, sleepiness)   Negative: [1] DOUBLE DOSE (an extra dose or lesser amount) of over-the-counter (OTC) drug AND [2] any symptoms (e.g., dizziness, nausea, pain, sleepiness)   Negative: Took another person's prescription drug   Negative: [1] DOUBLE DOSE (an extra dose or lesser amount) of prescription drug AND [2] NO symptoms (Exception: a double dose of antibiotics)   Negative: Diabetes drug error or overdose (e.g., took wrong type of insulin or took extra dose)   Negative: [1] Prescription refill request for ESSENTIAL medicine (i.e., likelihood of harm to patient if not taken) AND [2] triager unable to refill per department policy   Negative: [1] Prescription not at pharmacy AND [2] was prescribed by PCP recently (Exception: triager has access to EMR and prescription is recorded there. Go to Home Care and confirm for pharmacy.)   Negative: [1] Pharmacy calling with prescription question AND [2] triager unable to answer question   Negative:  [1] Caller has URGENT medicine question about med that PCP or specialist prescribed AND [2] triager unable to answer question   Negative: Medicine patch causing local rash or itching   Negative: [1] Caller has medicine question about med NOT prescribed by PCP AND [2] triager unable to answer question (e.g., compatibility with other med, storage)   Negative: Prescription request for new medicine (not a refill)   Negative: Prescription refill request for a CONTROLLED substance (e.g., narcotics, ADHD medicines)   Negative: [1] Prescription refill request for NON-ESSENTIAL medicine (i.e., no harm to patient if med not taken) AND [2] triager unable to refill per department policy   Negative: [1] Caller has NON-URGENT medicine question about med that PCP prescribed AND [2] triager unable to answer question   Negative: Caller wants to use a complementary or alternative medicine    Protocols used: MEDICATION QUESTION CALL-A-

## 2022-06-17 NOTE — TELEPHONE ENCOUNTER
Pt called for covid surveillance enrollment and she said that she is having trouble getting into her my chart pt given the number to help desk to call and see if the can reset her code because she is unable to get in. Pt sent home on 2 and 1/2 L of O2 so would like her in the surveillance if possible  O2 sat is 96 on O2       Reason for Disposition   Information only question and nurse able to answer    Protocols used: INFORMATION ONLY CALL - NO TRIAGE-A-[place]

## 2022-06-17 NOTE — TELEPHONE ENCOUNTER
Pt called again for enrollment and couldn't get into her acct and she will go to her office tomorrow and she said that she is illiterate when it come to doing this. Pt'S O2 level 93 / 73 Hr and did deep breathing and O2 sat went up to 97 and she will reach out if any issue     Reason for Disposition   Information only question and nurse able to answer    Protocols used: INFORMATION ONLY CALL - NO TRIAGE-A-[place]

## 2022-06-17 NOTE — TELEPHONE ENCOUNTER
I called the pt and informed her that Becki HUMPHREYS is out of office on today however I have forwarded this concern to one of our other provider for help. I will contact her once approved. She verbalized understanding.  //kah

## 2022-06-18 ENCOUNTER — PATIENT MESSAGE (OUTPATIENT)
Dept: ADMINISTRATIVE | Facility: OTHER | Age: 62
End: 2022-06-18
Payer: OTHER GOVERNMENT

## 2022-06-18 ENCOUNTER — PATIENT MESSAGE (OUTPATIENT)
Dept: ADMINISTRATIVE | Facility: CLINIC | Age: 62
End: 2022-06-18
Payer: OTHER GOVERNMENT

## 2022-06-18 ENCOUNTER — NURSE TRIAGE (OUTPATIENT)
Dept: ADMINISTRATIVE | Facility: CLINIC | Age: 62
End: 2022-06-18
Payer: OTHER GOVERNMENT

## 2022-06-18 NOTE — TELEPHONE ENCOUNTER
Surveillance pt escalated for no response.  Pt stated she would try to enter VS soon.  No triage.    Reason for Disposition   General information question, no triage required and triager able to answer question    Additional Information   Negative: [1] Caller is not with the adult (patient) AND [2] reporting urgent symptoms   Negative: Lab result questions   Negative: Medication questions   Negative: Caller can't be reached by phone   Negative: Caller has already spoken to PCP or another triager   Negative: RN needs further essential information from caller in order to complete triage   Negative: Requesting regular office appointment   Negative: [1] Caller requesting NON-URGENT health information AND [2] PCP's office is the best resource   Negative: Health Information question, no triage required and triager able to answer question    Protocols used: INFORMATION ONLY CALL - NO [facility]AGE[facility]

## 2022-06-18 NOTE — TELEPHONE ENCOUNTER
Pt called for no response to cc push.    VM left for Pt explaining the need to put in vs asap and if having a medical emergency to call 911 or go to the nearest ER.    VM left for Pt on home phone explaining the above and to call ooc at 1 [phone] if she wishes to speak with a nurse on call.    OMER Stinson called but no answer. Unable to leave VM.    Spoke with OMER Lyle. She will attempt to contact Pt to have her put in vs asap. Invited her to call ooc at 1 [phone] if any question or concerns were needed.    Encounter routed to pcp staff.  Reason for Disposition   Message left on unidentified voice mail.  Phone number verified.    Protocols used: NO CONTACT OR DUPLICATE CONTACT CALL-A-

## 2022-06-19 ENCOUNTER — PATIENT MESSAGE (OUTPATIENT)
Dept: ADMINISTRATIVE | Facility: CLINIC | Age: 62
End: 2022-06-19
Payer: OTHER GOVERNMENT

## 2022-06-19 ENCOUNTER — NURSE TRIAGE (OUTPATIENT)
Dept: ADMINISTRATIVE | Facility: CLINIC | Age: 62
End: 2022-06-19
Payer: OTHER GOVERNMENT

## 2022-06-19 NOTE — TELEPHONE ENCOUNTER
Surveillance pt escalated for no response. Pt states she's having trouble entering VS because she forgets where to enter VS.  RN attempted to walk pt through dax in order to enter VS. Pt asked to be removed from surveillance program and enrolled HSM program.  OOC number was provided to pt. Pt verbalized understanding and denied needs.  Tasks removed.    Reason for Disposition   Health Information question, no triage required and triager able to answer question    Additional Information   Negative: [1] Caller is not with the adult (patient) AND [2] reporting urgent symptoms   Negative: Lab result questions   Negative: Medication questions   Negative: Caller can't be reached by phone   Negative: Caller has already spoken to PCP or another triager   Negative: RN needs further essential information from caller in order to complete triage   Negative: Requesting regular office appointment   Negative: [1] Caller requesting NON-URGENT health information AND [2] PCP's office is the best resource    Protocols used: INFORMATION ONLY CALL - NO TRIAGE-[facility]

## 2022-06-20 LAB
BACTERIA BLD CULT: NORMAL
BACTERIA BLD CULT: NORMAL

## 2022-06-22 ENCOUNTER — PATIENT MESSAGE (OUTPATIENT)
Dept: PULMONOLOGY | Facility: CLINIC | Age: 62
End: 2022-06-22

## 2022-06-22 ENCOUNTER — TELEPHONE (OUTPATIENT)
Dept: PULMONOLOGY | Facility: CLINIC | Age: 62
End: 2022-06-22

## 2022-06-22 ENCOUNTER — TELEPHONE (OUTPATIENT)
Dept: INTERNAL MEDICINE | Facility: CLINIC | Age: 62
End: 2022-06-22
Payer: OTHER GOVERNMENT

## 2022-06-22 ENCOUNTER — OFFICE VISIT (OUTPATIENT)
Dept: PULMONOLOGY | Facility: CLINIC | Age: 62
End: 2022-06-22
Payer: OTHER GOVERNMENT

## 2022-06-22 DIAGNOSIS — R73.03 PREDIABETES: ICD-10-CM

## 2022-06-22 DIAGNOSIS — J96.11 CHRONIC RESPIRATORY FAILURE WITH HYPOXIA: ICD-10-CM

## 2022-06-22 DIAGNOSIS — I10 PRIMARY HYPERTENSION: ICD-10-CM

## 2022-06-22 DIAGNOSIS — Z87.891 FORMER SMOKER: ICD-10-CM

## 2022-06-22 DIAGNOSIS — R93.89 ABNORMAL CXR: ICD-10-CM

## 2022-06-22 DIAGNOSIS — E66.01 SEVERE OBESITY (BMI >= 40): ICD-10-CM

## 2022-06-22 DIAGNOSIS — U07.1 COVID-19: ICD-10-CM

## 2022-06-22 DIAGNOSIS — J45.909 ASTHMA, UNSPECIFIED ASTHMA SEVERITY, UNSPECIFIED WHETHER COMPLICATED, UNSPECIFIED WHETHER PERSISTENT: Primary | ICD-10-CM

## 2022-06-22 PROCEDURE — 99204 OFFICE O/P NEW MOD 45 MIN: CPT | Mod: 95,,, | Performed by: INTERNAL MEDICINE

## 2022-06-22 PROCEDURE — 99204 PR OFFICE/OUTPT VISIT, NEW, LEVL IV, 45-59 MIN: ICD-10-PCS | Mod: 95,,, | Performed by: INTERNAL MEDICINE

## 2022-06-22 NOTE — PROGRESS NOTES
The patient location is: HOME  The chief complaint leading to consultation is: Pneumonia, COVID    Visit type: audiovisual    Face to Face time with patient: 15min  20 minutes of total time spent on the encounter, which includes face to face time and non-face to face time preparing to see the patient (eg, review of tests), Obtaining and/or reviewing separately obtained history, Documenting clinical information in the electronic or other health record, Independently interpreting results (not separately reported) and communicating results to the patient/family/caregiver, or Care coordination (not separately reported).         Each patient to whom he or she provides medical services by telemedicine is:  (1) informed of the relationship between the physician and patient and the respective role of any other health care provider with respect to management of the patient; and (2) notified that he or she may decline to receive medical services by telemedicine and may withdraw from such care at any time.    Notes:                                               Pulmonary Outpatient   Visit     Subjective:       Patient ID: Baylee Muñoz is a 61 y.o. female.    Chief Complaint: Pneumonia      Baylee Muñoz is 61 y.o.  Follow up from hospital: COVID 19  Dexamethasone, remdesivir, Vit C, Zinc  O2 sat was 88% in ER  Left pneumonia seen [facility] 05/24/2022  Was placed on oxygen during hospitalization  2.5 LPM. Cannot return to work on O2  Pneumonia: Abx completed  Vacination for covid, Booster 1 and 2 lacking  Ocupation:  poker  Smoking former: 1 PPD, 30 years  No TB exposures  Inhaler: Albuterol  DME: [facility]  Nebulizer: in storage    Medication List at Discharge      albuterol sulfate 90 mcg/actuation USE 2 INHALATIONS EVERY 6 HOURS AS NEEDED FOR WHEEZING    amlodipine besylate 5 mg Oral Daily    ascorbic acid 500 mg Oral 2 times daily    aspirin 81 mg Oral Daily    aspirin/caffeine 1,000-65 mg 1 packet Oral  Daily PRN    atorvastatin calcium 40 MG TAKE 1 TABLET(40 MG) BY MOUTH EVERY DAY    calcium carbonate/vitamin D3 1 tablet Oral Daily, (600 mg/ 400 iu)    elderberry fruit 50 mg Oral Daily    guaifenesin/dextromethorphan  mg/5 mL 10 mLs Oral Every 4 hours PRN    mv-min/iron/folic/calcium/vitK 1 tablet Oral Daily    naproxen sodium 440 mg Oral Daily    PULSE OXIMETER DEVICE, FINGERTIP REUSEABLE 1 Each Apply Externally 2 times daily, Use twice daily at 8 AM and 3 PM and record the value in MyChart as directed.        Home Oxygen Evaluation     Date Performed: 6/16/2022     1)         Patient's Home O2 Sat on room air, while at rest: 88                               If O2 sats on room air at rest are 88% or below, patient qualifies. No additional testing needed. Document N/A in steps 2 and 3. If 89% or above, complete steps 2.        2)         Patient's O2 Sat on room air while exercising: n/a     If O2 sats on room air while exercising remain 89% or above patient does not qualify, no further testing needed Document N/A in step 3. If O2 sats on room air while exercising are 88% or below, continue to step 3.        3)         Patient's O2 Sat while exercising on O2: 93% at 2 LPM                                           (Must show improvement from #2 for patients to qualify)     If O2 sats improve on oxygen, patient qualifies for portable oxygen. If not, the patient does not qualify.     [facility] - Telemetry ([facility])  [facility] Medicine  Discharge Summary        Patient Name: Baylee Muñoz  MRN: [id number]  Patient Class: OP- Observation  Admission Date: 6/15/2022  Hospital Length of Stay: 0 days  Discharge Date and Time:  06/16/2022 6:00 PM  Attending Physician: No att. providers found   Discharging Provider: Jessie Singh NP  Primary Care Provider: Becki Hernandez NP        HPI:    Baylee Muñoz is a 61 y.o. female patient with a PMHx of HTN, asthma who presents to the Emergency Department for SOB, onset  several days PTA. Pt was referred to the ED by her PCP after the pt was noted to be hypoxic in clinic (SpO2 of 89-91% on room air). Pt is not on home O2. Pt states that she was dx with L-sided pneumonia at an urgent care in [place] on 5/24/22, and was started on abx. Sxs are worse when laying flat, or with exertion. No associated sxs reported. Patient denies any fever, chills, n/v/d, CP, weakness, numbness, dizziness, headache, and all other sxs at this time.     In the ED O2 sat 89% on room air. CBC and CMP unremarkable. ; Positive COVID-19; CXR with no acute findings.      Patient placed in observation for acute respiratory failure secondary to COVID-19 infection under the care of hospital medicine.         * No surgery found *       Hospital Course:   60 y/o admited for acute respiratory failure with hypoxia secondary to COVID-19 infection. Patient started on Remdesivir, dexamethasone, and supplemental oxygen. Today pt doing well, symptoms improved. Pt qualified for home oxygen. Oxygen delivered to bedside. COVID surveillance program ordered. Patient to follow-up with PCP in 3 days for hospital follow-up.         Goals of Care Treatment Preferences:  Code Status: Full Code        Consults:           Consults (From admission, onward)         Status Ordering Provider       Inpatient consult to Social Work  Once        Provider:  (Not yet assigned)    LALITHA Olmstead new Assessment & Plan notes have been filed under this hospital service since the last note was generated.  Service: Hospital Medicine           Final Active Diagnoses:     Diagnosis Date Noted POA    PRINCIPAL PROBLEM:  Acute respiratory failure with hypoxia [J96.01] 06/15/2022 Yes    COVID-19 [U07.1] 06/15/2022 Yes    Hypertension [I10] 08/14/2017 Yes       Problems Resolved During this Admission:         Discharged Condition: stable     Disposition: Home or Self Care           The following portions of the  patient's history were reviewed and updated as appropriate:   She  has a past medical history of Allergy, Arthritis, Asthma, CKD (chronic kidney disease) stage 2, GFR 60-89 ml/min, Depression, Diastolic heart failure (01/06/2019), Diverticulitis (2005), Dyslipidemia (08/28/2018), Former smoker, Gastritis, Hypertension, Prediabetes, Severe obesity (BMI >= 40), Vertigo, and Vitamin D deficiency.  She does not have any pertinent problems on file.  She  has a past surgical history that includes Knee surgery (Right); laser to cervix; Exploratory laparotomy; and Dilation and curettage of uterus.  Her family history includes Bipolar disorder in her sister; COPD in her sister; HIV in her sister; Heart disease in her father and mother; Hypertension in her father; Kidney disease in her father; Stroke in her mother.  She  reports that she has been smoking cigarettes. She has a 6.75 pack-year smoking history. She has never used smokeless tobacco. She reports that she does not drink alcohol and does not use drugs.  She has a current medication list which includes the following prescription(s): albuterol, amlodipine, ascorbic acid (vitamin c), aspirin, bc arthritis, atorvastatin, calcium carbonate/vitamin d3, dextromethorphan-guaifenesin  mg/5 ml, elderberry fruit, mv-min/iron/folic/calcium/vitk, naproxen sodium, and pulse oximeter.  Current Outpatient Medications on File Prior to Visit   Medication Sig Dispense Refill    albuterol (PROAIR HFA) 90 mcg/actuation inhaler USE 2 INHALATIONS EVERY 6 HOURS AS NEEDED FOR WHEEZING 25.5 g 11    amLODIPine (NORVASC) 5 MG tablet Take 1 tablet (5 mg total) by mouth once daily. 90 tablet 3    ascorbic acid, vitamin C, (VITAMIN C) 500 MG tablet Take 1 tablet (500 mg total) by mouth 2 (two) times daily. 14 tablet 0    aspirin (ECOTRIN) 81 MG EC tablet Take 1 tablet (81 mg total) by mouth once daily. 90 tablet 3    aspirin-caffeine (BC ARTHRITIS) 1,000-65 mg PwPk Take 1 packet by  mouth daily as needed.      atorvastatin (LIPITOR) 40 MG tablet TAKE 1 TABLET(40 MG) BY MOUTH EVERY DAY 90 tablet 3    calcium carbonate/vitamin D3 (VITAMIN D-3 ORAL) Take 1 tablet by mouth once daily. (600 mg/ 400 iu)      dextromethorphan-guaiFENesin  mg/5 ml (ROBITUSSIN-DM)  mg/5 mL liquid Take 10 mLs by mouth every 4 (four) hours as needed (cough). 236 mL 0    ELDERBERRY FRUIT ORAL Take 50 mg by mouth once daily.      mv-min/iron/folic/calcium/vitK (WOMEN'S MULTIVITAMIN ORAL) Take 1 tablet by mouth once daily.      naproxen sodium (ANAPROX) 220 MG tablet Take 440 mg by mouth once daily.      pulse oximeter (PULSE OXIMETER) device by Apply Externally route 2 (two) times a day. Use twice daily at 8 AM and 3 PM and record the value in MyChart as directed. 1 each 0     No current facility-administered medications on file prior to visit.     She is allergic to coconut, iodine and iodide containing products, dairy aid [lactase], and penicillins..    Immunization History   Administered Date(s) Administered    COVID-19, MRNA, LN-S, PF (Pfizer) (Purple Cap) 08/28/2021, 09/27/2021    Pneumococcal Polysaccharide - 23 Valent 12/12/2017        Tobacco Use: High Risk    Smoking Tobacco Use: Current Some Day Smoker    Smokeless Tobacco Use: Never Used            Review of Systems   Constitutional: Positive for weight gain, appetite change and fatigue.   Respiratory: Positive for shortness of breath.    All other systems reviewed and are negative.      Outpatient Encounter Medications as of 6/22/2022   Medication Sig Dispense Refill    albuterol (PROAIR HFA) 90 mcg/actuation inhaler USE 2 INHALATIONS EVERY 6 HOURS AS NEEDED FOR WHEEZING 25.5 g 11    amLODIPine (NORVASC) 5 MG tablet Take 1 tablet (5 mg total) by mouth once daily. 90 tablet 3    ascorbic acid, vitamin C, (VITAMIN C) 500 MG tablet Take 1 tablet (500 mg total) by mouth 2 (two) times daily. 14 tablet 0    aspirin (ECOTRIN) 81 MG EC tablet  Take 1 tablet (81 mg total) by mouth once daily. 90 tablet 3    aspirin-caffeine (BC ARTHRITIS) 1,000-65 mg PwPk Take 1 packet by mouth daily as needed.      atorvastatin (LIPITOR) 40 MG tablet TAKE 1 TABLET(40 MG) BY MOUTH EVERY DAY 90 tablet 3    calcium carbonate/vitamin D3 (VITAMIN D-3 ORAL) Take 1 tablet by mouth once daily. (600 mg/ 400 iu)      dextromethorphan-guaiFENesin  mg/5 ml (ROBITUSSIN-DM)  mg/5 mL liquid Take 10 mLs by mouth every 4 (four) hours as needed (cough). 236 mL 0    ELDERBERRY FRUIT ORAL Take 50 mg by mouth once daily.      mv-min/iron/folic/calcium/vitK (WOMEN'S MULTIVITAMIN ORAL) Take 1 tablet by mouth once daily.      naproxen sodium (ANAPROX) 220 MG tablet Take 440 mg by mouth once daily.      pulse oximeter (PULSE OXIMETER) device by Apply Externally route 2 (two) times a day. Use twice daily at 8 AM and 3 PM and record the value in [facility] as directed. 1 each 0     No facility-administered encounter medications on file as of 6/22/2022.       Objective:     Vital Signs (Most Recent)   ]  Wt Readings from Last 2 Encounters:   06/16/22 134.3 kg (296 lb 1.2 oz)   06/15/22 130.8 kg (288 lb 7.6 oz)       Physical Exam   Constitutional: She appears well-developed. She is obese.   HENT:   Head: Normocephalic.   Neurological: She is alert.   Skin: Skin is warm.       Laboratory  Lab Results   Component Value Date    WBC 10.80 06/16/2022    RBC 4.43 06/16/2022    HGB 11.4 (L) 06/16/2022    HCT 41.6 06/16/2022    MCV 94 06/16/2022    MCH 25.7 (L) 06/16/2022    MCHC 27.4 (L) 06/16/2022    RDW 16.2 (H) 06/16/2022     06/16/2022    MPV 11.8 06/16/2022    GRAN 8.6 (H) 06/16/2022    GRAN 79.1 (H) 06/16/2022    LYMPH 1.6 06/16/2022    LYMPH 14.7 (L) 06/16/2022    MONO 0.6 06/16/2022    MONO 5.5 06/16/2022    EOS 0.0 06/16/2022    BASO 0.01 06/16/2022    EOSINOPHIL 0.0 06/16/2022    BASOPHIL 0.1 06/16/2022       BMP  Lab Results   Component Value Date     06/15/2022     K 4.5 06/15/2022    CL 98 06/15/2022    CO2 37 (H) 06/15/2022    BUN 16 06/15/2022    CREATININE 1.1 06/15/2022    CALCIUM 9.1 06/15/2022    ANIONGAP 9 06/15/2022    ESTGFRAFRICA >60 06/15/2022    EGFRNONAA 55 (A) 06/15/2022    AST 33 06/15/2022    ALT 16 06/15/2022    PROT 7.7 06/15/2022       Lab Results   Component Value Date    BNP 21 06/15/2022    BNP 13 01/12/2019       Lab Results   Component Value Date    TSH 0.395 (L) 06/09/2022       Lab Results   Component Value Date    SEDRATE 24 (H) 06/15/2022       No results found for: CRP  No results found for: IGE     No results found for: ASPERGILLUS  No results found for: AFUMIGATUSCL     No results found for: ACE     Diagnostic Results:  I have personally reviewed today the following studies:   1 - No wall motion abnormalities.     2 - Normal left ventricular systolic function (EF 60-65%).     3 - Normal left ventricular diastolic function.     4 - Normal right ventricular systolic function .     5 - The estimated PA systolic pressure is 38 mmHg.     6 - Mild tricuspid regurgitation.     7 - Intermediate central venous pressure.     X-Ray Chest 1 View  Narrative: EXAMINATION:  XR CHEST 1 VIEW    CLINICAL HISTORY:  Shortness of breath    FINDINGS:  Single view of the chest.  Comparison 03/15/2022    Cardiac silhouette is normal.  Aorta demonstrates atherosclerotic disease. The lungs demonstrate no evidence of active disease.  Stable scarring within the left mid lower lung zone. no evidence of pleural effusion or pneumothorax.  Bones appear intact.  Impression: No acute process seen.    Electronically signed by: Michael Lowe MD  Date:    06/15/2022  Time:    09:31       Assessment/Plan:      Problem List Items Addressed This Visit     Severe obesity (BMI >= 40)     Weight loss           Hypertension     On LIPITOR           Former smoker     Smoking cessation           Asthma - Primary     Seasonal symptoms  Albuterol  Recently using inhaler more            Relevant Orders    Spirometry with/without bronchodilator    Stress test, pulmonary    PULSE OXIMETRY OVERNIGHT    X-Ray Chest PA And Lateral    NEBULIZER KIT (SUPPLIES) FOR HOME USE    NEBULIZER FOR HOME USE    Prediabetes     Monitor           Chronic respiratory failure with hypoxia     Patient with Hypoxic Respiratory failure which is Acute on chronic.  she is on home oxygen at 2.5 LPM. Supplemental oxygen was provided and noted-  .   Signs/symptoms of respiratory failure include- recent COVID. Contributing diagnoses includes - Pneumonia Labs and images were reviewed. Patient Has not had a recent ABG. Will treat underlying causes and adjust management of respiratory failure as follows-             Relevant Orders    Spirometry with/without bronchodilator    Stress test, pulmonary    PULSE OXIMETRY OVERNIGHT    X-Ray Chest PA And Lateral    COVID-19     Follow up CXR             Other Visit Diagnoses     Abnormal CXR            Suspect Obesity hypoventilation: office visit and physical exam for clarity of her pul issue  Also has elevated pressure on last echo  Nebulizer ordered  Will eval need for LABA/ICS or Solo ICS    Follow up in about 4 weeks (around 7/20/2022), or CXR, Shilo, ABG, 6MWD, ONSAT, for Follow up with Nichole WELLS.    This note was prepared using voice recognition system and is likely to have sound alike errors that may have been overlooked even after proof reading.  Please call me with any questions    Discussed diagnosis, its evaluation, treatment and usual course. All questions answered.      Frankie Klein MD

## 2022-06-22 NOTE — TELEPHONE ENCOUNTER
----- Message from Yadi Baldwin sent at 6/22/2022 10:23 AM CDT -----  Contact: self  Baylee Muñoz would like a call back at [phone], in regards to getting a letter stating that she is still on medical leave and won't be released until her next appointment in 2 weeks.

## 2022-06-22 NOTE — ASSESSMENT & PLAN NOTE
Patient with Hypoxic Respiratory failure which is Acute on chronic.  she is on home oxygen at 2.5 LPM. Supplemental oxygen was provided and noted-  .   Signs/symptoms of respiratory failure include- recent COVID. Contributing diagnoses includes - Pneumonia Labs and images were reviewed. Patient Has not had a recent ABG. Will treat underlying causes and adjust management of respiratory failure as follows-

## 2022-06-22 NOTE — TELEPHONE ENCOUNTER
DME form needed?    ----- Message from Zonia Dennis sent at 6/22/2022 11:51 AM CDT -----  Contact: Patient  Patient called to consult with nurse or staff regarding a portable oxygen tank. She states she was told the hospital contacted the insurance but that she was denied and wanted to speak with office about this. Patient can be reached at [phone]. Thanks/

## 2022-06-23 ENCOUNTER — OFFICE VISIT (OUTPATIENT)
Dept: INTERNAL MEDICINE | Facility: CLINIC | Age: 62
End: 2022-06-23
Payer: OTHER GOVERNMENT

## 2022-06-23 ENCOUNTER — PATIENT OUTREACH (OUTPATIENT)
Dept: INFECTIOUS DISEASES | Facility: HOSPITAL | Age: 62
End: 2022-06-23
Payer: OTHER GOVERNMENT

## 2022-06-23 ENCOUNTER — TELEPHONE (OUTPATIENT)
Dept: INTERNAL MEDICINE | Facility: CLINIC | Age: 62
End: 2022-06-23

## 2022-06-23 VITALS
RESPIRATION RATE: 18 BRPM | HEART RATE: 79 BPM | OXYGEN SATURATION: 98 % | HEIGHT: 65 IN | BODY MASS INDEX: 47.68 KG/M2 | SYSTOLIC BLOOD PRESSURE: 122 MMHG | WEIGHT: 286.19 LBS | TEMPERATURE: 97 F | DIASTOLIC BLOOD PRESSURE: 84 MMHG

## 2022-06-23 DIAGNOSIS — J96.11 CHRONIC RESPIRATORY FAILURE WITH HYPOXIA: ICD-10-CM

## 2022-06-23 DIAGNOSIS — Z09 HOSPITAL DISCHARGE FOLLOW-UP: Primary | ICD-10-CM

## 2022-06-23 DIAGNOSIS — Z86.16 HISTORY OF COVID-19: ICD-10-CM

## 2022-06-23 DIAGNOSIS — R73.03 PREDIABETES: ICD-10-CM

## 2022-06-23 PROCEDURE — 99215 OFFICE O/P EST HI 40 MIN: CPT | Mod: PBBFAC,PN | Performed by: NURSE PRACTITIONER

## 2022-06-23 PROCEDURE — 99213 PR OFFICE/OUTPT VISIT, EST, LEVL III, 20-29 MIN: ICD-10-PCS | Mod: S$PBB,,, | Performed by: NURSE PRACTITIONER

## 2022-06-23 PROCEDURE — 99213 OFFICE O/P EST LOW 20 MIN: CPT | Mod: S$PBB,,, | Performed by: NURSE PRACTITIONER

## 2022-06-23 PROCEDURE — 99999 PR PBB SHADOW E&M-EST. PATIENT-LVL V: CPT | Mod: PBBFAC,,, | Performed by: NURSE PRACTITIONER

## 2022-06-23 PROCEDURE — 99999 PR PBB SHADOW E&M-EST. PATIENT-LVL V: ICD-10-PCS | Mod: PBBFAC,,, | Performed by: NURSE PRACTITIONER

## 2022-06-23 RX ORDER — MUPIROCIN 20 MG/G
OINTMENT TOPICAL 2 TIMES DAILY
Qty: 30 G | Refills: 2 | Status: SHIPPED | OUTPATIENT
Start: 2022-06-23 | End: 2023-04-11

## 2022-06-23 NOTE — LETTER
June 23, 2022    Baylee Muñoz  [address]  [place] [address]             [facility]  Internal Medicine  Internal Medicine  [address] [place] [address]  [place] [address]  Phone: [phone]  Fax: [phone]   June 23, 2022     Patient: Baylee Muñoz   YOB: 1960   Date of Visit: 6/23/2022       To Whom it May Concern:    Baylee Muñoz will be seen in the office of Tesha Canada PA-C which is the Pulmonary provider authorizing the patient be excused from work until her visit on August 02, 2022 to be medically cleared to return to work from her COVID diagnosis.     Please excuse her from any  work missed.    If you have any questions or concerns, please don't hesitate to call.    Sincerely,         MARTINA Dove

## 2022-06-23 NOTE — Clinical Note
Hello, Patient is inquiring about a portable oxygen device.  Is this something that you all can assist her with?  Thanks for you time,  Becki

## 2022-06-23 NOTE — LETTER
June 23, 2022    Baylee Muñoz  [address]  [place] [address]             [facility] - Internal Medicine  Internal Medicine  [address] [place] [address]  [place] [address]  Phone: [phone]  Fax: [phone]   June 23, 2022     Patient: Baylee Muñoz   YOB: 1960   Date of Visit: 6/23/2022       To Whom it May Concern:    Baylee Muñoz was seen in my clinic on 6/23/2022.  Patient was recently hospitalized 06/15/2022-06/17/2022 for Respiratory Distressed due to Covid-19.  Currently on home oxygen per her pulmonary team.      Please excuse for time missed through July 8, 2022.    If you have any questions or concerns, please don't hesitate to call.    Sincerely,         Becki Hernandez NP

## 2022-06-23 NOTE — TELEPHONE ENCOUNTER
"I placed an excuse into the pt's chart per Tesha Canada PA-C authorizing the pt to be excused from work until she visits her on 08/02/2022. This is the authorization via secured chat . "Hi I am not in the office until Monday. Will see if my staff can write her a note excusing her until then" . The letter was placed . Thanks for your assistance MARIFER Canada.  //js  "

## 2022-06-23 NOTE — PROGRESS NOTES
Subjective:       Patient ID: Baylee Muñoz is a 61 y.o. female.    Chief Complaint: Follow-up (COVID )    Patient presents for hospital follow up.  I saw patient on 06/15/2022.  Was sent to [facility] ER for low sat in clinic.  Was admitted for Acute Respiratory Distress due to Covid.         Hospital Course:   62 y/o admited for acute respiratory failure with hypoxia secondary to COVID-19 infection. Patient started on Remdesivir, dexamethasone, and supplemental oxygen. Today pt doing well, symptoms improved. Pt qualified for home oxygen. Oxygen delivered to bedside. COVID surveillance program ordered. Patient to follow-up with PCP in 3 days for hospital follow-up.     Had follow up visit with pulmonologist on yesterday:     Chronic respiratory failure with hypoxia         Patient with Hypoxic Respiratory failure which is Acute on chronic.  she is on home oxygen at 2.5 LPM. Supplemental oxygen was provided and noted-  .   Signs/symptoms of respiratory failure include- recent COVID. Contributing diagnoses includes - Pneumonia Labs and images were reviewed. Patient Has not had a recent ABG. Will treat underlying causes and adjust management of respiratory failure as follows-        She's wanting the portable oxygen tank.  She's wearing her oxygen during the day and night.      Has tried without while walking through the house but was fatigue.     Was told to continue oxygen for an additional 2 weeks post hospital stay.      Has tender nodular area to left lower abdominal area post Lovenox injection.      Reports body aches improved.      Needing letter for work.  Not able to return with oxygen.     Review of Systems   Constitutional: Positive for fatigue. Negative for chills and fever.   Respiratory: Positive for shortness of breath. Negative for cough.    Cardiovascular: Negative for chest pain and leg swelling.   Gastrointestinal: Negative for abdominal pain, constipation and diarrhea.   Musculoskeletal: Negative for  arthralgias and gait problem.   Psychiatric/Behavioral: Negative for agitation and confusion.         Objective:      Physical Exam  Vitals reviewed.   Constitutional:       Appearance: Normal appearance.   HENT:      Head: Normocephalic.   Cardiovascular:      Rate and Rhythm: Normal rate and regular rhythm.   Pulmonary:      Effort: Pulmonary effort is normal.      Breath sounds: Normal breath sounds.   Skin:     General: Skin is warm.      Findings: Erythema present.          Neurological:      General: No focal deficit present.      Mental Status: She is alert and oriented to person, place, and time.   Psychiatric:         Mood and Affect: Mood normal.         Behavior: Behavior is cooperative.         Assessment:       Problem List Items Addressed This Visit     Prediabetes    Chronic respiratory failure with hypoxia      Other Visit Diagnoses     Hospital discharge follow-up    -  Primary    History of COVID-19              Plan:         Hospital discharge follow-up    Chronic respiratory failure with hypoxia    Prediabetes    History of COVID-19    Other orders  -     mupirocin (BACTROBAN) 2 % ointment; Apply topically 2 (two) times daily. To abdominal area  Dispense: 30 g; Refill: 2      One month follow up.    Will consult with pulmonary regarding portable oxygen.     Continue to monitor pulse oximeter.

## 2022-06-23 NOTE — TELEPHONE ENCOUNTER
Pt is post covid, requesting excuse extension from July 8th until pt can see pulmonary in early August. Pt states she will lose her job while waiting for appointment. Is there anything your department can do for patient?    Pt pulmonary appt is 8/2 with PRISCILLA Parish.     ----- Message from Thor Mendez sent at 6/23/2022  2:24 PM CDT -----  Contact: self  Pt is asking for an return call in regards to paper work she received was filled out in correctly pt states she is needing the paper work redone , please call back at [phone]

## 2022-06-28 NOTE — TELEPHONE ENCOUNTER
Pt requesting portable oxygen, states it is difficult her to maneuver traditional oxygen tanks. Please advise. Had virtual with Dr. Klein recently, f/u with Tesha moran.     ----- Message from Barrera Harrell sent at 6/28/2022  3:28 PM CDT -----  Contact: self  Pt would like to consult with nurse regarding a request portable oxygen.  Please contact Baylee Muñoz @ [phone].  Thanks/As

## 2022-06-29 NOTE — TELEPHONE ENCOUNTER
----- Message from Ag Kingston sent at 6/29/2022  2:51 PM CDT -----  Contact: [phone]  Pt is requesting a call in regards to medication. Pt stated that she needs the solution that is placed inside of nebulizer. Please call her back at [phone]. Thanks KB

## 2022-06-29 NOTE — TELEPHONE ENCOUNTER
Lv was on 06/23/22. Informed her request will be forwarded to the provider once approved she will be notified. She verbalized understanding. Please refill. Thanks //kah

## 2022-06-30 RX ORDER — IPRATROPIUM BROMIDE AND ALBUTEROL SULFATE 2.5; .5 MG/3ML; MG/3ML
3 SOLUTION RESPIRATORY (INHALATION) EVERY 6 HOURS PRN
Qty: 75 ML | Refills: 0 | Status: SHIPPED | OUTPATIENT
Start: 2022-06-30 | End: 2022-10-26 | Stop reason: SDUPTHER

## 2022-07-01 ENCOUNTER — TELEPHONE (OUTPATIENT)
Dept: INTERNAL MEDICINE | Facility: CLINIC | Age: 62
End: 2022-07-01
Payer: OTHER GOVERNMENT

## 2022-07-01 NOTE — TELEPHONE ENCOUNTER
----- Message from Dolly Jeronimo sent at 7/1/2022 10:25 AM CDT -----  Contact: pt  The pt request a return call concerning her paperwork, no additional info given and can be reached at [phone]///[email or web address]

## 2022-07-01 NOTE — TELEPHONE ENCOUNTER
I returned a call back to the pt and she was inquiring on paperwork that she brought into the clinic to be completed for her job. I informed her that they were faxed to [employer] who works with [employer] and she may contact their office to check the status or whether or not if will be given to  to complete. I also explained that there is a 5 to 10 business day turn around time frame for any paperwork request dropped off, mailed, faxed or emailed. She verbalized understanding. Please reach out to pt with status on forms @ [employer]. Thanks angelai //js

## 2022-07-11 ENCOUNTER — TELEPHONE (OUTPATIENT)
Dept: PULMONOLOGY | Facility: CLINIC | Age: 62
End: 2022-07-11
Payer: OTHER GOVERNMENT

## 2022-07-11 NOTE — TELEPHONE ENCOUNTER
spoke with Ms. Muñoz and she will fax insurance/finance paper work        ----- Message from Ashish Valdez sent at 7/11/2022  2:44 PM CDT -----  Contact: [phone]  Pt is calling regarding paper(insurance /finance company) work that will be needing to be filled out . Please call back at [phone] . Thanks/dj

## 2022-07-11 NOTE — TELEPHONE ENCOUNTER
Spoke with pt. Advised pt that in order to get an order for her portable oxygen tank she needs a six minute walk done. Advised  her that she's scheduled for all needed test on 08/01/22 and  08/02/22 and if she qualifies then a order for the portable tank will be ordered. PT voiced understanding.

## 2022-07-11 NOTE — TELEPHONE ENCOUNTER
----- Message from Yaneth Fields sent at 7/11/2022 12:38 PM CDT -----  Contact: Baylee  Type:  Needs Medical Advice    Who Called: Martita with    Symptoms (please be specific):   How long has patient had these symptoms:   Would the patient rather a call back or a response via [facility]? Call     Best Call Back Number: [phone] (home)    [phone] at [facility]   Fax: [phone]       Additional Information: the patient is requesting a referral for a portable oxygen tank post her hospital stay. The patient will be coming in 08/02/2022 and would like to be notified when it is done and if it referral is sent.

## 2022-07-19 ENCOUNTER — TELEPHONE (OUTPATIENT)
Dept: INTERNAL MEDICINE | Facility: CLINIC | Age: 62
End: 2022-07-19
Payer: OTHER GOVERNMENT

## 2022-07-19 NOTE — TELEPHONE ENCOUNTER
"S/w pt, would like to know if she should f/u on 7/26. Pt states her condition has improved since last appt. Would like to know if there is something that can be done because pulmonary does not want to submit codes for portable oxygen until pt is seen by pulmonary on 8/2. Advised pt f/u with Becki may be needed since she had some concerns with "knot" in abdomen from lovenox injections during last hospital stay. Pt verbalized understanding.     ----- Message from Olga Hill sent at 7/19/2022 10:52 AM CDT -----  Contact: [phone]  Patients has questions on rather doctor wants to see her after Lung appointment on 8/2.or before.Please call back at [phone].Thanks      "

## 2022-07-26 ENCOUNTER — OFFICE VISIT (OUTPATIENT)
Dept: INTERNAL MEDICINE | Facility: CLINIC | Age: 62
End: 2022-07-26
Payer: OTHER GOVERNMENT

## 2022-07-26 VITALS
SYSTOLIC BLOOD PRESSURE: 126 MMHG | DIASTOLIC BLOOD PRESSURE: 78 MMHG | HEART RATE: 82 BPM | WEIGHT: 286.63 LBS | RESPIRATION RATE: 18 BRPM | BODY MASS INDEX: 47.75 KG/M2 | TEMPERATURE: 98 F | OXYGEN SATURATION: 97 % | HEIGHT: 65 IN

## 2022-07-26 DIAGNOSIS — Z99.81 ON HOME OXYGEN THERAPY: ICD-10-CM

## 2022-07-26 DIAGNOSIS — R73.03 PREDIABETES: ICD-10-CM

## 2022-07-26 DIAGNOSIS — J96.11 CHRONIC RESPIRATORY FAILURE WITH HYPOXIA: ICD-10-CM

## 2022-07-26 DIAGNOSIS — Z09 FOLLOW UP: Primary | ICD-10-CM

## 2022-07-26 DIAGNOSIS — I10 PRIMARY HYPERTENSION: ICD-10-CM

## 2022-07-26 PROCEDURE — 99213 PR OFFICE/OUTPT VISIT, EST, LEVL III, 20-29 MIN: ICD-10-PCS | Mod: S$PBB,,, | Performed by: NURSE PRACTITIONER

## 2022-07-26 PROCEDURE — 99215 OFFICE O/P EST HI 40 MIN: CPT | Mod: PBBFAC,PN | Performed by: NURSE PRACTITIONER

## 2022-07-26 PROCEDURE — 99213 OFFICE O/P EST LOW 20 MIN: CPT | Mod: S$PBB,,, | Performed by: NURSE PRACTITIONER

## 2022-07-26 PROCEDURE — 99999 PR PBB SHADOW E&M-EST. PATIENT-LVL V: CPT | Mod: PBBFAC,,, | Performed by: NURSE PRACTITIONER

## 2022-07-26 PROCEDURE — 99999 PR PBB SHADOW E&M-EST. PATIENT-LVL V: ICD-10-PCS | Mod: PBBFAC,,, | Performed by: NURSE PRACTITIONER

## 2022-07-26 NOTE — Clinical Note
Good morning,   Patient is requesting a portable tank.  Reports she reached out to the insurance company and they will cover.  They are needed orders with diagnosis code.  Reports her  is leaving and will be out of state.   He usually helps with mobility of the tank.    Is this possible?  She reports reaching out but I didn't see any note.   Thanks for your time.   Becki Hernandez NP

## 2022-07-26 NOTE — PROGRESS NOTES
Subjective:       Patient ID: Baylee Muñoz is a 62 y.o. female.    Chief Complaint: Follow-up (1 mo)    Patient presents for a one month follow up.  Has some concerns.  She's wanting a portable oxygen tank.  She's reached out to her insurance company and they will cover it.     She says the container is too large.  Unable to use to get up stairs.   Her  is leaving out of town and she will be alone.     Reports her oxygen is going down without wearing it.     Review of Systems   Constitutional: Negative for activity change, appetite change, fatigue and fever.   HENT: Negative for nasal congestion, ear discharge, ear pain, mouth sores, nosebleeds, sore throat and tinnitus.    Eyes: Negative for discharge, redness and itching.   Respiratory: Positive for shortness of breath. Negative for apnea and cough.    Cardiovascular: Negative for chest pain and leg swelling.   Gastrointestinal: Negative for abdominal distention, abdominal pain, blood in stool and constipation.   Endocrine: Negative for polydipsia, polyphagia and polyuria.   Genitourinary: Negative for difficulty urinating, flank pain, frequency and hematuria.   Musculoskeletal: Negative for back pain, gait problem, neck pain and neck stiffness.   Integumentary:  Negative for rash and wound.   Allergic/Immunologic: Negative for environmental allergies, food allergies and immunocompromised state.   Neurological: Negative for dizziness, seizures, syncope, numbness and headaches.   Hematological: Negative for adenopathy. Does not bruise/bleed easily.   Psychiatric/Behavioral: Negative for agitation, confusion, hallucinations, self-injury and suicidal ideas.         Objective:      Physical Exam  Vitals reviewed.   Constitutional:       Appearance: Normal appearance.   HENT:      Head: Normocephalic.      Mouth/Throat:      Mouth: Mucous membranes are moist.   Cardiovascular:      Rate and Rhythm: Normal rate and regular rhythm.   Pulmonary:      Breath  sounds: Examination of the right-lower field reveals decreased breath sounds. Examination of the left-lower field reveals decreased breath sounds. Decreased breath sounds present.   Abdominal:          Comments: Erythematous area has improved.  Knot noted to area.     Skin:     General: Skin is warm.   Neurological:      General: No focal deficit present.      Mental Status: She is alert.   Psychiatric:         Mood and Affect: Mood normal.         Behavior: Behavior is cooperative.         Assessment:       Problem List Items Addressed This Visit     Hypertension    Prediabetes    Chronic respiratory failure with hypoxia      Other Visit Diagnoses     Follow up    -  Primary    On home oxygen therapy              Plan:         Follow up    Chronic respiratory failure with hypoxia  Comments:  Keep upcoming follow up pulmonary team.     Primary hypertension    Prediabetes    On home oxygen therapy        Keep upcoming appointment with pulmonary .    Three month follow up.

## 2022-07-26 NOTE — PATIENT INSTRUCTIONS
CONTINUE TO USE INHALER FOR MILD WHEEZING AS NEEDED.     IF WHEEZING OR COUGHING GETS WORSE, USE NEBS 2-3 TIMES A DAY FOR A FEW DAYS THEN AS NEEDED.

## 2022-08-01 ENCOUNTER — TELEPHONE (OUTPATIENT)
Dept: INTERNAL MEDICINE | Facility: CLINIC | Age: 62
End: 2022-08-01
Payer: OTHER GOVERNMENT

## 2022-08-01 ENCOUNTER — CLINICAL SUPPORT (OUTPATIENT)
Dept: PULMONOLOGY | Facility: CLINIC | Age: 62
End: 2022-08-01
Payer: OTHER GOVERNMENT

## 2022-08-01 DIAGNOSIS — J96.11 CHRONIC RESPIRATORY FAILURE WITH HYPOXIA: ICD-10-CM

## 2022-08-01 DIAGNOSIS — J45.909 ASTHMA, UNSPECIFIED ASTHMA SEVERITY, UNSPECIFIED WHETHER COMPLICATED, UNSPECIFIED WHETHER PERSISTENT: ICD-10-CM

## 2022-08-01 PROCEDURE — 99999 PR PBB SHADOW E&M-EST. PATIENT-LVL I: ICD-10-PCS | Mod: PBBFAC,,,

## 2022-08-01 PROCEDURE — 99211 OFF/OP EST MAY X REQ PHY/QHP: CPT | Mod: PBBFAC

## 2022-08-01 PROCEDURE — 94762 N-INVAS EAR/PLS OXIMTRY CONT: CPT | Mod: PBBFAC

## 2022-08-01 PROCEDURE — 99999 PR PBB SHADOW E&M-EST. PATIENT-LVL I: CPT | Mod: PBBFAC,,,

## 2022-08-01 NOTE — TELEPHONE ENCOUNTER
----- Message from Talia Efra sent at 8/1/2022  1:31 PM CDT -----  Pt called in re: Dr. Klein or Dr. Canada willl not give her paperwork re: a work excuse until test results are back from sleep study, and pt cant pay bills.  Pt also states that she was given auth, by the  to have the portable oxygen tank. And she cant continue to carry the big oxygen tank.  Pt states the  only needs the codes for the portable tank from Dr. Klein or Dr. Canada office    [phone]     Thanks [facility]

## 2022-08-02 ENCOUNTER — CLINICAL SUPPORT (OUTPATIENT)
Dept: PULMONOLOGY | Facility: CLINIC | Age: 62
End: 2022-08-02
Payer: OTHER GOVERNMENT

## 2022-08-02 ENCOUNTER — HOSPITAL ENCOUNTER (OUTPATIENT)
Dept: RADIOLOGY | Facility: HOSPITAL | Age: 62
Discharge: HOME | End: 2022-08-02
Attending: INTERNAL MEDICINE
Payer: OTHER GOVERNMENT

## 2022-08-02 ENCOUNTER — OFFICE VISIT (OUTPATIENT)
Dept: PULMONOLOGY | Facility: CLINIC | Age: 62
End: 2022-08-02
Payer: OTHER GOVERNMENT

## 2022-08-02 VITALS
RESPIRATION RATE: 14 BRPM | WEIGHT: 287.25 LBS | HEART RATE: 82 BPM | SYSTOLIC BLOOD PRESSURE: 129 MMHG | BODY MASS INDEX: 47.86 KG/M2 | HEIGHT: 65 IN | DIASTOLIC BLOOD PRESSURE: 76 MMHG | OXYGEN SATURATION: 90 %

## 2022-08-02 VITALS — BODY MASS INDEX: 47.84 KG/M2 | WEIGHT: 287.13 LBS | HEIGHT: 65 IN

## 2022-08-02 DIAGNOSIS — R09.81 NASAL CONGESTION: ICD-10-CM

## 2022-08-02 DIAGNOSIS — J44.9 OBSTRUCTIVE LUNG DISEASE: ICD-10-CM

## 2022-08-02 DIAGNOSIS — J96.11 CHRONIC RESPIRATORY FAILURE WITH HYPOXIA: ICD-10-CM

## 2022-08-02 DIAGNOSIS — J45.909 ASTHMA, UNSPECIFIED ASTHMA SEVERITY, UNSPECIFIED WHETHER COMPLICATED, UNSPECIFIED WHETHER PERSISTENT: ICD-10-CM

## 2022-08-02 DIAGNOSIS — E66.01 SEVERE OBESITY (BMI >= 40): ICD-10-CM

## 2022-08-02 DIAGNOSIS — J96.11 CHRONIC RESPIRATORY FAILURE WITH HYPOXIA: Primary | ICD-10-CM

## 2022-08-02 DIAGNOSIS — Z87.891 FORMER SMOKER: ICD-10-CM

## 2022-08-02 PROCEDURE — 99214 OFFICE O/P EST MOD 30 MIN: CPT | Mod: 25,S$PBB,, | Performed by: PHYSICIAN ASSISTANT

## 2022-08-02 PROCEDURE — 99999 PR PBB SHADOW E&M-EST. PATIENT-LVL I: ICD-10-PCS | Mod: PBBFAC,,,

## 2022-08-02 PROCEDURE — 71046 XR CHEST PA AND LATERAL: ICD-10-PCS | Mod: 26,,, | Performed by: RADIOLOGY

## 2022-08-02 PROCEDURE — 94060 PR EVAL OF BRONCHOSPASM: ICD-10-PCS | Mod: 26,59,S$PBB, | Performed by: INTERNAL MEDICINE

## 2022-08-02 PROCEDURE — 99212 OFFICE O/P EST SF 10 MIN: CPT | Mod: PBBFAC,25,27

## 2022-08-02 PROCEDURE — 99999 PR PBB SHADOW E&M-EST. PATIENT-LVL II: CPT | Mod: PBBFAC,,,

## 2022-08-02 PROCEDURE — 71046 X-RAY EXAM CHEST 2 VIEWS: CPT | Mod: 26,,, | Performed by: RADIOLOGY

## 2022-08-02 PROCEDURE — 71046 X-RAY EXAM CHEST 2 VIEWS: CPT | Mod: TC

## 2022-08-02 PROCEDURE — 94618 PULMONARY STRESS TESTING: CPT | Mod: PBBFAC

## 2022-08-02 PROCEDURE — 99999 PR PBB SHADOW E&M-EST. PATIENT-LVL I: CPT | Mod: PBBFAC,,,

## 2022-08-02 PROCEDURE — 99999 PR PBB SHADOW E&M-EST. PATIENT-LVL V: CPT | Mod: PBBFAC,,, | Performed by: PHYSICIAN ASSISTANT

## 2022-08-02 PROCEDURE — 99211 OFF/OP EST MAY X REQ PHY/QHP: CPT | Mod: PBBFAC,25

## 2022-08-02 PROCEDURE — 99999 PR PBB SHADOW E&M-EST. PATIENT-LVL II: ICD-10-PCS | Mod: PBBFAC,,,

## 2022-08-02 PROCEDURE — 99215 OFFICE O/P EST HI 40 MIN: CPT | Mod: PBBFAC,25,27 | Performed by: PHYSICIAN ASSISTANT

## 2022-08-02 PROCEDURE — 99214 PR OFFICE/OUTPT VISIT, EST, LEVL IV, 30-39 MIN: ICD-10-PCS | Mod: 25,S$PBB,, | Performed by: PHYSICIAN ASSISTANT

## 2022-08-02 PROCEDURE — 94060 EVALUATION OF WHEEZING: CPT | Mod: 26,59,S$PBB, | Performed by: INTERNAL MEDICINE

## 2022-08-02 PROCEDURE — 94618 PULMONARY STRESS TESTING: ICD-10-PCS | Mod: 26,S$PBB,, | Performed by: INTERNAL MEDICINE

## 2022-08-02 PROCEDURE — 94618 PULMONARY STRESS TESTING: CPT | Mod: 26,S$PBB,, | Performed by: INTERNAL MEDICINE

## 2022-08-02 PROCEDURE — 94060 EVALUATION OF WHEEZING: CPT | Mod: PBBFAC

## 2022-08-02 PROCEDURE — 99999 PR PBB SHADOW E&M-EST. PATIENT-LVL V: ICD-10-PCS | Mod: PBBFAC,,, | Performed by: PHYSICIAN ASSISTANT

## 2022-08-02 RX ORDER — FLUTICASONE PROPIONATE AND SALMETEROL 250; 50 UG/1; UG/1
1 POWDER RESPIRATORY (INHALATION) 2 TIMES DAILY
Qty: 60 EACH | Refills: 3 | Status: SHIPPED | OUTPATIENT
Start: 2022-08-02 | End: 2022-12-29

## 2022-08-02 RX ORDER — FLUTICASONE PROPIONATE 50 MCG
1 SPRAY, SUSPENSION (ML) NASAL DAILY
Qty: 16 G | Refills: 3 | Status: SHIPPED | OUTPATIENT
Start: 2022-08-02 | End: 2023-12-07 | Stop reason: SDUPTHER

## 2022-08-02 NOTE — PROGRESS NOTES
Subjective:       Patient ID: Baylee Muñoz is a 62 y.o. female.    Chief Complaint: Asthma      61yo female  SOB follow up, suspected asthma  Post covid syndrome  Saw Dr. Klein in June video visit for hospital follow up  Chest X Ray, jean, and walk today  She uses oxygen 3L with activity, requesting portable concentrator  Some relief of SOB with albuterol  She is motivated to go back to work  Also wants to work on weight loss for her SOB and joint pains  Qualifies for oxygen with activity today  Surrency with severe obstruction, some bronchodilator reversal      6/2022 Dr. Klein:  Baylee Muñoz is 61 y.o.  Follow up from hospital: COVID 19  Dexamethasone, remdesivir, Vit C, Zinc  O2 sat was 88% in ER  Left pneumonia seen  Nabil 05/24/2022  Was placed on oxygen during hospitalization  2.5 LPM. Cannot return to work on O2  Pneumonia: Abx completed  Vacination for covid, Booster 1 and 2 lacking  Ocupation:  cole  Smoking former: 1 PPD, 30 years  No TB exposures  Inhaler: Albuterol  DME: [facility]  Nebulizer: in storage     Assessment/plan:  Suspect Obesity hypoventilation: office visit and physical exam for clarity of her pul issue  Also has elevated pressure on last echo  Nebulizer ordered  Will eval need for LABA/ICS or Solo ICS    Asthma Control Test  In the past 4  weeks, how much of the time did your asthma keep you from getting as much done at work, school or at home?: None of the time  During the past 4 weeks, how often have you had shortness of breath?: More than once a day  During the past 4 weeks, how often did your asthma symptoms (wheezing, couging, shortness of breath, chest tightness or pain) wake you up at night or earlier than usual in the morning?: Not at all  During the past 4 weeks, how often have you used your rescue inhaler or nebulizer medication (such as albuterol)?: Once a week or less  How would you rate your asthma control during the past 4 weeks?: Well controlled  If your  score is 19 or less, your asthma may not be under control: 19     Immunization History   Administered Date(s) Administered    COVID-19, MRNA, LN-S, PF (Pfizer) (Purple Cap) 08/28/2021, 09/27/2021    Pneumococcal Polysaccharide - 23 Valent 12/12/2017      Tobacco Use: Medium Risk    Smoking Tobacco Use: Former Smoker    Smokeless Tobacco Use: Never Used      Past Medical History:   Diagnosis Date    Allergy     Arthritis     Asthma     Admit to [facility] 1/6/19 by Dr. Michael Randall for asthma exaceration, acute bronchitis, acute dCHF, hypoxia, elevated troponin/BNP (thought to be 2/2 asthma exac & acute dCHF by cards), tx'd w/ IV lasix, duonebs, IV solumedrol and DC'd on Z-prema, prednisone taper    CKD (chronic kidney disease) stage 2, GFR 60-89 ml/min     H/o ARF 2/2 poor water intake; encouraged to increase water intake and avoid NSAIDS & contrast dye    COVID 06/2022    Depression     Diastolic heart failure 01/06/2019    Dx'd at [place] during asthma exacerbation    Diverticulitis 2005    Dyslipidemia 08/28/2018    Former smoker     Quit 12/31/18; smoked 0.12 packs/day x 37 years    Gastritis     Hypertension     Pneumonia 06/2022    Prediabetes     A1c 6.1% on 9/29/19    Severe obesity (BMI >= 40)     Vertigo     Vitamin D deficiency       Current Outpatient Medications on File Prior to Visit   Medication Sig Dispense Refill    albuterol (PROAIR HFA) 90 mcg/actuation inhaler USE 2 INHALATIONS EVERY 6 HOURS AS NEEDED FOR WHEEZING 25.5 g 11    albuterol-ipratropium (DUO-NEB) 2.5 mg-0.5 mg/3 mL nebulizer solution Take 3 mLs by nebulization every 6 (six) hours as needed for Wheezing. Rescue 75 mL 0    amLODIPine (NORVASC) 5 MG tablet Take 1 tablet (5 mg total) by mouth once daily. 90 tablet 3    aspirin (ECOTRIN) 81 MG EC tablet Take 1 tablet (81 mg total) by mouth once daily. 90 tablet 3    aspirin-caffeine (BC ARTHRITIS) 1,000-65 mg PwPk Take 1 packet by mouth daily as needed.      atorvastatin  "(LIPITOR) 40 MG tablet TAKE 1 TABLET(40 MG) BY MOUTH EVERY DAY 90 tablet 3    calcium carbonate/vitamin D3 (VITAMIN D-3 ORAL) Take 1 tablet by mouth once daily. (600 mg/ 400 iu)      ELDERBERRY FRUIT ORAL Take 50 mg by mouth once daily.      mupirocin (BACTROBAN) 2 % ointment Apply topically 2 (two) times daily. To abdominal area 30 g 2    mv-min/iron/folic/calcium/vitK (WOMEN'S MULTIVITAMIN ORAL) Take 1 tablet by mouth once daily.      naproxen sodium (ANAPROX) 220 MG tablet Take 440 mg by mouth once daily.      pulse oximeter (PULSE OXIMETER) device by Apply Externally route 2 (two) times a day. Use twice daily at 8 AM and 3 PM and record the value in Via6t as directed. 1 each 0     No current facility-administered medications on file prior to visit.        Review of Systems   Constitutional: Positive for fatigue. Negative for fever, weight loss, appetite change and weakness.   HENT: Negative for postnasal drip, rhinorrhea, sinus pressure, trouble swallowing and congestion.    Respiratory: Positive for shortness of breath, dyspnea on extertion and somnolence. Negative for cough, sputum production, choking, chest tightness and wheezing.    Cardiovascular: Negative for chest pain and leg swelling.   Musculoskeletal: Positive for arthralgias. Negative for gait problem and joint swelling.   Gastrointestinal: Negative for nausea, vomiting and abdominal pain.   Neurological: Negative for dizziness, weakness and headaches.   All other systems reviewed and are negative.      Objective:       Vitals:    08/02/22 1432   BP: 129/76   Pulse: 82   Resp: 14   SpO2: (!) 90%   Weight: 130.3 kg (287 lb 4.2 oz)   Height: 5' 5" (1.651 m)       Physical Exam   Constitutional: She is oriented to person, place, and time. She appears well-developed and well-nourished. No distress. She is obese.   HENT:   Head: Normocephalic.   Nose: Nose normal.   Mouth/Throat: Oropharynx is clear and moist.   Cardiovascular: Normal rate and " regular rhythm.   Pulmonary/Chest: Effort normal. No respiratory distress. She has no wheezes. She has no rhonchi. She has no rales.   Musculoskeletal:         General: No edema.      Cervical back: Normal range of motion and neck supple.   Lymphadenopathy: No supraclavicular adenopathy is present.     She has no cervical adenopathy.   Neurological: She is alert and oriented to person, place, and time. Gait normal.   Skin: Skin is warm and dry.   Psychiatric: She has a normal mood and affect.   Vitals reviewed.    Personal Diagnostic Review    X-Ray Chest PA And Lateral  Narrative: EXAMINATION:  XR CHEST PA AND LATERAL    CLINICAL HISTORY:  Unspecified asthma, uncomplicated    TECHNIQUE:  PA and lateral views of the chest were performed.    COMPARISON:  06/15/2022, 03/15/2022    FINDINGS:  Cardiac silhouette and mediastinal contours are stable.  Lungs demonstrate no acute opacity.  Similar linear changes within the left mid/lower lung, likely scarring.  No pleural effusion.  Osseous structures are intact.  Impression: Similar appearance of the chest    Electronically signed by: Vince Carmona MD  Date:    08/02/2022  Time:    13:33    8/2/22  Oxygen Assessment Supplemental O2 Heart   Rate Blood Pressure Shanice Dyspnea Scale Rating    Resting 90 % Room Air 82 bpm 129/76 3   Exercise             Minute             1 88 % (placed NC 2L) Room Air 90 bpm       2 91 % 2 L/M 100 bpm       3 88 % (NC increased to 3L) 2 L/M 102 bpm       4 91 % 3 L/M 107 bpm       5 94 % 3 L/M 107 bpm       6  95 % 3 L/M 104 bpm 127/65 5-6   Recovery             Minute             1 98 % 3 L/M 93 bpm       2 97 % 3 L/M 84 bpm       3 96 % 3 L/M 86 bpm       4 96 % 3 L/M 87 bpm 134/71 3      Six Minute Walk Summary  6MWT Status: completed without stopping  Patient Reported: Other (Comment), Dyspnea (Knee pain; lower back pain)               Interpretation:  Did the patient stop or pause?: No  Total Time Walked (Calculated): 360  seconds  Final Partial Lap Distance (feet): 100 feet  Total Distance Meters (Calculated): 335.28 meters  Predicted Distance Meters (Calculated): 370.29 meters  Percentage of Predicted (Calculated): 90.55  Peak VO2 (Calculated): 14.04  Mets: 4.01  Has The Patient Had a Previous Six Minute Walk Test?: No     Previous 6MWT Results  Has The Patient Had a Previous Six Minute Walk Test?: No    22 Spirometry  FVC 61.9% predicted, FEV1 48.9% predicted (9.5% improvement post bronchodilator), FEV1/FVC 65%      Assessment/Plan:       Problem List Items Addressed This Visit        ENT    Nasal congestion    Relevant Medications    fluticasone propionate (FLONASE) 50 mcg/actuation nasal spray       Pulmonary    Chronic respiratory failure with hypoxia - Primary     Home oxygen ordered 3L with activity  Overnight oximetry results pending           Relevant Orders    OXYGEN FOR HOME USE    Obstructive lung disease     Start Advair BID, risks of ICS discussed and need to rinse mouth after use  albuterol prn  Oxygen with activity, order for portable concentrator faxed to [employer]  Weight loss and exercise to improve overall health  F/u 3 months with FeNO           Relevant Medications    fluticasone-salmeterol diskus inhaler 250-50 mcg    Other Relevant Orders    Fraction of  Nitric Oxide       Endocrine    Severe obesity (BMI >= 40)     Weight loss and exercise to improve overall health.                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  FeNO next visit.    Discussed diagnosis, its evaluation, treatment and usual course. All questions answered.    Patient verbalized understanding of plan and left in no acute distress    Thank you for the courtesy of participating in the care of this patient    Tesha Canada PA-C

## 2022-08-02 NOTE — ASSESSMENT & PLAN NOTE
Start Advair BID, risks of ICS discussed and need to rinse mouth after use  albuterol prn  Oxygen with activity, order for portable concentrator faxed to [employer]  Weight loss and exercise to improve overall health  F/u 3 months with FeNO

## 2022-08-02 NOTE — PROCEDURES
[facility]  [address] * [place] [address]  Telephone: [phone]  Test date: 22 Start: 22 22:56:34 Baylee Muñoz  Doctor: JUAN M Klein MD End: 22 05:59:22 [id number]  Oximetry: Summary Report  Comments: Room air  Recording time: 07:02:48 Highest pulse: 250 Highest SpO2: 92%  Excluded samplin:09:28 Lowest pulse: 62 Lowest SpO2: 44%  Total valid samplin:53:20 Mean pulse: 80 Mean SpO2: 74.5%  1 S.D.: 5.9 1 S.D.: 7.7  Time with SpO2<90: 6:52:56, 99.9%  Time with SpO2<80: 5:09:20, 74.8%  Time with SpO2<70: 1:20:44, 19.5%  Time with SpO2<60: 0:29:56, 7.2%  Time with SpO2<89: 6:51:36, 99.6%  Time with SpO2 =>90: 0:00:24, 0.1%  Time with SpO2=>80 & <90: 1:43:36, 25.1%  Time with SpO2=>70 & <80: 3:48:36, 55.3%  Time with SpO2=>60 & <70: 0:50:48, 12.3%  The longest continuous time with saturation <=88 was 02:27:40, which started at  22 03:31:14.  A desaturation event was defined as a decrease of saturation by 4 or more.  One event was excluded due to artifact.  There were 16 desaturation events over 3 minutes duration.  There were 197 desaturation events of less than 3 minutes duration during which:  The mean high was 75.3%. The mean low was 67.0%.  The number of these events that were:  > 0 & <10 seconds: 18 > 0 seconds: 197  =>10 & <20 seconds: 31 =>10 seconds: 179  =>20 & <30 seconds: 53 =>20 seconds: 148  =>30 & <40 seconds: 24 =>30 seconds: 95  =>40 & <50 seconds: 12 =>40 seconds: 71  =>50 & <60 seconds: 10 =>50 seconds: 59  =>60 seconds: 49 =>60 seconds: 49  The mean length of desaturation events that were >=10 sec & <=3 mins was: 44.3 sec.  Desaturation event index (events >=10 sec per sampled hour): 26.0  Desaturation event index (events >= 0 sec per sampled hour): 28.6      OVERNIGHT OXIMETRY REPORT:    Dictated by: Frankie Klein MD  Test date: 2022  Dictated on: 2022      Comment: This test was performed on Room Air     A  desaturation event was defined as a decrease of saturation by 4 or more.    REPORT SUMMARY  Total valid samplin:53:20   High SpO2: 92%    Low SpO2: 44%    Mean SpO2  74.5 %  Cumulative time with oxygen saturation less than 88% (TC88): 6:51:36    CLINICAL INTERPRETATION   There is  significant nocturnal oxygen desaturation,  Clinical correlation is advised, and  Recommend overnight polysomnography if clinically indicated    Medicare Criteria Comments:   Oximetry test results suggest the patient falls under Medicare Group 1 Criteria. ( Arterial oxygen saturation at or below 88% for at least 5 minutes taken during sleep)  Frankie Klein MD    Details about Medicare Group Criteria coverage can be found at http://www.cms.hhs.gov/manuals/downloads/

## 2022-08-02 NOTE — PROCEDURES
"[facility] - Pulmonary Function  Six Minute Walk   SUMMARY   Ordering Provider: Frankie Klein MD   Interpreting Provider: Frankie Klein MD  Performing nurse/tech/RT: V. T., RT  Diagnosis:  (Asthma, unspecified asthma severity, unspecified whether complicated, unspecified whether persistent; Chronic respiratory failure with hypoxia)  Height: 5' 5" (165.1 cm)  Weight: 130.3 kg (287 lb 2.4 oz)  BMI (Calculated): 47.8   Patient Race:    Phase Oxygen Assessment Supplemental O2 Heart   Rate Blood Pressure Shanice Dyspnea Scale Rating   Resting 90 % Room Air 82 bpm 129/76 3   Exercise        Minute        1 88 % (placed NC 2L) Room Air 90 bpm     2 91 % 2 L/M 100 bpm     3 88 % (NC increased to 3L) 2 L/M 102 bpm     4 91 % 3 L/M 107 bpm     5 94 % 3 L/M 107 bpm     6  95 % 3 L/M 104 bpm 127/65 5-6   Recovery        Minute        1 98 % 3 L/M 93 bpm     2 97 % 3 L/M 84 bpm     3 96 % 3 L/M 86 bpm     4 96 % 3 L/M 87 bpm 134/71 3     Six Minute Walk Summary  6MWT Status: completed without stopping  Patient Reported: Other (Comment), Dyspnea (Knee pain; lower back pain)     Interpretation:  Did the patient stop or pause?: No                  Total Time Walked (Calculated): 360 seconds  Final Partial Lap Distance (feet): 100 feet  Total Distance Meters (Calculated): 335.28 meters  Predicted Distance Meters (Calculated): 370.29 meters  Percentage of Predicted (Calculated): 90.55  Peak VO2 (Calculated): 14.04  Mets: 4.01  Has The Patient Had a Previous Six Minute Walk Test?: No       Previous 6MWT Results  Has The Patient Had a Previous Six Minute Walk Test?: No         CLINICAL INTERPRETATION:  Six minute walk distance is 335.28m (90.55 % predicted) with moderate dyspnea.  During exercise, there was significant desaturation while breathing room air.  Blood pressure remained stable and Heart rate remained stable with walking.  The patient reported non-pulmonary symptoms during exercise.  The patient did " complete the study, walking 360 seconds of the 360 second test.  The patient may benefit from using supplemental oxygen during exertion.  Based upon age and body mass index, exercise capacity is normal.      [] Mild exercise-induced hypoxemia described as an arterial oxygen saturation of 93-95% (or 3-4% less than at rest)  []  Moderate exercise-induced hypoxemia as 89-93%  [x]  Severe exercise induced hypoxemia as < 89% O2 saturation.  Medicare Criteria for oxygen prescription comments:   []  When arterial oxygen saturation is at or below 88% during exercise (severe exercise induced hypoxemia) then the patient falls under Medicare Group 1 criteria for supplemental oxygen

## 2022-08-03 ENCOUNTER — TELEPHONE (OUTPATIENT)
Dept: PULMONOLOGY | Facility: CLINIC | Age: 62
End: 2022-08-03
Payer: OTHER GOVERNMENT

## 2022-08-03 NOTE — TELEPHONE ENCOUNTER
----- Message from Tesha Canada PA-C sent at 8/3/2022  2:33 PM CDT -----  Oxygen should be 3L based on walk testing done yesterday. Should keep her oxygen saturation at 89% or above, if 88% or below needs to wear oxygen  ----- Message -----  From: Gabriel Mckinnon MA  Sent: 8/3/2022   9:48 AM CDT  To: Tesha Canada PA-C      ----- Message -----  From: Denise Pineda  Sent: 8/3/2022   9:39 AM CDT  To: [facility]    Pt would like to be called back regarding  the level for oxygen, don't know what levels they should be on    Pt can be reached at  [phone]

## 2022-08-03 NOTE — TELEPHONE ENCOUNTER
spoke with pt and confirmed her oxygen should be at 3L based on walk from 08/02/22 pt states she will come to office tomorrow. job would not accept her discharge paper because of how its worded

## 2022-08-04 ENCOUNTER — TELEPHONE (OUTPATIENT)
Dept: PULMONOLOGY | Facility: CLINIC | Age: 62
End: 2022-08-04
Payer: OTHER GOVERNMENT

## 2022-08-04 LAB
BRPFT: ABNORMAL
FEF 25 75 CHG: 11 %
FEF 25 75 LLN: 0.89
FEF 25 75 POST REF: 29.6 %
FEF 25 75 PRE REF: 26.6 %
FEF 25 75 REF: 2.05
FET100 CHG: -12.1 %
FEV1 CHG: 9.5 %
FEV1 FVC CHG: 3.3 %
FEV1 FVC LLN: 68
FEV1 FVC POST REF: 81.2 %
FEV1 FVC PRE REF: 78.6 %
FEV1 FVC REF: 80
FEV1 LLN: 1.62
FEV1 POST REF: 53.6 %
FEV1 PRE REF: 48.9 %
FEV1 REF: 2.22
FVC CHG: 6 %
FVC LLN: 2.07
FVC POST REF: 65.6 %
FVC PRE REF: 61.9 %
FVC REF: 2.8
PEF CHG: -7.6 %
PEF LLN: 3.7
PEF POST REF: 57.9 %
PEF PRE REF: 62.7 %
PEF REF: 5.76
POST FEF 25 75: 0.6 L/S (ref 0.89–3.21)
POST FET 100: 8.1 SEC
POST FEV1 FVC: 64.67 % (ref 67.98–91.31)
POST FEV1: 1.19 L (ref 1.62–2.82)
POST FVC: 1.84 L (ref 2.07–3.54)
POST PEF: 3.34 L/S (ref 3.7–7.83)
PRE FEF 25 75: 0.55 L/S (ref 0.89–3.21)
PRE FET 100: 9.22 SEC
PRE FEV1 FVC: 62.62 % (ref 67.98–91.31)
PRE FEV1: 1.09 L (ref 1.62–2.82)
PRE FVC: 1.74 L (ref 2.07–3.54)
PRE PEF: 3.61 L/S (ref 3.7–7.83)

## 2022-08-09 ENCOUNTER — PATIENT MESSAGE (OUTPATIENT)
Dept: ADMINISTRATIVE | Facility: HOSPITAL | Age: 62
End: 2022-08-09
Payer: OTHER GOVERNMENT

## 2022-08-09 ENCOUNTER — HOSPITAL ENCOUNTER (OUTPATIENT)
Facility: HOSPITAL | Age: 62
Discharge: HOME | End: 2022-08-10
Attending: EMERGENCY MEDICINE | Admitting: INTERNAL MEDICINE
Payer: OTHER GOVERNMENT

## 2022-08-09 ENCOUNTER — ANESTHESIA EVENT (OUTPATIENT)
Dept: ENDOSCOPY | Facility: HOSPITAL | Age: 62
End: 2022-08-09
Payer: OTHER GOVERNMENT

## 2022-08-09 ENCOUNTER — ANESTHESIA (OUTPATIENT)
Dept: ENDOSCOPY | Facility: HOSPITAL | Age: 62
End: 2022-08-09
Payer: OTHER GOVERNMENT

## 2022-08-09 DIAGNOSIS — R07.9 CHEST PAIN: ICD-10-CM

## 2022-08-09 DIAGNOSIS — J45.909 ASTHMA: Primary | ICD-10-CM

## 2022-08-09 DIAGNOSIS — K57.92 DIVERTICULITIS: ICD-10-CM

## 2022-08-09 DIAGNOSIS — K92.2 GIB (GASTROINTESTINAL BLEEDING): ICD-10-CM

## 2022-08-09 DIAGNOSIS — Z87.19 H/O NSAID-ASSOCIATED GASTROPATHY: Primary | ICD-10-CM

## 2022-08-09 PROBLEM — K92.1 HEMATOCHEZIA: Status: ACTIVE | Noted: 2022-08-09

## 2022-08-09 LAB
ABO + RH BLD: NORMAL
ALBUMIN SERPL BCP-MCNC: 3.5 G/DL (ref 3.5–5.2)
ALP SERPL-CCNC: 99 U/L (ref 55–135)
ALT SERPL W/O P-5'-P-CCNC: 16 U/L (ref 10–44)
ANION GAP SERPL CALC-SCNC: 8 MMOL/L (ref 8–16)
AST SERPL-CCNC: 19 U/L (ref 10–40)
BASOPHILS # BLD AUTO: 0.02 K/UL (ref 0–0.2)
BASOPHILS NFR BLD: 0.2 % (ref 0–1.9)
BILIRUB SERPL-MCNC: 0.4 MG/DL (ref 0.1–1)
BLD GP AB SCN CELLS X3 SERPL QL: NORMAL
BUN SERPL-MCNC: 16 MG/DL (ref 6–20)
CALCIUM SERPL-MCNC: 9.3 MG/DL (ref 8.7–10.5)
CHLORIDE SERPL-SCNC: 99 MMOL/L (ref 95–110)
CO2 SERPL-SCNC: 37 MMOL/L (ref 23–29)
CREAT SERPL-MCNC: 1 MG/DL (ref 0.5–1.4)
CTP QC/QA: YES
DIFFERENTIAL METHOD: ABNORMAL
EOSINOPHIL # BLD AUTO: 0.1 K/UL (ref 0–0.5)
EOSINOPHIL NFR BLD: 1.3 % (ref 0–8)
ERYTHROCYTE [DISTWIDTH] IN BLOOD BY AUTOMATED COUNT: 16 % (ref 11.5–14.5)
EST. GFR  (NO RACE VARIABLE): >60 ML/MIN/1.73 M^2
GLUCOSE SERPL-MCNC: 129 MG/DL (ref 70–110)
HCT VFR BLD AUTO: 36.2 % (ref 37–48.5)
HCT VFR BLD AUTO: 38 % (ref 37–48.5)
HCT VFR BLD AUTO: 38.7 % (ref 37–48.5)
HGB BLD-MCNC: 10.3 G/DL (ref 12–16)
HGB BLD-MCNC: 10.8 G/DL (ref 12–16)
HGB BLD-MCNC: 11.1 G/DL (ref 12–16)
IMM GRANULOCYTES # BLD AUTO: 0.03 K/UL (ref 0–0.04)
IMM GRANULOCYTES NFR BLD AUTO: 0.3 % (ref 0–0.5)
INR PPP: 0.9 (ref 0.8–1.2)
LYMPHOCYTES # BLD AUTO: 2.9 K/UL (ref 1–4.8)
LYMPHOCYTES NFR BLD: 32.3 % (ref 18–48)
MCH RBC QN AUTO: 26 PG (ref 27–31)
MCHC RBC AUTO-ENTMCNC: 28.7 G/DL (ref 32–36)
MCV RBC AUTO: 91 FL (ref 82–98)
MONOCYTES # BLD AUTO: 0.7 K/UL (ref 0.3–1)
MONOCYTES NFR BLD: 7.6 % (ref 4–15)
NEUTROPHILS # BLD AUTO: 5.2 K/UL (ref 1.8–7.7)
NEUTROPHILS NFR BLD: 58.3 % (ref 38–73)
NRBC BLD-RTO: 0 /100 WBC
PLATELET # BLD AUTO: 265 K/UL (ref 150–450)
PMV BLD AUTO: 11.1 FL (ref 9.2–12.9)
POTASSIUM SERPL-SCNC: 4.3 MMOL/L (ref 3.5–5.1)
PROT SERPL-MCNC: 7 G/DL (ref 6–8.4)
PROTHROMBIN TIME: 9.8 SEC (ref 9–12.5)
RBC # BLD AUTO: 4.27 M/UL (ref 4–5.4)
SARS-COV-2 RDRP RESP QL NAA+PROBE: NEGATIVE
SODIUM SERPL-SCNC: 144 MMOL/L (ref 136–145)
TROPONIN I SERPL DL<=0.01 NG/ML-MCNC: 0.02 NG/ML (ref 0–0.03)
WBC # BLD AUTO: 8.92 K/UL (ref 3.9–12.7)

## 2022-08-09 PROCEDURE — 96375 TX/PRO/DX INJ NEW DRUG ADDON: CPT

## 2022-08-09 PROCEDURE — 99204 PR OFFICE/OUTPT VISIT, NEW, LEVL IV, 45-59 MIN: ICD-10-PCS | Mod: 25,,, | Performed by: INTERNAL MEDICINE

## 2022-08-09 PROCEDURE — 85014 HEMATOCRIT: CPT | Mod: 91 | Performed by: INTERNAL MEDICINE

## 2022-08-09 PROCEDURE — 96376 TX/PRO/DX INJ SAME DRUG ADON: CPT

## 2022-08-09 PROCEDURE — 99204 OFFICE O/P NEW MOD 45 MIN: CPT | Mod: 25,,, | Performed by: INTERNAL MEDICINE

## 2022-08-09 PROCEDURE — 96375 TX/PRO/DX INJ NEW DRUG ADDON: CPT | Mod: 59

## 2022-08-09 PROCEDURE — 36415 COLL VENOUS BLD VENIPUNCTURE: CPT | Performed by: NURSE PRACTITIONER

## 2022-08-09 PROCEDURE — G0378 HOSPITAL OBSERVATION PER HR: HCPCS

## 2022-08-09 PROCEDURE — 93010 EKG 12-LEAD: ICD-10-PCS | Mod: ,,, | Performed by: INTERNAL MEDICINE

## 2022-08-09 PROCEDURE — 43255 EGD CONTROL BLEEDING ANY: CPT | Performed by: INTERNAL MEDICINE

## 2022-08-09 PROCEDURE — 63600175 PHARM REV CODE 636 W HCPCS: Performed by: INTERNAL MEDICINE

## 2022-08-09 PROCEDURE — 63600175 PHARM REV CODE 636 W HCPCS: Performed by: NURSE PRACTITIONER

## 2022-08-09 PROCEDURE — 85014 HEMATOCRIT: CPT | Mod: 59 | Performed by: NURSE PRACTITIONER

## 2022-08-09 PROCEDURE — 84484 ASSAY OF TROPONIN QUANT: CPT | Performed by: INTERNAL MEDICINE

## 2022-08-09 PROCEDURE — 86850 RBC ANTIBODY SCREEN: CPT | Performed by: EMERGENCY MEDICINE

## 2022-08-09 PROCEDURE — 00813 ANES UPR LWR GI NDSC PX: CPT | Performed by: INTERNAL MEDICINE

## 2022-08-09 PROCEDURE — 88305 TISSUE EXAM BY PATHOLOGIST: CPT | Mod: 26,,, | Performed by: PATHOLOGY

## 2022-08-09 PROCEDURE — 36415 COLL VENOUS BLD VENIPUNCTURE: CPT | Performed by: INTERNAL MEDICINE

## 2022-08-09 PROCEDURE — 27201012 HC FORCEPS, HOT/COLD, DISP: Performed by: INTERNAL MEDICINE

## 2022-08-09 PROCEDURE — 93005 ELECTROCARDIOGRAM TRACING: CPT | Mod: 59

## 2022-08-09 PROCEDURE — 85610 PROTHROMBIN TIME: CPT | Performed by: NURSE PRACTITIONER

## 2022-08-09 PROCEDURE — 27202087 HC PROBE, APC: Performed by: INTERNAL MEDICINE

## 2022-08-09 PROCEDURE — 25000003 PHARM REV CODE 250: Performed by: NURSE PRACTITIONER

## 2022-08-09 PROCEDURE — 25000003 PHARM REV CODE 250: Performed by: INTERNAL MEDICINE

## 2022-08-09 PROCEDURE — 25500020 PHARM REV CODE 255: Performed by: INTERNAL MEDICINE

## 2022-08-09 PROCEDURE — 63600175 PHARM REV CODE 636 W HCPCS: Performed by: EMERGENCY MEDICINE

## 2022-08-09 PROCEDURE — 88305 TISSUE EXAM BY PATHOLOGIST: ICD-10-PCS | Mod: 26,,, | Performed by: PATHOLOGY

## 2022-08-09 PROCEDURE — 43255 EGD CONTROL BLEEDING ANY: CPT | Mod: 59,,, | Performed by: INTERNAL MEDICINE

## 2022-08-09 PROCEDURE — 43239 PR EGD, FLEX, W/BIOPSY, SGL/MULTI: ICD-10-PCS | Mod: ,,, | Performed by: INTERNAL MEDICINE

## 2022-08-09 PROCEDURE — 85025 COMPLETE CBC W/AUTO DIFF WBC: CPT | Performed by: NURSE PRACTITIONER

## 2022-08-09 PROCEDURE — 85018 HEMOGLOBIN: CPT | Mod: 91 | Performed by: INTERNAL MEDICINE

## 2022-08-09 PROCEDURE — 43239 EGD BIOPSY SINGLE/MULTIPLE: CPT | Performed by: INTERNAL MEDICINE

## 2022-08-09 PROCEDURE — 93010 ELECTROCARDIOGRAM REPORT: CPT | Mod: ,,, | Performed by: INTERNAL MEDICINE

## 2022-08-09 PROCEDURE — 96365 THER/PROPH/DIAG IV INF INIT: CPT | Mod: 59

## 2022-08-09 PROCEDURE — 93010 ELECTROCARDIOGRAM REPORT: CPT | Mod: 76,,, | Performed by: INTERNAL MEDICINE

## 2022-08-09 PROCEDURE — A4216 STERILE WATER/SALINE, 10 ML: HCPCS | Performed by: NURSE PRACTITIONER

## 2022-08-09 PROCEDURE — 93005 ELECTROCARDIOGRAM TRACING: CPT

## 2022-08-09 PROCEDURE — 37000009 HC ANESTHESIA EA ADD 15 MINS: Performed by: INTERNAL MEDICINE

## 2022-08-09 PROCEDURE — 80053 COMPREHEN METABOLIC PANEL: CPT | Performed by: NURSE PRACTITIONER

## 2022-08-09 PROCEDURE — 43255 PR EGD, FLEX, W/CTRL BLEED, ANY METHOD: ICD-10-PCS | Mod: 59,,, | Performed by: INTERNAL MEDICINE

## 2022-08-09 PROCEDURE — 36415 COLL VENOUS BLD VENIPUNCTURE: CPT | Performed by: EMERGENCY MEDICINE

## 2022-08-09 PROCEDURE — 37000008 HC ANESTHESIA 1ST 15 MINUTES: Performed by: INTERNAL MEDICINE

## 2022-08-09 PROCEDURE — 85018 HEMOGLOBIN: CPT | Mod: 59 | Performed by: NURSE PRACTITIONER

## 2022-08-09 PROCEDURE — 63600175 PHARM REV CODE 636 W HCPCS: Performed by: NURSE ANESTHETIST, CERTIFIED REGISTERED

## 2022-08-09 PROCEDURE — 99291 CRITICAL CARE FIRST HOUR: CPT | Mod: 25

## 2022-08-09 PROCEDURE — S0030 INJECTION, METRONIDAZOLE: HCPCS | Performed by: INTERNAL MEDICINE

## 2022-08-09 PROCEDURE — C9113 INJ PANTOPRAZOLE SODIUM, VIA: HCPCS | Performed by: EMERGENCY MEDICINE

## 2022-08-09 PROCEDURE — 88305 TISSUE EXAM BY PATHOLOGIST: CPT | Performed by: PATHOLOGY

## 2022-08-09 PROCEDURE — U0002 COVID-19 LAB TEST NON-CDC: HCPCS | Performed by: EMERGENCY MEDICINE

## 2022-08-09 PROCEDURE — 25000003 PHARM REV CODE 250: Performed by: STUDENT IN AN ORGANIZED HEALTH CARE EDUCATION/TRAINING PROGRAM

## 2022-08-09 PROCEDURE — 43239 EGD BIOPSY SINGLE/MULTIPLE: CPT | Mod: ,,, | Performed by: INTERNAL MEDICINE

## 2022-08-09 PROCEDURE — 96367 TX/PROPH/DG ADDL SEQ IV INF: CPT | Mod: 59

## 2022-08-09 RX ORDER — NALOXONE HCL 0.4 MG/ML
0.02 VIAL (ML) INJECTION
Status: DISCONTINUED | OUTPATIENT
Start: 2022-08-09 | End: 2022-08-10 | Stop reason: HOSPADM

## 2022-08-09 RX ORDER — DIPHENHYDRAMINE HYDROCHLORIDE 50 MG/ML
50 INJECTION INTRAMUSCULAR; INTRAVENOUS ONCE
Status: COMPLETED | OUTPATIENT
Start: 2022-08-09 | End: 2022-08-09

## 2022-08-09 RX ORDER — DIPHENHYDRAMINE HYDROCHLORIDE 50 MG/ML
25 INJECTION INTRAMUSCULAR; INTRAVENOUS ONCE
Status: DISCONTINUED | OUTPATIENT
Start: 2022-08-09 | End: 2022-08-09

## 2022-08-09 RX ORDER — AMOXICILLIN 250 MG
1 CAPSULE ORAL 2 TIMES DAILY PRN
Status: DISCONTINUED | OUTPATIENT
Start: 2022-08-09 | End: 2022-08-10

## 2022-08-09 RX ORDER — SODIUM CHLORIDE 0.9 % (FLUSH) 0.9 %
10 SYRINGE (ML) INJECTION EVERY 8 HOURS
Status: DISCONTINUED | OUTPATIENT
Start: 2022-08-09 | End: 2022-08-10 | Stop reason: HOSPADM

## 2022-08-09 RX ORDER — GLUCAGON 1 MG
1 KIT INJECTION
Status: DISCONTINUED | OUTPATIENT
Start: 2022-08-09 | End: 2022-08-10 | Stop reason: HOSPADM

## 2022-08-09 RX ORDER — ONDANSETRON 2 MG/ML
4 INJECTION INTRAMUSCULAR; INTRAVENOUS EVERY 8 HOURS PRN
Status: DISCONTINUED | OUTPATIENT
Start: 2022-08-09 | End: 2022-08-10 | Stop reason: HOSPADM

## 2022-08-09 RX ORDER — IBUPROFEN 200 MG
16 TABLET ORAL
Status: DISCONTINUED | OUTPATIENT
Start: 2022-08-09 | End: 2022-08-10 | Stop reason: HOSPADM

## 2022-08-09 RX ORDER — FAMOTIDINE 10 MG/ML
40 INJECTION INTRAVENOUS ONCE
Status: COMPLETED | OUTPATIENT
Start: 2022-08-10 | End: 2022-08-10

## 2022-08-09 RX ORDER — MORPHINE SULFATE 2 MG/ML
2 INJECTION, SOLUTION INTRAMUSCULAR; INTRAVENOUS EVERY 6 HOURS PRN
Status: DISCONTINUED | OUTPATIENT
Start: 2022-08-09 | End: 2022-08-10 | Stop reason: HOSPADM

## 2022-08-09 RX ORDER — PROCHLORPERAZINE EDISYLATE 5 MG/ML
5 INJECTION INTRAMUSCULAR; INTRAVENOUS EVERY 6 HOURS PRN
Status: DISCONTINUED | OUTPATIENT
Start: 2022-08-09 | End: 2022-08-10 | Stop reason: HOSPADM

## 2022-08-09 RX ORDER — ACETAMINOPHEN 325 MG/1
650 TABLET ORAL ONCE
Status: DISCONTINUED | OUTPATIENT
Start: 2022-08-10 | End: 2022-08-10

## 2022-08-09 RX ORDER — CIPROFLOXACIN 2 MG/ML
400 INJECTION, SOLUTION INTRAVENOUS
Status: DISCONTINUED | OUTPATIENT
Start: 2022-08-09 | End: 2022-08-10 | Stop reason: HOSPADM

## 2022-08-09 RX ORDER — SIMETHICONE 80 MG
1 TABLET,CHEWABLE ORAL 3 TIMES DAILY PRN
Status: DISCONTINUED | OUTPATIENT
Start: 2022-08-09 | End: 2022-08-10 | Stop reason: HOSPADM

## 2022-08-09 RX ORDER — PANTOPRAZOLE SODIUM 40 MG/10ML
40 INJECTION, POWDER, LYOPHILIZED, FOR SOLUTION INTRAVENOUS
Status: COMPLETED | OUTPATIENT
Start: 2022-08-09 | End: 2022-08-09

## 2022-08-09 RX ORDER — PROPOFOL 10 MG/ML
VIAL (ML) INTRAVENOUS
Status: DISCONTINUED | OUTPATIENT
Start: 2022-08-09 | End: 2022-08-09

## 2022-08-09 RX ORDER — TALC
6 POWDER (GRAM) TOPICAL NIGHTLY PRN
Status: DISCONTINUED | OUTPATIENT
Start: 2022-08-09 | End: 2022-08-10 | Stop reason: HOSPADM

## 2022-08-09 RX ORDER — METRONIDAZOLE 500 MG/100ML
500 INJECTION, SOLUTION INTRAVENOUS
Status: DISCONTINUED | OUTPATIENT
Start: 2022-08-09 | End: 2022-08-10 | Stop reason: HOSPADM

## 2022-08-09 RX ORDER — FAMOTIDINE 10 MG/ML
20 INJECTION INTRAVENOUS ONCE
Status: DISCONTINUED | OUTPATIENT
Start: 2022-08-09 | End: 2022-08-09

## 2022-08-09 RX ORDER — POLYETHYLENE GLYCOL 3350, SODIUM SULFATE ANHYDROUS, SODIUM BICARBONATE, SODIUM CHLORIDE, POTASSIUM CHLORIDE 236; 22.74; 6.74; 5.86; 2.97 G/4L; G/4L; G/4L; G/4L; G/4L
4000 POWDER, FOR SOLUTION ORAL ONCE
Status: COMPLETED | OUTPATIENT
Start: 2022-08-09 | End: 2022-08-09

## 2022-08-09 RX ORDER — DIPHENHYDRAMINE HYDROCHLORIDE 50 MG/ML
50 INJECTION INTRAMUSCULAR; INTRAVENOUS ONCE
Status: COMPLETED | OUTPATIENT
Start: 2022-08-10 | End: 2022-08-10

## 2022-08-09 RX ORDER — IPRATROPIUM BROMIDE AND ALBUTEROL SULFATE 2.5; .5 MG/3ML; MG/3ML
3 SOLUTION RESPIRATORY (INHALATION) EVERY 4 HOURS PRN
Status: DISCONTINUED | OUTPATIENT
Start: 2022-08-09 | End: 2022-08-10 | Stop reason: HOSPADM

## 2022-08-09 RX ORDER — ACETAMINOPHEN 325 MG/1
650 TABLET ORAL ONCE
Status: COMPLETED | OUTPATIENT
Start: 2022-08-09 | End: 2022-08-09

## 2022-08-09 RX ORDER — ACETAMINOPHEN 325 MG/1
650 TABLET ORAL EVERY 4 HOURS PRN
Status: DISCONTINUED | OUTPATIENT
Start: 2022-08-09 | End: 2022-08-10 | Stop reason: HOSPADM

## 2022-08-09 RX ORDER — IBUPROFEN 200 MG
24 TABLET ORAL
Status: DISCONTINUED | OUTPATIENT
Start: 2022-08-09 | End: 2022-08-10 | Stop reason: HOSPADM

## 2022-08-09 RX ORDER — FAMOTIDINE 10 MG/ML
40 INJECTION INTRAVENOUS ONCE
Status: COMPLETED | OUTPATIENT
Start: 2022-08-09 | End: 2022-08-09

## 2022-08-09 RX ORDER — MAG HYDROX/ALUMINUM HYD/SIMETH 200-200-20
30 SUSPENSION, ORAL (FINAL DOSE FORM) ORAL 4 TIMES DAILY PRN
Status: DISCONTINUED | OUTPATIENT
Start: 2022-08-09 | End: 2022-08-10 | Stop reason: HOSPADM

## 2022-08-09 RX ADMIN — POLYETHYLENE GLYCOL 3350, SODIUM SULFATE ANHYDROUS, SODIUM BICARBONATE, SODIUM CHLORIDE, POTASSIUM CHLORIDE 4000 ML: 236; 22.74; 6.74; 5.86; 2.97 POWDER, FOR SOLUTION ORAL at 11:08

## 2022-08-09 RX ADMIN — ACETAMINOPHEN 650 MG: 325 TABLET ORAL at 05:08

## 2022-08-09 RX ADMIN — METRONIDAZOLE 500 MG: 500 INJECTION, SOLUTION INTRAVENOUS at 11:08

## 2022-08-09 RX ADMIN — FAMOTIDINE 40 MG: 10 INJECTION INTRAVENOUS at 05:08

## 2022-08-09 RX ADMIN — IOHEXOL 100 ML: 350 INJECTION, SOLUTION INTRAVENOUS at 07:08

## 2022-08-09 RX ADMIN — Medication 10 ML: at 10:08

## 2022-08-09 RX ADMIN — PROPOFOL 80 MG: 10 INJECTION, EMULSION INTRAVENOUS at 11:08

## 2022-08-09 RX ADMIN — PROPOFOL 20 MG: 10 INJECTION, EMULSION INTRAVENOUS at 12:08

## 2022-08-09 RX ADMIN — DIPHENHYDRAMINE HYDROCHLORIDE 50 MG: 50 INJECTION, SOLUTION INTRAMUSCULAR; INTRAVENOUS at 05:08

## 2022-08-09 RX ADMIN — METHYLPREDNISOLONE SODIUM SUCCINATE 80 MG: 40 INJECTION, POWDER, FOR SOLUTION INTRAMUSCULAR; INTRAVENOUS at 05:08

## 2022-08-09 RX ADMIN — PROPOFOL 40 MG: 10 INJECTION, EMULSION INTRAVENOUS at 11:08

## 2022-08-09 RX ADMIN — CIPROFLOXACIN 400 MG: 2 INJECTION, SOLUTION INTRAVENOUS at 10:08

## 2022-08-09 RX ADMIN — SODIUM CHLORIDE, POTASSIUM CHLORIDE, SODIUM LACTATE AND CALCIUM CHLORIDE: 600; 310; 30; 20 INJECTION, SOLUTION INTRAVENOUS at 11:08

## 2022-08-09 RX ADMIN — SIMETHICONE 80 MG: 80 TABLET, CHEWABLE ORAL at 05:08

## 2022-08-09 RX ADMIN — PANTOPRAZOLE SODIUM 40 MG: 40 INJECTION, POWDER, FOR SOLUTION INTRAVENOUS at 06:08

## 2022-08-09 RX ADMIN — PANTOPRAZOLE SODIUM 40 MG: 40 INJECTION, POWDER, FOR SOLUTION INTRAVENOUS at 05:08

## 2022-08-09 NOTE — ED PROVIDER NOTES
SCRIBE #1 NOTE: I, Neil Segura, am scribing for, and in the presence of, Rohith Bullard MD. I have scribed the entire note.         History     Chief Complaint   Patient presents with    Rectal Bleeding     States bright red blood with stools x 2 days. Reports clots with BM tonight, Denies weakness.Pt states she was using BC powder x 3 doses since Friday. States she was told she wasn't supposed to use but was having knee pain. On home 02 but doesn't have a transport tank. Denies SOB     Review of patient's allergies indicates:   Allergen Reactions    Coconut Anaphylaxis    Iodine and iodide containing products Anaphylaxis    Dairy aid [lactase] Diarrhea and Nausea And Vomiting    Penicillins Hives         History of Present Illness     HPI    8/9/2022, 4:55 AM  History obtained from the patient      History of Present Illness: Baylee Muñoz is a 62 y.o. female patient with a PMHx of  UGIB in 2005 due to gastritis on EGD 2/2 BC powder abuse, last colonoscopy was about 5 years ago with 3 polyps removed, obesity, diverticulitis, HLD, pre-DM who presents to the Emergency Department for evaluation of rectal bleeding which onset gradually yesterday at 3:30 PM. Pt states her stool is now bright red and she has seen a clot within the blood. Symptoms are episodic and moderate in severity. No mitigating or exacerbating factors reported. Associated sxs include R sided abdominal cramping. Patient denies any n/v/d, pelvic pain, dysuria, CP, SOB, weakness, fatigue, and all other sxs at this time. No prior Tx reported. No further complaints or concerns at this time.       Arrival mode: Personal vehicle    PCP: Becki Hernandez NP        Past Medical History:  Past Medical History:   Diagnosis Date    Allergy     Arthritis     Asthma     Admit to [facility] 1/6/19 by Dr. Michael Randall for asthma exaceration, acute bronchitis, acute dCHF, hypoxia, elevated troponin/BNP (thought to be 2/2 asthma exac & acute dCHF by cards), tx'd  w/ IV lasix, duonebs, IV solumedrol and DC'd on Z-prema, prednisone taper    CKD (chronic kidney disease) stage 2, GFR 60-89 ml/min     H/o ARF 2/2 poor water intake; encouraged to increase water intake and avoid NSAIDS & contrast dye    COVID 2022    Depression     Diastolic heart failure 2019    Dx'd at [facility] during asthma exacerbation    Diverticulitis 2005    Dyslipidemia 2018    Former smoker     Quit 20; smoked 0.12 packs/day x 37 years    Gastritis     Hypertension     Pneumonia 2022    Prediabetes     A1c 6.1% on 19    Severe obesity (BMI >= 40)     Vertigo     Vitamin D deficiency        Past Surgical History:  Past Surgical History:   Procedure Laterality Date    DILATION AND CURETTAGE OF UTERUS      missed , bleeding    EXPLORATORY LAPAROTOMY      lysis of adhesions, open tube    KNEE SURGERY Right     torn mcl    laser to cervix           Family History:  Family History   Problem Relation Age of Onset    Stroke Mother     Heart disease Mother     Kidney disease Father     Heart disease Father     Hypertension Father     HIV Sister     COPD Sister     Bipolar disorder Sister        Social History:  Social History     Tobacco Use    Smoking status: Former Smoker     Packs/day: 0.25     Years: 27.00     Pack years: 6.75     Types: Cigarettes    Smokeless tobacco: Never Used   Substance and Sexual Activity    Alcohol use: No    Drug use: No    Sexual activity: Never        Review of Systems     Review of Systems   Constitutional: Negative for fever.   HENT: Negative for sore throat.    Respiratory: Negative for shortness of breath.    Cardiovascular: Negative for chest pain.   Gastrointestinal: Positive for abdominal pain (R sided cramping) and blood in stool. Negative for constipation, diarrhea, nausea and vomiting.   Genitourinary: Negative for dysuria.   Musculoskeletal: Negative for back pain.   Skin: Negative for rash.   Neurological:  Negative for weakness and headaches.   Hematological: Does not bruise/bleed easily.   All other systems reviewed and are negative.       Physical Exam     Initial Vitals [08/09/22 0209]   BP Pulse Resp Temp SpO2   (!) 181/66 89 20 98 °F (36.7 °C) 98 %      MAP       --          Physical Exam   Nursing Notes and Vital Signs Reviewed.  Constitutional: Patient is in no acute distress. Well-developed and well-nourished.  Head: Atraumatic. Normocephalic.  Eyes: PERRL. EOM intact. Conjunctivae are not pale. No scleral icterus.  ENT: Mucous membranes are moist. Oropharynx is clear and symmetric.    Neck: Supple. Full ROM. No lymphadenopathy.  Cardiovascular: Regular rate. Regular rhythm. No murmurs, rubs, or gallops. Distal pulses are 2+ and symmetric.  Pulmonary/Chest: No respiratory distress. Clear to auscultation bilaterally. No wheezing or rales.  Abdominal: Soft and non-distended.  There is no tenderness.  No rebound, guarding, or rigidity. Good bowel sounds.  Genitourinary: No CVA tenderness  Rectal: Female chaperone present for the duration of the rectal exam.There is no tenderness. No bleeding masses or hemorrhoids. Normal sphincter tone. Loose dark maroon colored stool in rectal vault.  Musculoskeletal: Moves all extremities. No obvious deformities. No edema. No calf tenderness. Mild midline lumbar spinal tenderness.  Skin: Warm and dry.  Neurological:  Alert, awake, and appropriate.  Normal speech.  No acute focal neurological deficits are appreciated.  Psychiatric: Normal affect. Good eye contact. Appropriate in content.     ED Course   Critical Care    Date/Time: 8/9/2022 7:05 AM  Performed by: Rohith Bullard MD  Authorized by: Rohith Bullard MD   Direct patient critical care time: 8 minutes  Additional history critical care time: 7 minutes  Ordering / reviewing critical care time: 6 minutes  Documentation critical care time: 7 minutes  Consulting other physicians critical care time: 8 minutes  Consult with  "family critical care time: 6 minutes  Other critical care time: 5 minutes  Total critical care time (exclusive of procedural time) : 47 minutes  Critical care time was exclusive of separately billable procedures and treating other patients and teaching time.  Critical care was necessary to treat or prevent imminent or life-threatening deterioration of the following conditions: GI bleed.  Critical care was time spent personally by me on the following activities: blood draw for specimens, development of treatment plan with patient or surrogate, discussions with consultants, interpretation of cardiac output measurements, evaluation of patient's response to treatment, examination of patient, obtaining history from patient or surrogate, ordering and performing treatments and interventions, ordering and review of laboratory studies, ordering and review of radiographic studies, pulse oximetry, re-evaluation of patient's condition and review of old charts.        ED Vital Signs:  Vitals:    08/09/22 0209 08/09/22 0615   BP: (!) 181/66    Pulse: 89 77   Resp: 20    Temp: 98 °F (36.7 °C)    TempSrc: Oral    SpO2: 98%    Weight: 127 kg (280 lb)    Height: 5' 5" (1.651 m)        Abnormal Lab Results:  Labs Reviewed   CBC W/ AUTO DIFFERENTIAL - Abnormal; Notable for the following components:       Result Value    Hemoglobin 11.1 (*)     MCH 26.0 (*)     MCHC 28.7 (*)     RDW 16.0 (*)     All other components within normal limits   COMPREHENSIVE METABOLIC PANEL - Abnormal; Notable for the following components:    CO2 37 (*)     Glucose 129 (*)     All other components within normal limits   PROTIME-INR   SARS-COV-2 RDRP GENE   TYPE & SCREEN        All Lab Results:  Results for orders placed or performed during the hospital encounter of 08/09/22   CBC auto differential   Result Value Ref Range    WBC 8.92 3.90 - 12.70 K/uL    RBC 4.27 4.00 - 5.40 M/uL    Hemoglobin 11.1 (L) 12.0 - 16.0 g/dL    Hematocrit 38.7 37.0 - 48.5 %    MCV " 91 82 - 98 fL    MCH 26.0 (L) 27.0 - 31.0 pg    MCHC 28.7 (L) 32.0 - 36.0 g/dL    RDW 16.0 (H) 11.5 - 14.5 %    Platelets 265 150 - 450 K/uL    MPV 11.1 9.2 - 12.9 fL    Immature Granulocytes 0.3 0.0 - 0.5 %    Gran # (ANC) 5.2 1.8 - 7.7 K/uL    Immature Grans (Abs) 0.03 0.00 - 0.04 K/uL    Lymph # 2.9 1.0 - 4.8 K/uL    Mono # 0.7 0.3 - 1.0 K/uL    Eos # 0.1 0.0 - 0.5 K/uL    Baso # 0.02 0.00 - 0.20 K/uL    nRBC 0 0 /100 WBC    Gran % 58.3 38.0 - 73.0 %    Lymph % 32.3 18.0 - 48.0 %    Mono % 7.6 4.0 - 15.0 %    Eosinophil % 1.3 0.0 - 8.0 %    Basophil % 0.2 0.0 - 1.9 %    Differential Method Automated    Comprehensive metabolic panel   Result Value Ref Range    Sodium 144 136 - 145 mmol/L    Potassium 4.3 3.5 - 5.1 mmol/L    Chloride 99 95 - 110 mmol/L    CO2 37 (H) 23 - 29 mmol/L    Glucose 129 (H) 70 - 110 mg/dL    BUN 16 6 - 20 mg/dL    Creatinine 1.0 0.5 - 1.4 mg/dL    Calcium 9.3 8.7 - 10.5 mg/dL    Total Protein 7.0 6.0 - 8.4 g/dL    Albumin 3.5 3.5 - 5.2 g/dL    Total Bilirubin 0.4 0.1 - 1.0 mg/dL    Alkaline Phosphatase 99 55 - 135 U/L    AST 19 10 - 40 U/L    ALT 16 10 - 44 U/L    Anion Gap 8 8 - 16 mmol/L    eGFR >60 >60 mL/min/1.73 m^2   Protime-INR   Result Value Ref Range    Prothrombin Time 9.8 9.0 - 12.5 sec    INR 0.9 0.8 - 1.2   POCT COVID-19 Rapid Screening   Result Value Ref Range    POC Rapid COVID Negative Negative     Acceptable Yes          Imaging Results:  Imaging Results          X-Ray Chest AP Portable (Preliminary result)  Result time 08/09/22 07:03:23    ED Interpretation by Rohith Bullard MD (08/09/22 07:03:23, [facility] - Emergency Dept., Emergency Medicine)    No acute findings                             The EKG was ordered, reviewed, and independently interpreted by the ED provider.  Interpretation time: 05:29  Rate: 74 BPM  Rhythm: normal sinus rhythm  Interpretation: No acute ST changes. No STEMI.    The Emergency Provider reviewed the vital signs and test results,  which are outlined above.     ED Discussion     5:53 AM: Discussed pt's case with Dr. Devi (Gastroenterology) who recommends giving the pt another 40 mg of IV Protonix and will plan for possible EGD today.     6:00 AM: Discussed case with Dr. Goel ([facility]). Dr. Brown agrees with current care and management of pt and accepts admission.   Admitting Service: Hospital Medicine  Admitting Physician: Dr. Brown  Admit to: Obs    6:00 AM: Re-evaluated pt. I have discussed test results, shared treatment plan, and the need for admission with patient and family at bedside. Pt and family express understanding at this time and agree with all information. All questions answered. Pt and family have no further questions or concerns at this time. Pt is ready for admit.         Medical Decision Making:   Clinical Tests:   Lab Tests: Ordered and Reviewed  Radiological Study: Ordered and Reviewed           ED Medication(s):  Medications   pantoprazole injection 40 mg (40 mg Intravenous Given 8/9/22 0516)   pantoprazole injection 40 mg (40 mg Intravenous Given 8/9/22 0613)       New Prescriptions    No medications on file               Scribe Attestation:   Scribe #1: I performed the above scribed service and the documentation accurately describes the services I performed. I attest to the accuracy of the note.     Attending:   Physician Attestation Statement for Scribe #1: I, Rohith Bullard MD, personally performed the services described in this documentation, as scribed by Neil Segura, in my presence, and it is both accurate and complete.           Clinical Impression       ICD-10-CM ICD-9-CM   1. H/O NSAID-associated gastropathy  Z87.19 V12.79   2. GIB (gastrointestinal bleeding)  K92.2 578.9       Disposition:   Disposition: Placed in Observation  Condition: Fair         Rohith Bullard MD  08/09/22 0707

## 2022-08-09 NOTE — PLAN OF CARE
Bedside shift report completed with oncoming staff about plan of care  PRN medications given upon request or per criteria for symptom management, please review MAR for details  Safety measures intact per patient needs and current fall score and accompanied assessment findings  Care plan reviewed with patient no new questions at this time regarding plan of care.   Will continue to monitor per hourly rounding and unit practice/ policy.

## 2022-08-09 NOTE — CONSULTS
[facility] - Emergency Dept.  Gastroenterology  Consult Note    Patient Name: Baylee Muñoz  MRN: [id number]  Admission Date: 8/9/2022  Hospital Length of Stay: 0 days  Code Status: Prior   Attending Provider: No att. providers found   Consulting Provider: Damien Neumann PA-C  Primary Care Physician: Becki Hernandez NP  Principal Problem:<principal problem not specified>    Inpatient consult to Gastroenterology  Consult performed by: Damien Neumann PA-C  Consult ordered by: Rohith Bullard MD  Reason for consult: GI bleed        Subjective:     HPI:  The patient presented to the ER for hematochezia which started yesterday. It is described as maroon stools. She had mild cramping yesterday but it didn't last long. She denies nausea, vomiting, change in appetite or weight loss. She has a history of NSAID induced PUD, colon polyps and diverticulosis. She thinks her last scopes were over 5 years ago at [facility]. She reports taking BC Powder over the last three days due to knee pain. She takes ASA 81 mg daily but denies anticoagulation use. ER work up revealed a Hgb of 11.1, BUN 16 and creatinine 1.0. The ER physician documented loose dark maroon stool on rectal exam without hemorrhoids or mass. Vitals have been stable. She was given 80 mg of Protonix.       Past Medical History:   Diagnosis Date    Allergy     Arthritis     Asthma     Admit to [facility] 1/6/19 by Dr. Michael Randall for asthma exaceration, acute bronchitis, acute dCHF, hypoxia, elevated troponin/BNP (thought to be 2/2 asthma exac & acute dCHF by cards), tx'd w/ IV lasix, duonebs, IV solumedrol and DC'd on Z-prema, prednisone taper    CKD (chronic kidney disease) stage 2, GFR 60-89 ml/min     H/o ARF 2/2 poor water intake; encouraged to increase water intake and avoid NSAIDS & contrast dye    COVID 06/2022    Depression     Diastolic heart failure 01/06/2019    Dx'd at [facility] during asthma exacerbation    Diverticulitis 2005    Dyslipidemia 08/28/2018     Former smoker     Quit 20; smoked 0.12 packs/day x 37 years    Gastritis     Hypertension     Pneumonia 2022    Prediabetes     A1c 6.1% on 19    Severe obesity (BMI >= 40)     Vertigo     Vitamin D deficiency        Past Surgical History:   Procedure Laterality Date    DILATION AND CURETTAGE OF UTERUS      missed , bleeding    EXPLORATORY LAPAROTOMY      lysis of adhesions, open tube    KNEE SURGERY Right     torn mcl    laser to cervix         Review of patient's allergies indicates:   Allergen Reactions    Coconut Anaphylaxis    Iodine and iodide containing products Anaphylaxis    Dairy aid [lactase] Diarrhea and Nausea And Vomiting    Penicillins Hives     Family History       Problem Relation (Age of Onset)    Bipolar disorder Sister    COPD Sister    HIV Sister    Heart disease Mother, Father    Hypertension Father    Kidney disease Father    Stroke Mother          Tobacco Use    Smoking status: Former Smoker     Packs/day: 0.25     Years: 27.00     Pack years: 6.75     Types: Cigarettes    Smokeless tobacco: Never Used   Substance and Sexual Activity    Alcohol use: No    Drug use: No    Sexual activity: Never     Review of Systems   Constitutional:  Negative for fever.   HENT:  Negative for hearing loss.    Eyes:  Negative for visual disturbance.   Respiratory:  Negative for cough and shortness of breath.    Cardiovascular:  Negative for chest pain.   Gastrointestinal:         As per HPI.   Genitourinary:  Negative for dysuria, frequency and hematuria.   Musculoskeletal:  Positive for arthralgias. Negative for back pain.   Skin:  Negative for rash.   Neurological:  Positive for weakness. Negative for seizures, syncope, numbness and headaches.   Hematological:  Does not bruise/bleed easily.   Psychiatric/Behavioral:  The patient is not nervous/anxious.    Objective:     Vital Signs (Most Recent):  Temp: 98 °F (36.7 °C) (22 0209)  Pulse: 77 (22  0615)  Resp: 20 (08/09/22 0209)  BP: (!) 181/66 (08/09/22 0209)  SpO2: 98 % (08/09/22 0209)   Vital Signs (24h Range):  Temp:  [98 °F (36.7 °C)] 98 °F (36.7 °C)  Pulse:  [77-89] 77  Resp:  [20] 20  SpO2:  [98 %] 98 %  BP: (181)/(66) 181/66     Weight: 127 kg (280 lb) (08/09/22 0209)  Body mass index is 46.59 kg/m².    No intake or output data in the 24 hours ending 08/09/22 0802    Lines/Drains/Airways       Peripheral Intravenous Line  Duration                  Peripheral IV - Single Lumen 08/09/22 0515 22 G Left Antecubital <1 day                    Physical Exam  Constitutional:       Appearance: Normal appearance. She is well-developed.   HENT:      Head: Normocephalic and atraumatic.   Eyes:      Extraocular Movements: Extraocular movements intact.   Cardiovascular:      Rate and Rhythm: Normal rate and regular rhythm.      Heart sounds: No murmur heard.  Pulmonary:      Effort: Pulmonary effort is normal. No respiratory distress.      Breath sounds: Normal breath sounds. No wheezing.   Abdominal:      General: Bowel sounds are normal. There is no distension.      Palpations: Abdomen is soft. There is no mass.      Tenderness: There is no abdominal tenderness.   Musculoskeletal:      Cervical back: Normal range of motion and neck supple.      Right lower leg: No edema.      Left lower leg: No edema.   Skin:     General: Skin is warm and dry.      Findings: No rash.   Neurological:      Mental Status: She is alert and oriented to person, place, and time.      Cranial Nerves: No cranial nerve deficit.   Psychiatric:         Behavior: Behavior normal.       Significant Labs:  CBC:   Recent Labs   Lab 08/09/22 0317   WBC 8.92   HGB 11.1*   HCT 38.7        CMP:   Recent Labs   Lab 08/09/22 0317   *   CALCIUM 9.3   ALBUMIN 3.5   PROT 7.0      K 4.3   CO2 37*   CL 99   BUN 16   CREATININE 1.0   ALKPHOS 99   ALT 16   AST 19   BILITOT 0.4     Coagulation:   Recent Labs   Lab 08/09/22 0317   INR  0.9       Significant Imaging:  Imaging results within the past 24 hours have been reviewed.    Assessment/Plan:     Hematochezia  Possible diverticular bleed but given the history of PUD and recent BC Powder use, will need to rule out upper GI bleed. Will plan for EGD this morning.   Patient hemodynamically stable. Hgb 11.1  Continue IV Protonix.   Continue to monitor H/H and transfuse as indicated.     Asthma  Resume home medications. Management per  team.         Thank you for your consult. I will follow-up with patient. Please contact us if you have any additional questions.    Damien Neumann PA-C  Gastroenterology  [facility] - Emergency Dept.

## 2022-08-09 NOTE — SUBJECTIVE & OBJECTIVE
Past Medical History:   Diagnosis Date    Allergy     Arthritis     Asthma     Admit to [facility] by Dr. Michael Randall for asthma exaceration, acute bronchitis, acute dCHF, hypoxia, elevated troponin/BNP (thought to be 2/2 asthma exac & acute dCHF by cards), tx'd w/ IV lasix, duonebs, IV solumedrol and DC'd on Z-prema, prednisone taper    CKD (chronic kidney disease) stage 2, GFR 60-89 ml/min     H/o ARF 2/2 poor water intake; encouraged to increase water intake and avoid NSAIDS & contrast dye    COVID 2022    Depression     Diastolic heart failure 2019    Dx'd at [facility] during asthma exacerbation    Diverticulitis     Dyslipidemia 2018    Former smoker     Quit 18; smoked 0.12 packs/day x 37 years    Gastritis     Hypertension     Pneumonia 2022    Prediabetes     A1c 6.1% on 19    Severe obesity (BMI >= 40)     Vertigo     Vitamin D deficiency        Past Surgical History:   Procedure Laterality Date    DILATION AND CURETTAGE OF UTERUS      missed , bleeding    EXPLORATORY LAPAROTOMY      lysis of adhesions, open tube    KNEE SURGERY Right     torn mcl    laser to cervix         Review of patient's allergies indicates:   Allergen Reactions    Coconut Anaphylaxis    Iodine and iodide containing products Anaphylaxis    Dairy aid [lactase] Diarrhea and Nausea And Vomiting    Penicillins Hives     Family History       Problem Relation (Age of Onset)    Bipolar disorder Sister    COPD Sister    HIV Sister    Heart disease Mother, Father    Hypertension Father    Kidney disease Father    Stroke Mother          Tobacco Use    Smoking status: Former Smoker     Packs/day: 0.25     Years: 27.00     Pack years: 6.75     Types: Cigarettes    Smokeless tobacco: Never Used   Substance and Sexual Activity    Alcohol use: No    Drug use: No    Sexual activity: Never     Review of Systems   Constitutional:  Negative for fever.   HENT:  Negative for hearing loss.    Eyes:  Negative for visual  disturbance.   Respiratory:  Negative for cough and shortness of breath.    Cardiovascular:  Negative for chest pain.   Gastrointestinal:         As per HPI.   Genitourinary:  Negative for dysuria, frequency and hematuria.   Musculoskeletal:  Positive for arthralgias. Negative for back pain.   Skin:  Negative for rash.   Neurological:  Positive for weakness. Negative for seizures, syncope, numbness and headaches.   Hematological:  Does not bruise/bleed easily.   Psychiatric/Behavioral:  The patient is not nervous/anxious.    Objective:     Vital Signs (Most Recent):  Temp: 98 °F (36.7 °C) (08/09/22 0209)  Pulse: 77 (08/09/22 0615)  Resp: 20 (08/09/22 0209)  BP: (!) 181/66 (08/09/22 0209)  SpO2: 98 % (08/09/22 0209)   Vital Signs (24h Range):  Temp:  [98 °F (36.7 °C)] 98 °F (36.7 °C)  Pulse:  [77-89] 77  Resp:  [20] 20  SpO2:  [98 %] 98 %  BP: (181)/(66) 181/66     Weight: 127 kg (280 lb) (08/09/22 0209)  Body mass index is 46.59 kg/m².    No intake or output data in the 24 hours ending 08/09/22 0802    Lines/Drains/Airways       Peripheral Intravenous Line  Duration                  Peripheral IV - Single Lumen 08/09/22 0515 22 G Left Antecubital <1 day                    Physical Exam  Constitutional:       Appearance: Normal appearance. She is well-developed.   HENT:      Head: Normocephalic and atraumatic.   Eyes:      Extraocular Movements: Extraocular movements intact.   Cardiovascular:      Rate and Rhythm: Normal rate and regular rhythm.      Heart sounds: No murmur heard.  Pulmonary:      Effort: Pulmonary effort is normal. No respiratory distress.      Breath sounds: Normal breath sounds. No wheezing.   Abdominal:      General: Bowel sounds are normal. There is no distension.      Palpations: Abdomen is soft. There is no mass.      Tenderness: There is no abdominal tenderness.   Musculoskeletal:      Cervical back: Normal range of motion and neck supple.      Right lower leg: No edema.      Left lower leg:  No edema.   Skin:     General: Skin is warm and dry.      Findings: No rash.   Neurological:      Mental Status: She is alert and oriented to person, place, and time.      Cranial Nerves: No cranial nerve deficit.   Psychiatric:         Behavior: Behavior normal.       Significant Labs:  CBC:   Recent Labs   Lab 08/09/22 0317   WBC 8.92   HGB 11.1*   HCT 38.7        CMP:   Recent Labs   Lab 08/09/22 0317   *   CALCIUM 9.3   ALBUMIN 3.5   PROT 7.0      K 4.3   CO2 37*   CL 99   BUN 16   CREATININE 1.0   ALKPHOS 99   ALT 16   AST 19   BILITOT 0.4     Coagulation:   Recent Labs   Lab 08/09/22 0317   INR 0.9       Significant Imaging:  Imaging results within the past 24 hours have been reviewed.

## 2022-08-09 NOTE — H&P (VIEW-ONLY)
[facility] - Emergency Dept.  Gastroenterology  Consult Note    Patient Name: Baylee Muñoz  MRN: [id number]  Admission Date: 8/9/2022  Hospital Length of Stay: 0 days  Code Status: Prior   Attending Provider: No att. providers found   Consulting Provider: Damien Neumann PA-C  Primary Care Physician: Becki Hernandez NP  Principal Problem:<principal problem not specified>    Inpatient consult to Gastroenterology  Consult performed by: Damien Neumann PA-C  Consult ordered by: Rohith Bullard MD  Reason for consult: GI bleed        Subjective:     HPI:  The patient presented to the ER for hematochezia which started yesterday. It is described as maroon stools. She had mild cramping yesterday but it didn't last long. She denies nausea, vomiting, change in appetite or weight loss. She has a history of NSAID induced PUD, colon polyps and diverticulosis. She thinks her last scopes were over 5 years ago at [facility]. She reports taking BC Powder over the last three days due to knee pain. She takes ASA 81 mg daily but denies anticoagulation use. ER work up revealed a Hgb of 11.1, BUN 16 and creatinine 1.0. The ER physician documented loose dark maroon stool on rectal exam without hemorrhoids or mass. Vitals have been stable. She was given 80 mg of Protonix.       Past Medical History:   Diagnosis Date    Allergy     Arthritis     Asthma     Admit to [facility] 1/6/19 by Dr. Michael Randall for asthma exaceration, acute bronchitis, acute dCHF, hypoxia, elevated troponin/BNP (thought to be 2/2 asthma exac & acute dCHF by cards), tx'd w/ IV lasix, duonebs, IV solumedrol and DC'd on Z-prema, prednisone taper    CKD (chronic kidney disease) stage 2, GFR 60-89 ml/min     H/o ARF 2/2 poor water intake; encouraged to increase water intake and avoid NSAIDS & contrast dye    COVID 06/2022    Depression     Diastolic heart failure 01/06/2019    Dx'd at [facility] during asthma exacerbation    Diverticulitis 2005    Dyslipidemia 08/28/2018     Former smoker     Quit 18; smoked 0.12 packs/day x 37 years    Gastritis     Hypertension     Pneumonia 2022    Prediabetes     A1c 6.1% on 19    Severe obesity (BMI >= 40)     Vertigo     Vitamin D deficiency        Past Surgical History:   Procedure Laterality Date    DILATION AND CURETTAGE OF UTERUS      missed , bleeding    EXPLORATORY LAPAROTOMY      lysis of adhesions, open tube    KNEE SURGERY Right     torn mcl    laser to cervix         Review of patient's allergies indicates:   Allergen Reactions    Coconut Anaphylaxis    Iodine and iodide containing products Anaphylaxis    Dairy aid [lactase] Diarrhea and Nausea And Vomiting    Penicillins Hives     Family History       Problem Relation (Age of Onset)    Bipolar disorder Sister    COPD Sister    HIV Sister    Heart disease Mother, Father    Hypertension Father    Kidney disease Father    Stroke Mother          Tobacco Use    Smoking status: Former Smoker     Packs/day: 0.25     Years: 27.00     Pack years: 6.75     Types: Cigarettes    Smokeless tobacco: Never Used   Substance and Sexual Activity    Alcohol use: No    Drug use: No    Sexual activity: Never     Review of Systems   Constitutional:  Negative for fever.   HENT:  Negative for hearing loss.    Eyes:  Negative for visual disturbance.   Respiratory:  Negative for cough and shortness of breath.    Cardiovascular:  Negative for chest pain.   Gastrointestinal:         As per HPI.   Genitourinary:  Negative for dysuria, frequency and hematuria.   Musculoskeletal:  Positive for arthralgias. Negative for back pain.   Skin:  Negative for rash.   Neurological:  Positive for weakness. Negative for seizures, syncope, numbness and headaches.   Hematological:  Does not bruise/bleed easily.   Psychiatric/Behavioral:  The patient is not nervous/anxious.    Objective:     Vital Signs (Most Recent):  Temp: 98 °F (36.7 °C) (22 0209)  Pulse: 77 (22  0615)  Resp: 20 (08/09/22 0209)  BP: (!) 181/66 (08/09/22 0209)  SpO2: 98 % (08/09/22 0209)   Vital Signs (24h Range):  Temp:  [98 °F (36.7 °C)] 98 °F (36.7 °C)  Pulse:  [77-89] 77  Resp:  [20] 20  SpO2:  [98 %] 98 %  BP: (181)/(66) 181/66     Weight: 127 kg (280 lb) (08/09/22 0209)  Body mass index is 46.59 kg/m².    No intake or output data in the 24 hours ending 08/09/22 0802    Lines/Drains/Airways       Peripheral Intravenous Line  Duration                  Peripheral IV - Single Lumen 08/09/22 0515 22 G Left Antecubital <1 day                    Physical Exam  Constitutional:       Appearance: Normal appearance. She is well-developed.   HENT:      Head: Normocephalic and atraumatic.   Eyes:      Extraocular Movements: Extraocular movements intact.   Cardiovascular:      Rate and Rhythm: Normal rate and regular rhythm.      Heart sounds: No murmur heard.  Pulmonary:      Effort: Pulmonary effort is normal. No respiratory distress.      Breath sounds: Normal breath sounds. No wheezing.   Abdominal:      General: Bowel sounds are normal. There is no distension.      Palpations: Abdomen is soft. There is no mass.      Tenderness: There is no abdominal tenderness.   Musculoskeletal:      Cervical back: Normal range of motion and neck supple.      Right lower leg: No edema.      Left lower leg: No edema.   Skin:     General: Skin is warm and dry.      Findings: No rash.   Neurological:      Mental Status: She is alert and oriented to person, place, and time.      Cranial Nerves: No cranial nerve deficit.   Psychiatric:         Behavior: Behavior normal.       Significant Labs:  CBC:   Recent Labs   Lab 08/09/22 0317   WBC 8.92   HGB 11.1*   HCT 38.7        CMP:   Recent Labs   Lab 08/09/22 0317   *   CALCIUM 9.3   ALBUMIN 3.5   PROT 7.0      K 4.3   CO2 37*   CL 99   BUN 16   CREATININE 1.0   ALKPHOS 99   ALT 16   AST 19   BILITOT 0.4     Coagulation:   Recent Labs   Lab 08/09/22 0317   INR  0.9       Significant Imaging:  Imaging results within the past 24 hours have been reviewed.    Assessment/Plan:     Hematochezia  Possible diverticular bleed but given the history of PUD and recent BC Powder use, will need to rule out upper GI bleed. Will plan for EGD this morning.   Patient hemodynamically stable. Hgb 11.1  Continue IV Protonix.   Continue to monitor H/H and transfuse as indicated.     Asthma  Resume home medications. Management per  team.         Thank you for your consult. I will follow-up with patient. Please contact us if you have any additional questions.    Damien Neumann PA-C  Gastroenterology  [facility] - Emergency Dept.

## 2022-08-09 NOTE — PROVATION PATIENT INSTRUCTIONS
Discharge Summary/Instructions after an Endoscopic Procedure  Patient Name: Baylee Muñoz  Patient MRN: [id number]  Patient YOB: 1960 Tuesday, August 9, 2022 Dariela Devi MD  Dear patient,  As a result of recent federal legislation (The Federal Cures Act), you may   receive lab or pathology results from your procedure in your [facility]   account before your physician is able to contact you. Your physician or   their representative will relay the results to you with their   recommendations at their soonest availability.  Thank you,  RESTRICTIONS:  During your procedure today, you received medications for sedation.  These   medications may affect your judgment, balance and coordination.  Therefore,   for 24 hours, you have the following restrictions:   - DO NOT drive a car, operate machinery, make legal/financial decisions,   sign important papers or drink alcohol.    ACTIVITY:  Today: no heavy lifting, straining or running due to procedural   sedation/anesthesia.  The following day: return to full activity including work.  DIET:  Eat and drink normally unless instructed otherwise.     TREATMENT FOR COMMON SIDE EFFECTS:  - Mild abdominal pain, nausea, belching, bloating or excessive gas:  rest,   eat lightly and use a heating pad.  - Sore Throat: treat with throat lozenges and/or gargle with warm salt   water.  - Because air was used during the procedure, expelling large amounts of air   from your rectum or belching is normal.  - If a bowel prep was taken, you may not have a bowel movement for 1-3 days.    This is normal.  SYMPTOMS TO WATCH FOR AND REPORT TO YOUR PHYSICIAN:  1. Abdominal pain or bloating, other than gas cramps.  2. Chest pain.  3. Back pain.  4. Signs of infection such as: chills or fever occurring within 24 hours   after the procedure.  5. Rectal bleeding, which would show as bright red, maroon, or black stools.   (A tablespoon of blood from the rectum is not serious, especially if    hemorrhoids are present.)  6. Vomiting.  7. Weakness or dizziness.  GO DIRECTLY TO THE NEAREST EMERGENCY ROOM IF YOU HAVE ANY OF THE FOLLOWING:      Difficulty breathing              Chills and/or fever over 101 F   Persistent vomiting and/or vomiting blood   Severe abdominal pain   Severe chest pain   Black, tarry stools   Bleeding- more than one tablespoon   Any other symptom or condition that you feel may need urgent attention  Your doctor recommends these additional instructions:  If any biopsies were taken, your doctors clinic will contact you in 1 to 2   weeks with any results.  - Return patient to [facility] for ongoing care.   - Do a GI bleeding (tagged RBC) scan today.   - Patient unable to complete CTA due to allergy to iodine.  - Check hemogram now. - STAT.   - Perform a colonoscopy to evaluate lower GI bleeding however patient   declines.  - Communicated via secure chat with hospital medicine team.  For questions, problems or results please call your physician Dariela Devi MD at Work:  [phone]  If you have any questions about the above instructions, call the GI   department at [phone] or call the endoscopy unit at [phone]   from 7am until 3 pm.  [facility] [place], EMERGENCY ROOM PHONE NUMBER:   [phone]  IF A COMPLICATION OR EMERGENCY SITUATION ARISES AND YOU ARE UNABLE TO REACH   YOUR PHYSICIAN - GO DIRECTLY TO THE EMERGENCY ROOM.  I have read or have had read to me these discharge instructions for my   procedure and have received a written copy.  I understand these   instructions and will follow-up with my physician if I have any questions.     __________________________________       _____________________________________  Nurse Signature                                          Patient/Designated   Responsible Party Signature  MD Dariela Dejesus MD  8/9/2022 12:19:30 PM  This report has been verified and signed electronically.  Dear  patient,  As a result of recent federal legislation (The Federal Cures Act), you may   receive lab or pathology results from your procedure in your [facility]   account before your physician is able to contact you. Your physician or   their representative will relay the results to you with their   recommendations at their soonest availability.  Thank you,  PROVATION

## 2022-08-09 NOTE — CARE UPDATE
Tagged RBC scan positive for active bleeding in the sigmoid colon. Contacted Dr. Ardon and requested CTA this evening. Patient with iodine allergy. States she breaks out into hives with iodine exposure in past. Discussed with pharmacy. Will pre-medicate with Pepcid 40mg IV x 1, Solumedrol 80 mg IV x1, Benadryl 50 mg IV x1 and tylenol 650 mg PO x 1.     Discussed with patient. She understands the results and need to proceed with CTA. Patient declined earlier today. She is agreeable to taking premeds for iodine allergy. Recent complaints of chest pain evaluated. EKG normal. States she feels like it was gas and resolved soon after EKG results made available to her. Questions answered. She agrees to proceed.

## 2022-08-09 NOTE — SUBJECTIVE & OBJECTIVE
Past Medical History:   Diagnosis Date    Allergy     Arthritis     Asthma     Admit to [facility] by Dr. Michael Randall for asthma exaceration, acute bronchitis, acute dCHF, hypoxia, elevated troponin/BNP (thought to be 2/2 asthma exac & acute dCHF by cards), tx'd w/ IV lasix, duonebs, IV solumedrol and DC'd on Z-prema, prednisone taper    CKD (chronic kidney disease) stage 2, GFR 60-89 ml/min     H/o ARF 2/2 poor water intake; encouraged to increase water intake and avoid NSAIDS & contrast dye    COVID 2022    Depression     Diastolic heart failure 2019    Dx'd at [place] during asthma exacerbation    Diverticulitis     Dyslipidemia 2018    Former smoker     Quit 18; smoked 0.12 packs/day x 37 years    Gastritis     Hypertension     Pneumonia 2022    Prediabetes     A1c 6.1% on 19    Severe obesity (BMI >= 40)     Vertigo     Vitamin D deficiency        Past Surgical History:   Procedure Laterality Date    DILATION AND CURETTAGE OF UTERUS      missed , bleeding    EXPLORATORY LAPAROTOMY      lysis of adhesions, open tube    KNEE SURGERY Right     torn mcl    laser to cervix         Review of patient's allergies indicates:   Allergen Reactions    Coconut Anaphylaxis    Iodine and iodide containing products Anaphylaxis    Dairy aid [lactase] Diarrhea and Nausea And Vomiting    Penicillins Hives       No current facility-administered medications on file prior to encounter.     Current Outpatient Medications on File Prior to Encounter   Medication Sig    albuterol (PROAIR HFA) 90 mcg/actuation inhaler USE 2 INHALATIONS EVERY 6 HOURS AS NEEDED FOR WHEEZING    albuterol-ipratropium (DUO-NEB) 2.5 mg-0.5 mg/3 mL nebulizer solution Take 3 mLs by nebulization every 6 (six) hours as needed for Wheezing. Rescue    amLODIPine (NORVASC) 5 MG tablet Take 1 tablet (5 mg total) by mouth once daily.    aspirin (ECOTRIN) 81 MG EC tablet Take 1 tablet (81 mg total) by mouth once daily.     aspirin-caffeine (BC ARTHRITIS) 1,000-65 mg PwPk Take 1 packet by mouth daily as needed.    atorvastatin (LIPITOR) 40 MG tablet TAKE 1 TABLET(40 MG) BY MOUTH EVERY DAY    calcium carbonate/vitamin D3 (VITAMIN D-3 ORAL) Take 1 tablet by mouth once daily. (600 mg/ 400 iu)    ELDERBERRY FRUIT ORAL Take 50 mg by mouth once daily.    fluticasone propionate (FLONASE) 50 mcg/actuation nasal spray 1 spray (50 mcg total) by Each Nostril route once daily.    fluticasone-salmeterol diskus inhaler 250-50 mcg Inhale 1 puff into the lungs 2 (two) times daily. Controller    mupirocin (BACTROBAN) 2 % ointment Apply topically 2 (two) times daily. To abdominal area    mv-min/iron/folic/calcium/vitK (WOMEN'S MULTIVITAMIN ORAL) Take 1 tablet by mouth once daily.    naproxen sodium (ANAPROX) 220 MG tablet Take 440 mg by mouth once daily.    pulse oximeter (PULSE OXIMETER) device by Apply Externally route 2 (two) times a day. Use twice daily at 8 AM and 3 PM and record the value in MyChart as directed.     Family History       Problem Relation (Age of Onset)    Bipolar disorder Sister    COPD Sister    HIV Sister    Heart disease Mother, Father    Hypertension Father    Kidney disease Father    Stroke Mother          Tobacco Use    Smoking status: Former Smoker     Packs/day: 0.25     Years: 27.00     Pack years: 6.75     Types: Cigarettes    Smokeless tobacco: Never Used   Substance and Sexual Activity    Alcohol use: No    Drug use: No    Sexual activity: Never     Review of Systems   Constitutional:  Negative for activity change, diaphoresis, fatigue and unexpected weight change.   HENT:  Negative for congestion, ear pain and sore throat.    Eyes: Negative.    Respiratory:  Negative for shortness of breath and wheezing.    Cardiovascular:  Negative for chest pain and palpitations.   Gastrointestinal:  Positive for blood in stool. Negative for abdominal pain, constipation, diarrhea and vomiting.   Endocrine: Negative.    Genitourinary:   Negative for flank pain, hematuria and urgency.   Musculoskeletal:  Negative for joint swelling and neck pain.   Skin:  Negative for pallor.   Neurological:  Negative for seizures, syncope and light-headedness.   Hematological: Negative.    Psychiatric/Behavioral: Negative.     Objective:     Vital Signs (Most Recent):  Temp: 98.6 °F (37 °C) (08/09/22 1319)  Pulse: 75 (08/09/22 1715)  Resp: 18 (08/09/22 1646)  BP: 139/86 (08/09/22 1646)  SpO2: (!) 93 % (08/09/22 1646)   Vital Signs (24h Range):  Temp:  [98 °F (36.7 °C)-98.6 °F (37 °C)] 98.6 °F (37 °C)  Pulse:  [73-89] 75  Resp:  [18-22] 18  SpO2:  [92 %-98 %] 93 %  BP: (123-181)/(60-97) 139/86     Weight: 127 kg (280 lb)  Body mass index is 46.59 kg/m².    Physical Exam  Constitutional:       Appearance: She is well-developed. She is obese.   HENT:      Head: Normocephalic and atraumatic.   Cardiovascular:      Rate and Rhythm: Normal rate and regular rhythm.      Heart sounds: Normal heart sounds. No murmur heard.  Pulmonary:      Effort: Pulmonary effort is normal. No respiratory distress.      Breath sounds: Normal breath sounds.   Abdominal:      General: Bowel sounds are normal. There is no distension.      Palpations: Abdomen is soft.      Tenderness: There is no abdominal tenderness.   Genitourinary:     Comments: Maroon stool-rectal exam  Musculoskeletal:         General: Normal range of motion.      Cervical back: Normal range of motion and neck supple.   Skin:     General: Skin is warm and dry.   Neurological:      Mental Status: She is alert and oriented to person, place, and time.           Significant Labs: All pertinent labs within the past 24 hours have been reviewed.  CBC:   Recent Labs   Lab 08/09/22  0317 08/09/22  1240 08/09/22  1453   WBC 8.92  --   --    HGB 11.1* 10.8* 10.3*   HCT 38.7 38.0 36.2*     --   --      CMP:   Recent Labs   Lab 08/09/22  0317      K 4.3   CL 99   CO2 37*   *   BUN 16   CREATININE 1.0   CALCIUM 9.3    PROT 7.0   ALBUMIN 3.5   BILITOT 0.4   ALKPHOS 99   AST 19   ALT 16   ANIONGAP 8       Significant Imaging:   Imaging Results              X-Ray Chest AP Portable (Final result)  Result time 08/09/22 07:15:28      Final result by Sathish Ardon MD (08/09/22 07:15:28)                   Impression:      No acute abnormality.      Electronically signed by: Sathish Ardon  Date:    08/09/2022  Time:    07:15               Narrative:    EXAMINATION:  XR CHEST AP PORTABLE    CLINICAL HISTORY:  GIB;.    TECHNIQUE:  Single frontal portable view of the chest was performed.    COMPARISON:  08/02/2022    FINDINGS:  Support devices: None    Peripheral left pleuroparenchymal scarring again noted.The lungs are otherwise clear, with normal appearance of pulmonary vasculature and no pleural effusion or pneumothorax.    The cardiac silhouette is normal in size. The hilar and mediastinal contours are unremarkable.    Bones are intact.                        ED Interpretation by Rohith Bullard MD (08/09/22 07:03:23, [facility] - Emergency Dept., Emergency Medicine)    No acute findings

## 2022-08-09 NOTE — H&P
"[facility]  History & Physical    Patient Name: Baylee Muñoz  MRN: [id number]  Patient Class: OP- Observation  Admission Date: 8/9/2022  Attending Physician: José Miguel Brown MD   Primary Care Provider: Becki Hernandez NP      Patient information was obtained from patient and ER records.     Subjective:     Principal Problem:Hematochezia    Chief Complaint:   Chief Complaint   Patient presents with    Rectal Bleeding     States bright red blood with stools x 2 days. Reports clots with BM tonight, Denies weakness.Pt states she was using BC powder x 3 doses since Friday. States she was told she wasn't supposed to use but was having knee pain. On home 02 but doesn't have a transport tank. Denies SOB        HPI: Baylee Muñoz is a 62 year old female with history of CKD, diastolic heart failure, HTN, Obesity, and diverticulosis who presents to ED for further evaluation of hematochezia that began yesterday. She admits to taking daily BC powder daily x 3 days since Friday. She reports having "bleeding problems" in the past due to taking BC powder. She takes ASA daily as well. She denies hematemesis, N/V, chest pain, or dyspnea.     In ED, rectal exam revealed maroon stool in rectal vault. Vitals and labs are stable at present. GI has been consulted for upper scope.   Will place patient in observation.     Past Medical History:   Diagnosis Date    Allergy     Arthritis     Asthma     Admit to [facility] 1/6/19 by Dr. Michael Randall for asthma exaceration, acute bronchitis, acute dCHF, hypoxia, elevated troponin/BNP (thought to be 2/2 asthma exac & acute dCHF by cards), tx'd w/ IV lasix, duonebs, IV solumedrol and DC'd on Z-prema, prednisone taper    CKD (chronic kidney disease) stage 2, GFR 60-89 ml/min     H/o ARF 2/2 poor water intake; encouraged to increase water intake and avoid NSAIDS & contrast dye    COVID 06/2022    Depression     Diastolic heart failure 01/06/2019    Dx'd at [facility] " during asthma exacerbation    Diverticulitis     Dyslipidemia 2018    Former smoker     Quit 18; smoked 0.12 packs/day x 37 years    Gastritis     Hypertension     Pneumonia 2022    Prediabetes     A1c 6.1% on 19    Severe obesity (BMI >= 40)     Vertigo     Vitamin D deficiency        Past Surgical History:   Procedure Laterality Date    DILATION AND CURETTAGE OF UTERUS      missed , bleeding    EXPLORATORY LAPAROTOMY      lysis of adhesions, open tube    KNEE SURGERY Right     torn mcl    laser to cervix         Review of patient's allergies indicates:   Allergen Reactions    Coconut Anaphylaxis    Iodine and iodide containing products Anaphylaxis    Dairy aid [lactase] Diarrhea and Nausea And Vomiting    Penicillins Hives       No current facility-administered medications on file prior to encounter.     Current Outpatient Medications on File Prior to Encounter   Medication Sig    albuterol (PROAIR HFA) 90 mcg/actuation inhaler USE 2 INHALATIONS EVERY 6 HOURS AS NEEDED FOR WHEEZING    albuterol-ipratropium (DUO-NEB) 2.5 mg-0.5 mg/3 mL nebulizer solution Take 3 mLs by nebulization every 6 (six) hours as needed for Wheezing. Rescue    amLODIPine (NORVASC) 5 MG tablet Take 1 tablet (5 mg total) by mouth once daily.    aspirin (ECOTRIN) 81 MG EC tablet Take 1 tablet (81 mg total) by mouth once daily.    aspirin-caffeine (BC ARTHRITIS) 1,000-65 mg PwPk Take 1 packet by mouth daily as needed.    atorvastatin (LIPITOR) 40 MG tablet TAKE 1 TABLET(40 MG) BY MOUTH EVERY DAY    calcium carbonate/vitamin D3 (VITAMIN D-3 ORAL) Take 1 tablet by mouth once daily. (600 mg/ 400 iu)    ELDERBERRY FRUIT ORAL Take 50 mg by mouth once daily.    fluticasone propionate (FLONASE) 50 mcg/actuation nasal spray 1 spray (50 mcg total) by Each Nostril route once daily.    fluticasone-salmeterol diskus inhaler 250-50 mcg Inhale 1 puff into the lungs 2 (two) times daily. Controller    mupirocin (BACTROBAN) 2 %  ointment Apply topically 2 (two) times daily. To abdominal area    mv-min/iron/folic/calcium/vitK (WOMEN'S MULTIVITAMIN ORAL) Take 1 tablet by mouth once daily.    naproxen sodium (ANAPROX) 220 MG tablet Take 440 mg by mouth once daily.    pulse oximeter (PULSE OXIMETER) device by Apply Externally route 2 (two) times a day. Use twice daily at 8 AM and 3 PM and record the value in MyChart as directed.     Family History       Problem Relation (Age of Onset)    Bipolar disorder Sister    COPD Sister    HIV Sister    Heart disease Mother, Father    Hypertension Father    Kidney disease Father    Stroke Mother          Tobacco Use    Smoking status: Former Smoker     Packs/day: 0.25     Years: 27.00     Pack years: 6.75     Types: Cigarettes    Smokeless tobacco: Never Used   Substance and Sexual Activity    Alcohol use: No    Drug use: No    Sexual activity: Never     Review of Systems   Constitutional:  Negative for activity change, diaphoresis, fatigue and unexpected weight change.   HENT:  Negative for congestion, ear pain and sore throat.    Eyes: Negative.    Respiratory:  Negative for shortness of breath and wheezing.    Cardiovascular:  Negative for chest pain and palpitations.   Gastrointestinal:  Positive for blood in stool. Negative for abdominal pain, constipation, diarrhea and vomiting.   Endocrine: Negative.    Genitourinary:  Negative for flank pain, hematuria and urgency.   Musculoskeletal:  Negative for joint swelling and neck pain.   Skin:  Negative for pallor.   Neurological:  Negative for seizures, syncope and light-headedness.   Hematological: Negative.    Psychiatric/Behavioral: Negative.     Objective:     Vital Signs (Most Recent):  Temp: 98.6 °F (37 °C) (08/09/22 1319)  Pulse: 75 (08/09/22 1715)  Resp: 18 (08/09/22 1646)  BP: 139/86 (08/09/22 1646)  SpO2: (!) 93 % (08/09/22 1646)   Vital Signs (24h Range):  Temp:  [98 °F (36.7 °C)-98.6 °F (37 °C)] 98.6 °F (37 °C)  Pulse:  [73-89] 75  Resp:   [18-22] 18  SpO2:  [92 %-98 %] 93 %  BP: (123-181)/(60-97) 139/86     Weight: 127 kg (280 lb)  Body mass index is 46.59 kg/m².    Physical Exam  Constitutional:       Appearance: She is well-developed. She is obese.   HENT:      Head: Normocephalic and atraumatic.   Cardiovascular:      Rate and Rhythm: Normal rate and regular rhythm.      Heart sounds: Normal heart sounds. No murmur heard.  Pulmonary:      Effort: Pulmonary effort is normal. No respiratory distress.      Breath sounds: Normal breath sounds.   Abdominal:      General: Bowel sounds are normal. There is no distension.      Palpations: Abdomen is soft.      Tenderness: There is no abdominal tenderness.   Genitourinary:     Comments: Maroon stool-rectal exam  Musculoskeletal:         General: Normal range of motion.      Cervical back: Normal range of motion and neck supple.   Skin:     General: Skin is warm and dry.   Neurological:      Mental Status: She is alert and oriented to person, place, and time.           Significant Labs: All pertinent labs within the past 24 hours have been reviewed.  CBC:   Recent Labs   Lab 08/09/22  0317 08/09/22  1240 08/09/22  1453   WBC 8.92  --   --    HGB 11.1* 10.8* 10.3*   HCT 38.7 38.0 36.2*     --   --      CMP:   Recent Labs   Lab 08/09/22  0317      K 4.3   CL 99   CO2 37*   *   BUN 16   CREATININE 1.0   CALCIUM 9.3   PROT 7.0   ALBUMIN 3.5   BILITOT 0.4   ALKPHOS 99   AST 19   ALT 16   ANIONGAP 8       Significant Imaging:   Imaging Results              X-Ray Chest AP Portable (Final result)  Result time 08/09/22 07:15:28      Final result by Sathish Ardon MD (08/09/22 07:15:28)                   Impression:      No acute abnormality.      Electronically signed by: Sathish Ardon  Date:    08/09/2022  Time:    07:15               Narrative:    EXAMINATION:  XR CHEST AP PORTABLE    CLINICAL HISTORY:  GIB;.    TECHNIQUE:  Single frontal portable view of the chest was  performed.    COMPARISON:  08/02/2022    FINDINGS:  Support devices: None    Peripheral left pleuroparenchymal scarring again noted.The lungs are otherwise clear, with normal appearance of pulmonary vasculature and no pleural effusion or pneumothorax.    The cardiac silhouette is normal in size. The hilar and mediastinal contours are unremarkable.    Bones are intact.                        ED Interpretation by Rohith Bullard MD (08/09/22 07:03:23, [facility] - Emergency Dept., Emergency Medicine)    No acute findings                                      Assessment/Plan:     * Hematochezia  Admits BC powder use x 3 days in setting of PUD vs diverticular bleed.   Plans for upper scope today by GI further recs to follow  Monitor H/H  PPI IV BID  Avoid NSAIDS      VTE Risk Mitigation (From admission, onward)      None               Elvira Shah NP  Department of Hospital Medicine   [facility] - Telemetry ([facility])

## 2022-08-09 NOTE — HPI
The patient presented to the ER for hematochezia which started yesterday. It is described as maroon stools. She had mild cramping yesterday but it didn't last long. She denies nausea, vomiting, change in appetite or weight loss. She has a history of NSAID induced PUD, colon polyps and diverticulosis. She thinks her last scopes were over 5 years ago at GI [facility]. She reports taking BC Powder over the last three days due to knee pain. She takes ASA 81 mg daily but denies anticoagulation use. ER work up revealed a Hgb of 11.1, BUN 16 and creatinine 1.0. The ER physician documented loose dark maroon stool on rectal exam without hemorrhoids or mass. Vitals have been stable. She was given 80 mg of Protonix.

## 2022-08-09 NOTE — ASSESSMENT & PLAN NOTE
Possible diverticular bleed but given the history of PUD and recent BC Powder use, will need to rule out upper GI bleed. Will plan for EGD this morning.   Patient hemodynamically stable. Hgb 11.1  Continue IV Protonix.   Continue to monitor H/H and transfuse as indicated.

## 2022-08-09 NOTE — ANESTHESIA PREPROCEDURE EVALUATION
08/09/2022  Baylee Muñoz is a 62 y.o., female.            History of Present Illness: Baylee Muñoz is a 62 y.o. female patient with a PMHx of  UGIB in 2005 due to gastritis on EGD 2/2 BC powder abuse, last colonoscopy was about 5 years ago with 3 polyps removed, obesity, diverticulitis, HLD, pre-DM who presents to the Emergency Department for evaluation of rectal bleeding which onset gradually yesterday at 3:30 PM. Pt states her stool is now bright red and she has seen a clot within the blood. Symptoms are episodic and moderate in severity. No mitigating or exacerbating factors reported. Associated sxs include R sided abdominal cramping. Patient denies any n/v/d, pelvic pain, dysuria, CP, SOB, weakness, fatigue, and all other sxs at this time. No prior Tx reported. No further complaints or concerns at this time.       Pre-op Assessment    I have reviewed the Patient Summary Reports.    I have reviewed the NPO Status.      Review of Systems  Cardiovascular:   Hypertension    Pulmonary:   Asthma    Renal/:   Chronic Renal Disease    Hepatic/GI:   Lower Gi bleed   Musculoskeletal:   Arthritis     Psych:   Psychiatric History          Physical Exam  General: Well nourished    Airway:  Mallampati: II   Mouth Opening: Normal  TM Distance: Normal  Neck ROM: Normal ROM    Dental:  Intact        Anesthesia Plan  Type of Anesthesia, risks & benefits discussed:    Anesthesia Type: MAC  Intra-op Monitoring Plan: Standard ASA Monitors  Post Op Pain Control Plan: multimodal analgesia  Informed Consent: Informed consent signed with the Patient and all parties understand the risks and agree with anesthesia plan.  All questions answered.   ASA Score: 3    Ready For Surgery From Anesthesia Perspective.     .

## 2022-08-09 NOTE — TRANSFER OF CARE
"Anesthesia Transfer of Care Note    Patient: Baylee Muñoz    Procedure(s) Performed: Procedure(s) (LRB):  EGD (ESOPHAGOGASTRODUODENOSCOPY) (N/A)    Patient location: GI    Anesthesia Type: MAC    Transport from OR: Transported from OR on room air with adequate spontaneous ventilation    Post pain: adequate analgesia    Post assessment: no apparent anesthetic complications and tolerated procedure well    Post vital signs: stable    Level of consciousness: awake and alert    Nausea/Vomiting: no nausea/vomiting    Complications: none    Transfer of care protocol was followed      Last vitals:   Visit Vitals  BP (!) 158/97 (BP Location: Left arm, Patient Position: Lying)   Pulse 82   Temp 36.7 °C (98.1 °F) (Temporal)   Resp (!) 22   Ht 5' 5" (1.651 m)   Wt 127 kg (280 lb)   SpO2 96%   Breastfeeding No   BMI 46.59 kg/m²     "

## 2022-08-09 NOTE — INTERVAL H&P NOTE
The patient has been examined and the H&P has been reviewed:    I concur with the findings and no changes have occurred since H&P was written.    Anesthesia/Surgery risks, benefits and alternative options discussed and understood by patient/family.          Active Hospital Problems    Diagnosis  POA    Hematochezia [K92.1]  Yes    Asthma [J45.909]  Yes      Resolved Hospital Problems   No resolved problems to display.

## 2022-08-09 NOTE — PLAN OF CARE
Dr pepe came to bedside to discuss findings. Vital signs stable, no patient c/o nausea/vomitting, no abdominal pain, no gi bleeding. Patient to be discharged from unit.

## 2022-08-09 NOTE — CARE UPDATE
Repeat H/H shows a decline, but overall stable. Bleeding scan shows GI bleeding possible in distal colon. IR consulted for recommended CTA abdomen pelvis. Patient has an allergy to contrast with hives. Will give Solumedrol, Pepcid, Benadryl, and Tylenol. Dr. Devi will discuss risk vs benefits of CTA.     Complains of chest pain . Suspect GI vs anxiety induced. Will get EKG now and order troponin. Give simethicone and montior    Further recs to follow

## 2022-08-09 NOTE — FIRST PROVIDER EVALUATION
"Medical screening exam completed.  I have conducted a focused provider triage encounter, findings are as follows:    Brief history of present illness:  62-year-old female presents to emergency room for rectal bleeding and out of home oxygen.  Patient states she took a BC Powder yesterday and she is not supposed to do that.    Vitals:    08/09/22 0209   BP: (!) 181/66   BP Location: Right arm   Pulse: 89   Resp: 20   Temp: 98 °F (36.7 °C)   TempSrc: Oral   SpO2: 98%   Weight: 127 kg (280 lb)   Height: 5' 5" (1.651 m)       Pertinent physical exam:  Elevated blood pressure, speaking in full sentences, no acute distress, awake alert.    Brief workup plan:  Lab work    Preliminary workup initiated; this workup will be continued and followed by the physician or advanced practice provider that is assigned to the patient when roomed.  "

## 2022-08-09 NOTE — BRIEF OP NOTE
Inpatient Endoscopy Brief Operative Note      Admit Date: 8/9/2022    Procedure Date and Time:  8/9/2022 12:08 PM    Attending Physician: José Miguel Brown MD     Principal Admitting Diagnoses: <principal problem not specified>         Discharge Diagnosis: The primary encounter diagnosis was H/O NSAID-associated gastropathy. A diagnosis of GIB (gastrointestinal bleeding) was also pertinent to this visit.     Discharged Condition: Stable    Indication for Admission: maroon colored stools     Procedure Performed: EGD with biopsy    SEE [facility] REPORTS FOR DETAILS.    Pathology (if any):  Specimen (24h ago, onward)             Start     Ordered    08/09/22 1205  Specimen to Pathology, Surgery Gastrointestinal tract  Once        Comments: Pre-op Diagnosis: GIB (gastrointestinal bleeding) [K92.2]Procedure(s):EGD (ESOPHAGOGASTRODUODENOSCOPY) 1. Gastric biopsy r/o hp     References:    Click here for ordering Quick Tip   Question Answer Comment   Procedure Type: Gastrointestinal tract    Which provider would you like to cc? DARIELA DEVI    Release to patient Immediate        08/09/22 1205                Estimated Blood Loss: 1 ml.    Discussed with: patient.    Disposition: Return to hospital [facility].    Recommendations:   1. CTA STAT  2. Clears, no reds  3. Repeat STAT H&H now      Communicated with hospital medicine service regarding the above findings. Patient has declined colonoscopy even though she continues to pass maroon colored stools post EGD.     Addednum: [facility]. Medical records indicates last colonoscopy in 2006 by Dr. Kendall Toure for diverticular bleeding. Founds to have tics. Has not been seen since 2012.       Dariela Devi MD  Inpatient Gastroenterology  [facility]

## 2022-08-09 NOTE — ASSESSMENT & PLAN NOTE
Admits BC powder use x 3 days in setting of PUD vs diverticular bleed.   Plans for upper scope today by GI further recs to follow  Monitor H/H  PPI IV BID  Avoid NSAIDS

## 2022-08-09 NOTE — HPI
"Baylee Muñoz is a 62 year old female with history of CKD, diastolic heart failure, HTN, Obesity, and diverticulosis who presents to ED for further evaluation of hematochezia that began yesterday. She admits to taking daily BC powder daily x 3 days since Friday. She reports having "bleeding problems" in the past due to taking BC powder. She takes ASA daily as well. She denies hematemesis, N/V, chest pain, or dyspnea.     In ED, rectal exam revealed maroon stool in rectal vault. Vitals and labs are stable at present. GI has been consulted for upper scope.   "

## 2022-08-09 NOTE — ANESTHESIA POSTPROCEDURE EVALUATION
Anesthesia Post Evaluation    Patient: Baylee Muñoz    Procedure(s) Performed: Procedure(s) (LRB):  EGD (ESOPHAGOGASTRODUODENOSCOPY) (N/A)    Final Anesthesia Type: MAC      Patient location during evaluation: GI PACU  Patient participation: Yes- Able to Participate  Level of consciousness: awake and alert and awake  Post-procedure vital signs: reviewed and stable  Pain management: adequate  Airway patency: patent  TUTU mitigation strategies: Multimodal analgesia  PONV status at discharge: No PONV  Anesthetic complications: no      Cardiovascular status: blood pressure returned to baseline and hemodynamically stable  Respiratory status: unassisted and spontaneous ventilation  Hydration status: euvolemic  Follow-up not needed.            Taken Time     08/09/22 1211     08/09/22 1211     08/09/22 1211     08/09/22 1211     08/09/22 1211         No case tracking events are documented in the log.      Pain/Miguel Angel Score: No data recorded

## 2022-08-10 ENCOUNTER — ANESTHESIA (OUTPATIENT)
Dept: ENDOSCOPY | Facility: HOSPITAL | Age: 62
End: 2022-08-10
Payer: OTHER GOVERNMENT

## 2022-08-10 ENCOUNTER — ANESTHESIA EVENT (OUTPATIENT)
Dept: ENDOSCOPY | Facility: HOSPITAL | Age: 62
End: 2022-08-10
Payer: OTHER GOVERNMENT

## 2022-08-10 VITALS
HEIGHT: 65 IN | TEMPERATURE: 98 F | WEIGHT: 292 LBS | HEART RATE: 78 BPM | RESPIRATION RATE: 20 BRPM | SYSTOLIC BLOOD PRESSURE: 129 MMHG | DIASTOLIC BLOOD PRESSURE: 72 MMHG | OXYGEN SATURATION: 93 % | BODY MASS INDEX: 48.65 KG/M2

## 2022-08-10 PROBLEM — K57.31 DIVERTICULOSIS OF COLON WITH HEMORRHAGE OF LARGE INTESTINE: Status: RESOLVED | Noted: 2022-08-10 | Resolved: 2022-08-10

## 2022-08-10 PROBLEM — K57.92 DIVERTICULITIS: Status: ACTIVE | Noted: 2022-08-10

## 2022-08-10 PROBLEM — K63.5 COLON POLYP: Status: ACTIVE | Noted: 2022-08-10

## 2022-08-10 PROBLEM — I50.32 CHRONIC DIASTOLIC CONGESTIVE HEART FAILURE: Status: ACTIVE | Noted: 2019-01-06

## 2022-08-10 PROBLEM — I50.30 DIASTOLIC HEART FAILURE: Status: ACTIVE | Noted: 2019-01-06

## 2022-08-10 PROBLEM — K92.1 HEMATOCHEZIA: Status: RESOLVED | Noted: 2022-08-09 | Resolved: 2022-08-10

## 2022-08-10 PROBLEM — D62 ACUTE BLOOD LOSS ANEMIA: Status: ACTIVE | Noted: 2022-08-10

## 2022-08-10 PROBLEM — K92.2 GI BLEED: Status: ACTIVE | Noted: 2022-08-10

## 2022-08-10 PROBLEM — K57.31 DIVERTICULOSIS OF COLON WITH HEMORRHAGE OF LARGE INTESTINE: Status: ACTIVE | Noted: 2022-08-10

## 2022-08-10 PROCEDURE — 25000003 PHARM REV CODE 250: Performed by: NURSE ANESTHETIST, CERTIFIED REGISTERED

## 2022-08-10 PROCEDURE — 94761 N-INVAS EAR/PLS OXIMETRY MLT: CPT

## 2022-08-10 PROCEDURE — A4216 STERILE WATER/SALINE, 10 ML: HCPCS | Performed by: NURSE PRACTITIONER

## 2022-08-10 PROCEDURE — 25000003 PHARM REV CODE 250: Performed by: INTERNAL MEDICINE

## 2022-08-10 PROCEDURE — 37000008 HC ANESTHESIA 1ST 15 MINUTES: Performed by: INTERNAL MEDICINE

## 2022-08-10 PROCEDURE — 96376 TX/PRO/DX INJ SAME DRUG ADON: CPT | Mod: 59

## 2022-08-10 PROCEDURE — 63600175 PHARM REV CODE 636 W HCPCS: Performed by: NURSE ANESTHETIST, CERTIFIED REGISTERED

## 2022-08-10 PROCEDURE — 00813 ANES UPR LWR GI NDSC PX: CPT | Performed by: INTERNAL MEDICINE

## 2022-08-10 PROCEDURE — 25000003 PHARM REV CODE 250: Performed by: NURSE PRACTITIONER

## 2022-08-10 PROCEDURE — 45378 DIAGNOSTIC COLONOSCOPY: CPT | Performed by: INTERNAL MEDICINE

## 2022-08-10 PROCEDURE — 45378 DIAGNOSTIC COLONOSCOPY: CPT | Mod: ,,, | Performed by: INTERNAL MEDICINE

## 2022-08-10 PROCEDURE — G0378 HOSPITAL OBSERVATION PER HR: HCPCS

## 2022-08-10 PROCEDURE — 37000009 HC ANESTHESIA EA ADD 15 MINS: Performed by: INTERNAL MEDICINE

## 2022-08-10 PROCEDURE — 63600175 PHARM REV CODE 636 W HCPCS: Performed by: INTERNAL MEDICINE

## 2022-08-10 PROCEDURE — 96366 THER/PROPH/DIAG IV INF ADDON: CPT | Mod: 59

## 2022-08-10 PROCEDURE — 96365 THER/PROPH/DIAG IV INF INIT: CPT | Mod: 59

## 2022-08-10 PROCEDURE — 45378 PR COLONOSCOPY,DIAGNOSTIC: ICD-10-PCS | Mod: ,,, | Performed by: INTERNAL MEDICINE

## 2022-08-10 PROCEDURE — S0030 INJECTION, METRONIDAZOLE: HCPCS | Performed by: INTERNAL MEDICINE

## 2022-08-10 RX ORDER — AMOXICILLIN 250 MG
1 CAPSULE ORAL 2 TIMES DAILY PRN
Start: 2022-08-10 | End: 2022-10-26

## 2022-08-10 RX ORDER — ACETAMINOPHEN 325 MG/1
650 TABLET ORAL EVERY 6 HOURS PRN
Refills: 0
Start: 2022-08-10

## 2022-08-10 RX ORDER — METRONIDAZOLE 500 MG/1
500 TABLET ORAL EVERY 8 HOURS
Qty: 21 TABLET | Refills: 0 | Status: SHIPPED | OUTPATIENT
Start: 2022-08-10 | End: 2022-08-17

## 2022-08-10 RX ORDER — CIPROFLOXACIN 500 MG/1
500 TABLET ORAL EVERY 12 HOURS
Qty: 14 TABLET | Refills: 0 | Status: SHIPPED | OUTPATIENT
Start: 2022-08-10 | End: 2022-08-17

## 2022-08-10 RX ORDER — PROPOFOL 10 MG/ML
VIAL (ML) INTRAVENOUS
Status: DISCONTINUED | OUTPATIENT
Start: 2022-08-10 | End: 2022-08-10

## 2022-08-10 RX ORDER — SODIUM CHLORIDE, SODIUM LACTATE, POTASSIUM CHLORIDE, CALCIUM CHLORIDE 600; 310; 30; 20 MG/100ML; MG/100ML; MG/100ML; MG/100ML
INJECTION, SOLUTION INTRAVENOUS CONTINUOUS PRN
Status: DISCONTINUED | OUTPATIENT
Start: 2022-08-10 | End: 2022-08-10

## 2022-08-10 RX ORDER — SODIUM CHLORIDE 9 MG/ML
INJECTION, SOLUTION INTRAVENOUS CONTINUOUS
Status: DISCONTINUED | OUTPATIENT
Start: 2022-08-10 | End: 2022-08-10 | Stop reason: HOSPADM

## 2022-08-10 RX ORDER — DEXTROMETHORPHAN/PSEUDOEPHED 2.5-7.5/.8
DROPS ORAL
Status: COMPLETED | OUTPATIENT
Start: 2022-08-10 | End: 2022-08-10

## 2022-08-10 RX ORDER — LIDOCAINE HYDROCHLORIDE 10 MG/ML
INJECTION, SOLUTION EPIDURAL; INFILTRATION; INTRACAUDAL; PERINEURAL
Status: DISCONTINUED | OUTPATIENT
Start: 2022-08-10 | End: 2022-08-10

## 2022-08-10 RX ADMIN — FAMOTIDINE 40 MG: 10 INJECTION, SOLUTION INTRAVENOUS at 01:08

## 2022-08-10 RX ADMIN — SODIUM CHLORIDE: 0.9 INJECTION, SOLUTION INTRAVENOUS at 09:08

## 2022-08-10 RX ADMIN — SIMETHICONE 200 MG: 20 SUSPENSION/ DROPS ORAL at 11:08

## 2022-08-10 RX ADMIN — PROPOFOL 30 MG: 10 INJECTION, EMULSION INTRAVENOUS at 11:08

## 2022-08-10 RX ADMIN — METHYLPREDNISOLONE SODIUM SUCCINATE 40 MG: 40 INJECTION, POWDER, FOR SOLUTION INTRAMUSCULAR; INTRAVENOUS at 01:08

## 2022-08-10 RX ADMIN — ACETAMINOPHEN 650 MG: 325 TABLET ORAL at 09:08

## 2022-08-10 RX ADMIN — Medication 10 ML: at 05:08

## 2022-08-10 RX ADMIN — CIPROFLOXACIN 400 MG: 2 INJECTION, SOLUTION INTRAVENOUS at 09:08

## 2022-08-10 RX ADMIN — ACETAMINOPHEN 650 MG: 325 TABLET ORAL at 01:08

## 2022-08-10 RX ADMIN — METRONIDAZOLE 500 MG: 500 INJECTION, SOLUTION INTRAVENOUS at 05:08

## 2022-08-10 RX ADMIN — DIPHENHYDRAMINE HYDROCHLORIDE 50 MG: 50 INJECTION, SOLUTION INTRAMUSCULAR; INTRAVENOUS at 01:08

## 2022-08-10 RX ADMIN — PROPOFOL 80 MG: 10 INJECTION, EMULSION INTRAVENOUS at 11:08

## 2022-08-10 RX ADMIN — SODIUM CHLORIDE, SODIUM LACTATE, POTASSIUM CHLORIDE, AND CALCIUM CHLORIDE: .6; .31; .03; .02 INJECTION, SOLUTION INTRAVENOUS at 11:08

## 2022-08-10 RX ADMIN — LIDOCAINE HYDROCHLORIDE 50 MG: 10 INJECTION, SOLUTION EPIDURAL; INFILTRATION; INTRACAUDAL; PERINEURAL at 11:08

## 2022-08-10 NOTE — HOSPITAL COURSE
The patient was monitored closely during her stay. She was kept on continuous telemetry monitoring. She was initially kept NPO. GI was consulted. Patient had colonoscopy which showed severe diverticulosis in the sigmoid colon, in the descending colon, in the transverse colon and in the ascending colon. There was evidence of an impacted diverticulum. There was evidence of recent bleeding from the diverticular opening. There was evidence of past bleeding from the diverticular opening. Two 2 to 3 mm polyps were noted in the rectum. Resection was not attempted due to recent GI bleed. The examination was otherwise normal. Hemorrhoids were found on perianal exam. No specimens were collected. The patient's overall condition remained stable and improved and she was discharged to home.

## 2022-08-10 NOTE — PLAN OF CARE
[facility] - Telemetry (Hospital)  Discharge Final Note    Primary Care Provider: Becki Hernandez NP    Expected Discharge Date: 8/10/2022    Final Discharge Note (most recent)     Final Note - 08/10/22 1438        Final Note    Assessment Type Final Discharge Note     Anticipated Discharge Disposition Home or Self Care     What phone number can be called within the next 1-3 days to see how you are doing after discharge? --   [phone]       Post-Acute Status    Discharge Delays None known at this time               Pt to discharge home. In-basket message sent to Dr. Devi's staff to arrange hospital follow up.     No additional CM needs for discharge.     Important Message from Medicare             Contact Info     GI        Next Steps: Follow up in 2 week(s)    Instructions: Follow up as Outpatient  Clinic will schedule appointment.

## 2022-08-10 NOTE — TRANSFER OF CARE
"Anesthesia Transfer of Care Note    Patient: Baylee Muñoz    Procedure(s) Performed: Procedure(s) (LRB):  COLONOSCOPY (N/A)    Patient location: GI    Anesthesia Type: MAC    Transport from OR: Transported from OR on room air with adequate spontaneous ventilation    Post pain: adequate analgesia    Post assessment: no apparent anesthetic complications    Post vital signs: stable    Level of consciousness: awake    Nausea/Vomiting: no nausea/vomiting    Complications: none    Transfer of care protocol was followed      Last vitals:   Visit Vitals  BP (!) 155/80 (BP Location: Left arm, Patient Position: Sitting)   Pulse 75   Temp 36.7 °C (98 °F)   Resp 19   Ht 5' 5" (1.651 m)   Wt 132.5 kg (292 lb)   SpO2 (!) 94%   Breastfeeding No   BMI 48.59 kg/m²     "

## 2022-08-10 NOTE — DISCHARGE SUMMARY
"[facility] ([facility])  [facility] Medicine  Discharge Summary    Patient Name: Baylee Muñoz  MRN: [id number]  Patient Class: OP- Observation  Admission Date: 8/9/2022  Hospital Length of Stay: 0 days  Discharge Date and Time:  08/10/2022 2:19 PM  Attending Physician: Irma Blandon MD   Discharging Provider: COLTEN Park  Primary Care Provider: Becki Hernandez NP    HPI:   Baylee Muñoz is a 62 year old female with history of CKD, diastolic heart failure, HTN, Obesity, and diverticulosis who presents to ED for further evaluation of hematochezia that began yesterday. She admits to taking daily BC powder daily x 3 days since Friday. She reports having "bleeding problems" in the past due to taking BC powder. She takes ASA daily as well. She denies hematemesis, N/V, chest pain, or dyspnea.     In ED, rectal exam revealed maroon stool in rectal vault. Vitals and labs are stable at present. GI has been consulted for upper scope.       Procedure(s) (LRB):  COLONOSCOPY (N/A)      Hospital Course:   The patient was monitored closely during her stay. She was kept on continuous telemetry monitoring. She was initially kept NPO. GI was consulted. Patient had colonoscopy which showed severe diverticulosis in the sigmoid colon, in the descending colon, in the transverse colon and in the ascending colon. There was evidence of an impacted diverticulum. There was evidence of recent bleeding from the diverticular opening. There was evidence of past bleeding from the diverticular opening. Two 2 to 3 mm polyps were noted in the rectum. Resection was not attempted due to recent GI bleed. The examination was otherwise normal. Hemorrhoids were found on perianal exam. No specimens were collected. The patient's overall condition remained stable and improved and she was discharged to home.      Goals of Care Treatment Preferences:  Code Status: Full Code      Consults:   Consults (From admission, onward)        Status " Ordering Provider     Inpatient consult to Gastroenterology  Once        Provider: Dr. Devi    Completed MAYRA GARIBAY        Service: Hospital Medicine    Final Active Diagnoses:    Diagnosis Date Noted POA    Acute blood loss anemia secondary to GI bleed [D62] 08/10/2022 Yes    Colon polyp [K63.5] 08/10/2022 Yes    Chronic diastolic congestive heart failure [I50.32] 01/06/2019 Yes    Asthma [J45.909]  Yes      Problems Resolved During this Admission:    Diagnosis Date Noted Date Resolved POA    Diverticulosis of colon with hemorrhage of large intestine [K57.31] 08/10/2022 08/10/2022 Yes    Hematochezia [K92.1] 08/09/2022 08/10/2022 Yes       Discharged Condition: stable    Disposition: Home or Self Care    Follow Up:   Follow-up Information     GI Follow up in 2 week(s).    Why: Follow up as Outpatient                     Patient Instructions:      Diet Cardiac   Order Comments: Diverticular diet     Notify your health care provider if you experience any of the following:  temperature >100.4     Notify your health care provider if you experience any of the following:  persistent nausea and vomiting or diarrhea     Notify your health care provider if you experience any of the following:   Order Comments: Any decline in condition     Activity as tolerated       Significant Diagnostic Studies:   CMP   Recent Labs   Lab 08/09/22  0317      K 4.3   CL 99   CO2 37*   *   BUN 16   CREATININE 1.0   CALCIUM 9.3   PROT 7.0   ALBUMIN 3.5   BILITOT 0.4   ALKPHOS 99   AST 19   ALT 16   ANIONGAP 8   , CBC   Recent Labs   Lab 08/09/22  0317 08/09/22  1240 08/09/22  1453   WBC 8.92  --   --    HGB 11.1* 10.8* 10.3*   HCT 38.7 38.0 36.2*     --   --    , INR   Lab Results   Component Value Date    INR 0.9 08/09/2022    INR 0.9 06/15/2022    INR 0.9 01/20/2019   Troponin   Recent Labs   Lab 08/09/22  1657   TROPONINI 0.024      Recent Labs   Lab 06/09/22  0835   HGBA1C 6.4*       Procedure Component  Value Units Date/Time   CTA Abdomen and Pelvis [[id number]] (Abnormal) Resulted: 08/09/22 1955   Order Status: Completed Updated: 08/09/22 1958   Narrative:     EXAMINATION:   CTA ABDOMEN AND PELVIS     CLINICAL HISTORY:   GI bleed;     TECHNIQUE:   Low dose axial images, sagittal and coronal reformations were obtained from the lung bases to the pubic symphysis.  Low dose axial images, sagittal and coronal reformations were obtained from the lung bases to the pubic symphysis.  Images acquired before and after the administration of IV contrast in a CT angiography protocol. 100 mL Omnipaque 350 IV contrast were administered. 3D/MIP reformats were generated in a CT angiography protocol. Images acquired in a GI bleeding protocol.     COMPARISON:   Multiple priors.     FINDINGS:   Heart: Normal in size. No pericardial effusion.     Lung Bases: Partially visualized left peripheral scarring.  Questionable partially visualized pulmonary nodule measuring 7 mm as seen on series 7, image 1.  For a solid nodule 6-8 mm, Fleischner Society 2017 guidelines recommend follow up with non-contrast chest CT at 6-12 months and 18-24 months after discovery.     Liver: Normal in size and attenuation, with no focal hepatic lesions.     Gallbladder: No calcified gallstones.     Bile Ducts: No evidence of dilated ducts.     Pancreas: No mass or peripancreatic fat stranding.     Spleen: Unremarkable.     Adrenals: Unremarkable.     Kidneys/ Ureters: No hydronephrosis.  No obstructive uropathy.  No nonobstructive nephrolithiasis.     Bladder: No evidence of wall thickening.     Reproductive organs: Unremarkable.     GI Tract/Mesentery: No evidence of bowel obstruction or inflammation.  No evidence of ongoing arterial GI bleeding, specifically no evidence of ongoing arterial GI bleeding over the area of concern identified on the prior nuclear medicine scan.  This could represent bleeding below the resolution of CT angiography and conventional  angiography.  Clinical correlation and further evaluation as clinically warranted.     Questionable mild/early diverticulitis of the distal descending colon such as on coronal series 601 image 88-94 noting minimal local inflammatory stranding.  Prominent diverticulosis throughout the distal colon.     Peritoneal Space: No ascites. No free air.     Retroperitoneum: No significant adenopathy.     Abdominal wall: Probable fat necrosis in the left body wall as on series 4, image 483.     Vasculature: Moderate aortoiliac atherosclerosis.  No aneurysm.  No significant stenosis of the celiac or SMA at their origins.  DELILAH is patent.     Bones: Multifocal osseous degenerative changes.    Impression:       No evidence of ongoing arterial GI bleeding as detailed above.     Questionable mild/early diverticulitis of the distal descending colon as detailed above.     Partially visualized left peripheral scarring.  7 mm pulmonary nodule partially visualized in the left lung base.  For a solid nodule 6-8 mm, Fleischner Society 2017 guidelines recommend follow up with non-contrast chest CT at 6-12 months and 18-24 months after discovery.     Additional details as above.     This report was flagged in Epic as abnormal.     All CT scans at this facility are performed  using dose modulation techniques as appropriate to performed exam including the following:  automated exposure control; adjustment of mA and/or kV according to the patients size (this includes techniques or standardized protocols for targeted exams where dose is matched to indication/reason for exam: i.e. extremities or head);  iterative reconstruction technique.       Electronically signed by: Satnam Cain   Date: 08/09/2022   Time: 19:55   NM GI Bleed Scan (Tagged RBC) [[id number]] (Abnormal) Resulted: 08/09/22 1646   Order Status: Completed Updated: 08/09/22 1649   Narrative:     EXAMINATION:   NM GI BLEED STUDY     CLINICAL HISTORY:   GI bleed;     TECHNIQUE:   After  injection of autologous red blood cells labeled in vitro with 25.8mCi of Tc-99m pertechnetate, sequential dynamic images of the abdomen were obtained for 60minutes.     COMPARISON:   None.     FINDINGS:   There is normal distribution of the activity within the labeled blood pool with visualization of the heart, liver, spleen, kidneys, bladder, and genitals.     Normal vessels are identified.     There is increased radiotracer pooling just superior to the bladder concerning for ongoing GI bleeding possibly colonic or rectal.    Impression:       Findings concerning for ongoing GI bleeding possibly from the more distal colon as detailed above.     This report was flagged in Epic as abnormal.     Findings discussed via electronic message system with the care team at the time of dictation.       Electronically signed by: Satnam Cain   Date: 08/09/2022   Time: 16:46   X-Ray Chest AP Portable [[id number]] Resulted: 08/09/22 0715   Order Status: Completed Updated: 08/09/22 0717   Narrative:     EXAMINATION:   XR CHEST AP PORTABLE     CLINICAL HISTORY:   GIB;.     TECHNIQUE:   Single frontal portable view of the chest was performed.     COMPARISON:   08/02/2022     FINDINGS:   Support devices: None     Peripheral left pleuroparenchymal scarring again noted.The lungs are otherwise clear, with normal appearance of pulmonary vasculature and no pleural effusion or pneumothorax.     The cardiac silhouette is normal in size. The hilar and mediastinal contours are unremarkable.     Bones are intact.    Impression:       No acute abnormality.       Electronically signed by: Sathish Ardon   Date: 08/09/2022   Time: 07:15       Pending Diagnostic Studies:     Procedure Component Value Units Date/Time    Specimen to Pathology, Surgery Gastrointestinal tract [[id number]] Collected: 08/09/22 1206    Order Status: Sent Lab Status: In process Updated: 08/09/22 1323    Specimen: Tissue          Medications:  Reconciled Home Medications:       Medication List      START taking these medications    acetaminophen 325 MG tablet  Commonly known as: TYLENOL  Take 2 tablets (650 mg total) by mouth every 6 (six) hours as needed for Pain.     ciprofloxacin HCl 500 MG tablet  Commonly known as: CIPRO  Take 1 tablet (500 mg total) by mouth every 12 (twelve) hours. for 7 days     metroNIDAZOLE 500 MG tablet  Commonly known as: FLAGYL  Take 1 tablet (500 mg total) by mouth every 8 (eight) hours. for 7 days     senna-docusate 8.6-50 mg 8.6-50 mg per tablet  Commonly known as: PERICOLACE  Take 1 tablet by mouth 2 (two) times daily as needed for Constipation.        CONTINUE taking these medications    albuterol 90 mcg/actuation inhaler  Commonly known as: PROAIR HFA  USE 2 INHALATIONS EVERY 6 HOURS AS NEEDED FOR WHEEZING     albuterol-ipratropium 2.5 mg-0.5 mg/3 mL nebulizer solution  Commonly known as: DUO-NEB  Take 3 mLs by nebulization every 6 (six) hours as needed for Wheezing. Rescue     amLODIPine 5 MG tablet  Commonly known as: NORVASC  Take 1 tablet (5 mg total) by mouth once daily.     atorvastatin 40 MG tablet  Commonly known as: LIPITOR  TAKE 1 TABLET(40 MG) BY MOUTH EVERY DAY     ELDERBERRY FRUIT ORAL  Take 50 mg by mouth once daily.     fluticasone propionate 50 mcg/actuation nasal spray  Commonly known as: FLONASE  1 spray (50 mcg total) by Each Nostril route once daily.     fluticasone-salmeterol 250-50 mcg/dose 250-50 mcg/dose diskus inhaler  Commonly known as: ADVAIR DISKUS  Inhale 1 puff into the lungs 2 (two) times daily. Controller     mupirocin 2 % ointment  Commonly known as: BACTROBAN  Apply topically 2 (two) times daily. To abdominal area     pulse oximeter device  Commonly known as: pulse oximeter  by Apply Externally route 2 (two) times a day. Use twice daily at 8 AM and 3 PM and record the value in [facility] as directed.     VITAMIN D-3 ORAL  Take 1 tablet by mouth once daily. (600 mg/ 400 iu)     WOMEN'S MULTIVITAMIN ORAL  Take 1 tablet by  mouth once daily.        STOP taking these medications    aspirin 81 MG EC tablet  Commonly known as: ECOTRIN     BC ARTHRITIS 1,000-65 mg Pwpk  Generic drug: aspirin-caffeine     naproxen sodium 220 MG tablet  Commonly known as: ANAPROX            Indwelling Lines/Drains at time of discharge:   Lines/Drains/Airways     None                 Time spent on the discharge of patient: 68 minutes      COLTEN Park  Department of Hospital Medicine  [facility] Tele[facility] ([facility])

## 2022-08-10 NOTE — PLAN OF CARE
Discharge summary and instructions given to patient.  IV catheter removed no complications noted.   Cardiac monitor removed and returned to the monitor room.  Reminded of follow-up appointments . Verbalized understanding.   Left the unit per wheelchair.   With personal belongings at hand.

## 2022-08-10 NOTE — ANESTHESIA POSTPROCEDURE EVALUATION
Anesthesia Post Evaluation    Patient: Baylee Muñoz    Procedure(s) Performed: Procedure(s) (LRB):  COLONOSCOPY (N/A)    Final Anesthesia Type: MAC      Patient location during evaluation: GI PACU  Patient participation: Yes- Able to Participate  Level of consciousness: awake and alert  Post-procedure vital signs: reviewed and stable  Pain management: adequate  Airway patency: patent    PONV status at discharge: No PONV  Anesthetic complications: no      Cardiovascular status: hemodynamically stable  Respiratory status: unassisted, spontaneous ventilation and room air  Hydration status: euvolemic  Follow-up not needed.          Vitals  Taken Time   BP  08/10/22 1151   Temp  08/10/22 1151   Pulse  08/10/22 1151   Resp  08/10/22 1151   SpO2  08/10/22 1151         No case tracking events are documented in the log.      Pain/Miguel Angel Score: Pain Rating Prior to Med Admin: 3 (8/10/2022  9:57 AM)  Miguel Angel Score: 10 (8/9/2022 12:31 PM)

## 2022-08-10 NOTE — PLAN OF CARE
[facility] Tele[facility] (Hospital)  Discharge Assessment    Primary Care Provider: Becki Hernandez NP     Discharge Assessment (most recent)     BRIEF DISCHARGE ASSESSMENT - 08/10/22 0879        Discharge Planning    Assessment Type Discharge Planning Brief Assessment     Resource/Environmental Concerns none     Support Systems Spouse/significant other;Children     Assistance Needed NA     Equipment Currently Used at Home oxygen     Current Living Arrangements home/apartment/condo     Patient/Family Anticipates Transition to home     Patient/Family Anticipated Services at Transition none     DME Needed Upon Discharge  none     Discharge Plan A Home               Swer met with patient at the bedside to complete discharge assessment. Patient reports being independent with ADLs. Patient has home O2 that she uses PRN; pt states that DME company is delivering portable tanks to her home on Thursday. Patient to drive herself home at discharge.     No additional CM needs for discharge.

## 2022-08-10 NOTE — PLAN OF CARE
CTA performed this evening. No extravasation appreciated however concerns for sigmoid diverticulitis. No abscess. Will start patient on bowel prep and continue to follow blood counts. Empirically placed on antibiotics given radiology findings even though no leukocytosis or abdominal pain.

## 2022-08-10 NOTE — ANESTHESIA PREPROCEDURE EVALUATION
08/10/2022  Baylee Muñoz is a 62 y.o., female.      Pre-op Assessment    I have reviewed the Patient Summary Reports.     I have reviewed the Nursing Notes. I have reviewed the NPO Status.   I have reviewed the Medications.     Review of Systems  Anesthesia Hx:  Denies Family Hx of Anesthesia complications.   Denies Personal Hx of Anesthesia complications.   Hematology/Oncology:         -- Anemia:   Cardiovascular:   Hypertension CHF    Pulmonary:   Pneumonia Asthma  Respiratory Failure, Chronic   Renal/:   Chronic Renal Disease    Hepatic/GI:   Lower Gi bleed Hepatic/GI Symptoms: hematochezia.    Musculoskeletal:   Arthritis     Endocrine:  Morbid Obesity / BMI > 40  Psych:   Psychiatric History          Physical Exam  General: Well nourished, Cooperative, Alert and Oriented    Airway:  Mallampati: II   Mouth Opening: Normal  TM Distance: Normal  Neck ROM: Normal ROM    Dental:  Intact        Anesthesia Plan  Type of Anesthesia, risks & benefits discussed:    Anesthesia Type: MAC  Intra-op Monitoring Plan: Standard ASA Monitors  Post Op Pain Control Plan: IV/PO Opioids PRN  Induction:  IV  ASA Score: 3  Day of Surgery Review of History & Physical: H&P Update referred to the surgeon/provider.I have interviewed and examined the patient. I have reviewed the patient's H&P dated:     Ready For Surgery From Anesthesia Perspective.     .

## 2022-08-10 NOTE — BRIEF OP NOTE
Inpatient Endoscopy Brief Operative Note      Admit Date: 8/9/2022    Procedure Date and Time:  8/10/2022 11:43 AM    Attending Physician: Irma Blandon MD     Principal Admitting Diagnoses: Hematochezia         Discharge Diagnosis: The primary encounter diagnosis was H/O NSAID-associated gastropathy. Diagnoses of GIB (gastrointestinal bleeding) and Chest pain were also pertinent to this visit.     Discharged Condition: Stable    Indication for Admission: Hematochezia     Procedure Performed: Colonoscopy     SEE [employer] REPORTS FOR DETAILS.    Pathology (if any):  Specimen (24h ago, onward)             Start     Ordered    08/09/22 1205  Specimen to Pathology, Surgery Gastrointestinal tract  Once        Comments: Pre-op Diagnosis: GIB (gastrointestinal bleeding) [K92.2]Procedure(s):EGD (ESOPHAGOGASTRODUODENOSCOPY) 1. Gastric biopsy r/o hp     References:    Click here for ordering Quick Tip   Question Answer Comment   Procedure Type: Gastrointestinal tract    Which provider would you like to cc? DARIELA DEVI    Release to patient Immediate        08/09/22 1205                Estimated Blood Loss: 1 ml.    Discussed with: patient.    Disposition: Return to hospital [facility].    Recommendations:   1. Restart PO diabetic diet  2. Continue to hold ASA  3. Switch to PO Flagyl and Cipro  4. May discharge home.   5. Will arrange GI follow up      Communicated with hospital medicine service regarding the above findings.       Dariela Devi MD  Inpatient Gastroenterology  [facility]

## 2022-08-10 NOTE — INTERVAL H&P NOTE
The patient has been examined and the H&P has been reviewed:    I concur with the findings and changes have been noted since the H&P was written: tagged RBC scan positive yesterday is distal descending/proximal sigmoid. CTA however was negative. She was pre-medicated with solumedrol, pepcis, benadryl and tylenol for a iodine allergy and tolerated it without incident. Started bowel prep last night. This morning states stool has turned brown. No further bleeding.     Anesthesia/Surgery risks, benefits and alternative options discussed and understood by patient/family.          Active Hospital Problems    Diagnosis  POA    *Hematochezia [K92.1]  Yes    Acute blood loss anemia [D62]  Yes    GI bleed [K92.2]  Yes    Possible Diverticulitis [K57.92]  Yes    Diastolic heart failure [I50.30]  Yes     Dx'd at [facility] during asthma exacerbation        Asthma [J45.909]  Yes      Resolved Hospital Problems   No resolved problems to display.

## 2022-08-10 NOTE — [employer] PATIENT INSTRUCTIONS
Discharge Summary/Instructions after an Endoscopic Procedure  Patient Name: Baylee Muñoz  Patient MRN: [id number]  Patient YOB: 1960  Wednesday, August 10, 2022 Dariela Devi MD  Dear patient,  As a result of recent federal legislation (The Federal Cures Act), you may   receive lab or pathology results from your procedure in your [facility]   account before your physician is able to contact you. Your physician or   their representative will relay the results to you with their   recommendations at their soonest availability.  Thank you,  RESTRICTIONS:  During your procedure today, you received medications for sedation.  These   medications may affect your judgment, balance and coordination.  Therefore,   for 24 hours, you have the following restrictions:   - DO NOT drive a car, operate machinery, make legal/financial decisions,   sign important papers or drink alcohol.    ACTIVITY:  Today: no heavy lifting, straining or running due to procedural   sedation/anesthesia.  The following day: return to full activity including work.  DIET:  Eat and drink normally unless instructed otherwise.     TREATMENT FOR COMMON SIDE EFFECTS:  - Mild abdominal pain, nausea, belching, bloating or excessive gas:  rest,   eat lightly and use a heating pad.  - Sore Throat: treat with throat lozenges and/or gargle with warm salt   water.  - Because air was used during the procedure, expelling large amounts of air   from your rectum or belching is normal.  - If a bowel prep was taken, you may not have a bowel movement for 1-3 days.    This is normal.  SYMPTOMS TO WATCH FOR AND REPORT TO YOUR PHYSICIAN:  1. Abdominal pain or bloating, other than gas cramps.  2. Chest pain.  3. Back pain.  4. Signs of infection such as: chills or fever occurring within 24 hours   after the procedure.  5. Rectal bleeding, which would show as bright red, maroon, or black stools.   (A tablespoon of blood from the rectum is not serious, especially  if   hemorrhoids are present.)  6. Vomiting.  7. Weakness or dizziness.  GO DIRECTLY TO THE NEAREST EMERGENCY ROOM IF YOU HAVE ANY OF THE FOLLOWING:      Difficulty breathing              Chills and/or fever over 101 F   Persistent vomiting and/or vomiting blood   Severe abdominal pain   Severe chest pain   Black, tarry stools   Bleeding- more than one tablespoon   Any other symptom or condition that you feel may need urgent attention  Your doctor recommends these additional instructions:  If any biopsies were taken, your doctors clinic will contact you in 1 to 2   weeks with any results.  - Patient has a contact number available for emergencies.  The signs and   symptoms of potential delayed complications were discussed with the   patient.  Return to normal activities tomorrow.  Written discharge   instructions were provided to the patient.   - Resume previous diet.   - Discharge patient to home (ambulatory).   - Continue present medications.   - Repeat colonoscopy within 3 months for surveillance.   - Return to GI clinic in 1-2 weeks after your hospitalization. You will be   contacted by the GI department.   - The findings and recommendations were discussed with the patient.   - Communicated via secure chat with hospital medicine team.  For questions, problems or results please call your physician Dariela Devi MD at Work:  [phone]  If you have any questions about the above instructions, call the GI   department at [phone] or call the endoscopy unit at [phone]   from 7am until 3 pm.  [facility], EMERGENCY ROOM PHONE NUMBER:   [phone]  IF A COMPLICATION OR EMERGENCY SITUATION ARISES AND YOU ARE UNABLE TO REACH   YOUR PHYSICIAN - GO DIRECTLY TO THE EMERGENCY ROOM.  I have read or have had read to me these discharge instructions for my   procedure and have received a written copy.  I understand these   instructions and will follow-up with my physician if I have  any questions.     __________________________________       _____________________________________  Nurse Signature                                          Patient/Designated   Responsible Party Signature  MD Dariela Dejesus MD  8/10/2022 11:57:15 AM  This report has been verified and signed electronically.  Dear patient,  As a result of recent federal legislation (The Federal Cures Act), you may   receive lab or pathology results from your procedure in your [facility]   account before your physician is able to contact you. Your physician or   their representative will relay the results to you with their   recommendations at their soonest availability.  Thank you,  [employer]

## 2022-08-12 LAB
FINAL PATHOLOGIC DIAGNOSIS: NORMAL
GROSS: NORMAL
Lab: NORMAL

## 2022-08-12 NOTE — PROGRESS NOTES
The biopsies of your stomach were normal. The inflammation seen in your stomach is more related to the BC goody powder. Please avoid this medication and non-steroidal anti-inflammatory drugs. I recommend you follow up with your PCP for your recent hospitalization from a diverticular bleed.

## 2022-08-18 ENCOUNTER — TELEPHONE (OUTPATIENT)
Dept: INTERNAL MEDICINE | Facility: CLINIC | Age: 62
End: 2022-08-18
Payer: OTHER GOVERNMENT

## 2022-08-18 NOTE — TELEPHONE ENCOUNTER
----- Message from Dariela Devi MD sent at 8/17/2022  2:51 PM CDT -----  Regarding: Colonoscopy results  Oneal Connell    I see that she has not made a hospital follow up with you. She was hospitalized for a lower GI bleed, thought to be diverticular. Please let me know if your office needs help in arranging this follow up with you. The discharge summary is in the record as well. Thanks  ----- Message -----  From: Ayo, [facility]  Sent: 8/15/2022   4:44 PM CDT  To: Dariela Devi MD

## 2022-08-22 ENCOUNTER — OFFICE VISIT (OUTPATIENT)
Dept: INTERNAL MEDICINE | Facility: CLINIC | Age: 62
End: 2022-08-22
Payer: OTHER GOVERNMENT

## 2022-08-22 ENCOUNTER — TELEPHONE (OUTPATIENT)
Dept: INTERNAL MEDICINE | Facility: CLINIC | Age: 62
End: 2022-08-22
Payer: OTHER GOVERNMENT

## 2022-08-22 ENCOUNTER — LAB VISIT (OUTPATIENT)
Dept: LAB | Facility: HOSPITAL | Age: 62
End: 2022-08-22
Attending: NURSE PRACTITIONER
Payer: OTHER GOVERNMENT

## 2022-08-22 VITALS
HEART RATE: 79 BPM | BODY MASS INDEX: 47.38 KG/M2 | HEIGHT: 65 IN | TEMPERATURE: 99 F | WEIGHT: 284.38 LBS | OXYGEN SATURATION: 97 % | SYSTOLIC BLOOD PRESSURE: 120 MMHG | RESPIRATION RATE: 18 BRPM | DIASTOLIC BLOOD PRESSURE: 82 MMHG

## 2022-08-22 DIAGNOSIS — R73.03 PREDIABETES: ICD-10-CM

## 2022-08-22 DIAGNOSIS — Z87.19 HISTORY OF GI DIVERTICULAR BLEED: ICD-10-CM

## 2022-08-22 DIAGNOSIS — Z09 HOSPITAL DISCHARGE FOLLOW-UP: Primary | ICD-10-CM

## 2022-08-22 DIAGNOSIS — M25.562 ACUTE PAIN OF LEFT KNEE: ICD-10-CM

## 2022-08-22 DIAGNOSIS — D62 ACUTE BLOOD LOSS ANEMIA: ICD-10-CM

## 2022-08-22 LAB
BASOPHILS # BLD AUTO: 0.02 K/UL (ref 0–0.2)
BASOPHILS NFR BLD: 0.3 % (ref 0–1.9)
DIFFERENTIAL METHOD: ABNORMAL
EOSINOPHIL # BLD AUTO: 0.1 K/UL (ref 0–0.5)
EOSINOPHIL NFR BLD: 0.8 % (ref 0–8)
ERYTHROCYTE [DISTWIDTH] IN BLOOD BY AUTOMATED COUNT: 17.3 % (ref 11.5–14.5)
HCT VFR BLD AUTO: 38.9 % (ref 37–48.5)
HGB BLD-MCNC: 11.1 G/DL (ref 12–16)
IMM GRANULOCYTES # BLD AUTO: 0.01 K/UL (ref 0–0.04)
IMM GRANULOCYTES NFR BLD AUTO: 0.1 % (ref 0–0.5)
LYMPHOCYTES # BLD AUTO: 3.1 K/UL (ref 1–4.8)
LYMPHOCYTES NFR BLD: 40.8 % (ref 18–48)
MCH RBC QN AUTO: 26.1 PG (ref 27–31)
MCHC RBC AUTO-ENTMCNC: 28.5 G/DL (ref 32–36)
MCV RBC AUTO: 92 FL (ref 82–98)
MONOCYTES # BLD AUTO: 0.6 K/UL (ref 0.3–1)
MONOCYTES NFR BLD: 7.4 % (ref 4–15)
NEUTROPHILS # BLD AUTO: 3.8 K/UL (ref 1.8–7.7)
NEUTROPHILS NFR BLD: 50.6 % (ref 38–73)
NRBC BLD-RTO: 0 /100 WBC
PLATELET # BLD AUTO: 313 K/UL (ref 150–450)
PMV BLD AUTO: 11.9 FL (ref 9.2–12.9)
RBC # BLD AUTO: 4.25 M/UL (ref 4–5.4)
WBC # BLD AUTO: 7.59 K/UL (ref 3.9–12.7)

## 2022-08-22 PROCEDURE — 85025 COMPLETE CBC W/AUTO DIFF WBC: CPT | Performed by: NURSE PRACTITIONER

## 2022-08-22 PROCEDURE — 99215 OFFICE O/P EST HI 40 MIN: CPT | Mod: PBBFAC,PN | Performed by: NURSE PRACTITIONER

## 2022-08-22 PROCEDURE — 99999 PR PBB SHADOW E&M-EST. PATIENT-LVL V: CPT | Mod: PBBFAC,,, | Performed by: NURSE PRACTITIONER

## 2022-08-22 PROCEDURE — 99213 PR OFFICE/OUTPT VISIT, EST, LEVL III, 20-29 MIN: ICD-10-PCS | Mod: S$PBB,,, | Performed by: NURSE PRACTITIONER

## 2022-08-22 PROCEDURE — 36415 COLL VENOUS BLD VENIPUNCTURE: CPT | Mod: PO | Performed by: NURSE PRACTITIONER

## 2022-08-22 PROCEDURE — 99999 PR PBB SHADOW E&M-EST. PATIENT-LVL V: ICD-10-PCS | Mod: PBBFAC,,, | Performed by: NURSE PRACTITIONER

## 2022-08-22 PROCEDURE — 99213 OFFICE O/P EST LOW 20 MIN: CPT | Mod: S$PBB,,, | Performed by: NURSE PRACTITIONER

## 2022-08-22 NOTE — TELEPHONE ENCOUNTER
I called the pt 15 minutes after her 9:40 appt time and asked that she contact us if she's in route or would need to reschedule. //kah

## 2022-08-22 NOTE — PROGRESS NOTES
Subjective:       Patient ID: Baylee Muñoz is a 62 y.o. female.    Chief Complaint: Follow-up (HOSP)    Patient presents for hospital follow up.  Had recent hospitalization for Diverticular bleed.       Reports she's having bowel movements.  Formed.  No signs of bleeding.   No nausea or vomiting.      Has returned to work.     Wearing oxygen as needed.      Hospital Course:   The patient was monitored closely during her stay. She was kept on continuous telemetry monitoring. She was initially kept NPO. GI was consulted. Patient had colonoscopy which showed severe diverticulosis in the sigmoid colon, in the descending colon, in the transverse colon and in the ascending colon. There was evidence of an impacted diverticulum. There was evidence of recent bleeding from the diverticular opening. There was evidence of past bleeding from the diverticular opening. Two 2 to 3 mm polyps were noted in the rectum. Resection was not attempted due to recent GI bleed. The examination was otherwise normal. Hemorrhoids were found on perianal exam. No specimens were collected. The patient's overall condition remained stable and improved and she was discharged to home.     Review of Systems   Constitutional: Negative for activity change, appetite change, fatigue and fever.   HENT: Negative for nasal congestion, ear discharge, ear pain, mouth sores, nosebleeds, sore throat and tinnitus.    Eyes: Negative for discharge, redness and itching.   Respiratory: Positive for shortness of breath (intermittent ). Negative for apnea and cough.    Cardiovascular: Negative for chest pain and leg swelling.   Gastrointestinal: Negative for abdominal distention, abdominal pain, blood in stool and constipation.   Endocrine: Negative for polydipsia, polyphagia and polyuria.   Genitourinary: Negative for difficulty urinating, flank pain, frequency and hematuria.   Musculoskeletal: Positive for arthralgias, gait problem and joint swelling. Negative for  back pain, neck pain and neck stiffness.   Integumentary:  Negative for rash and wound.   Allergic/Immunologic: Negative for environmental allergies, food allergies and immunocompromised state.   Neurological: Negative for dizziness, seizures, syncope, numbness and headaches.   Hematological: Negative for adenopathy. Does not bruise/bleed easily.   Psychiatric/Behavioral: Negative for agitation, confusion, hallucinations, self-injury and suicidal ideas.         Objective:      Physical Exam  Vitals reviewed.   Constitutional:       Appearance: Normal appearance.   Cardiovascular:      Rate and Rhythm: Normal rate and regular rhythm.   Pulmonary:      Effort: Pulmonary effort is normal.      Breath sounds: Normal breath sounds.   Abdominal:      General: Bowel sounds are normal. There is no distension.      Tenderness: There is no abdominal tenderness.   Musculoskeletal:         General: Tenderness present.   Skin:            Comments: Possible scar tissue from previous injection/Lovenox    Neurological:      General: No focal deficit present.      Mental Status: She is alert.   Psychiatric:         Mood and Affect: Mood normal.         Behavior: Behavior normal. Behavior is cooperative.         Assessment:       Problem List Items Addressed This Visit     Prediabetes    Acute blood loss anemia secondary to GI bleed      Other Visit Diagnoses     Hospital discharge follow-up    -  Primary    History of GI diverticular bleed        Relevant Orders    CBC Auto Differential (Completed)    Acute pain of left knee        Relevant Orders    X-ray Knee Ortho Left          Plan:           Hospital discharge follow-up    History of GI diverticular bleed  -     CBC Auto Differential; Future; Expected date: 08/22/2022    Acute blood loss anemia secondary to GI bleed    Prediabetes    Acute pain of left knee  -     X-ray Knee Ortho Left; Future; Expected date: 08/22/2022        Will continue to monitor cyst to lower abdomen.   Will ultrasound if no improvement.     Labs today.    X-ray Wednesday.     Follow up as scheduled.

## 2022-08-22 NOTE — TELEPHONE ENCOUNTER
----- Message from Lidnsay Rojas sent at 8/22/2022  9:40 AM CDT -----  Contact: Self [phone]  Would like to receive medical advice.    Would they like a call back or a response via [facility]:  call back if needed    Additional information:  Pt will arrive 15 mins late.

## 2022-08-24 ENCOUNTER — OFFICE VISIT (OUTPATIENT)
Dept: GASTROENTEROLOGY | Facility: CLINIC | Age: 62
End: 2022-08-24
Payer: OTHER GOVERNMENT

## 2022-08-24 VITALS
BODY MASS INDEX: 47.16 KG/M2 | DIASTOLIC BLOOD PRESSURE: 78 MMHG | SYSTOLIC BLOOD PRESSURE: 132 MMHG | OXYGEN SATURATION: 97 % | HEART RATE: 83 BPM | WEIGHT: 283.06 LBS | HEIGHT: 65 IN

## 2022-08-24 DIAGNOSIS — K57.90 DIVERTICULOSIS: ICD-10-CM

## 2022-08-24 DIAGNOSIS — Q27.30 AVM (ARTERIOVENOUS MALFORMATION): ICD-10-CM

## 2022-08-24 DIAGNOSIS — Z87.19 HISTORY OF LOWER GI BLEEDING: Primary | ICD-10-CM

## 2022-08-24 PROCEDURE — 99203 PR OFFICE/OUTPT VISIT, NEW, LEVL III, 30-44 MIN: ICD-10-PCS | Mod: S$PBB,,, | Performed by: PHYSICIAN ASSISTANT

## 2022-08-24 PROCEDURE — 99215 OFFICE O/P EST HI 40 MIN: CPT | Mod: PBBFAC | Performed by: PHYSICIAN ASSISTANT

## 2022-08-24 PROCEDURE — 99999 PR PBB SHADOW E&M-EST. PATIENT-LVL V: CPT | Mod: PBBFAC,,, | Performed by: PHYSICIAN ASSISTANT

## 2022-08-24 PROCEDURE — 99203 OFFICE O/P NEW LOW 30 MIN: CPT | Mod: S$PBB,,, | Performed by: PHYSICIAN ASSISTANT

## 2022-08-24 PROCEDURE — 99999 PR PBB SHADOW E&M-EST. PATIENT-LVL V: ICD-10-PCS | Mod: PBBFAC,,, | Performed by: PHYSICIAN ASSISTANT

## 2022-08-24 RX ORDER — POLYETHYLENE GLYCOL 3350, SODIUM SULFATE ANHYDROUS, SODIUM BICARBONATE, SODIUM CHLORIDE, POTASSIUM CHLORIDE 236; 22.74; 6.74; 5.86; 2.97 G/4L; G/4L; G/4L; G/4L; G/4L
4 POWDER, FOR SOLUTION ORAL ONCE
Qty: 4000 ML | Refills: 0 | Status: SHIPPED | OUTPATIENT
Start: 2022-08-24 | End: 2022-08-24

## 2022-08-24 RX ORDER — SODIUM, POTASSIUM,MAG SULFATES 17.5-3.13G
1 SOLUTION, RECONSTITUTED, ORAL ORAL DAILY
Qty: 1 KIT | Refills: 0 | Status: SHIPPED | OUTPATIENT
Start: 2022-08-24 | End: 2022-08-26

## 2022-08-24 NOTE — PROGRESS NOTES
Clinic Consult:  [facility] Consultation Note    Reason for Consult:  The primary encounter diagnosis was History of lower GI bleeding. Diagnoses of Diverticulosis and AVM (arteriovenous malformation) were also pertinent to this visit.    PCP: Becki Hernandez   [id number] [place] [id number]    CC: Hospital Follow Up      HPI:  This is a 62 y.o. female here for evaluation of the above. Patient is here for hospital follow up of diverticular bleed. Reported to the ED for blood in the stool. EGD with nonbleeding AVM. Colonoscopy completed with findings of severe diverticular disease throughout the colon. There was also finding of diverticulum with recent bleeding. 2 polyps were noted, but not removed due to recent GI bleeding. It was recommended she repeat a colonoscopy in 3 months for polyp removal. She denies any current complaints. States that she was still mildly bleeding until a few days after her discharge. Since then she has seen no overt bleeding and denies any abdominal pain.     Review of Systems   Constitutional: Negative for activity change, appetite change, chills, diaphoresis, fatigue, fever and unexpected weight change.   HENT: Negative for trouble swallowing.    Respiratory: Negative for shortness of breath.    Cardiovascular: Negative for chest pain.   Gastrointestinal: Negative for abdominal distention, abdominal pain, anal bleeding, blood in stool, constipation, diarrhea, nausea and vomiting.   Neurological: Negative for dizziness, weakness and light-headedness.   Psychiatric/Behavioral: Negative for dysphoric mood. The patient is not nervous/anxious.         Medical History:   Past Medical History:   Diagnosis Date    Allergy     Arthritis     Asthma     Admit to [facility] 1/6/19 by Dr. Michael Randall for asthma exaceration, acute bronchitis, acute dCHF, hypoxia, elevated troponin/BNP (thought to be 2/2 asthma exac & acute dCHF by cards), tx'd w/ IV lasix, duonebs, IV  solumedrol and DC'd on Z-prema, prednisone taper    CKD (chronic kidney disease) stage 2, GFR 60-89 ml/min     H/o ARF 2/2 poor water intake; encouraged to increase water intake and avoid NSAIDS & contrast dye    COVID 2022    Depression     Diastolic heart failure 2019    Dx'd at [facility] during asthma exacerbation    Diverticulitis 2005    Dyslipidemia 2018    Former smoker     Quit 18; smoked 0.12 packs/day x 37 years    Gastritis     Hypertension     Pneumonia 2022    Prediabetes     A1c 6.1% on 19    Severe obesity (BMI >= 40)     Vertigo     Vitamin D deficiency        Surgical History:   Past Surgical History:   Procedure Laterality Date    COLONOSCOPY N/A 8/10/2022    Procedure: COLONOSCOPY;  Surgeon: Dariela Devi MD;  Location: [facility];  Service: Endoscopy;  Laterality: N/A;    DILATION AND CURETTAGE OF UTERUS      missed , bleeding    ESOPHAGOGASTRODUODENOSCOPY N/A 2022    Procedure: EGD (ESOPHAGOGASTRODUODENOSCOPY);  Surgeon: Dariela Devi MD;  Location: [facility];  Service: Endoscopy;  Laterality: N/A;    EXPLORATORY LAPAROTOMY      lysis of adhesions, open tube    KNEE SURGERY Right     torn mcl    laser to cervix         Family History:    Family History   Problem Relation Age of Onset    Stroke Mother     Heart disease Mother     Kidney disease Father     Heart disease Father     Hypertension Father     HIV Sister     COPD Sister     Bipolar disorder Sister        Social History:   Social History     Socioeconomic History    Marital status:    Tobacco Use    Smoking status: Former Smoker     Packs/day: 0.25     Years: 27.00     Pack years: 6.75     Types: Cigarettes    Smokeless tobacco: Never Used   Substance and Sexual Activity    Alcohol use: No    Drug use: No    Sexual activity: Never   Social History Narrative    Patient goal(s): Weight 175 lbs    Breakfast: sausage biscuit or croissant and 1 egg; black  coffee w/ 1/2 tsp sugar or OJ    Lunch: Eats out: mashed potatoes, gravy, vegetable, fish or baked chicken; water or swea or kiwi/strawberry juice    Dinner: Grilled chicken salad or taco with ground beef, beans, ltteuce, cheese, tomatoes; kiwi/eetened tstrawberry juice    Snacks: None    Eating out: 3-4x/wk    Water (oz/day): 160 oz/d    Physical Activity: None       Allergies:   Review of patient's allergies indicates:   Allergen Reactions    Coconut Anaphylaxis    Iodine and iodide containing products Anaphylaxis    Dairy aid [lactase] Diarrhea and Nausea And Vomiting    Penicillins Hives       Home Medications:   Current Outpatient Medications on File Prior to Visit   Medication Sig Dispense Refill    acetaminophen (TYLENOL) 325 MG tablet Take 2 tablets (650 mg total) by mouth every 6 (six) hours as needed for Pain.  0    albuterol (PROAIR HFA) 90 mcg/actuation inhaler USE 2 INHALATIONS EVERY 6 HOURS AS NEEDED FOR WHEEZING 25.5 g 11    albuterol-ipratropium (DUO-NEB) 2.5 mg-0.5 mg/3 mL nebulizer solution Take 3 mLs by nebulization every 6 (six) hours as needed for Wheezing. Rescue 75 mL 0    amLODIPine (NORVASC) 5 MG tablet Take 1 tablet (5 mg total) by mouth once daily. 90 tablet 3    atorvastatin (LIPITOR) 40 MG tablet TAKE 1 TABLET(40 MG) BY MOUTH EVERY DAY 90 tablet 3    calcium carbonate/vitamin D3 (VITAMIN D-3 ORAL) Take 1 tablet by mouth once daily. (600 mg/ 400 iu)      ELDERBERRY FRUIT ORAL Take 50 mg by mouth once daily.      fluticasone propionate (FLONASE) 50 mcg/actuation nasal spray 1 spray (50 mcg total) by Each Nostril route once daily. 16 g 3    fluticasone-salmeterol diskus inhaler 250-50 mcg Inhale 1 puff into the lungs 2 (two) times daily. Controller 60 each 3    mupirocin (BACTROBAN) 2 % ointment Apply topically 2 (two) times daily. To abdominal area 30 g 2    mv-min/iron/folic/calcium/vitK (WOMEN'S MULTIVITAMIN ORAL) Take 1 tablet by mouth once daily.      pulse oximeter  "(PULSE OXIMETER) device by Apply Externally route 2 (two) times a day. Use twice daily at 8 AM and 3 PM and record the value in [employer] as directed. 1 each 0    senna-docusate 8.6-50 mg (PERICOLACE) 8.6-50 mg per tablet Take 1 tablet by mouth 2 (two) times daily as needed for Constipation.       No current facility-administered medications on file prior to visit.       Physical Exam:  /78   Pulse 83   Ht 5' 5" (1.651 m)   Wt 128.4 kg (283 lb 1.1 oz)   SpO2 97%   BMI 47.11 kg/m²   Body mass index is 47.11 kg/m².  Physical Exam  Constitutional:       General: She is not in acute distress.     Appearance: Normal appearance. She is not ill-appearing, toxic-appearing or diaphoretic.   HENT:      Head: Normocephalic and atraumatic.   Eyes:      General: No scleral icterus.     Extraocular Movements: Extraocular movements intact.   Cardiovascular:      Rate and Rhythm: Normal rate and regular rhythm.   Pulmonary:      Effort: Pulmonary effort is normal. No respiratory distress.   Abdominal:      General: Bowel sounds are normal. There is no distension.      Palpations: Abdomen is soft.      Tenderness: There is no abdominal tenderness. There is no guarding.   Musculoskeletal:         General: Normal range of motion.      Cervical back: Normal range of motion.   Skin:     General: Skin is warm and dry.      Coloration: Skin is not jaundiced or pale.   Neurological:      Mental Status: She is alert and oriented to person, place, and time.           Labs: Pertinent labs reviewed.  Endoscopy: 2022  [facility] Screen[facility]2022  Anticoagulation: --    Assessment:  1. History of lower GI bleeding    2. Diverticulosis    3. AVM (arteriovenous malformation)         Recommendations:  -Schedule for repeat colonoscopy for polyp removal.   -suprep  -Referral to endo .   -Repeat blood work shows hemoglobin level trending up at 11.1.      History of lower GI bleeding  -     Ambulatory referral/consult to Endo Procedure " ; Future; Expected date: 08/25/2022  -     Case Request Endoscopy: COLONOSCOPY  -     sodium,potassium,mag sulfates (SUPREP BOWEL PREP KIT) 17.5-3.13-1.6 gram SolR; Take 177 mLs by mouth once daily. for 2 days  Dispense: 1 kit; Refill: 0    Diverticulosis  -     Ambulatory referral/consult to Endo Procedure ; Future; Expected date: 08/25/2022  -     Case Request Endoscopy: COLONOSCOPY  -     sodium,potassium,mag sulfates (SUPREP BOWEL PREP KIT) 17.5-3.13-1.6 gram SolR; Take 177 mLs by mouth once daily. for 2 days  Dispense: 1 kit; Refill: 0    AVM (arteriovenous malformation)  -     Ambulatory referral/consult to Endo Procedure ; Future; Expected date: 08/25/2022  -     Case Request Endoscopy: COLONOSCOPY  -     sodium,potassium,mag sulfates (SUPREP BOWEL PREP KIT) 17.5-3.13-1.6 gram SolR; Take 177 mLs by mouth once daily. for 2 days  Dispense: 1 kit; Refill: 0        No follow-ups on file.    Thank you so much for allowing me to participate in the care of Baylee Reveles PA-C

## 2022-08-31 ENCOUNTER — PATIENT OUTREACH (OUTPATIENT)
Dept: ADMINISTRATIVE | Facility: HOSPITAL | Age: 62
End: 2022-08-31
Payer: OTHER GOVERNMENT

## 2022-08-31 NOTE — PROGRESS NOTES
Working the Non-Complaint Mammogram Report: Called patient to discuss scheduling a mammogram appointment Patient states she has a procedure coming up and was told to complete that first so she will call back to schedule

## 2022-09-19 ENCOUNTER — PATIENT OUTREACH (OUTPATIENT)
Dept: ADMINISTRATIVE | Facility: HOSPITAL | Age: 62
End: 2022-09-19
Payer: OTHER GOVERNMENT

## 2022-10-03 ENCOUNTER — TELEPHONE (OUTPATIENT)
Dept: PULMONOLOGY | Facility: CLINIC | Age: 62
End: 2022-10-03
Payer: OTHER GOVERNMENT

## 2022-10-03 NOTE — TELEPHONE ENCOUNTER
----- Message from Vinnie Batres sent at 10/3/2022  4:06 PM CDT -----  Contact: Baylee Beach would like a call back at [phone] in regards to getting an mask for her machine, she can't find the one she has and would like to pick one up either today or tomorrow.

## 2022-10-04 ENCOUNTER — PATIENT MESSAGE (OUTPATIENT)
Dept: ADMINISTRATIVE | Facility: HOSPITAL | Age: 62
End: 2022-10-04
Payer: OTHER GOVERNMENT

## 2022-10-26 ENCOUNTER — OFFICE VISIT (OUTPATIENT)
Dept: INTERNAL MEDICINE | Facility: CLINIC | Age: 62
End: 2022-10-26
Payer: OTHER GOVERNMENT

## 2022-10-26 VITALS
HEART RATE: 86 BPM | WEIGHT: 292.75 LBS | SYSTOLIC BLOOD PRESSURE: 126 MMHG | HEIGHT: 65 IN | RESPIRATION RATE: 18 BRPM | OXYGEN SATURATION: 95 % | TEMPERATURE: 98 F | DIASTOLIC BLOOD PRESSURE: 86 MMHG | BODY MASS INDEX: 48.78 KG/M2

## 2022-10-26 DIAGNOSIS — J44.9 OBSTRUCTIVE LUNG DISEASE: ICD-10-CM

## 2022-10-26 DIAGNOSIS — M17.0 PRIMARY OSTEOARTHRITIS OF BOTH KNEES: ICD-10-CM

## 2022-10-26 DIAGNOSIS — Z00.00 ROUTINE MEDICAL EXAM: Primary | ICD-10-CM

## 2022-10-26 DIAGNOSIS — Z87.19 HISTORY OF GI BLEED: ICD-10-CM

## 2022-10-26 DIAGNOSIS — J96.11 CHRONIC RESPIRATORY FAILURE WITH HYPOXIA: ICD-10-CM

## 2022-10-26 DIAGNOSIS — I50.32 CHRONIC DIASTOLIC CONGESTIVE HEART FAILURE: ICD-10-CM

## 2022-10-26 PROBLEM — E78.5 HYPERLIPIDEMIA: Status: ACTIVE | Noted: 2022-10-26

## 2022-10-26 PROBLEM — K57.92 DIVERTICULITIS: Status: ACTIVE | Noted: 2022-10-26

## 2022-10-26 PROCEDURE — 99999 PR PBB SHADOW E&M-EST. PATIENT-LVL V: CPT | Mod: PBBFAC,,, | Performed by: NURSE PRACTITIONER

## 2022-10-26 PROCEDURE — 99213 PR OFFICE/OUTPT VISIT, EST, LEVL III, 20-29 MIN: ICD-10-PCS | Mod: S$PBB,,, | Performed by: NURSE PRACTITIONER

## 2022-10-26 PROCEDURE — 99999 PR PBB SHADOW E&M-EST. PATIENT-LVL V: ICD-10-PCS | Mod: PBBFAC,,, | Performed by: NURSE PRACTITIONER

## 2022-10-26 PROCEDURE — 99215 OFFICE O/P EST HI 40 MIN: CPT | Mod: PBBFAC,PN | Performed by: NURSE PRACTITIONER

## 2022-10-26 PROCEDURE — 99213 OFFICE O/P EST LOW 20 MIN: CPT | Mod: S$PBB,,, | Performed by: NURSE PRACTITIONER

## 2022-10-26 RX ORDER — IPRATROPIUM BROMIDE AND ALBUTEROL SULFATE 2.5; .5 MG/3ML; MG/3ML
3 SOLUTION RESPIRATORY (INHALATION) EVERY 6 HOURS PRN
Qty: 75 ML | Refills: 5 | Status: SHIPPED | OUTPATIENT
Start: 2022-10-26 | End: 2024-03-11 | Stop reason: SDUPTHER

## 2022-10-26 NOTE — PROGRESS NOTES
Subjective:       Patient ID: Baylee Muñoz is a 62 y.o. female.    Chief Complaint: Follow-up (3 mo)    Patient presents for a 3 month follow up.      Has Chronic Respiratory Failure-using oxygen as needed (day and night)    She's still working but get short winded and SOB.  Using inhaler.  Can not wear oxygen at work.     Continues to have bilateral knee pain.   Has seen orthopedic.  Did not want to have knee replacement.   Usually topical cream.      Follow-up  Associated symptoms include arthralgias. Pertinent negatives include no abdominal pain, chills, coughing, fatigue or fever.   Review of Systems   Constitutional:  Negative for chills, fatigue and fever.   Respiratory:  Positive for shortness of breath. Negative for cough.    Gastrointestinal:  Negative for abdominal pain, constipation and diarrhea.   Musculoskeletal:  Positive for arthralgias and gait problem.   Psychiatric/Behavioral:  Negative for agitation and confusion.        Objective:      Physical Exam  Vitals reviewed.   Constitutional:       Appearance: Normal appearance.   Cardiovascular:      Rate and Rhythm: Normal rate and regular rhythm.   Pulmonary:      Effort: Pulmonary effort is normal.      Breath sounds: Decreased breath sounds present.   Musculoskeletal:         General: Normal range of motion.   Skin:     General: Skin is warm.   Neurological:      General: No focal deficit present.      Mental Status: She is alert.   Psychiatric:         Mood and Affect: Mood normal.         Behavior: Behavior normal. Behavior is cooperative.       Assessment:       Problem List Items Addressed This Visit       Primary osteoarthritis of both knees    Chronic respiratory failure with hypoxia    Obstructive lung disease    Chronic diastolic congestive heart failure     Other Visit Diagnoses       Routine medical exam    -  Primary    History of GI bleed                Plan:           Routine medical exam    Chronic respiratory failure with  hypoxia    Chronic diastolic congestive heart failure    History of GI bleed    Primary osteoarthritis of both knees    Obstructive lung disease    Other orders  -     albuterol-ipratropium (DUO-NEB) 2.5 mg-0.5 mg/3 mL nebulizer solution; Take 3 mLs by nebulization every 6 (six) hours as needed for Wheezing. Rescue  Dispense: 75 mL; Refill: 5       Declines flu and pneumo vaccine.     Three month follow up.

## 2022-11-08 ENCOUNTER — CLINICAL SUPPORT (OUTPATIENT)
Dept: PULMONOLOGY | Facility: CLINIC | Age: 62
End: 2022-11-08
Payer: OTHER GOVERNMENT

## 2022-11-08 ENCOUNTER — OFFICE VISIT (OUTPATIENT)
Dept: PULMONOLOGY | Facility: CLINIC | Age: 62
End: 2022-11-08
Payer: OTHER GOVERNMENT

## 2022-11-08 VITALS
OXYGEN SATURATION: 92 % | DIASTOLIC BLOOD PRESSURE: 70 MMHG | BODY MASS INDEX: 48.65 KG/M2 | HEIGHT: 65 IN | HEART RATE: 76 BPM | RESPIRATION RATE: 18 BRPM | WEIGHT: 292 LBS | SYSTOLIC BLOOD PRESSURE: 125 MMHG

## 2022-11-08 DIAGNOSIS — Z87.891 FORMER SMOKER: ICD-10-CM

## 2022-11-08 DIAGNOSIS — J44.9 OBSTRUCTIVE LUNG DISEASE: ICD-10-CM

## 2022-11-08 DIAGNOSIS — R91.1 PULMONARY NODULE: ICD-10-CM

## 2022-11-08 DIAGNOSIS — R91.1 LEFT LOWER LOBE PULMONARY NODULE: ICD-10-CM

## 2022-11-08 DIAGNOSIS — M17.0 PRIMARY OSTEOARTHRITIS OF BOTH KNEES: ICD-10-CM

## 2022-11-08 DIAGNOSIS — R06.02 SHORTNESS OF BREATH: ICD-10-CM

## 2022-11-08 DIAGNOSIS — J96.11 CHRONIC RESPIRATORY FAILURE WITH HYPOXIA: Primary | ICD-10-CM

## 2022-11-08 PROCEDURE — 99999 PR PBB SHADOW E&M-EST. PATIENT-LVL V: CPT | Mod: PBBFAC,,, | Performed by: PHYSICIAN ASSISTANT

## 2022-11-08 PROCEDURE — 99999 PR PBB SHADOW E&M-EST. PATIENT-LVL V: ICD-10-PCS | Mod: PBBFAC,,, | Performed by: PHYSICIAN ASSISTANT

## 2022-11-08 PROCEDURE — 99215 OFFICE O/P EST HI 40 MIN: CPT | Mod: PBBFAC | Performed by: PHYSICIAN ASSISTANT

## 2022-11-08 PROCEDURE — 95012 NITRIC OXIDE EXP GAS DETER: CPT | Mod: PBBFAC

## 2022-11-08 PROCEDURE — 99214 PR OFFICE/OUTPT VISIT, EST, LEVL IV, 30-39 MIN: ICD-10-PCS | Mod: 25,S$PBB,, | Performed by: PHYSICIAN ASSISTANT

## 2022-11-08 PROCEDURE — 99214 OFFICE O/P EST MOD 30 MIN: CPT | Mod: 25,S$PBB,, | Performed by: PHYSICIAN ASSISTANT

## 2022-11-08 RX ORDER — ALBUTEROL SULFATE 90 UG/1
AEROSOL, METERED RESPIRATORY (INHALATION)
Qty: 25.5 G | Refills: 11 | Status: SHIPPED | OUTPATIENT
Start: 2022-11-08 | End: 2023-12-07 | Stop reason: SDUPTHER

## 2022-11-08 NOTE — PROGRESS NOTES
Subjective:       Patient ID: Baylee Muñoz is a 62 y.o. female.    Chief Complaint: Breathing Problem and Asthma      11/8/22  Asthma follow up  Started Advair last visit, significant improvement in symptoms with use  She ran out of Advair for about a months and noticed a decline in functional capacity, more SOB, needed to use oxygen more  Started back on Advair about 2 weeks ago  Uses oxygen prn, monitors pulse ox at home  FeNO today 5  No signs of infection  She is planning on retiring soon, works at a [employer] around smoke  Knee issues affecting her ability to work on exercising  Asthma score 16  COPD score 16  Since last visit she was admitted for a GI bleed, on abdominal CT there is note of a 7mm left lung nodule    8/2/22  63yo female  SOB follow up, suspected asthma  Post covid syndrome  Saw Dr. Klein in June video visit for hospital follow up  Chest X Ray, shilo, and walk today  She uses oxygen 3L with activity, requesting portable concentrator  Some relief of SOB with albuterol  She is motivated to go back to work  Also wants to work on weight loss for her SOB and joint pains  Qualifies for oxygen with activity today  Shilo with severe obstruction, some bronchodilator reversal      6/2022 Dr. Klein:  Baylee Muñoz is 61 y.o.  Follow up from hospital: COVID 19  Dexamethasone, remdesivir, Vit C, Zinc  O2 sat was 88% in ER  Left pneumonia seen OMAR Ferrer 05/24/2022  Was placed on oxygen during hospitalization  2.5 LPM. Cannot return to work on O2  Pneumonia: Abx completed  Vacination for covid, Booster 1 and 2 lacking  Ocupation:  poker  Smoking former: 1 PPD, 30 years  No TB exposures  Inhaler: Albuterol  DME: [facility]  Nebulizer: in storage     Assessment/plan:  Suspect Obesity hypoventilation: office visit and physical exam for clarity of her pul issue  Also has elevated pressure on last echo  Nebulizer ordered  Will eval need for LABA/ICS or Solo ICS    Asthma Control Test  In the past 4   weeks, how much of the time did your asthma keep you from getting as much done at work, school or at home?: Some of the time  During the past 4 weeks, how often have you had shortness of breath?: 3 to 6 times a week  During the past 4 weeks, how often did your asthma symptoms (wheezing, couging, shortness of breath, chest tightness or pain) wake you up at night or earlier than usual in the morning?: Not at all  During the past 4 weeks, how often have you used your rescue inhaler or nebulizer medication (such as albuterol)?: 1 or 2 times per day  How would you rate your asthma control during the past 4 weeks?: Somewhat controlled  If your score is 19 or less, your asthma may not be under control: 16     COPD Questionnaire  How often do you cough?: Never  How often do you have phlegm (mucus) in your chest?: Never  How often does your chest feel tight?: Never  When you walk up a hill or one flight of stairs, how often are you breathless?: All of the time  How often are you limited doing any activities at home?: Some of the time  How often are you confident leaving the house despite your lung condition?: All of the time  How often do you sleep soundly?: Almost never  How often do you have energy?: Almost never  Total score: 16    Immunization History   Administered Date(s) Administered    COVID-19, MRNA, LN-S, PF (Pfizer) (Purple Cap) 08/28/2021, 09/27/2021    Pneumococcal Polysaccharide - 23 Valent 12/12/2017      Tobacco Use: Medium Risk    Smoking Tobacco Use: Former    Smokeless Tobacco Use: Never    Passive Exposure: Never      Past Medical History:   Diagnosis Date    Allergy     Arthritis     Asthma     Admit to [facility] 1/6/19 by Dr. Michael Randall for asthma exaceration, acute bronchitis, acute dCHF, hypoxia, elevated troponin/BNP (thought to be 2/2 asthma exac & acute dCHF by cards), tx'd w/ IV lasix, duonebs, IV solumedrol and DC'd on Z-prema, prednisone taper    CKD (chronic kidney disease) stage 2, GFR 60-89  ml/min     H/o ARF 2/2 poor water intake; encouraged to increase water intake and avoid NSAIDS & contrast dye    COVID 06/2022    Depression     Diastolic heart failure 01/06/2019    Dx'd at [facility] during asthma exacerbation    Diverticulitis 2005    Dyslipidemia 08/28/2018    Former smoker     Quit 12/31/18; smoked 0.12 packs/day x 37 years    Gastritis     Hypertension     Pneumonia 06/2022    Prediabetes     A1c 6.1% on 9/29/19    Severe obesity (BMI >= 40)     Vertigo     Vitamin D deficiency       Current Outpatient Medications on File Prior to Visit   Medication Sig Dispense Refill    acetaminophen (TYLENOL) 325 MG tablet Take 2 tablets (650 mg total) by mouth every 6 (six) hours as needed for Pain.  0    albuterol-ipratropium (DUO-NEB) 2.5 mg-0.5 mg/3 mL nebulizer solution Take 3 mLs by nebulization every 6 (six) hours as needed for Wheezing. Rescue 75 mL 5    amLODIPine (NORVASC) 5 MG tablet Take 1 tablet (5 mg total) by mouth once daily. 90 tablet 3    atorvastatin (LIPITOR) 40 MG tablet TAKE 1 TABLET(40 MG) BY MOUTH EVERY DAY 90 tablet 3    calcium carbonate/vitamin D3 (VITAMIN D-3 ORAL) Take 1 tablet by mouth once daily. (600 mg/ 400 iu)      ELDERBERRY FRUIT ORAL Take 50 mg by mouth once daily.      fluticasone propionate (FLONASE) 50 mcg/actuation nasal spray 1 spray (50 mcg total) by Each Nostril route once daily. 16 g 3    fluticasone-salmeterol diskus inhaler 250-50 mcg Inhale 1 puff into the lungs 2 (two) times daily. Controller 60 each 3    mupirocin (BACTROBAN) 2 % ointment Apply topically 2 (two) times daily. To abdominal area 30 g 2    mv-min/iron/folic/calcium/vitK (WOMEN'S MULTIVITAMIN ORAL) Take 1 tablet by mouth once daily.      pulse oximeter (PULSE OXIMETER) device by Apply Externally route 2 (two) times a day. Use twice daily at 8 AM and 3 PM and [facility] the [facility] as directed. 1 each 0    [DISCONTINUED] albuterol (PROAIR HFA) 90 mcg/actuation inhaler USE 2 INHALATIONS EVERY 6  "HOURS AS NEEDED FOR WHEEZING 25.5 g 11     No current facility-administered medications on file prior to visit.        Review of Systems   Constitutional:  Positive for fatigue. Negative for fever, weight loss, appetite change and weakness.   HENT:  Negative for postnasal drip, rhinorrhea, sinus pressure, trouble swallowing and congestion.    Respiratory:  Positive for shortness of breath and dyspnea on extertion. Negative for cough, sputum production, choking, chest tightness, wheezing and somnolence.    Cardiovascular:  Negative for chest pain and leg swelling.   Musculoskeletal:  Positive for arthralgias (left knee). Negative for gait problem and joint swelling.   Gastrointestinal:  Negative for nausea, vomiting and abdominal pain.   Neurological:  Negative for dizziness, weakness and headaches.   All other systems reviewed and are negative.      Objective:       Vitals:    11/08/22 1256   BP: 125/70   Pulse: 76   Resp: 18   SpO2: (!) 92%  Comment: 3 liters   Weight: 132.5 kg (292 lb)   Height: 5' 5" (1.651 m)         Physical Exam   Constitutional: She is oriented to person, place, and time. She appears well-developed and well-nourished. No distress. She is obese.   HENT:   Head: Normocephalic.   Nose: Nose normal.   Mouth/Throat: Oropharynx is clear and moist.   Cardiovascular: Normal rate and regular rhythm.   Pulmonary/Chest: Effort normal. No respiratory distress. She has no wheezes. She has no rhonchi. She has no rales.   Musculoskeletal:         General: No edema.      Cervical back: Normal range of motion and neck supple.      Comments: Walking with cane   Lymphadenopathy: No supraclavicular adenopathy is present.     She has no cervical adenopathy.   Neurological: She is alert and oriented to person, place, and time.   Skin: Skin is warm and dry.   Psychiatric: She has a normal mood and affect.   Vitals reviewed.  Personal Diagnostic Review    CTA Abdomen and Pelvis  Narrative: EXAMINATION:  CTA ABDOMEN " AND PELVIS    CLINICAL HISTORY:  GI bleed;    TECHNIQUE:  Low dose axial images, sagittal and coronal reformations were obtained from the lung bases to the pubic symphysis.  Low dose axial images, sagittal and coronal reformations were obtained from the lung bases to the pubic symphysis.  Images acquired before and after the administration of IV contrast in a CT angiography protocol. 100 mL Omnipaque 350 IV contrast were administered. 3D/MIP reformats were generated in a CT angiography protocol. Images acquired in a GI bleeding protocol.    COMPARISON:  Multiple priors.    FINDINGS:  Heart: Normal in size. No pericardial effusion.    Lung Bases: Partially visualized left peripheral scarring.  Questionable partially visualized pulmonary nodule measuring 7 mm as seen on series 7, image 1.  For a solid nodule 6-8 mm, Fleischner Society 2017 guidelines recommend follow up with non-contrast chest CT at 6-12 months and 18-24 months after discovery.    Liver: Normal in size and attenuation, with no focal hepatic lesions.    Gallbladder: No calcified gallstones.    Bile Ducts: No evidence of dilated ducts.    Pancreas: No mass or peripancreatic fat stranding.    Spleen: Unremarkable.    Adrenals: Unremarkable.    Kidneys/ Ureters: No hydronephrosis.  No obstructive uropathy.  No nonobstructive nephrolithiasis.    Bladder: No evidence of wall thickening.    Reproductive organs: Unremarkable.    GI Tract/Mesentery: No evidence of bowel obstruction or inflammation.  No evidence of ongoing arterial GI bleeding, specifically no evidence of ongoing arterial GI bleeding over the area of concern identified on the prior nuclear medicine scan.  This could represent bleeding below the resolution of CT angiography and conventional angiography.  Clinical correlation and further evaluation as clinically warranted.    Questionable mild/early diverticulitis of the distal descending colon such as on coronal series 601 image 88-94 noting  minimal local inflammatory stranding.  Prominent diverticulosis throughout the distal colon.    Peritoneal Space: No ascites. No free air.    Retroperitoneum: No significant adenopathy.    Abdominal wall: Probable fat necrosis in the left body wall as on series 4, image 483.    Vasculature: Moderate aortoiliac atherosclerosis.  No aneurysm.  No significant stenosis of the celiac or SMA at their origins.  DELILAH is patent.    Bones: Multifocal osseous degenerative changes.  Impression: No evidence of ongoing arterial GI bleeding as detailed above.    Questionable mild/early diverticulitis of the distal descending colon as detailed above.    Partially visualized left peripheral scarring.  7 mm pulmonary nodule partially visualized in the left lung base.  For a solid nodule 6-8 mm, Fleischner Society 2017 guidelines recommend follow up with non-contrast chest CT at 6-12 months and 18-24 months after discovery.    Additional details as above.    This report was flagged in Epic as abnormal.    All CT scans at this facility are performed  using dose modulation techniques as appropriate to performed exam including the following:  automated exposure control; adjustment of mA and/or kV according to the patients size (this includes techniques or standardized protocols for targeted exams where dose is matched to indication/reason for exam: i.e. extremities or head);  iterative reconstruction technique.    Electronically signed by: Satnam Cain  Date:    08/09/2022  Time:    19:55  NM GI Bleed Scan (Tagged RBC)  Narrative: EXAMINATION:  NM GI BLEED STUDY    CLINICAL HISTORY:  GI bleed;    TECHNIQUE:  After injection of autologous red blood cells labeled in vitro with 25.8mCi of Tc-99m pertechnetate, sequential dynamic images of the abdomen were obtained for 60minutes.    COMPARISON:  None.    FINDINGS:  There is normal distribution of the activity within the labeled blood pool with visualization of the heart, liver, spleen,  kidneys, bladder, and genitals.    Normal vessels are identified.    There is increased radiotracer pooling just superior to the bladder concerning for ongoing GI bleeding possibly colonic or rectal.  Impression: Findings concerning for ongoing GI bleeding possibly from the more distal colon as detailed above.    This report was flagged in Epic as abnormal.    Findings discussed via electronic message system with the care team at the time of dictation.    Electronically signed by: Satnam Cain  Date:    08/09/2022  Time:    16:46  X-Ray Chest AP Portable  Narrative: EXAMINATION:  XR CHEST AP PORTABLE    CLINICAL HISTORY:  GIB;.    TECHNIQUE:  Single frontal portable view of the chest was performed.    COMPARISON:  08/02/2022    FINDINGS:  Support devices: None    Peripheral left pleuroparenchymal scarring again noted.The lungs are otherwise clear, with normal appearance of pulmonary vasculature and no pleural effusion or pneumothorax.    The cardiac silhouette is normal in size. The hilar and mediastinal contours are unremarkable.    Bones are intact.  Impression: No acute abnormality.    Electronically signed by: Sathish Ardon  Date:    08/09/2022  Time:    07:15    8/2/22  Oxygen Assessment Supplemental O2 Heart   Rate Blood Pressure Shanice Dyspnea Scale Rating    Resting 90 % Room Air 82 bpm 129/76 3   Exercise             Minute             1 88 % (placed NC 2L) Room Air 90 bpm       2 91 % 2 L/M 100 bpm       3 88 % (NC increased to 3L) 2 L/M 102 bpm       4 91 % 3 L/M 107 bpm       5 94 % 3 L/M 107 bpm       6  95 % 3 L/M 104 bpm 127/65 5-6   Recovery             Minute             1 98 % 3 L/M 93 bpm       2 97 % 3 L/M 84 bpm       3 96 % 3 L/M 86 bpm       4 96 % 3 L/M 87 bpm 134/71 3      Six Minute Walk Summary  6MWT Status: completed without stopping  Patient Reported: Other (Comment), Dyspnea (Knee pain; lower back pain)               Interpretation:  Did the patient stop or pause?: No  Total Time Walked  (Calculated): 360 seconds  Final Partial Lap Distance (feet): 100 feet  Total Distance Meters (Calculated): 335.28 meters  Predicted Distance Meters (Calculated): 370.29 meters  Percentage of Predicted (Calculated): 90.55  Peak VO2 (Calculated): 14.04  Mets: 4.01  Has The Patient Had a Previous Six Minute Walk Test?: No     Previous 6MWT Results  Has The Patient Had a Previous Six Minute Walk Test?: No    8/2/22 Spirometry  FVC 61.9% predicted, FEV1 48.9% predicted (9.5% improvement post bronchodilator), FEV1/FVC 65%      Assessment/Plan:       Problem List Items Addressed This Visit          Pulmonary    Chronic respiratory failure with hypoxia - Primary     3L with activity           Relevant Medications    albuterol (PROAIR HFA) 90 mcg/actuation inhaler    Obstructive lung disease     Advair BID, risks of ICS discussed and need to rinse mouth after use  albuterol prn  Oxygen with activity  Weight loss and exercise to improve overall health  Patient declines flu shot today           Relevant Medications    albuterol (PROAIR HFA) 90 mcg/actuation inhaler    Other Relevant Orders    Ambulatory referral/consult to Pulmonary Disease Management w/ Respiratory Therapist    Left lower lobe pulmonary nodule 7mm     Found on abdominal CT 8/2022  Will get chest CT in 3 months  For a solid nodule 6-8 mm, Fleischner Society 2017 guidelines recommend follow up with non-contrast chest CT at 6-12 months and 18-24 months after discovery            Orthopedic    Primary osteoarthritis of both knees     Recommend orthopedic follow up  Patient will discuss with PCP            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    Discussed  diagnosis, its evaluation, treatment and usual course. All questions answered.    Patient verbalized understanding of plan and left in no acute distress    Thank you for the courtesy of participating in the care of this patient    Tesha Canada PA-C

## 2022-11-08 NOTE — PROCEDURES
"Clinical Guide to Interpretation or FeNO Levels :    FeNO  (ppb) LOW INTERMEDIATE HIGH   ADULT VALUES < 25 25-50          > 50   Th2-driven Inflammation Unlikely Likely Significant     Patients FeNO level at this visit : __5__ (ppb)       Interpretation of FeNO measurement in adults:   [X ] FENO is less than 25 ppb implies non eosinophilic airway inflammation or the absence of airway inflammation.   Comment: Low FENO (<25 ppb) in adult asthmatics with persistent symptoms suggests other etiologies for these symptoms and a lower likelihood of benefit from adding or increasing inhaled glucocorticoids.     [ ] FENO between 25 and 50 ppb in adults should be interpreted cautiously with reference to the clinical situation (eg, symptomatic, on or off therapy, current smoking).     [ ] FENO greater than 50 ppb in adults  suggests eosinophilic airway inflammation   Comment: High FENO (>50 ppb) in adult asthmatics even with atypical symptoms suggests glucocorticoid responsiveness. High FENO (>50 ppb) can help identify poor adherence or uncontrolled inflammation in asthma patients with otherwise seemingly "controlled" asthma.     Discussion:   ·A FENO less than 25 ppb in adults and less than 20 ppb in children younger than 12 years of age implies noneosinophilic airway inflammation or the absence of airway inflammation.   ·A FENO greater than 50 ppb in adults or greater than 35 ppb in children suggests eosinophilic airway inflammation.   ·Values of FENO between 25 and 50 ppb in adults (20 to 35 ppb in children) should be interpreted cautiously with reference to the clinical situation (eg, symptomatic, on or off therapy, current smoking).   ·A rising FENO with a greater than 20 percent change and more than 25 ppb (20 ppb in children) from a previously stable level suggests increasing eosinophilic airway inflammation, but there are wide inter-individual differences.   ·A decrease in FENO greater than 20 percent for values over 50 " ppb or more than 10 ppb for values less than 50 ppb may be clinically important.   FENO in other respiratory diseases - Several other diseases are associated with altered levels of exhaled NO: low levels of FENO have been noted in cystic fibrosis, current smoking, pulmonary hypertension, hypothermia, primary ciliary dyskinesia, and bronchopulmonary dysplasia. Elevated FENO has been noted in atopy, nonasthmatic eosinophilic bronchitis, COPD exacerbations, noncystic fibrosis bronchiectasis, and viral upper respiratory infections.     REFERENCE:   ATS Board of Directors, December 2004, and by the ERS Executive Committee, June 2004. ATS/ERS Recommendations for Standardized Procedures for the Online and Offline Measurement of Exhaled Lower Respiratory Nitric Oxide and Nasal Nitric Oxide. Guideline 2005

## 2022-11-08 NOTE — ASSESSMENT & PLAN NOTE
Found on abdominal CT 8/2022  Will get chest CT in 3 months  For a solid nodule 6-8 mm, Fleischner Society 2017 guidelines recommend follow up with non-contrast chest CT at 6-12 months and 18-24 months after discovery

## 2022-11-08 NOTE — ASSESSMENT & PLAN NOTE
Advair BID, risks of ICS discussed and need to rinse mouth after use  albuterol prn  Oxygen with activity  Weight loss and exercise to improve overall health  Patient declines flu shot today

## 2022-11-15 ENCOUNTER — PATIENT OUTREACH (OUTPATIENT)
Dept: ADMINISTRATIVE | Facility: HOSPITAL | Age: 62
End: 2022-11-15
Payer: OTHER GOVERNMENT

## 2022-11-16 ENCOUNTER — ANESTHESIA EVENT (OUTPATIENT)
Dept: ENDOSCOPY | Facility: HOSPITAL | Age: 62
End: 2022-11-16
Payer: OTHER GOVERNMENT

## 2022-11-16 ENCOUNTER — HOSPITAL ENCOUNTER (OUTPATIENT)
Facility: HOSPITAL | Age: 62
Discharge: HOME | End: 2022-11-16
Attending: INTERNAL MEDICINE | Admitting: INTERNAL MEDICINE
Payer: OTHER GOVERNMENT

## 2022-11-16 ENCOUNTER — ANESTHESIA (OUTPATIENT)
Dept: ENDOSCOPY | Facility: HOSPITAL | Age: 62
End: 2022-11-16
Payer: OTHER GOVERNMENT

## 2022-11-16 DIAGNOSIS — Z86.010 HISTORY OF COLON POLYPS: Primary | ICD-10-CM

## 2022-11-16 PROBLEM — Z86.0100 HISTORY OF COLON POLYPS: Status: ACTIVE | Noted: 2022-11-16

## 2022-11-16 PROCEDURE — 00812 ANES LWR INTST SCR COLSC: CPT | Performed by: INTERNAL MEDICINE

## 2022-11-16 PROCEDURE — 37000009 HC ANESTHESIA EA ADD 15 MINS: Performed by: INTERNAL MEDICINE

## 2022-11-16 PROCEDURE — 88305 TISSUE EXAM BY PATHOLOGIST: ICD-10-PCS | Mod: 26,,, | Performed by: PATHOLOGY

## 2022-11-16 PROCEDURE — 63600175 PHARM REV CODE 636 W HCPCS: Performed by: STUDENT IN AN ORGANIZED HEALTH CARE EDUCATION/TRAINING PROGRAM

## 2022-11-16 PROCEDURE — 37000008 HC ANESTHESIA 1ST 15 MINUTES: Performed by: INTERNAL MEDICINE

## 2022-11-16 PROCEDURE — 25000003 PHARM REV CODE 250: Performed by: STUDENT IN AN ORGANIZED HEALTH CARE EDUCATION/TRAINING PROGRAM

## 2022-11-16 PROCEDURE — 88305 TISSUE EXAM BY PATHOLOGIST: CPT | Mod: 26,,, | Performed by: PATHOLOGY

## 2022-11-16 PROCEDURE — 45380 COLONOSCOPY AND BIOPSY: CPT | Mod: 33,,, | Performed by: INTERNAL MEDICINE

## 2022-11-16 PROCEDURE — 27201012 HC FORCEPS, HOT/COLD, DISP: Performed by: INTERNAL MEDICINE

## 2022-11-16 PROCEDURE — 45380 PR COLONOSCOPY,BIOPSY: ICD-10-PCS | Mod: 33,,, | Performed by: INTERNAL MEDICINE

## 2022-11-16 PROCEDURE — 45380 COLONOSCOPY AND BIOPSY: CPT | Mod: PT | Performed by: INTERNAL MEDICINE

## 2022-11-16 PROCEDURE — 88305 TISSUE EXAM BY PATHOLOGIST: CPT | Performed by: PATHOLOGY

## 2022-11-16 RX ORDER — SODIUM CHLORIDE, SODIUM LACTATE, POTASSIUM CHLORIDE, CALCIUM CHLORIDE 600; 310; 30; 20 MG/100ML; MG/100ML; MG/100ML; MG/100ML
INJECTION, SOLUTION INTRAVENOUS CONTINUOUS
Status: DISCONTINUED | OUTPATIENT
Start: 2022-11-16 | End: 2022-11-16 | Stop reason: HOSPADM

## 2022-11-16 RX ORDER — SODIUM CHLORIDE, SODIUM LACTATE, POTASSIUM CHLORIDE, CALCIUM CHLORIDE 600; 310; 30; 20 MG/100ML; MG/100ML; MG/100ML; MG/100ML
INJECTION, SOLUTION INTRAVENOUS CONTINUOUS PRN
Status: DISCONTINUED | OUTPATIENT
Start: 2022-11-16 | End: 2022-11-16

## 2022-11-16 RX ORDER — LIDOCAINE HYDROCHLORIDE 10 MG/ML
INJECTION, SOLUTION EPIDURAL; INFILTRATION; INTRACAUDAL; PERINEURAL
Status: DISCONTINUED | OUTPATIENT
Start: 2022-11-16 | End: 2022-11-16

## 2022-11-16 RX ORDER — PROPOFOL 10 MG/ML
VIAL (ML) INTRAVENOUS
Status: DISCONTINUED | OUTPATIENT
Start: 2022-11-16 | End: 2022-11-16

## 2022-11-16 RX ADMIN — LIDOCAINE HYDROCHLORIDE 50 MG: 10 INJECTION, SOLUTION EPIDURAL; INFILTRATION; INTRACAUDAL; PERINEURAL at 09:11

## 2022-11-16 RX ADMIN — PROPOFOL 100 MG: 10 INJECTION, EMULSION INTRAVENOUS at 09:11

## 2022-11-16 RX ADMIN — PROPOFOL 50 MG: 10 INJECTION, EMULSION INTRAVENOUS at 09:11

## 2022-11-16 RX ADMIN — SODIUM CHLORIDE, SODIUM LACTATE, POTASSIUM CHLORIDE, AND CALCIUM CHLORIDE: 600; 310; 30; 20 INJECTION, SOLUTION INTRAVENOUS at 09:11

## 2022-11-16 NOTE — [employer]ATION PATIENT INSTRUCTIONS
Discharge Summary/Instructions after an Endoscopic Procedure  Patient Name: Baylee Muñoz  Patient MRN: [id number]  Patient YOB: 1960 Wednesday, November 16, 2022 Angie Neves MD  Dear patient,  As a result of recent federal legislation (The Federal Cures Act), you may   receive lab or pathology results from your procedure in your [facility]   account before your physician is able to contact you. Your physician or   their representative will relay the results to you with their   recommendations at their soonest availability.  Thank you,  RESTRICTIONS:  During your procedure today, you received medications for sedation.  These   medications may affect your judgment, balance and coordination.  Therefore,   for 24 hours, you have the following restrictions:   - DO NOT drive a car, operate machinery, make legal/financial decisions,   sign important papers or drink alcohol.    ACTIVITY:  Today: no heavy lifting, straining or running due to procedural   sedation/anesthesia.  The following day: return to full activity including work.  DIET:  Eat and drink normally unless instructed otherwise.     TREATMENT FOR COMMON SIDE EFFECTS:  - Mild abdominal pain, nausea, belching, bloating or excessive gas:  rest,   eat lightly and use a heating pad.  - Sore Throat: treat with throat lozenges and/or gargle with warm salt   water.  - Because air was used during the procedure, expelling large amounts of air   from your rectum or belching is normal.  - If a bowel prep was taken, you may not have a bowel movement for 1-3 days.    This is normal.  SYMPTOMS TO WATCH FOR AND REPORT TO YOUR PHYSICIAN:  1. Abdominal pain or bloating, other than gas cramps.  2. Chest pain.  3. Back pain.  4. Signs of infection such as: chills or fever occurring within 24 hours   after the procedure.  5. Rectal bleeding, which would show as bright red, maroon, or black stools.   (A tablespoon of blood from the rectum is not serious,  especially if   hemorrhoids are present.)  6. Vomiting.  7. Weakness or dizziness.  GO DIRECTLY TO THE NEAREST EMERGENCY ROOM IF YOU HAVE ANY OF THE FOLLOWING:      Difficulty breathing              Chills and/or fever over 101 F   Persistent vomiting and/or vomiting blood   Severe abdominal pain   Severe chest pain   Black, tarry stools   Bleeding- more than one tablespoon   Any other symptom or condition that you feel may need urgent attention  Your doctor recommends these additional instructions:  If any biopsies were taken, your doctors clinic will contact you in 1 to 2   weeks with any results.  - Discharge patient to home (via wheelchair).   - Resume previous diet.   - Continue present medications.   - Await pathology results.   - Repeat colonoscopy in 7 years for surveillance.   - Telephone GI clinic for pathology results in 2 weeks.   - Patient has a contact number available for emergencies.  The signs and   symptoms of potential delayed complications were discussed with the   patient.  Return to normal activities tomorrow.  Written discharge   instructions were provided to the patient.  For questions, problems or results please call your physician Angie Neves MD at Work:  [phone]  If you have any questions about the above instructions, call the GI   department at [phone] or call the endoscopy unit at [phone]   from 7am until 3 pm.  [facility] [place], EMERGENCY ROOM PHONE NUMBER:   [phone]  IF A COMPLICATION OR EMERGENCY SITUATION ARISES AND YOU ARE UNABLE TO REACH   YOUR PHYSICIAN - GO DIRECTLY TO THE EMERGENCY ROOM.  I have read or have had read to me these discharge instructions for my   procedure and have received a written copy.  I understand these   instructions and will follow-up with my physician if I have any questions.     __________________________________       _____________________________________  Nurse Signature                                           Patient/Designated   Responsible Party Signature  MD Angie Mars MD  11/16/2022 9:30:15 AM  [employer]

## 2022-11-16 NOTE — TRANSFER OF CARE
"Anesthesia Transfer of Care Note    Patient: Baylee Muñoz    Procedure(s) Performed: Procedure(s) (LRB):  COLONOSCOPY (N/A)    Patient location: PACU    Anesthesia Type: MAC    Transport from OR: Transported from OR on room air with adequate spontaneous ventilation    Post pain: adequate analgesia    Post assessment: no apparent anesthetic complications    Post vital signs: stable    Level of consciousness: responds to stimulation and awake    Nausea/Vomiting: no nausea/vomiting    Complications: none    Transfer of care protocol was followed      Last vitals:   Visit Vitals  BP (!) 150/81   Pulse 80   Temp 36.9 °C (98.4 °F)   Resp 18   Ht 5' 5" (1.651 m)   Wt 129.3 kg (285 lb)   SpO2 (!) 94%   Breastfeeding No   BMI 47.43 kg/m²     "

## 2022-11-16 NOTE — H&P
Short Stay Endoscopy History and Physical    PCP - Becki Hernandez NP    Procedure - EGD  ASA - 2  Mallampati - per anesthesia  History of Anesthesia problems - no  Family history Anesthesia problems -  no     HPI:  This is a 62 y.o. female here for evaluation of :   Active Hospital Problems    Diagnosis  POA    *History of colon polyps [Z86.010]  Not Applicable      Resolved Hospital Problems   No resolved problems to display.       ROS:  CONSTITUTIONAL: Denies weight change,  fatigue, fevers, chills, night sweats.  CARDIOVASCULAR: Denies chest pain, shortness of breath, orthopnea and edema.  RESPIRATORY: Denies cough, hemoptysis, dyspnea, and wheezing.  GI: See HPI.    Medical History:   Past Medical History:   Diagnosis Date    Allergy     Arthritis     Asthma     Admit to [facility] by Dr. Michael Randall for asthma exaceration, acute bronchitis, acute dCHF, hypoxia, elevated troponin/BNP (thought to be 2/2 asthma exac & acute dCHF by cards), tx'd w/ IV lasix, duonebs, IV solumedrol and DC'd on Z-prema, prednisone taper    CKD (chronic kidney disease) stage 2, GFR 60-89 ml/min     H/o ARF 2/2 poor water intake; encouraged to increase water intake and avoid NSAIDS & contrast dye    COVID 2022    Depression     Diastolic heart failure 2019    Dx'd at [facility] during asthma exacerbation    Diverticulitis 2005    Dyslipidemia 2018    Former smoker     Quit 18; smoked 0.12 packs/day x 37 years    Gastritis     Hypertension     Pneumonia 2022    Prediabetes     A1c 6.1% on 19    Severe obesity (BMI >= 40)     Vertigo     Vitamin D deficiency        Surgical History:   Past Surgical History:   Procedure Laterality Date    COLONOSCOPY N/A 8/10/2022    Procedure: COLONOSCOPY;  Surgeon: Dariela Devi MD;  Location: [facility];  Service: Endoscopy;  Laterality: N/A;    DILATION AND CURETTAGE OF UTERUS      missed , bleeding    ESOPHAGOGASTRODUODENOSCOPY N/A 2022    Procedure: EGD  (ESOPHAGOGASTRODUODENOSCOPY);  Surgeon: Dariela Devi MD;  Location: [facility];  Service: Endoscopy;  Laterality: N/A;    EXPLORATORY LAPAROTOMY      lysis of adhesions, open tube    KNEE SURGERY Right     torn mcl    laser to cervix         Family History:  Family History   Problem Relation Age of Onset    Stroke Mother     Heart disease Mother     Kidney disease Father     Heart disease Father     Hypertension Father     HIV Sister     COPD Sister     Bipolar disorder Sister        Social History:   Social History     Tobacco Use    Smoking status: Former     Packs/day: 0.25     Years: 27.00     Pack years: 6.75     Types: Cigarettes     Passive exposure: Never    Smokeless tobacco: Never   Substance Use Topics    Alcohol use: No    Drug use: No       Allergy  Review of patient's allergies indicates:   Allergen Reactions    Coconut Anaphylaxis    Iodine and iodide containing products Anaphylaxis    Dairy aid [lactase] Diarrhea and Nausea And Vomiting    Penicillins Hives       Medications:   No current facility-administered medications on file prior to encounter.     Current Outpatient Medications on File Prior to Encounter   Medication Sig Dispense Refill    acetaminophen (TYLENOL) 325 MG tablet Take 2 tablets (650 mg total) by mouth every 6 (six) hours as needed for Pain.  0    amLODIPine (NORVASC) 5 MG tablet Take 1 tablet (5 mg total) by mouth once daily. 90 tablet 3    atorvastatin (LIPITOR) 40 MG tablet TAKE 1 TABLET(40 MG) BY MOUTH EVERY DAY 90 tablet 3    calcium carbonate/vitamin D3 (VITAMIN D-3 ORAL) Take 1 tablet by mouth once daily. (600 mg/ 400 iu)      ELDERBERRY FRUIT ORAL Take 50 mg by mouth once daily.      fluticasone propionate (FLONASE) 50 mcg/actuation nasal spray 1 spray (50 mcg total) by Each Nostril route once daily. 16 g 3    fluticasone-salmeterol diskus inhaler 250-50 mcg Inhale 1 puff into the lungs 2 (two) times daily. Controller 60 each 3    mupirocin (BACTROBAN) 2 % ointment  Apply topically 2 (two) times daily. To abdominal area 30 g 2    mv-min/iron/folic/calcium/vitK (WOMEN'S MULTIVITAMIN ORAL) Take 1 tablet by mouth once daily.      pulse oximeter (PULSE OXIMETER) device by Apply Externally route 2 (two) times a day. Use twice daily at 8 AM and 3 PM and record the value in MyChart as directed. 1 each 0       Physical Exam:  Vital Signs: There were no vitals filed for this visit.  General Appearance: Well appearing in no acute distress  ENT: OP clear  Chest: CTA B  CV: RRR, no m/r/g  Abd: s/nt/nd/nabs  Ext: no edema    Labs:  Reviewed    IMP:  Active Hospital Problems    Diagnosis  POA    *History of colon polyps [Z86.010]  Not Applicable      Resolved Hospital Problems   No resolved problems to display.         Plan:  I have explained the risks and benefits of endoscopy procedures to the patient including but not limited to bleeding, perforation, infection, and death. The patient wishes to proceed.

## 2022-11-16 NOTE — ANESTHESIA PREPROCEDURE EVALUATION
11/16/2022  Baylee uMñoz is a 62 y.o., female.      Pre-op Assessment    I have reviewed the Patient Summary Reports.     I have reviewed the Nursing Notes. I have reviewed the NPO Status.   I have reviewed the Medications.     Review of Systems  Anesthesia Hx:  Denies Family Hx of Anesthesia complications.   Denies Personal Hx of Anesthesia complications.   Hematology/Oncology:         -- Anemia:   Cardiovascular:   Hypertension CHF    Pulmonary:   Pneumonia Asthma  Respiratory Failure, Chronic   Renal/:   Chronic Renal Disease    Hepatic/GI:   Lower Gi bleed Hepatic/GI Symptoms: hematochezia.    Musculoskeletal:   Arthritis     Endocrine:  Morbid Obesity / BMI > 40  Psych:   Psychiatric History          Physical Exam  General: Well nourished, Cooperative, Alert and Oriented    Airway:  Mallampati: II   Mouth Opening: Normal  TM Distance: Normal  Neck ROM: Normal ROM    Dental:  Intact        Anesthesia Plan  Type of Anesthesia, risks & benefits discussed:    Anesthesia Type: MAC  Intra-op Monitoring Plan: Standard ASA Monitors  Post Op Pain Control Plan: IV/PO Opioids PRN and multimodal analgesia  Induction:  IV  ASA Score: 3  Day of Surgery Review of History & Physical: H&P Update referred to the surgeon/provider.I have interviewed and examined the patient. I have reviewed the patient's H&P dated:     Ready For Surgery From Anesthesia Perspective.     .

## 2022-11-16 NOTE — DISCHARGE SUMMARY
[facility] - Endoscopy (Hospital)  Discharge Note  Short Stay    Procedure(s) (LRB):  COLONOSCOPY (N/A)      OUTCOME: Patient tolerated treatment/procedure well without complication and is now ready for discharge.    DISPOSITION: Home or Self Care    FINAL DIAGNOSIS:  History of colon polyps    FOLLOWUP: With primary care provider    DISCHARGE INSTRUCTIONS:  No discharge procedures on file.

## 2022-11-16 NOTE — LETTER
Baylee Muñoz   [address]  [place] [address]      [facility] ENDOSCOPY PATIENT INSTRUCTIONS    You are scheduled for a/an Colonoscopy(Procedure), on Wednesday (Day), 11/16/2022 (Date).   Please check in for your appointment at 07:30 AM.                          Your procedure will be performed at [facility] ([facility]). The hospital is located at [address], off [address], [address] ([facility]). Once on [address], [facility] is the second building on the left. Check in for your procedure on the 1st floor. ([phone])    ALL PATIENTS:   ? You will be at the hospital for approximately 3 - 4 hours.    ? Use of Anesthesia requires you to have a responsible person who can drive you to the hospital, stay while the procedure is being done, assume responsibility for your care at discharge, and drive you home. You will not be able to operate a vehicle or machinery or sign any legal documents until the following day.   ? Leave all valuables at home, including jewelry. (Piercings need to be removed) You will need to bring your 's license, medical insurance card, and a method of payment. You will be responsible for any co-payment at time of registration.    ? If biopsies need to be performed or a polyp needs to be removed this may result in a change in the billing of your procedure and could impact your payment responsibility depending on your insurance provider.   ? Wear clothing appropriate for easy re-dressing after sedation.   ? Please bring a complete list of all medications you are taking.     MEDICATIONS:  ? BLOOD THINNING MEDICATION (Coumadin, Plavix, Eliquis, etc.):  o If you are on a blood thinning medication, our scheduling team will obtain clearance to hold from your prescribing physician. Please do not stop these medications until it is approved by your provider. Our scheduling nurse will notify you an appropriate date and time to hold prior to your  procedure.  o Coumadin, Plavix and Effient MUST be stopped 5 days prior to exam unless discussed with the doctor performing the test.    o Eliquis, Savaysa, Arixtra, and Xarelto MUST be stopped 2 days prior to exam unless discussed with the doctor performing the test.  ? WEIGHT LOSS MEDICATIONS - Stop 2 weeks prior to your appointment  ? BLOOD PRESSURE, HEART, SEIZURE, LUNG or PSYCHIATRIC MEDICATIONS you normally take in the morning, please take them the morning of your procedure.  This includes Inhalers.   o Please take these medications one hour prior to your arrival time with a small sip of water, or 2 hours before you start drinking the second part of the prep.     DIABETIC PATIENTS:   ? Check blood sugar levels while following clear liquid diet on the day before the exam. Check again on the morning of the exam and as needed if you feel hypoglycemic (low blood sugar).     ? Do not take any diabetic medications (including insulin) the morning of the exam.    ? If your blood sugar goes below 70, you may drink 4 ounces of clear juice, soda or eat a piece of hard candy. Wait 15 minutes, then recheck your blood sugar. If it isn't going up, you may drink another 4 ounces of clear juice and contact the On-Call Nurse line at [phone].   ?       Questions regarding scheduling: Endoscopy Scheduling [phone] (M-F, 7:30am-4:30pm)   Questions regarding prep or procedure: ([facility] Pre-Op Nurse [phone] or [phone]   Questions regarding prep or procedure: [facility] Pre-Op Nurse: [phone]   Questions about Insurance/ Financial obligations: Financial Services [phone]   [facility] [phone] (M-F, 6:30am-3:00pm)   [facility] [phone] (M-F, 6:30am-3:00pm)            Questions requiring immediate assistance: [facility] On-Call Nurse Line [phone] (After Hours)           [facility] GOLYTELY, COLYTE, NULYTELY, GENERIC PEG 3350  "W/ELECTROLYTES INSTRUCTIONS  Please use this page as a checklist for your preparation.    []ITEMS TO PURCHASE BEFORE YOUR PROCEDURE:  Please purchase the following items from your local pharmacy prior to your appointment.    2 Dulcolax (bisacodyl) tablets- no prescription needed, over the counter.   Gas X (simethicone) 125 mg capsules - no prescription needed, over the counter.    Meclizine 25 mg tablet (for nausea)- no prescription needed, over the counter.   Prep- A prescription is required and has been sent to your pharmacy    [] FIVE DAYS BEFORE YOUR PROCEDURE: Begin low fiber diet- see attached instructions.    []THE DAY BEFORE YOUR PROCEDURE :(WHEN YOU WAKE)  begin clear liquids only - no solid foods may be eaten until after your procedure has been performed/completed.  Please drink 64oz. of clear liquids before starting your prep at 6pm.   You may consume the following items:    Coffee, water, or tea. (We agree it's odd, but coffee and tea without milk or creamer is considered a clear liquid)    Clear carbonated beverages (soft drinks), ginger ale, sprite, 7up, sparkling water, etc. No "Energy" beverages.    Gelatin dessert, (JELLO) plain or fruit flavored. No red or purple coloring/No solid pieces of fruit.   Apple juice, white grape juice, or white cranberry juice. No pulp, no orange juice.    Gatorade, Powerade, lemonade, or limeade. No red or purple.    Clear, fat-free, beef or chicken broths, or bouillon.    Snowballs, popsicles, slushes. No red or purple coloring, no pulp.    Avoid any liquids not listed above.     []12:00 PM (the day before procedure):   Take 2 Dulcolax (bisacodyl) tablets with a glass of clear liquid   Take 1 Gas X (simethicone) 125 mg capsule with a glass of clear liquid    []2:00 PM (the day before procedure), for prevention of nausea and vomiting:   Take ½ of a tablet (12.5 mg) of Meclizine every 6 hours as needed for nausea and/or vomiting. DO NOT TAKE MORE THAN 50 " MG IN A 24 HOUR PERIOD.    []6:00 PM (the day before procedure) Begin the first portion of the Golytely or Nulytely prep. (Best if refrigerated)  1. Add cool water to mixing container up to the fill line and mix.  2. Drink HALF of the mixture in the container.  3. You may refrigerate remaining half of the prep until ready to use the next morning.  Please have this consumed within 1 hour and 30 minutes. ---VERY IMPORTANT---  This entire process is required for the success of the examination.    Clear liquids may be continued until you finish the second portion of the prep. This will help you remain hydrated.    []02:00 AM (THE MORNING OF YOUR PROCEDURE): Complete the second portion of your prep.  1. Drink the second HALF of the mixture that was done the previous evening.  Please have this consumed within 1 hour and 30 minutes. ---VERY IMPORTANT---  This entire process is required for the success of the examination.      After you complete the bowel prep and the required water, you may not have anything else by mouth except for your medications, with a small sip of water.    Please follow these instructions to ensure you have a very good prep.  The goal is for stool to be CLEAR OR YELLOW liquid - NO BROWN.  Avoid having to repeat the procedure due to a poor prep!    Please call [phone] or [phone] if you continue to have brown stool or have any questions about your prep instructions.        LOW FIBER DIET  FIVE (5) DAYS BEFORE YOUR PROCEDURE- Please start a Low Fiber Diet as below:  A good preparation (clean out) of the colon is necessary prior to having a colonoscopy in order to ensure that all areas of the colon can be seen without difficulty. It is helpful to avoid high fiber foods prior to having a colonoscopy as high fiber foods (especially seeds and nuts) are more difficult to completely clear out of the colon. We recommend avoiding high fiber foods for at least 4 days prior to your colonoscopy. If  you have issues with constipation you may want to start avoiding high fiber foods 7 days before the procedure.    Foods to Include In Your Diet:       Grain Products:    Enriched refined white bread, buns, bagels, English muffins   Plain cereals e.g., Cheerios, Cornflakes, Cream of Wheat, Rice Krispies, Special K   Tea biscuits, arrowroot cookies, soda crackers, terell crackers, plain Acacia toast and flour tortillas  White rice, refined pasta and noodles   Fruits:   peel fruits when possible  Fruit juices except prune juice   Soft fruits: apricots, banana (1/2), cantaloupe, canned fruit cocktail, grapes,   honeydew melon, peaches, watermelon, citrus fruits, plums, pineapple   Sauces: Apple or apricot        Vegetables:    Vegetable juices    Tomato sauces    Potatoes (no skin)    Well-cooked and tender vegetables including alfalfa sprouts, spinach, beets, carrots, celery, cucumber, eggplant, lettuce, mushrooms, green/red peppers, squash, zucchini, onions, brussel sprouts, asparagus     Meat and Protein Choice:  Well-cooked, tender meat, fish and eggs            Foods to avoid:   ? Whole grain breads and pastas, corn bread or muffins, products made with whole grain products, bran, seeds, or nuts   ? Strong cheeses, yogurt containing fruit skins or seeds   ? Raw vegetables and pickles  ? All types of whole kernel corn   ? All beans, peas, and legumes   ? Fruit peels and hard fruits   ? Tough meat, meat with gristle   ? Crunchy peanut butter   ? Nuts, seeds and popcorn   ? Millet, buckwheat, flax, oatmeal, deion seeds   ? Dried fruits, berries, other fruits with pulp or seeds (including blueberries, raspberries, and blackberries)   ? Food containing chocolate, coconut   ? Juices with pulp     Avoiding the recommended foods is one part of helping to ensure that you have adequate preparation and that you do not need to have a repeat colonoscopy any sooner than what would be recommended by the colon cancer screening  guidelines.

## 2022-11-16 NOTE — ANESTHESIA POSTPROCEDURE EVALUATION
Anesthesia Post Evaluation    Patient: Baylee Muñoz    Procedure(s) Performed: Procedure(s) (LRB):  COLONOSCOPY (N/A)    Final Anesthesia Type: MAC      Patient location during evaluation: GI PACU  Patient participation: Yes- Able to Participate  Level of consciousness: awake and alert and oriented  Post-procedure vital signs: reviewed and stable  Pain management: adequate  Airway patency: patent  TUTU mitigation strategies: Multimodal analgesia  PONV status at discharge: No PONV  Anesthetic complications: no      Cardiovascular status: blood pressure returned to baseline  Respiratory status: unassisted  Hydration status: euvolemic  Follow-up not needed.          Vitals Value Taken Time   /81 11/16/22 0902   Temp 36.9 °C (98.4 °F) 11/16/22 0854   Pulse 80 11/16/22 0854   Resp 18 11/16/22 0854   SpO2 94 % 11/16/22 0854         No case tracking events are documented in the log.      Pain/Miguel Angel Score: No data recorded

## 2022-11-16 NOTE — PLAN OF CARE
Dr. Neves at bedside to speak with pt na family about procedure and results. All questions answered with no further questions at this time.

## 2022-11-17 VITALS
HEART RATE: 84 BPM | DIASTOLIC BLOOD PRESSURE: 93 MMHG | WEIGHT: 285 LBS | SYSTOLIC BLOOD PRESSURE: 136 MMHG | OXYGEN SATURATION: 95 % | BODY MASS INDEX: 47.48 KG/M2 | RESPIRATION RATE: 15 BRPM | TEMPERATURE: 98 F | HEIGHT: 65 IN

## 2022-11-22 LAB
FINAL PATHOLOGIC DIAGNOSIS: NORMAL
GROSS: NORMAL
Lab: NORMAL

## 2022-11-28 ENCOUNTER — PATIENT MESSAGE (OUTPATIENT)
Dept: GASTROENTEROLOGY | Facility: CLINIC | Age: 62
End: 2022-11-28
Payer: OTHER GOVERNMENT

## 2022-12-30 ENCOUNTER — TELEPHONE (OUTPATIENT)
Dept: INTERNAL MEDICINE | Facility: CLINIC | Age: 62
End: 2022-12-30
Payer: OTHER GOVERNMENT

## 2022-12-30 NOTE — TELEPHONE ENCOUNTER
I called the pt back tried offering her appt at other locations she declined stated times did not work but states that she have been experiencing this earache for two weeks . I recommended she be seen sooner than the appt I was able to schedule for her for next week . She replied if it gets worse she umm. //kah

## 2022-12-30 NOTE — TELEPHONE ENCOUNTER
----- Message from Ashish Valdez sent at 12/30/2022  8:02 AM CST -----  Contact: [phone]  Type:  Same Day Appointment Request    Caller is requesting a same day appointment.  Caller declined first available appointment listed below.    Name of Caller:Baylee  When is the first available appointment?01/05/2023  Symptoms:ear ache   Best Call Back Number:[phone]  Additional Information:

## 2023-01-05 ENCOUNTER — OFFICE VISIT (OUTPATIENT)
Dept: INTERNAL MEDICINE | Facility: CLINIC | Age: 63
End: 2023-01-05
Payer: OTHER GOVERNMENT

## 2023-01-05 VITALS
WEIGHT: 293 LBS | OXYGEN SATURATION: 90 % | HEART RATE: 87 BPM | BODY MASS INDEX: 48.77 KG/M2 | SYSTOLIC BLOOD PRESSURE: 126 MMHG | RESPIRATION RATE: 18 BRPM | TEMPERATURE: 98 F | DIASTOLIC BLOOD PRESSURE: 82 MMHG

## 2023-01-05 DIAGNOSIS — H66.91 OTITIS OF RIGHT EAR: Primary | ICD-10-CM

## 2023-01-05 DIAGNOSIS — I83.90 VARICOSE VEINS OF LOWER EXTREMITY, UNSPECIFIED LATERALITY, UNSPECIFIED WHETHER COMPLICATED: ICD-10-CM

## 2023-01-05 DIAGNOSIS — J45.909 ASTHMA, UNSPECIFIED ASTHMA SEVERITY, UNSPECIFIED WHETHER COMPLICATED, UNSPECIFIED WHETHER PERSISTENT: ICD-10-CM

## 2023-01-05 PROCEDURE — 99213 PR OFFICE/OUTPT VISIT, EST, LEVL III, 20-29 MIN: ICD-10-PCS | Mod: S$PBB,,, | Performed by: NURSE PRACTITIONER

## 2023-01-05 PROCEDURE — 99999 PR PBB SHADOW E&M-EST. PATIENT-LVL IV: CPT | Mod: PBBFAC,,, | Performed by: NURSE PRACTITIONER

## 2023-01-05 PROCEDURE — 99214 OFFICE O/P EST MOD 30 MIN: CPT | Mod: PBBFAC,PN | Performed by: NURSE PRACTITIONER

## 2023-01-05 PROCEDURE — 99213 OFFICE O/P EST LOW 20 MIN: CPT | Mod: S$PBB,,, | Performed by: NURSE PRACTITIONER

## 2023-01-05 PROCEDURE — 99999 PR PBB SHADOW E&M-EST. PATIENT-LVL IV: ICD-10-PCS | Mod: PBBFAC,,, | Performed by: NURSE PRACTITIONER

## 2023-01-05 RX ORDER — AZITHROMYCIN 250 MG/1
250 TABLET, FILM COATED ORAL DAILY
Qty: 6 TABLET | Refills: 0 | Status: SHIPPED | OUTPATIENT
Start: 2023-01-05 | End: 2023-01-10

## 2023-01-05 RX ORDER — PREDNISONE 10 MG/1
10 TABLET ORAL 2 TIMES DAILY
Qty: 10 TABLET | Refills: 0 | Status: SHIPPED | OUTPATIENT
Start: 2023-01-05 | End: 2023-01-31

## 2023-01-05 NOTE — PROGRESS NOTES
Subjective:       Patient ID: Baylee Muñoz is a 62 y.o. female.    Chief Complaint: Otalgia    Patient presents with right ear pain.  Started about 3 weeks ago.   Went to urgent care.  Was given injection, nasal spray and antihistamine (too expensive).   Started to take Claritin.      Mild improvement      Otalgia   There is pain in the right ear. This is a new problem. The current episode started 1 to 4 weeks ago. The problem has been unchanged. There has been no fever. Associated symptoms include coughing. Pertinent negatives include no abdominal pain, diarrhea, rhinorrhea or sore throat. Treatments tried: nasal spray and Claritin.   Review of Systems   Constitutional:  Negative for chills, fatigue and fever.   HENT:  Positive for ear pain. Negative for rhinorrhea and sore throat.    Respiratory:  Positive for cough.    Gastrointestinal:  Negative for abdominal pain, constipation and diarrhea.   Psychiatric/Behavioral:  Negative for agitation and confusion.        Objective:      Physical Exam  Vitals reviewed.   Constitutional:       Appearance: Normal appearance.   HENT:      Head: Normocephalic.      Right Ear: Hearing normal. A middle ear effusion is present.      Left Ear: Hearing normal.      Nose: Nose normal.   Cardiovascular:      Rate and Rhythm: Normal rate and regular rhythm.      Comments: Varicose veins  Pulmonary:      Effort: Pulmonary effort is normal.      Breath sounds: Normal breath sounds.   Skin:     General: Skin is warm.   Neurological:      General: No focal deficit present.      Mental Status: She is alert.   Psychiatric:         Mood and Affect: Mood normal.         Behavior: Behavior is cooperative.       Assessment:       Problem List Items Addressed This Visit    None  Visit Diagnoses       Otitis of right ear    -  Primary    Relevant Medications    azithromycin (Z-BERNY) 250 MG tablet    predniSONE (DELTASONE) 10 MG tablet    Varicose veins of lower extremity, unspecified  laterality, unspecified whether complicated                Plan:           Otitis of right ear  -     azithromycin (Z-BERNY) 250 MG tablet; Take 1 tablet (250 mg total) by mouth once daily. Take 2 tablets by mouth on day 1, then one tablet daily on days 2-5. for 5 days  Dispense: 6 tablet; Refill: 0  -     predniSONE (DELTASONE) 10 MG tablet; Take 1 tablet (10 mg total) by mouth 2 (two) times daily.  Dispense: 10 tablet; Refill: 0    Varicose veins of lower extremity, unspecified laterality, unspecified whether complicated

## 2023-01-15 NOTE — TELEPHONE ENCOUNTER
Physical Therapy Treatment    Patient Name:  Peyman Gr   MRN:  [id number]    Recommendations:     Discharge Recommendations: rehabilitation facility  Discharge Equipment Recommendations: walker, rolling  Barriers to discharge:  increased burden of care.     Assessment:     Peyman Gr is a 68 y.o. male admitted with a medical diagnosis of STEMI involving right coronary artery.  He presents with the following impairments/functional limitations: weakness, gait instability, decreased lower extremity function, impaired functional mobility, impaired cardiopulmonary response to activity, impaired balance, impaired coordination Admits to hallucination..    Rehab Prognosis: Good; patient would benefit from acute skilled PT services to address these deficits and reach maximum level of function.    Recent Surgery: Procedure(s) (LRB):  Left heart cath (Left)  Insertion, Pacemaker, Temporary Transvenous  ANGIOGRAM, CORONARY ARTERY (N/A)  Percutaneous coronary intervention (N/A)  IVUS, Coronary 14 Days Post-Op    Plan:     During this hospitalization, patient to be seen daily to address the identified rehab impairments via gait training, therapeutic activities, therapeutic exercises and progress toward the following goals:    Plan of Care Expires:  02/09/23    Subjective     Chief Complaint: Patient states he was arguing to 5 people earlier. He said they were the devil trying to get him.  Patient/Family Comments/goals: Patient says he is not hallucinating.  Pain/Comfort:  Pain Rating 1: 0/10      Objective:     Communicated with nurse Whalen prior to session.  Patient found supine with telemetry, Condom Catheter, blood pressure cuff upon PT entry to room.     General Precautions: Standard, fall  Orthopedic Precautions: N/A  Braces: N/A  Respiratory Status: Room air     Functional Mobility:  Bed Mobility:     Rolling Right: contact guard assistance  Supine to Sit: contact guard assistance  Transfers:     Sit to Stand:   Returned pt's phone call about results no answer LM to return phone call./db**   minimum assistance with rolling walker  Gait: Ambulated 15 ft with knee on slight flexion and trembling mod A on RW  Balance: good siting balance; Stooped  standing posture on RW    Treatment & Education:  Patient seen for OOB actvity and ambulation with RW. Oriented and cooperative but always talking about the devil and how they are to get him. Unsteady gait on RW as his knees are slightly flexed and trembling. Tolerate 15 ft. Up in chair for lunch.    Patient left up in chair with all lines intact, call button in reach, chair alarm on, nurse Marlee notified, and wife present..    GOALS:   Multidisciplinary Problems       Physical Therapy Goals          Problem: Physical Therapy    Goal Priority Disciplines Outcome Goal Variances Interventions   Physical Therapy Goal     PT, PT/OT Ongoing, Progressing     Description: Goals to be met by: 2023     Patient will increase functional independence with mobility by performin. Supine to sit with MInimal Assistance  2. Sit to stand transfer with Moderate Assistance  3. Bed to chair with Mod A  w/ol AD  3. Gait  x 10  feet with Minimal Assistance using Rolling Walker.                          Time Tracking:     PT Received On: 01/15/23  PT Start Time: 1100     PT Stop Time: 1115  PT Total Time (min): 15 min     Billable Minutes: Gait Training 15    Treatment Type: Treatment  PT/PTA: PT     PTA Visit Number: 1     01/15/2023

## 2023-01-24 ENCOUNTER — TELEPHONE (OUTPATIENT)
Dept: INTERNAL MEDICINE | Facility: CLINIC | Age: 63
End: 2023-01-24
Payer: OTHER GOVERNMENT

## 2023-01-24 DIAGNOSIS — H92.01 RIGHT EAR PAIN: Primary | ICD-10-CM

## 2023-01-24 NOTE — TELEPHONE ENCOUNTER
I returned a call back to the pt and she complains of her ear hurting real bad and the steroid antibiotic is not working. Please advise. Thanks //kah   Scribe Attestation (For Scribes USE Only)... Attending Attestation (For Attendings USE Only).../Scribe Attestation (For Scribes USE Only)...

## 2023-01-24 NOTE — TELEPHONE ENCOUNTER
----- Message from Aylin Singh sent at 1/24/2023 11:19 AM CST -----  Contact: [phone]  Patient is requesting a call back due to Z pack and steriods are not helping her earache. Please call the patient back at [phone]. Thanks/YOLANDA

## 2023-01-25 ENCOUNTER — TELEPHONE (OUTPATIENT)
Dept: INTERNAL MEDICINE | Facility: CLINIC | Age: 63
End: 2023-01-25
Payer: OTHER GOVERNMENT

## 2023-01-26 ENCOUNTER — OFFICE VISIT (OUTPATIENT)
Dept: OTOLARYNGOLOGY | Facility: CLINIC | Age: 63
End: 2023-01-26
Payer: OTHER GOVERNMENT

## 2023-01-26 VITALS — TEMPERATURE: 98 F | BODY MASS INDEX: 49.89 KG/M2 | WEIGHT: 293 LBS

## 2023-01-26 DIAGNOSIS — H92.01 OTALGIA OF RIGHT EAR: ICD-10-CM

## 2023-01-26 DIAGNOSIS — K14.0 TONGUE ULCER: Primary | ICD-10-CM

## 2023-01-26 PROCEDURE — 99214 OFFICE O/P EST MOD 30 MIN: CPT | Mod: PBBFAC | Performed by: OTOLARYNGOLOGY

## 2023-01-26 PROCEDURE — 99202 PR OFFICE/OUTPT VISIT, NEW, LEVL II, 15-29 MIN: ICD-10-PCS | Mod: S$PBB,,, | Performed by: OTOLARYNGOLOGY

## 2023-01-26 PROCEDURE — 99202 OFFICE O/P NEW SF 15 MIN: CPT | Mod: S$PBB,,, | Performed by: OTOLARYNGOLOGY

## 2023-01-26 PROCEDURE — 99999 PR PBB SHADOW E&M-EST. PATIENT-LVL IV: ICD-10-PCS | Mod: PBBFAC,,, | Performed by: OTOLARYNGOLOGY

## 2023-01-26 PROCEDURE — 99999 PR PBB SHADOW E&M-EST. PATIENT-LVL IV: CPT | Mod: PBBFAC,,, | Performed by: OTOLARYNGOLOGY

## 2023-01-26 NOTE — PROGRESS NOTES
Referring Provider:    Becki Hernandez, Jessica  [address] [facility]  [place] [address]  Subjective:   Patient: Baylee Muñoz [id number], :1960   Visit date:2023 2:19 PM    Chief Complaint:  No chief complaint on file.    HPI:    Prior notes reviewed by myself.  Clinical documentation obtained by nursing staff reviewed.     62-year-old female presents for evaluation of right-sided otalgia.  This has been an intermittent issue for several months.  She has been treated at least once with antibiotics and steroids for this.  She reports feeling as if she has otorrhea at times and when she lays on her left side she feels a stabbing pain in her right ear.  She denies any changes in her hearing, tinnitus or vertigo.  She has been having some posterior musculoskeletal type neck pain.  She also states that she bit the right side of her tongue some time ago and that was around the same time that she started noticing the ear pain.      Objective:     Physical Exam:  Vitals:  Temp 97.6 °F (36.4 °C)   Wt 136 kg (299 lb 13.2 oz)   BMI 49.89 kg/m²   General appearance:  Well developed, well nourished    Ears:  Otoscopy of external auditory canals and tympanic membranes was normal, clinical speech reception thresholds grossly intact, no mass/lesion of auricle.    Nose:  No masses/lesions of external nose, nasal mucosa, septum, and turbinates were within normal limits.    Mouth:  No mass/lesion of lips, teeth, gums, hard/soft palate, tongue with superficial abrasion right posterolateral tongue, soft to palpation with no mass, tonsils, or oropharynx.    Neck & Lymphatics:  No cervical lymphadenopathy, no neck mass/crepitus/ asymmetry, trachea is midline, no thyroid enlargement/tenderness/mass.        [x]  Data Reviewed:    Lab Results   Component Value Date    WBC 7.59 2022    HGB 11.1 (L) 2022    HCT 38.9 2022    MCV 92 2022    EOSINOPHIL 0.8 2022             Assessment & Plan:   Tongue  ulcer    Otalgia of right ear  -     Ambulatory referral/consult to ENT        Her ear exam today was completely normal.  We discussed the concept of referred ear pain.  She has mixed dentition and an abnormal bite, so TMJ related pain is also a consideration.  She has a superficial ulceration on her posterolateral right tongue which could also be contributing.  She also is having some posterior musculoskeletal neck pain.  I recommended that she see her dentist regarding her bite and discuss her posterior neck pain with her PCP.  I am going to have her follow-up in 4 weeks to make sure that the otalgia resolves.

## 2023-01-31 ENCOUNTER — OFFICE VISIT (OUTPATIENT)
Dept: INTERNAL MEDICINE | Facility: CLINIC | Age: 63
End: 2023-01-31
Payer: OTHER GOVERNMENT

## 2023-01-31 ENCOUNTER — APPOINTMENT (OUTPATIENT)
Dept: RADIOLOGY | Facility: HOSPITAL | Age: 63
End: 2023-01-31
Attending: NURSE PRACTITIONER
Payer: OTHER GOVERNMENT

## 2023-01-31 VITALS
SYSTOLIC BLOOD PRESSURE: 118 MMHG | RESPIRATION RATE: 18 BRPM | WEIGHT: 293 LBS | OXYGEN SATURATION: 96 % | BODY MASS INDEX: 48.82 KG/M2 | HEART RATE: 72 BPM | DIASTOLIC BLOOD PRESSURE: 74 MMHG | HEIGHT: 65 IN | TEMPERATURE: 98 F

## 2023-01-31 DIAGNOSIS — I10 ESSENTIAL HYPERTENSION: ICD-10-CM

## 2023-01-31 DIAGNOSIS — R73.03 PREDIABETES: ICD-10-CM

## 2023-01-31 DIAGNOSIS — I10 PRIMARY HYPERTENSION: ICD-10-CM

## 2023-01-31 DIAGNOSIS — Z00.00 ROUTINE MEDICAL EXAM: Primary | ICD-10-CM

## 2023-01-31 DIAGNOSIS — K14.6 TONGUE SORE: ICD-10-CM

## 2023-01-31 DIAGNOSIS — J96.11 CHRONIC RESPIRATORY FAILURE WITH HYPOXIA: ICD-10-CM

## 2023-01-31 DIAGNOSIS — R06.02 SOB (SHORTNESS OF BREATH): ICD-10-CM

## 2023-01-31 DIAGNOSIS — J44.9 OBSTRUCTIVE LUNG DISEASE: ICD-10-CM

## 2023-01-31 PROCEDURE — 71046 XR CHEST PA AND LATERAL: ICD-10-PCS | Mod: 26,,, | Performed by: RADIOLOGY

## 2023-01-31 PROCEDURE — 99214 OFFICE O/P EST MOD 30 MIN: CPT | Mod: S$PBB,,, | Performed by: NURSE PRACTITIONER

## 2023-01-31 PROCEDURE — 99999 PR PBB SHADOW E&M-EST. PATIENT-LVL V: ICD-10-PCS | Mod: PBBFAC,,, | Performed by: NURSE PRACTITIONER

## 2023-01-31 PROCEDURE — 99999 PR PBB SHADOW E&M-EST. PATIENT-LVL V: CPT | Mod: PBBFAC,,, | Performed by: NURSE PRACTITIONER

## 2023-01-31 PROCEDURE — 71046 X-RAY EXAM CHEST 2 VIEWS: CPT | Mod: 26,,, | Performed by: RADIOLOGY

## 2023-01-31 PROCEDURE — 99215 OFFICE O/P EST HI 40 MIN: CPT | Mod: PBBFAC,25,PN | Performed by: NURSE PRACTITIONER

## 2023-01-31 PROCEDURE — 71046 X-RAY EXAM CHEST 2 VIEWS: CPT | Mod: TC,PO

## 2023-01-31 PROCEDURE — 99214 PR OFFICE/OUTPT VISIT, EST, LEVL IV, 30-39 MIN: ICD-10-PCS | Mod: S$PBB,,, | Performed by: NURSE PRACTITIONER

## 2023-01-31 RX ORDER — ATORVASTATIN CALCIUM 40 MG/1
40 TABLET, FILM COATED ORAL DAILY
Qty: 90 TABLET | Refills: 3 | Status: SHIPPED | OUTPATIENT
Start: 2023-01-31 | End: 2023-12-07 | Stop reason: SDUPTHER

## 2023-01-31 RX ORDER — FLUTICASONE PROPIONATE AND SALMETEROL 250; 50 UG/1; UG/1
POWDER RESPIRATORY (INHALATION)
Qty: 60 EACH | Refills: 3 | OUTPATIENT
Start: 2023-01-31

## 2023-01-31 RX ORDER — LORAZEPAM 2 MG/1
2 TABLET ORAL ONCE
Qty: 1 TABLET | Refills: 0 | Status: SHIPPED | OUTPATIENT
Start: 2023-01-31 | End: 2023-03-05 | Stop reason: ALTCHOICE

## 2023-01-31 RX ORDER — TRIAMCINOLONE ACETONIDE 1 MG/G
PASTE DENTAL
Qty: 5 G | Refills: 0 | Status: SHIPPED | OUTPATIENT
Start: 2023-01-31 | End: 2023-04-11

## 2023-01-31 NOTE — PROGRESS NOTES
Subjective:       Patient ID: Baylee Muñoz is a 62 y.o. female.    Chief Complaint: Follow-up (3 mo)    Patient presents for 3 month follow up.     Medical History: Obesity, HTN, Former Smoker, Gastritis, Prediabetes, Obstructive Lung Disease. Chronic Respiratory Failure, Pulmonary nodule.     Has a few concerns.  Reports some shortness of breath.  Wearing oxygen today.  Reports home O2 sat is very low.     Continues to have right ear pain (intermittent).  Saw ENT.  Was advised to see dentist.      Follow-up  Associated symptoms include coughing. Pertinent negatives include no abdominal pain, chills, fatigue or fever.   Review of Systems   Constitutional:  Negative for chills, fatigue and fever.   HENT:  Positive for ear pain.    Respiratory:  Positive for cough and shortness of breath.    Gastrointestinal:  Negative for abdominal pain, constipation and diarrhea.   Psychiatric/Behavioral:  Negative for agitation and confusion.        Objective:      Physical Exam  Vitals reviewed.   Constitutional:       Appearance: Normal appearance.   HENT:      Head: Normocephalic.      Mouth/Throat:      Tongue: Lesions present.     Cardiovascular:      Rate and Rhythm: Normal rate and regular rhythm.   Pulmonary:      Breath sounds: Decreased breath sounds present.   Skin:     General: Skin is warm.   Neurological:      General: No focal deficit present.      Mental Status: She is alert.   Psychiatric:         Mood and Affect: Mood normal.         Behavior: Behavior normal. Behavior is cooperative.       Assessment:       Problem List Items Addressed This Visit       Hypertension    Relevant Medications    atorvastatin (LIPITOR) 40 MG tablet    Other Relevant Orders    Basic Metabolic Panel    CBC Auto Differential    Prediabetes    Relevant Orders    HEMOGLOBIN A1C    Basic Metabolic Panel    CBC Auto Differential    Chronic respiratory failure with hypoxia    Relevant Medications    LORazepam (ATIVAN) 2 MG Tab    Other  Relevant Orders    X-Ray Chest PA And Lateral (Completed)     Other Visit Diagnoses       Routine medical exam    -  Primary    SOB (shortness of breath)        Relevant Medications    LORazepam (ATIVAN) 2 MG Tab    Other Relevant Orders    B-TYPE NATRIURETIC PEPTIDE    X-Ray Chest PA And Lateral (Completed)    Tongue sore        Relevant Medications    triamcinolone acetonide 0.1% (KENALOG) 0.1 % paste    Essential hypertension        Relevant Medications    atorvastatin (LIPITOR) 40 MG tablet            Plan:           Routine medical exam    Primary hypertension  -     Basic Metabolic Panel; Future; Expected date: 01/31/2023  -     CBC Auto Differential; Future; Expected date: 01/31/2023    Prediabetes  -     HEMOGLOBIN A1C; Future; Expected date: 01/31/2023  -     Basic Metabolic Panel; Future; Expected date: 01/31/2023  -     CBC Auto Differential; Future; Expected date: 01/31/2023    SOB (shortness of breath)  -     B-TYPE NATRIURETIC PEPTIDE; Future; Expected date: 01/31/2023  -     X-Ray Chest PA And Lateral; Future; Expected date: 01/31/2023  -     LORazepam (ATIVAN) 2 MG Tab; Take 1 tablet (2 mg total) by mouth once. Take 30-40 minutes before CT appointment for 1 dose  Dispense: 1 tablet; Refill: 0    Chronic respiratory failure with hypoxia  -     X-Ray Chest PA And Lateral; Future; Expected date: 01/31/2023  -     LORazepam (ATIVAN) 2 MG Tab; Take 1 tablet (2 mg total) by mouth once. Take 30-40 minutes before CT appointment for 1 dose  Dispense: 1 tablet; Refill: 0    Tongue sore  -     triamcinolone acetonide 0.1% (KENALOG) 0.1 % paste; Apply to area BID after meals for 7 days.  Dispense: 5 g; Refill: 0    Essential hypertension  -     atorvastatin (LIPITOR) 40 MG tablet; Take 1 tablet (40 mg total) by mouth once daily.  Dispense: 90 tablet; Refill: 3       Labs and x-ray today.    Keep upcoming appointment with pulmonary as scheduled.     Follow up in 3 months.

## 2023-02-02 DIAGNOSIS — E11.9 TYPE 2 DIABETES MELLITUS WITHOUT COMPLICATION, WITHOUT LONG-TERM CURRENT USE OF INSULIN: ICD-10-CM

## 2023-02-02 RX ORDER — GLIPIZIDE 2.5 MG/1
2.5 TABLET, EXTENDED RELEASE ORAL
Qty: 30 TABLET | Refills: 11 | Status: SHIPPED | OUTPATIENT
Start: 2023-02-02 | End: 2023-12-01 | Stop reason: SDUPTHER

## 2023-02-03 ENCOUNTER — TELEPHONE (OUTPATIENT)
Dept: INTERNAL MEDICINE | Facility: CLINIC | Age: 63
End: 2023-02-03
Payer: OTHER GOVERNMENT

## 2023-02-03 NOTE — TELEPHONE ENCOUNTER
----- Message from Olga Hill sent at 2/3/2023 12:47 PM CST -----  Contact: [phone]  Type:  Patient Returning Call    Who Called:pt  Who Left Message for Patient:nurse  Does the patient know what this is regarding?:yes  Would the patient rather a call back or a response via [facility]MAL[facility]er? call  Best Call Back Number:[phone]  Additional Information:

## 2023-02-03 NOTE — TELEPHONE ENCOUNTER
I returned a call and informed pt of lab results and instructions from Becki HUMPHREYS. She verbalized understanding. //kah

## 2023-02-07 ENCOUNTER — HOSPITAL ENCOUNTER (OUTPATIENT)
Dept: RADIOLOGY | Facility: HOSPITAL | Age: 63
Discharge: HOME | End: 2023-02-07
Attending: NURSE PRACTITIONER
Payer: OTHER GOVERNMENT

## 2023-02-07 DIAGNOSIS — Z12.31 BREAST CANCER SCREENING BY MAMMOGRAM: ICD-10-CM

## 2023-02-07 PROCEDURE — 77067 MAMMO DIGITAL SCREENING BILAT WITH TOMO: ICD-10-PCS | Mod: 26,,, | Performed by: RADIOLOGY

## 2023-02-07 PROCEDURE — 77067 SCR MAMMO BI INCL CAD: CPT | Mod: TC

## 2023-02-07 PROCEDURE — 77063 BREAST TOMOSYNTHESIS BI: CPT | Mod: 26,,, | Performed by: RADIOLOGY

## 2023-02-07 PROCEDURE — 77063 MAMMO DIGITAL SCREENING BILAT WITH TOMO: ICD-10-PCS | Mod: 26,,, | Performed by: RADIOLOGY

## 2023-02-07 PROCEDURE — 77067 SCR MAMMO BI INCL CAD: CPT | Mod: 26,,, | Performed by: RADIOLOGY

## 2023-02-07 NOTE — PROGRESS NOTES
Subjective:       Patient ID: Baylee Muñoz is a 62 y.o. female.    Chief Complaint: Asthma        2/8/23  61yo female here for follow up of abnormal CT  Abdominal CT 8/2022 with questionable 7mm nodule  Chest CT today is clear, no nodules  She has not been using her advair daily, has noticed worsening SOB  Has portable oxygen concentrator to use with activity, she does not bring oxygen to work and notices feeling more SOB at work, works at casino around smoke  Snores at night, she does wear oxygen at night  Wakes up frequently, wakes up un refreshed, has daytime fatigue  Has never had sleep study  She mentions her doctor told her she has a mole on the top of her head, patient just noticed this, unsure if it has changed in appearance   Still with pain behind right ear, has seen ENT with normal ear exam, following up in 3 weeks    11/8/22  Asthma follow up  Started Advair last visit, significant improvement in symptoms with use  She ran out of Advair for about a months and noticed a decline in functional capacity, more SOB, needed to use oxygen more  Started back on Advair about 2 weeks ago  Uses oxygen prn, monitors pulse ox at home  FeNO today 5  No signs of infection  She is planning on retiring soon, works at a [email or web address] around smoke  Knee issues affecting her ability to work on exercising  Asthma score 16  COPD score 16  Since last visit she was admitted for a GI bleed, on abdominal CT there is note of a 7mm left lung nodule    8/2/22  61yo female  SOB follow up, suspected asthma  Post covid syndrome  Saw Dr. Klein in June video visit for hospital follow up  Chest X Ray, jean, and walk today  She uses oxygen 3L with activity, requesting portable concentrator  Some relief of SOB with albuterol  She is motivated to go back to work  Also wants to work on weight loss for her SOB and joint pains  Qualifies for oxygen with activity today  [place] with severe obstruction, some bronchodilator reversal      6/2022   Latoya:  Baylee Muñoz is 61 y.o.  Follow up from hospital: COVID 19  Dexamethasone, remdesivir, Vit C, Zinc  O2 sat was 88% in ER  Left pneumonia seen [facility] 05/24/2022  Was placed on oxygen during hospitalization  2.5 LPM. Cannot return to work on O2  Pneumonia: Abx completed  Vacination for covid, Booster 1 and 2 lacking  Ocupation:  poker  Smoking former: 1 PPD, 30 years  No TB exposures  Inhaler: Albuterol  DME: [facility]  Nebulizer: in storage     Assessment/plan:  Suspect Obesity hypoventilation: office visit and physical exam for clarity of her pul issue  Also has elevated pressure on last echo  Nebulizer ordered  Will eval need for LABA/ICS or Solo ICS    Asthma Control Test  In the past 4  weeks, how much of the time did your asthma keep you from getting as much done at work, school or at home?: Most of the time  During the past 4 weeks, how often have you had shortness of breath?: More than once a day  During the past 4 weeks, how often did your asthma symptoms (wheezing, couging, shortness of breath, chest tightness or pain) wake you up at night or earlier than usual in the morning?: 4 or more nights a week  During the past 4 weeks, how often have you used your rescue inhaler or nebulizer medication (such as albuterol)?: 2 or 3 times a week  How would you rate your asthma control during the past 4 weeks?: Somewhat controlled  If your score is 19 or less, your asthma may not be under control: 10          Immunization History   Administered Date(s) Administered    COVID-19, MRNA, LN-S, PF (Pfizer) (Purple Cap) 08/28/2021, 09/27/2021    Pneumococcal Polysaccharide - 23 Valent 12/12/2017      Tobacco Use: Medium Risk    Smoking Tobacco Use: Former    Smokeless Tobacco Use: Never    Passive Exposure: Never      Past Medical History:   Diagnosis Date    Allergy     Arthritis     Asthma     Admit to [facility] 1/6/19 by Dr. Michael Randall for asthma exaceration, acute bronchitis, acute dCHF, hypoxia,  elevated troponin/BNP (thought to be 2/2 asthma exac & acute dCHF by cards), tx'd w/ IV lasix, duonebs, IV solumedrol and DC'd on Z-prema, prednisone taper    CKD (chronic kidney disease) stage 2, GFR 60-89 ml/min     H/o ARF 2/2 poor water intake; encouraged to increase water intake and avoid NSAIDS & contrast dye    COVID 06/2022    Depression     Diastolic heart failure 01/06/2019    Dx'd at [facility] during asthma exacerbation    Diverticulitis 2005    Dyslipidemia 08/28/2018    Former smoker     Quit 12/31/18; smoked 0.12 packs/day x 37 years    Gastritis     Hypertension     Pneumonia 06/2022    Prediabetes     A1c 6.1% on 9/29/19    Prediabetes     Severe obesity (BMI >= 40)     Vertigo     Vitamin D deficiency       Current Outpatient Medications on File Prior to Visit   Medication Sig Dispense Refill    acetaminophen (TYLENOL) 325 MG tablet Take 2 tablets (650 mg total) by mouth every 6 (six) hours as needed for Pain.  0    albuterol (PROAIR HFA) 90 mcg/actuation inhaler USE 2 INHALATIONS EVERY 6 HOURS AS NEEDED FOR WHEEZING 25.5 g 11    albuterol-ipratropium (DUO-NEB) 2.5 mg-0.5 mg/3 mL nebulizer solution Take 3 mLs by nebulization every 6 (six) hours as needed for Wheezing. Rescue 75 mL 5    amLODIPine (NORVASC) 5 MG tablet Take 1 tablet (5 mg total) by mouth once daily. 90 tablet 3    atorvastatin (LIPITOR) 40 MG tablet Take 1 tablet (40 mg total) by mouth once daily. 90 tablet 3    calcium carbonate/vitamin D3 (VITAMIN D-3 ORAL) Take 1 tablet by mouth once daily. (600 mg/ 400 iu)      ELDERBERRY FRUIT ORAL Take 50 mg by mouth once daily.      fluticasone propionate (FLONASE) 50 mcg/actuation nasal spray 1 spray (50 mcg total) by Each Nostril route once daily. 16 g 3    glipiZIDE (GLUCOTROL) 2.5 MG TR24 Take 1 tablet (2.5 mg total) by mouth daily with breakfast. 30 tablet 11    mupirocin (BACTROBAN) 2 % ointment Apply topically 2 (two) times daily. To abdominal area 30 g 2    mv-min/iron/folic/calcium/vitK  "(WOMEN'S MULTIVITAMIN ORAL) Take 1 tablet by mouth once daily.      pulse oximeter (PULSE OXIMETER) device by Apply Externally route 2 (two) times a day. Use twice daily at 8 AM and 3 PM and [facility] as directed. 1 each 0    triamcinolone acetonide 0.1% (KENALOG) 0.1 % paste Apply to area BID after meals for 7 days. 5 g 0    [DISCONTINUED] WIXELA INHUB 250-50 mcg/dose diskus inhaler INHALE 1 PUFF INTO THE LUNGS TWICE DAILY 60 each 3    LORazepam (ATIVAN) 2 MG Tab Take 1 tablet (2 mg total) by mouth once. Take 30-40 minutes before CT appointment for 1 dose 1 tablet 0     No current facility-administered medications on file prior to visit.        Review of Systems   Constitutional:  Positive for fatigue. Negative for fever, weight loss, appetite change and weakness.   HENT:  Negative for postnasal drip, rhinorrhea, sinus pressure, trouble swallowing and congestion.    Respiratory:  Positive for snoring, shortness of breath, dyspnea on extertion and somnolence. Negative for cough, sputum production, choking, chest tightness and wheezing.    Cardiovascular:  Negative for chest pain and leg swelling.   Musculoskeletal:  Positive for arthralgias (left knee). Negative for gait problem and joint swelling.   Gastrointestinal:  Negative for nausea, vomiting and abdominal pain.   Neurological:  Negative for dizziness, weakness and headaches.   All other systems reviewed and are negative.      Objective:       Vitals:    02/08/23 1041   BP: 130/72   Pulse: 81   Resp: 18   SpO2: (!) 91%  Comment: 3 liters of oxygen   Weight: 134.1 kg (295 lb 10.2 oz)   Height: 5' 5" (1.651 m)       Physical Exam   Constitutional: She is oriented to person, place, and time. She appears well-developed and well-nourished. No distress. She is obese.   HENT:   Head: Normocephalic.   Nose: Nose normal.   Mouth/Throat: Oropharynx is clear and moist.   Cardiovascular: Normal rate and regular rhythm.   Pulmonary/Chest: Effort normal. " No respiratory distress. She has no wheezes. She has no rhonchi. She has no rales.   Musculoskeletal:         General: No edema.      Cervical back: Normal range of motion and neck supple.      Comments: Walking with cane   Lymphadenopathy: No supraclavicular adenopathy is present.     She has no cervical adenopathy.   Neurological: She is alert and oriented to person, place, and time.   Skin: Skin is warm and dry.   6mm dark colored mole to top of scalp, raised, regular borders, no bleeding or erythema   Psychiatric: She has a normal mood and affect.   Vitals reviewed.  Personal Diagnostic Review    CT Chest Without Contrast  Narrative: EXAMINATION:  CT CHEST WITHOUT CONTRAST    CLINICAL HISTORY:  pulmonary nodule; Solitary pulmonary nodule    TECHNIQUE:  The chest was surveyed from the apices through the posterior costophrenic angles .  Data was reconstructed for multiplanar images in axial, sagittal and coronal planes in for maximal intensity projection images in the axial plane.    COMPARISON:  None    FINDINGS:  Base of Neck: No significant abnormality.    Airways: Patent.    Lungs: Clear lungs.  Suspect scarring and or rounded atelectasis within portions of the right lower lobe and lingula.  No mass identified.    Pleura: No pleural fluid.No pleural calcification.    Heather/Mediastinum: No pathologic tomas enlargement.    Esophagus: Normal.    Heart/pericardium: Normal size.  No pericardial effusion or calcification.    Pulmonary vasculature: Pulmonary arteries distribute normally.  There are four pulmonary veins.    Aorta: Left-sided aortic arch with 3 arterial branches.  The aorta maintains normal caliber, contour and course. There is no calcification of the thoracic aorta.  There is  no coronary artery calcification.    Thoracic soft tissues: Normal. Both breasts are present.    Bones: No acute fracture. No suspicious lytic or sclerotic lesion.    Upper Abdomen: No abnormality of the partially imaged upper  abdomen.  Mild constipation.  Impression: No suspicious nodules or masses.    All CT scans at this facility use dose modulation, iterative reconstruction and/or weight based dosing when appropriate to reduce radiation dose to as low as reasonably achievable.    Electronically signed by: Michael Lowe MD  Date:    02/08/2023  Time:    10:00      8/2/22  Oxygen Assessment Supplemental O2 Heart   Rate Blood Pressure Shanice Dyspnea Scale Rating    Resting 90 % Room Air 82 bpm 129/76 3   Exercise             Minute             1 88 % (placed NC 2L) Room Air 90 bpm       2 91 % 2 L/M 100 bpm       3 88 % (NC increased to 3L) 2 L/M 102 bpm       4 91 % 3 L/M 107 bpm       5 94 % 3 L/M 107 bpm       6  95 % 3 L/M 104 bpm 127/65 5-6   Recovery             Minute             1 98 % 3 L/M 93 bpm       2 97 % 3 L/M 84 bpm       3 96 % 3 L/M 86 bpm       4 96 % 3 L/M 87 bpm 134/71 3      Six Minute Walk Summary  6MWT Status: completed without stopping  Patient Reported: Other (Comment), Dyspnea (Knee pain; lower back pain)               Interpretation:  Did the patient stop or pause?: No  Total Time Walked (Calculated): 360 seconds  Final Partial Lap Distance (feet): 100 feet  Total Distance Meters (Calculated): 335.28 meters  Predicted Distance Meters (Calculated): 370.29 meters  Percentage of Predicted (Calculated): 90.55  Peak VO2 (Calculated): 14.04  Mets: 4.01  Has The Patient Had a Previous Six Minute Walk Test?: No     Previous 6MWT Results  Has The Patient Had a Previous Six Minute Walk Test?: No    8/2/22 Spirometry  FVC 61.9% predicted, FEV1 48.9% predicted (9.5% improvement post bronchodilator), FEV1/FVC 65%      Assessment/Plan:       Problem List Items Addressed This Visit          Pulmonary    Chronic respiratory failure with hypoxia    Obstructive lung disease - Primary    Relevant Medications    fluticasone-salmeterol diskus inhaler 500-50 mcg       Cardiac/Vascular    Hypertension     Controlled, F/u regularly  with PCP           Chronic diastolic congestive heart failure     F/u regularly with cardiology                Endocrine    Morbid obesity     Weight loss and exercise to improve overall health.              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, casino          Other Visit Diagnoses       Sleep-disordered breathing        Relevant Orders    Polysomnogram (CPAP will be added if patient meets diagnostic criteria.)    Skin mole        Relevant Orders    Ambulatory referral/consult to Dermatology        Increase Advair to 500 BID, need to use daily  Oxygen 2L with activity  Discussed leaving job, disability; she will think about it  Weight loss and exercise to improve overall health, she will discuss with PCP at her next visit, possibly changing Diabetes medications  Derm referral for mole on scalp  PSG     Follow up in about 2 months (around 4/8/2023) for follow up Dr. Klein; sleep/asthma.    Discussed diagnosis, its evaluation, treatment and usual course. All questions answered.    Patient verbalized understanding of plan and left in no acute distress    Thank you for the courtesy of participating in the care of this patient    Tesha Canada PA-C

## 2023-02-08 ENCOUNTER — OFFICE VISIT (OUTPATIENT)
Dept: PULMONOLOGY | Facility: CLINIC | Age: 63
End: 2023-02-08
Payer: OTHER GOVERNMENT

## 2023-02-08 ENCOUNTER — HOSPITAL ENCOUNTER (OUTPATIENT)
Dept: RADIOLOGY | Facility: HOSPITAL | Age: 63
Discharge: HOME | End: 2023-02-08
Attending: PHYSICIAN ASSISTANT
Payer: OTHER GOVERNMENT

## 2023-02-08 VITALS
HEIGHT: 65 IN | HEART RATE: 81 BPM | RESPIRATION RATE: 18 BRPM | SYSTOLIC BLOOD PRESSURE: 130 MMHG | OXYGEN SATURATION: 91 % | WEIGHT: 293 LBS | BODY MASS INDEX: 48.82 KG/M2 | DIASTOLIC BLOOD PRESSURE: 72 MMHG

## 2023-02-08 DIAGNOSIS — D22.9 SKIN MOLE: ICD-10-CM

## 2023-02-08 DIAGNOSIS — Z87.891 FORMER SMOKER: ICD-10-CM

## 2023-02-08 DIAGNOSIS — R91.1 PULMONARY NODULE: ICD-10-CM

## 2023-02-08 DIAGNOSIS — E66.01 MORBID OBESITY: ICD-10-CM

## 2023-02-08 DIAGNOSIS — J44.9 OBSTRUCTIVE LUNG DISEASE: Primary | ICD-10-CM

## 2023-02-08 DIAGNOSIS — I10 PRIMARY HYPERTENSION: ICD-10-CM

## 2023-02-08 DIAGNOSIS — J96.11 CHRONIC RESPIRATORY FAILURE WITH HYPOXIA: ICD-10-CM

## 2023-02-08 DIAGNOSIS — I50.32 CHRONIC DIASTOLIC CONGESTIVE HEART FAILURE: ICD-10-CM

## 2023-02-08 DIAGNOSIS — G47.30 SLEEP-DISORDERED BREATHING: ICD-10-CM

## 2023-02-08 PROCEDURE — 71250 CT THORAX DX C-: CPT | Mod: TC

## 2023-02-08 PROCEDURE — 99215 OFFICE O/P EST HI 40 MIN: CPT | Mod: PBBFAC,25 | Performed by: PHYSICIAN ASSISTANT

## 2023-02-08 PROCEDURE — 71250 CT CHEST WITHOUT CONTRAST: ICD-10-PCS | Mod: 26,,, | Performed by: RADIOLOGY

## 2023-02-08 PROCEDURE — 99214 OFFICE O/P EST MOD 30 MIN: CPT | Mod: S$PBB,,, | Performed by: PHYSICIAN ASSISTANT

## 2023-02-08 PROCEDURE — 99999 PR PBB SHADOW E&M-EST. PATIENT-LVL V: ICD-10-PCS | Mod: PBBFAC,,, | Performed by: PHYSICIAN ASSISTANT

## 2023-02-08 PROCEDURE — 71250 CT THORAX DX C-: CPT | Mod: 26,,, | Performed by: RADIOLOGY

## 2023-02-08 PROCEDURE — 99214 PR OFFICE/OUTPT VISIT, EST, LEVL IV, 30-39 MIN: ICD-10-PCS | Mod: S$PBB,,, | Performed by: PHYSICIAN ASSISTANT

## 2023-02-08 PROCEDURE — 99999 PR PBB SHADOW E&M-EST. PATIENT-LVL V: CPT | Mod: PBBFAC,,, | Performed by: PHYSICIAN ASSISTANT

## 2023-02-08 RX ORDER — FLUTICASONE PROPIONATE AND SALMETEROL 500; 50 UG/1; UG/1
1 POWDER RESPIRATORY (INHALATION) 2 TIMES DAILY
Qty: 60 EACH | Refills: 11 | Status: SHIPPED | OUTPATIENT
Start: 2023-02-08 | End: 2024-03-08

## 2023-02-08 NOTE — PROGRESS NOTES
I am happy to report that your recent breast imaging did NOT show evidence of cancer. An annual mammogram is the best test to screen for breast cancer, but it is not perfect, and it can miss some cancers. So, even though your mammogram was normal, if you notice any lump or change in one of your breasts, please schedule an appointment with me for a proper evaluation. Thank you for letting me care for you. I look forward to seeing you again. Sincerely, Dr. Gina Nair

## 2023-02-10 ENCOUNTER — TELEPHONE (OUTPATIENT)
Dept: DERMATOLOGY | Facility: CLINIC | Age: 63
End: 2023-02-10
Payer: OTHER GOVERNMENT

## 2023-02-10 NOTE — TELEPHONE ENCOUNTER
Spoke with patient, patient is scheduled.    ----- Message from Judith Ramirez sent at 2/10/2023  1:39 PM CST -----  Contact: Baylee  Patient is calling to speak with the nurse regarding appt. Reports needing to schedule from referral. Please give patient a call back at [phone].

## 2023-02-22 ENCOUNTER — TELEPHONE (OUTPATIENT)
Dept: PULMONOLOGY | Facility: CLINIC | Age: 63
End: 2023-02-22
Payer: OTHER GOVERNMENT

## 2023-02-22 NOTE — TELEPHONE ENCOUNTER
Returned patients call back regarding inhaler, Wilexa. Patient stated her medication was dc'd and she doesn't know why. Patient informed provider of this and provider stated the inhaler was increased to a higher dose and the lower dose was dc'd. Patient expressed understanding.----- Message from Kristopher Maradiaga sent at 2/22/2023  9:04 AM CST -----  Contact: [phone]  Patient would like to consult with a nurse in regards to her prescription request for her current inhaler. Please call back at [phone]. Thanks rs

## 2023-02-28 ENCOUNTER — OFFICE VISIT (OUTPATIENT)
Dept: OTOLARYNGOLOGY | Facility: CLINIC | Age: 63
End: 2023-02-28
Payer: OTHER GOVERNMENT

## 2023-02-28 VITALS — TEMPERATURE: 98 F | WEIGHT: 292.13 LBS | BODY MASS INDEX: 48.67 KG/M2 | HEIGHT: 65 IN

## 2023-02-28 DIAGNOSIS — H92.01 OTALGIA OF RIGHT EAR: Primary | ICD-10-CM

## 2023-02-28 DIAGNOSIS — K14.0 TONGUE ULCER: ICD-10-CM

## 2023-02-28 PROCEDURE — 99999 PR PBB SHADOW E&M-EST. PATIENT-LVL III: ICD-10-PCS | Mod: PBBFAC,,, | Performed by: OTOLARYNGOLOGY

## 2023-02-28 PROCEDURE — 99213 OFFICE O/P EST LOW 20 MIN: CPT | Mod: PBBFAC | Performed by: OTOLARYNGOLOGY

## 2023-02-28 PROCEDURE — 99212 PR OFFICE/OUTPT VISIT, EST, LEVL II, 10-19 MIN: ICD-10-PCS | Mod: S$PBB,,, | Performed by: OTOLARYNGOLOGY

## 2023-02-28 PROCEDURE — 99999 PR PBB SHADOW E&M-EST. PATIENT-LVL III: CPT | Mod: PBBFAC,,, | Performed by: OTOLARYNGOLOGY

## 2023-02-28 PROCEDURE — 99212 OFFICE O/P EST SF 10 MIN: CPT | Mod: S$PBB,,, | Performed by: OTOLARYNGOLOGY

## 2023-02-28 RX ORDER — CHOLECALCIFEROL (VITAMIN D3) 25 MCG
1000 TABLET ORAL DAILY
COMMUNITY

## 2023-02-28 NOTE — PROGRESS NOTES
"Referring Provider:    No referring provider defined for this encounter.  Subjective:   Patient: Baylee Muñoz [id number], :1960   Visit date:2023 2:19 PM    Chief Complaint:  f/u right ear (Pt states much better. C/o feeling pain behind right ear intermittently, and at times feels like ear is draining. Pt states when tongue ulcer got better so did ear)    HPI:    Prior notes reviewed by myself.  Clinical documentation obtained by nursing staff reviewed.     62-year-old female presents for evaluation of right-sided otalgia.  This has been an intermittent issue for several months.  She has been treated at least once with antibiotics and steroids for this.  She reports feeling as if she has otorrhea at times and when she lays on her left side she feels a stabbing pain in her right ear.  She denies any changes in her hearing, tinnitus or vertigo.  She has been having some posterior musculoskeletal type neck pain.  She also states that she bit the right side of her tongue some time ago and that was around the same time that she started noticing the ear pain.      Objective:     Physical Exam:  Vitals:  Temp 98.2 °F (36.8 °C) (Temporal)   Ht 5' 5" (1.651 m)   Wt 132.5 kg (292 lb 1.8 oz)   BMI 48.61 kg/m²   General appearance:  Well developed, well nourished    Ears:  Otoscopy of external auditory canals and tympanic membranes was normal, clinical speech reception thresholds grossly intact, no mass/lesion of auricle.    Nose:  No masses/lesions of external nose, nasal mucosa, septum, and turbinates were within normal limits.    Mouth:  No mass/lesion of lips, teeth, gums, hard/soft palate, tongue with no visible abnormalities, soft to palpation with no mass, tonsils, or oropharynx.    Neck & Lymphatics:  No cervical lymphadenopathy, no neck mass/crepitus/ asymmetry, trachea is midline, no thyroid enlargement/tenderness/mass.        [x]  Data Reviewed:    Lab Results   Component Value Date    WBC 6.85 " 01/31/2023    HGB 10.6 (L) 01/31/2023    HCT 38.7 01/31/2023    MCV 92 01/31/2023    EOSINOPHIL 0.9 01/31/2023             Assessment & Plan:   Otalgia of right ear    Tongue ulcer          Her ear exam today was completely normal.  We discussed the concept of referred ear pain.  She has mixed dentition and an abnormal bite, so TMJ related pain is also a consideration.  She has a superficial ulceration on her posterolateral right tongue which could also be contributing.  She also is having some posterior musculoskeletal neck pain.  I recommended that she see her dentist regarding her bite and discuss her posterior neck pain with her PCP.  I am going to have her follow-up in 4 weeks to make sure that the otalgia resolves.    2/28/23 update:  Tongue abrasion and otalgia resolved.  RTC PRN

## 2023-03-05 ENCOUNTER — HOSPITAL ENCOUNTER (EMERGENCY)
Facility: HOSPITAL | Age: 63
Discharge: HOME | End: 2023-03-05
Attending: EMERGENCY MEDICINE
Payer: OTHER GOVERNMENT

## 2023-03-05 VITALS
HEIGHT: 65 IN | OXYGEN SATURATION: 96 % | DIASTOLIC BLOOD PRESSURE: 80 MMHG | BODY MASS INDEX: 48.61 KG/M2 | HEART RATE: 95 BPM | TEMPERATURE: 98 F | RESPIRATION RATE: 18 BRPM | SYSTOLIC BLOOD PRESSURE: 167 MMHG

## 2023-03-05 DIAGNOSIS — M54.2 NECK PAIN: ICD-10-CM

## 2023-03-05 DIAGNOSIS — R51.9 NONINTRACTABLE HEADACHE, UNSPECIFIED CHRONICITY PATTERN, UNSPECIFIED HEADACHE TYPE: Primary | ICD-10-CM

## 2023-03-05 PROCEDURE — 99284 EMERGENCY DEPT VISIT MOD MDM: CPT | Mod: 25

## 2023-03-05 RX ORDER — BUTALBITAL, ACETAMINOPHEN AND CAFFEINE 50; 325; 40 MG/1; MG/1; MG/1
1 TABLET ORAL EVERY 6 HOURS PRN
Qty: 15 TABLET | Refills: 0 | Status: SHIPPED | OUTPATIENT
Start: 2023-03-05 | End: 2023-12-07

## 2023-03-05 NOTE — ED PROVIDER NOTES
Encounter Date: 3/5/2023       History     Chief Complaint   Patient presents with    Headache     Headache on back of right side of head, hurts to turn head, onset x months     62-year-old female with complaint of right posterior headache over the past month.  Patient reports pain worse with turning head to right.  Denies weakness.  Denies numbness or tingling.  Denies fever or chills.      Review of patient's allergies indicates:   Allergen Reactions    Coconut Anaphylaxis    Iodine and iodide containing products Anaphylaxis    Dairy aid [lactase] Diarrhea and Nausea And Vomiting    Penicillins Hives     Past Medical History:   Diagnosis Date    Allergy     Arthritis     Asthma     Admit to [facility] by Dr. Michael Randall for asthma exaceration, acute bronchitis, acute dCHF, hypoxia, elevated troponin/BNP (thought to be 2/2 asthma exac & acute dCHF by cards), tx'd w/ IV lasix, duonebs, IV solumedrol and DC'd on Z-prema, prednisone taper    CKD (chronic kidney disease) stage 2, GFR 60-89 ml/min     H/o ARF 2/2 poor water intake; encouraged to increase water intake and avoid NSAIDS & contrast dye    COVID 2022    Depression     Diastolic heart failure 2019    Dx'd at [facility] during asthma exacerbation    Diverticulitis 2005    Dyslipidemia 2018    Former smoker     Quit 18; smoked 0.12 packs/day x 37 years    Gastritis     Hypertension     Pneumonia 2022    Prediabetes     A1c 6.1% on 19    Prediabetes     Severe obesity (BMI >= 40)     Vertigo     Vitamin D deficiency      Past Surgical History:   Procedure Laterality Date    COLONOSCOPY N/A 8/10/2022    Procedure: COLONOSCOPY;  Surgeon: Dariela Devi MD;  Location: [facility];  Service: Endoscopy;  Laterality: N/A;    COLONOSCOPY N/A 2022    Procedure: COLONOSCOPY;  Surgeon: Angie Neves MD;  Location: [facility];  Service: Endoscopy;  Laterality: N/A;    DILATION AND CURETTAGE OF UTERUS      missed , bleeding     ESOPHAGOGASTRODUODENOSCOPY N/A 8/9/2022    Procedure: EGD (ESOPHAGOGASTRODUODENOSCOPY);  Surgeon: Dariela Devi MD;  Location: [facility];  Service: Endoscopy;  Laterality: N/A;    EXPLORATORY LAPAROTOMY      lysis of adhesions, open tube    KNEE SURGERY Right     torn mcl    laser to cervix       Family History   Problem Relation Age of Onset    Stroke Mother     Heart disease Mother     Kidney disease Father     Heart disease Father     Hypertension Father     HIV Sister     COPD Sister     Bipolar disorder Sister      Social History     Tobacco Use    Smoking status: Former     Packs/day: 0.25     Years: 27.00     Pack years: 6.75     Types: Cigarettes     Passive exposure: Never    Smokeless tobacco: Never   Substance Use Topics    Alcohol use: No    Drug use: No     Review of Systems   Constitutional:  Negative for fever.   HENT:  Negative for sore throat.    Respiratory:  Negative for shortness of breath.    Cardiovascular:  Negative for chest pain.   Gastrointestinal:  Negative for nausea.   Genitourinary:  Negative for dysuria.   Musculoskeletal:  Negative for back pain.   Skin:  Negative for rash.   Neurological:  Positive for headaches. Negative for weakness.   Hematological:  Does not bruise/bleed easily.     Physical Exam     Initial Vitals [03/05/23 1504]   BP Pulse Resp Temp SpO2   (!) 167/80 95 18 98.2 °F (36.8 °C) 96 %      MAP       --         Physical Exam    Nursing note and vitals reviewed.  Constitutional: She appears well-developed and well-nourished.   HENT:   Head: Normocephalic and atraumatic.   Eyes: Conjunctivae and EOM are normal. Pupils are equal, round, and reactive to light.   Neck: Neck supple.   Normal range of motion.  Cardiovascular:  Normal rate, regular rhythm, normal heart sounds and intact distal pulses.           Pulmonary/Chest: Breath sounds normal.   Abdominal: Abdomen is soft. There is no abdominal tenderness. There is no rebound and no guarding.   Musculoskeletal:          General: Normal range of motion.      Cervical back: Normal range of motion and neck supple.      Comments: Right sternocleidomastoid tenderness     Neurological: She is alert and oriented to person, place, and time. She has normal strength and normal reflexes.   Skin: Skin is warm and dry.   Psychiatric: She has a normal mood and affect. Her behavior is normal. Thought content normal.       ED Course   Procedures  Labs Reviewed   HIV 1 / 2 ANTIBODY   HEPATITIS C ANTIBODY   HEP C VIRUS HOLD SPECIMEN          Imaging Results              CT Head Without Contrast (Final result)  Result time 03/05/23 16:20:45      Final result by Remington Gallardo MD (03/05/23 16:20:45)                   Impression:      No acute abnormality.    Age-related changes    All CT scans   are performed using dose optimization techniques including the following: automated exposure control; adjustment of the mA and/or kV; use of iterative reconstruction technique.  Dose modulation was employed for ALARA by means of: Automated exposure control; adjustment of the mA and/or kV according to patient size (this includes techniques or standardized protocols for targeted exams where dose is matched to indication/reason for exam; i.e. extremities or head); and/or use of iterative reconstructive technique.      Electronically signed by: Remington Gallardo  Date:    03/05/2023  Time:    16:20               Narrative:    EXAMINATION:  CT HEAD WITHOUT CONTRAST    CLINICAL HISTORY:  Headache, new or worsening (Age >= 50y);    TECHNIQUE:  Low dose axial CT images obtained throughout the head without intravenous contrast. Sagittal and coronal reconstructions were performed.    COMPARISON:  None.    FINDINGS:  Age-related changes.    Ventricles and sulci are normal in size for age without evidence of hydrocephalus. No extra-axial blood or fluid collections.    No parenchymal mass, hemorrhage, edema or major vascular distribution infarct.    Skull/extracranial  contents (limited evaluation): No fracture. Mastoid air cells and paranasal sinuses are essentially clear.                                       Medications - No data to display                           Clinical Impression:   Final diagnoses:  [R51.9] Nonintractable headache, unspecified chronicity pattern, unspecified headache type (Primary)  [M54.2] Neck pain        ED Disposition Condition    Discharge Stable          ED Prescriptions       Medication Sig Dispense Start Date End Date Auth. Provider    butalbital-acetaminophen-caffeine -40 mg (FIORICET, ESGIC) -40 mg per tablet Take 1 tablet by mouth every 6 (six) hours as needed for Headaches. 15 tablet 3/5/2023 -- Derek Mercedes NP          Follow-up Information       Follow up With Specialties Details Why Contact Info    Becki Hernandez NP Internal Medicine Schedule an appointment as soon as possible for a visit  As needed [id number]  [place] [address]  [phone]               Derek Mercedes NP  03/05/23 5497

## 2023-03-05 NOTE — FIRST PROVIDER EVALUATION
"Medical screening examination initiated.  I have conducted a focused provider triage encounter, findings are as follows:    Brief history of present illness:  Worsening right-sided headache over last few months    Vitals:    03/05/23 1504   BP: (!) 167/80   BP Location: Right arm   Patient Position: Sitting   Pulse: 95   Resp: 18   Temp: 98.2 °F (36.8 °C)   TempSrc: Oral   SpO2: 96%   Height: 5' 5" (1.651 m)       Pertinent physical exam:  NAD, alert        Preliminary workup initiated; this workup will be continued and followed by the physician or advanced practice provider that is assigned to the patient when roomed.  "

## 2023-03-05 NOTE — Clinical Note
"Baylee "Baylee" Wanda was seen and treated in our emergency department on 3/5/2023.  She may return to work on 03/07/2023.       If you have any questions or concerns, please don't hesitate to call.      Derek Mercedes NP"

## 2023-03-07 ENCOUNTER — OFFICE VISIT (OUTPATIENT)
Dept: INTERNAL MEDICINE | Facility: CLINIC | Age: 63
End: 2023-03-07
Payer: OTHER GOVERNMENT

## 2023-03-07 VITALS
DIASTOLIC BLOOD PRESSURE: 76 MMHG | BODY MASS INDEX: 47.68 KG/M2 | WEIGHT: 286.19 LBS | OXYGEN SATURATION: 97 % | HEIGHT: 65 IN | RESPIRATION RATE: 18 BRPM | HEART RATE: 80 BPM | TEMPERATURE: 98 F | SYSTOLIC BLOOD PRESSURE: 132 MMHG

## 2023-03-07 DIAGNOSIS — E11.9 TYPE 2 DIABETES MELLITUS WITHOUT COMPLICATION, WITHOUT LONG-TERM CURRENT USE OF INSULIN: Primary | ICD-10-CM

## 2023-03-07 DIAGNOSIS — M54.2 CERVICALGIA: ICD-10-CM

## 2023-03-07 DIAGNOSIS — G89.29 CHRONIC NONINTRACTABLE HEADACHE, UNSPECIFIED HEADACHE TYPE: ICD-10-CM

## 2023-03-07 DIAGNOSIS — R51.9 CHRONIC NONINTRACTABLE HEADACHE, UNSPECIFIED HEADACHE TYPE: ICD-10-CM

## 2023-03-07 LAB — GLUCOSE SERPL-MCNC: 103 MG/DL (ref 70–110)

## 2023-03-07 PROCEDURE — 99214 OFFICE O/P EST MOD 30 MIN: CPT | Mod: S$PBB,,, | Performed by: NURSE PRACTITIONER

## 2023-03-07 PROCEDURE — 99215 OFFICE O/P EST HI 40 MIN: CPT | Mod: PBBFAC,PN | Performed by: NURSE PRACTITIONER

## 2023-03-07 PROCEDURE — 99214 PR OFFICE/OUTPT VISIT, EST, LEVL IV, 30-39 MIN: ICD-10-PCS | Mod: S$PBB,,, | Performed by: NURSE PRACTITIONER

## 2023-03-07 PROCEDURE — 99999 PR PBB SHADOW E&M-EST. PATIENT-LVL V: ICD-10-PCS | Mod: PBBFAC,,, | Performed by: NURSE PRACTITIONER

## 2023-03-07 PROCEDURE — 82962 GLUCOSE BLOOD TEST: CPT | Mod: PBBFAC,PN | Performed by: NURSE PRACTITIONER

## 2023-03-07 PROCEDURE — 99999 PR PBB SHADOW E&M-EST. PATIENT-LVL V: CPT | Mod: PBBFAC,,, | Performed by: NURSE PRACTITIONER

## 2023-03-07 RX ORDER — INSULIN PUMP SYRINGE, 3 ML
EACH MISCELLANEOUS
Qty: 1 EACH | Refills: 0 | Status: SHIPPED | OUTPATIENT
Start: 2023-03-07 | End: 2023-03-10 | Stop reason: SDUPTHER

## 2023-03-07 RX ORDER — GUAIFENESIN 600 MG/1
600 TABLET, EXTENDED RELEASE ORAL 2 TIMES DAILY
Qty: 20 TABLET | Refills: 0 | Status: SHIPPED | OUTPATIENT
Start: 2023-03-07 | End: 2023-03-10 | Stop reason: SDUPTHER

## 2023-03-07 RX ORDER — LANCETS
EACH MISCELLANEOUS
Qty: 100 EACH | Refills: 11 | Status: SHIPPED | OUTPATIENT
Start: 2023-03-07 | End: 2023-03-10 | Stop reason: SDUPTHER

## 2023-03-07 RX ORDER — LORAZEPAM 2 MG/1
2 TABLET ORAL ONCE
Qty: 1 TABLET | Refills: 0 | Status: SHIPPED | OUTPATIENT
Start: 2023-03-07 | End: 2023-05-01 | Stop reason: SDUPTHER

## 2023-03-07 NOTE — PROGRESS NOTES
Subjective:       Patient ID: Baylee Muñoz is a 62 y.o. female.    Chief Complaint: Pain (Rt side of lower head back)    Patient presents for hospital follow up.  Started to have worsening of right side head and neck pain.  Went to ER on 03/05/2023.      Right occipital head and neck pain.  Constant pain. Ear is better.      Reports mild episodes of dizziness.  No new changes in vision.  No vomiting.      Was given Fioricet.  Mild improvement since visit.  Described as a tooth ache.  Reports applying pressure helps.     Pain  Associated symptoms include arthralgias, headaches, myalgias and neck pain. Pertinent negatives include no chest pain, chills, coughing, fatigue or fever.   Review of Systems   Constitutional:  Negative for chills, fatigue and fever.   Respiratory:  Positive for shortness of breath. Negative for cough.    Cardiovascular:  Negative for chest pain and leg swelling.   Musculoskeletal:  Positive for arthralgias, myalgias, neck pain and neck stiffness.   Neurological:  Positive for dizziness and headaches.   Psychiatric/Behavioral:  Negative for agitation.        Objective:      Physical Exam  Vitals reviewed.   Constitutional:       Appearance: Normal appearance.   HENT:      Head:     Cardiovascular:      Rate and Rhythm: Normal rate and regular rhythm.   Pulmonary:      Effort: Pulmonary effort is normal.      Breath sounds: Normal breath sounds.   Musculoskeletal:         General: Tenderness present.   Skin:     General: Skin is warm.   Neurological:      General: No focal deficit present.      Mental Status: She is alert and oriented to person, place, and time.   Psychiatric:         Mood and Affect: Mood normal.         Behavior: Behavior is cooperative.       Assessment:       Problem List Items Addressed This Visit       Type 2 diabetes mellitus without complication, without long-term current use of insulin - Primary    Relevant Orders    POCT Glucose, Hand-Held Device (Completed)      Other Visit Diagnoses       Cervicalgia        Relevant Orders    MRI Cervical Spine Without Contrast    Chronic nonintractable headache, unspecified headache type        Relevant Orders    MRI Brain W WO Contrast    MRI Cervical Spine Without Contrast              Plan:           Type 2 diabetes mellitus without complication, without long-term current use of insulin  -     POCT Glucose, Hand-Held Device  -     Discontinue: blood-glucose meter kit; To check BG 2 times daily, to use with insurance preferred meter  Dispense: 1 each; Refill: 0  -     Discontinue: lancets Misc; To check BG 2 times daily, to use with insurance preferred meter  Dispense: 100 each; Refill: 11  -     Discontinue: blood sugar diagnostic Strp; To check BG 2 times daily, to use with insurance preferred meter  Dispense: 100 each; Refill: 11    Cervicalgia  -     MRI Cervical Spine Without Contrast; Future; Expected date: 03/07/2023    Chronic nonintractable headache, unspecified headache type  -     MRI Brain W WO Contrast; Future; Expected date: 03/07/2023  -     MRI Cervical Spine Without Contrast; Future; Expected date: 03/07/2023    Other orders  -     LORazepam (ATIVAN) 2 MG Tab; Take 1 tablet (2 mg total) by mouth once. Take 30-40 minutes before MRI for 1 dose  Dispense: 1 tablet; Refill: 0  -     Discontinue: guaiFENesin (MUCINEX) 600 mg 12 hr tablet; Take 1 tablet (600 mg total) by mouth 2 (two) times daily. for 10 days  Dispense: 20 tablet; Refill: 0     Schedule MRI    Recommend Mucinex.     Lorazepam before MRI

## 2023-03-07 NOTE — LETTER
March 7, 2023    Baylee Muñoz  [address]  [place] [address]             [facility]  Internal Medicine  [address] [place] [address]  [place] [address]  Phone: [phone]  Fax: [phone]   March 7, 2023     Patient: Baylee Muñoz   YOB: 1960   Date of Visit: 3/7/2023       To Whom it May Concern:    Baylee Muñoz was seen in my clinic on 3/7/2023. She may return to work on 03/09/2023 or sooner if symptoms improve.     Please excuse her from any work missed.    If you have any questions or concerns, please don't hesitate to call.    Sincerely,         Becki Hernandez NP

## 2023-03-10 ENCOUNTER — TELEPHONE (OUTPATIENT)
Dept: INTERNAL MEDICINE | Facility: CLINIC | Age: 63
End: 2023-03-10
Payer: OTHER GOVERNMENT

## 2023-03-10 DIAGNOSIS — G47.33 OSA (OBSTRUCTIVE SLEEP APNEA): ICD-10-CM

## 2023-03-10 DIAGNOSIS — E11.9 TYPE 2 DIABETES MELLITUS WITHOUT COMPLICATION, WITHOUT LONG-TERM CURRENT USE OF INSULIN: ICD-10-CM

## 2023-03-10 RX ORDER — GUAIFENESIN 600 MG/1
600 TABLET, EXTENDED RELEASE ORAL 2 TIMES DAILY
Qty: 20 TABLET | Refills: 0 | Status: SHIPPED | OUTPATIENT
Start: 2023-03-10 | End: 2023-03-20

## 2023-03-10 RX ORDER — LANCETS
EACH MISCELLANEOUS
Qty: 100 EACH | Refills: 11 | Status: SHIPPED | OUTPATIENT
Start: 2023-03-10

## 2023-03-10 RX ORDER — INSULIN PUMP SYRINGE, 3 ML
EACH MISCELLANEOUS
Qty: 1 EACH | Refills: 0 | Status: SHIPPED | OUTPATIENT
Start: 2023-03-10 | End: 2024-03-11

## 2023-03-10 NOTE — TELEPHONE ENCOUNTER
S/w pt, states shiv carrion did not receive her meds sent on 3/7 from visit with Becki. Transmission failed in med comments. Please resend. Thanks.       ----- Message from Maria Esther Singh sent at 3/10/2023  9:36 AM CST -----  Contact: Baylee Beach is calling to speak to the nurse regarding her medications that was prescribed at her visit on Tuesday 03/07. She talked to her pharmacy and they are saying no medications are there. Please give her a call back at [phone]    Thanks  LJ

## 2023-03-10 NOTE — TELEPHONE ENCOUNTER
Called pt, states she will  paper script for ativan on Monday if possible. Requesting to resend diabetic glucometer kit and supplies, transmission failed for that as well to shiv carrion.

## 2023-03-15 ENCOUNTER — HOSPITAL ENCOUNTER (OUTPATIENT)
Dept: SLEEP MEDICINE | Facility: HOSPITAL | Age: 63
Discharge: HOME | End: 2023-03-15
Attending: NURSE PRACTITIONER
Payer: OTHER GOVERNMENT

## 2023-03-15 DIAGNOSIS — G47.33 OSA (OBSTRUCTIVE SLEEP APNEA): Primary | ICD-10-CM

## 2023-03-15 DIAGNOSIS — G47.30 SLEEP-DISORDERED BREATHING: ICD-10-CM

## 2023-03-15 DIAGNOSIS — G47.36 HYPOXEMIA ASSOCIATED WITH SLEEP: ICD-10-CM

## 2023-03-15 PROCEDURE — 95810 POLYSOM 6/> YRS 4/> PARAM: CPT

## 2023-03-15 NOTE — Clinical Note
DIAGNOSIS: Obstructive Sleep Apnea / 327.23, Sleep hypoxemia  RECOMMENDATIONS: 1. CPAP titration 2. Weight loss

## 2023-03-16 ENCOUNTER — TELEPHONE (OUTPATIENT)
Dept: INTERNAL MEDICINE | Facility: CLINIC | Age: 63
End: 2023-03-16
Payer: OTHER GOVERNMENT

## 2023-03-16 PROBLEM — G47.30 SLEEP-DISORDERED BREATHING: Status: ACTIVE | Noted: 2023-03-16

## 2023-03-16 NOTE — TELEPHONE ENCOUNTER
I called the pt and informed her that the provider will print for her to  on Monday as they discussed and she verbalized understanding. //kah

## 2023-03-22 PROCEDURE — 95810 POLYSOM 6/> YRS 4/> PARAM: CPT | Mod: 26,,, | Performed by: INTERNAL MEDICINE

## 2023-03-22 PROCEDURE — 95810 PR POLYSOMNOGRAPHY, 4 OR MORE: ICD-10-PCS | Mod: 26,,, | Performed by: INTERNAL MEDICINE

## 2023-03-22 NOTE — PROCEDURES
"PATIENT: Baylee Muñoz  study Date: 3/15/2023  Referring Physician: Tesha Canada PA-C    Indications for Polysomnography: The patient is a 56 year year old Female who is 5' 5" and weighs 286.0 lbs.  Her BMI equals 47.6.  A full night polysomnogram was performed to evaluate for -.Snores at night, she does wear oxygen at night  Wakes up frequently, wakes up un refreshed, has daytime fatigue  History of COVID  Patient on oxygen 3 LPM    Polysomnogram Data:  A full night polysomnogram recorded the standard physiologic parameters including EEG, EOG, EMG, EKG, nasal and oral airflow.  Respiratory parameters of chest and abdominal movements were recorded with Peizo-Crystal motion transducers.  Oxygen saturation was recorded by pulse oximetry.      Sleep Architecture:  The total recording time of the polysomnogram was 409.8 minutes.  The total sleep time was 345.5 minutes.  The patient spent 10.9% of total sleep time in Stage N1, 69.5% in Stage N2, 8.1% in Stages N3 and N, and 11.6% in REM.   Sleep latency was 4.0 minutes.  REM latency was 300.0 minutes.  Sleep Efficiency was 84.3%.  Sleep Maintenance Efficiency was 84.5%.  Total wake time was 65.0 minutes for a total wake percentage of 14.8%.    Respiratory Events:  The polysomnogram revealed a presence of 11 obstructive, 2 central, and - mixed apneas resulting in an Apnea index of 2.3 events per hour.  There were 138 hypopneas (using 3% desaturation criteria) resulting in a Hypopnea index of 24.0 events per hour.  The combined Apnea/Hypopnea index (using 3% desaturation criteria for Hypopneas) was 26.2 events per hour.    Baseline oxygen saturation was 86.0%.  The lowest oxygen saturation was 74.0%.      LIMB ACTIVITY:  There were - limb movements recorded.  Of this total, - were classified as PLMs.  Of the PLMs, - were associated with arousals.  The Limb Movement index was - per hour while the PLM index was - per hour.    CARDIAC SUMMARY:   The average pulse " "rate was 72.3 bpm.  The minimum pulse rate was 59.0 bpm while the maximum pulse rate was 155.0 bpm.                  CONCLUSION:  Moderate Obstructive sleep apnea: AHI was 26.2/hr  Spo2 dominik 74%  SpO2 was below 88% for 308.1 minutes.  No Supine sleep.  Arousal was high 73.5/hr  Began Supplemental O2 at 2201 @ 1.0 LPM      DIAGNOSIS: Obstructive Sleep Apnea / 327.23, Sleep hypoxemia    RECOMMENDATIONS:  CPAP titration  Weight loss        Dear Tesha Canada, MARIFER  [address] [place] [address] [place] [address]/Becki Hernandez NP         The sleep study that you ordered is complete.  You have ordered sleep LAB services to perform the sleep study for Baylee Muñoz .      Please find Sleep Study result in  the "Media tab" of Chart Review menu.        You can look  for the report in the  Media by the document type "Sleep Study Documents". Alphabetizing  "Document type" column helps to find the SLEEP STUDY report  Faster.       As the ordering provider, you are responsible for reviewing the results and implementing a treatment plan with your patient.    If you need a Sleep Medicine provider to explain the sleep study findings and arrange treatment for the patient, please refer patient for consultation to our Sleep Clinic via [facility] with Ambulatory Consult Sleep.     To do that please place an order for an  "Ambulatory Consult Sleep" -  order , it will go to our clinic work queue for our staff  to contact the patient for an appointment.      For any questions, please contact our sleep lab  staff at [phone] to talk to clinical staff          Frankie Klein MD   "

## 2023-03-30 ENCOUNTER — TELEPHONE (OUTPATIENT)
Dept: INTERNAL MEDICINE | Facility: CLINIC | Age: 63
End: 2023-03-30
Payer: OTHER GOVERNMENT

## 2023-03-30 NOTE — TELEPHONE ENCOUNTER
I called the pt back and she was inquiring about getting a cheaper device than the free style tan and I informed that a order was sent to the pharmacy for and I spoke to Kate regarding her meter and lancets , test strips she stat that the meter is 19.99, lancets 6.79 and the remaining bal on the strip is 76.00 the insurance paid a portion which was initially 199.00 for the strips. The pt said she will try and get the equipment. She was very upset with the pharmacy. //kah

## 2023-03-30 NOTE — TELEPHONE ENCOUNTER
----- Message from Vania Salomon sent at 3/30/2023  9:40 AM CDT -----  Pt is requesting a call back concerning switching her glucose meter, she stated freestyle tan is too expensive and is requesting a cheaper device. Call back number is [phone]

## 2023-04-11 ENCOUNTER — OFFICE VISIT (OUTPATIENT)
Dept: PULMONOLOGY | Facility: CLINIC | Age: 63
End: 2023-04-11
Payer: OTHER GOVERNMENT

## 2023-04-11 VITALS
WEIGHT: 282.44 LBS | HEART RATE: 82 BPM | OXYGEN SATURATION: 92 % | SYSTOLIC BLOOD PRESSURE: 130 MMHG | BODY MASS INDEX: 47.06 KG/M2 | RESPIRATION RATE: 18 BRPM | DIASTOLIC BLOOD PRESSURE: 78 MMHG | HEIGHT: 65 IN

## 2023-04-11 DIAGNOSIS — I50.32 CHRONIC DIASTOLIC CONGESTIVE HEART FAILURE: ICD-10-CM

## 2023-04-11 DIAGNOSIS — Z87.891 FORMER SMOKER: ICD-10-CM

## 2023-04-11 DIAGNOSIS — G47.33 OSA (OBSTRUCTIVE SLEEP APNEA): Primary | ICD-10-CM

## 2023-04-11 DIAGNOSIS — G47.30 SLEEP-DISORDERED BREATHING: ICD-10-CM

## 2023-04-11 DIAGNOSIS — J96.11 CHRONIC RESPIRATORY FAILURE WITH HYPOXIA: ICD-10-CM

## 2023-04-11 PROCEDURE — 99215 OFFICE O/P EST HI 40 MIN: CPT | Mod: S$PBB,,, | Performed by: INTERNAL MEDICINE

## 2023-04-11 PROCEDURE — 99999 PR PBB SHADOW E&M-EST. PATIENT-LVL V: ICD-10-PCS | Mod: PBBFAC,,, | Performed by: INTERNAL MEDICINE

## 2023-04-11 PROCEDURE — 99215 PR OFFICE/OUTPT VISIT, EST, LEVL V, 40-54 MIN: ICD-10-PCS | Mod: S$PBB,,, | Performed by: INTERNAL MEDICINE

## 2023-04-11 PROCEDURE — 99999 PR PBB SHADOW E&M-EST. PATIENT-LVL V: CPT | Mod: PBBFAC,,, | Performed by: INTERNAL MEDICINE

## 2023-04-11 PROCEDURE — 99215 OFFICE O/P EST HI 40 MIN: CPT | Mod: PBBFAC | Performed by: INTERNAL MEDICINE

## 2023-04-11 NOTE — PROGRESS NOTES
Subjective:     Patient ID: Baylee Muñoz is a 62 y.o. female.    Chief Complaint:  shortness of breath     HPI   History of asthma - worse after COVID  History of hospital admission for pneumonia and COVID, seen in Emergency Room overnight 6/15/2022- placed on oxygen   History of smoking 10 pack years  She has a history of hypoxemic respiratory failure Allergy, Arthritis, Asthma, CKD (chronic kidney disease) stage 2, GFR 60-89 ml/min, Depression, Diastolic heart failure (01/06/2019), Diverticulitis (2005), Dyslipidemia (08/28/2018), Former smoker, Gastritis, Hypertension, Prediabetes, Severe obesity (BMI >= 40), Vertigo, and Vitamin D deficiency.  Sleep Apnea  She presents for a sleep evaluation. She complains of snoring, periods of not breathing, decreased memory, decreased concentration, excessive daytime sleepiness, sinus problems, congested nose.  Symptoms began 3 years ago, gradually worsening since that time.  She goes to sleep at variable 10 pm to 2 am  weekdays and  weekends. She awakens 3 pm  workdays and 1- 2 pm on nonworking days . She falls asleep in 20 minutes.  Collar size na. She denies knees buckling with laughing, completely or partially paralyzed while falling asleep or waking up. Previous evaluation and treatment has included PSG. Significant for Moderate Obstructive sleep apnea: AHI was 26.2/hr and nocturnal hypoxemia    Discussed with patient Continuous Positive Airway Pressure titration in the hospital but she did not want to have the procedure done in the hospital    Past Medical History:   Diagnosis Date    Allergy     Arthritis     Asthma     Admit to [facility] 1/6/19 by Dr. Michael Randall for asthma exaceration, acute bronchitis, acute dCHF, hypoxia, elevated troponin/BNP (thought to be 2/2 asthma exac & acute dCHF by cards), tx'd w/ IV lasix, duonebs, IV solumedrol and DC'd on Z-prema, prednisone taper    CKD (chronic kidney disease) stage 2, GFR 60-89 ml/min     H/o ARF 2/2 poor water intake;  encouraged to increase water intake and avoid NSAIDS & contrast dye    COVID 2022    Depression     Diastolic heart failure 2019    Dx'd at [facility] during asthma exacerbation    Diverticulitis 2005    Dyslipidemia 2018    Former smoker     Quit 18; smoked 0.12 packs/day x 37 years    Gastritis     Hypertension     Pneumonia 2022    Prediabetes     A1c 6.1% on 19    Prediabetes     Severe obesity (BMI >= 40)     Vertigo     Vitamin D deficiency      Past Surgical History:   Procedure Laterality Date    COLONOSCOPY N/A 8/10/2022    Procedure: COLONOSCOPY;  Surgeon: Dariela Devi MD;  Location: [facility] ENDO;  Service: Endoscopy;  Laterality: N/A;    COLONOSCOPY N/A 2022    Procedure: COLONOSCOPY;  Surgeon: Angie Neves MD;  Location: [facility] ENDO;  Service: Endoscopy;  Laterality: N/A;    DILATION AND CURETTAGE OF UTERUS      missed , bleeding    ESOPHAGOGASTRODUODENOSCOPY N/A 2022    Procedure: EGD (ESOPHAGOGASTRODUODENOSCOPY);  Surgeon: Dariela Devi MD;  Location: [facility];  Service: Endoscopy;  Laterality: N/A;    EXPLORATORY LAPAROTOMY      lysis of adhesions, open tube    KNEE SURGERY Right     torn mcl    laser to cervix       Review of patient's allergies indicates:   Allergen Reactions    Coconut Anaphylaxis    Iodine and iodide containing products Anaphylaxis    Dairy aid [lactase] Diarrhea and Nausea And Vomiting    Penicillins Hives     Current Outpatient Medications on File Prior to Visit   Medication Sig Dispense Refill    acetaminophen (TYLENOL) 325 MG tablet Take 2 tablets (650 mg total) by mouth every 6 (six) hours as needed for Pain.  0    albuterol (PROAIR HFA) 90 mcg/actuation inhaler USE 2 INHALATIONS EVERY 6 HOURS AS NEEDED FOR WHEEZING 25.5 g 11    albuterol-ipratropium (DUO-NEB) 2.5 mg-0.5 mg/3 mL nebulizer solution Take 3 mLs by nebulization every 6 (six) hours as needed for Wheezing. Rescue 75 mL 5    amLODIPine (NORVASC) 5 MG tablet Take  1 tablet (5 mg total) by mouth once daily. 90 tablet 3    atorvastatin (LIPITOR) 40 MG tablet Take 1 tablet (40 mg total) by mouth once daily. 90 tablet 3    blood sugar diagnostic Strp To check BG 2 times daily, to use with insurance preferred meter 100 each 11    blood-glucose meter kit To check BG 2 times daily, to use with insurance preferred meter 1 each 0    butalbital-acetaminophen-caffeine -40 mg (FIORICET, ESGIC) -40 mg per tablet Take 1 tablet by mouth every 6 (six) hours as needed for Headaches. 15 tablet 0    calcium carbonate/vitamin D3 (VITAMIN D-3 ORAL) Take 1 tablet by mouth once daily. (600 mg/ 400 iu)      ELDERBERRY FRUIT ORAL Take 50 mg by mouth once daily.      fluticasone propionate (FLONASE) 50 mcg/actuation nasal spray 1 spray (50 mcg total) by Each Nostril route once daily. 16 g 3    fluticasone-salmeterol diskus inhaler 500-50 mcg Inhale 1 puff into the lungs 2 (two) times daily. Controller 60 each 11    glipiZIDE (GLUCOTROL) 2.5 MG TR24 Take 1 tablet (2.5 mg total) by mouth daily with breakfast. 30 tablet 11    lancets Misc To check BG 2 times daily, to use with insurance preferred meter 100 each 11    LORazepam (ATIVAN) 2 MG Tab Take 1 tablet (2 mg total) by mouth once. Take 30-40 minutes before MRI for 1 dose 1 tablet 0    mv-min/iron/folic/calcium/vitK (WOMEN'S MULTIVITAMIN ORAL) Take 1 tablet by mouth once daily.      pulse oximeter (PULSE OXIMETER) device by Apply Externally route 2 (two) times a day. Use twice daily at 8 AM and 3 PM and record the value in [employer] as directed. 1 each 0    vitamin D (VITAMIN D3) 1000 units Tab Take 1,000 Units by mouth once daily.      [DISCONTINUED] triamcinolone acetonide 0.1% (KENALOG) 0.1 % paste Apply to area BID after meals for 7 days. 5 g 0    [DISCONTINUED] mupirocin (BACTROBAN) 2 % ointment Apply topically 2 (two) times daily. To abdominal area 30 g 2     No current facility-administered medications on file prior to visit.      Social History     Socioeconomic History    Marital status:    Tobacco Use    Smoking status: Former     Packs/day: 0.25     Years: 40.00     Pack years: 10.00     Types: Cigarettes     Start date: 1/1/1980     Quit date: 1/1/2020     Years since quitting: 3.2     Passive exposure: Never    Smokeless tobacco: Never   Substance and Sexual Activity    Alcohol use: No    Drug use: No    Sexual activity: Never   Social History Narrative    Patient goal(s): Weight 175 lbs    Breakfast: sausage biscuit or croissant and 1 egg; black coffee w/ 1/2 tsp sugar or OJ    Lunch: Eats out: mashed potatoes, gravy, vegetable, fish or baked chicken; water or swea or kiwi/strawberry juice    Dinner: Grilled chicken salad or taco with ground beef, beans, ltteuce, cheese, tomatoes; kiwi/eetened tstrawberry juice    Snacks: None    Eating out: 3-4x/wk    Water (oz/day): 160 oz/d    Physical Activity: None     Family History   Problem Relation Age of Onset    Stroke Mother     Heart disease Mother     Kidney disease Father     Heart disease Father     Hypertension Father     HIV Sister     COPD Sister     Bipolar disorder Sister        Review of Systems   Constitutional:  Positive for fatigue. Negative for fever.   HENT:  Positive for postnasal drip, rhinorrhea and congestion.    Eyes:  Negative for redness and itching.   Respiratory:  Positive for apnea, cough, sputum production, shortness of breath, dyspnea on extertion, use of rescue inhaler and Paroxysmal Nocturnal Dyspnea.    Cardiovascular: Negative.  Negative for chest pain, palpitations and leg swelling.   Genitourinary: Negative.  Negative for difficulty urinating and hematuria.   Endocrine: endocrine negative Negative for cold intolerance and heat intolerance.    Musculoskeletal: Negative.    Skin: Negative.  Negative for rash.   Gastrointestinal: Negative.  Negative for nausea and abdominal pain.   Neurological: Negative.  Negative for dizziness, syncope, weakness  "and light-headedness.   Hematological:  Negative for adenopathy. Does not bruise/bleed easily.   Psychiatric/Behavioral:  Positive for sleep disturbance. The patient is not nervous/anxious.      Objective:      /78   Pulse 82   Resp 18   Ht 5' 5" (1.651 m)   Wt 128.1 kg (282 lb 6.6 oz)   SpO2 (!) 92%   BMI 47.00 kg/m²   Physical Exam  Vitals and nursing note reviewed.   Constitutional:       Appearance: She is well-developed. She is obese.   HENT:      Head: Normocephalic and atraumatic.      Nose: Nose normal.      Mouth/Throat:      Pharynx: No oropharyngeal exudate.   Eyes:      Conjunctiva/sclera: Conjunctivae normal.      Pupils: Pupils are equal, round, and reactive to light.   Neck:      Thyroid: No thyromegaly.      Vascular: No JVD.      Trachea: No tracheal deviation.   Cardiovascular:      Rate and Rhythm: Normal rate and regular rhythm.      Heart sounds: Normal heart sounds.   Pulmonary:      Effort: Pulmonary effort is normal. No respiratory distress.      Breath sounds: Examination of the right-lower field reveals wheezing. Examination of the left-lower field reveals wheezing. Decreased breath sounds and wheezing present. No rhonchi or rales.   Chest:      Chest wall: No tenderness.   Abdominal:      General: Bowel sounds are normal.      Palpations: Abdomen is soft.   Musculoskeletal:         General: Normal range of motion.      Cervical back: Neck supple.   Lymphadenopathy:      Cervical: No cervical adenopathy.   Skin:     General: Skin is warm and dry.   Neurological:      Mental Status: She is alert and oriented to person, place, and time.     Personal Diagnostic Review  PSG: significant for moderate Obstructive Sleep Apnea with hypoxemia    Procedure Notes    Author Status Last  Updated Created   Frankie Klein MD Signed Frankie Klein MD 3/22/2023  6:45 PM 3/22/2023  6:45 PM          Assoc. Orders Procedures   POLYSOMNOGRAM POLYSOMNOGRAM       Pre-Op Dx Post-Op Dx " "  Sleep-disordered breathing None         PATIENT: Baylee Muñoz  STUDY DATE: 3/15/2023  REFERRING PHYSICIAN: Tesha Canada PA-C     INDICATIONS FOR POLYSOMNOGRAPHY: The patient is a 56 year year old Female who is 5' 5" and weighs 286.0 lbs.  Her BMI equals 47.6.  A full night polysomnogram was performed to evaluate for -.Snores at night, she does wear oxygen at night  Wakes up frequently, wakes up un refreshed, has daytime fatigue  History of COVID  Patient on oxygen 3 LPM     POLYSOMNOGRAM DATA:  A full night polysomnogram recorded the standard physiologic parameters including EEG, EOG, EMG, EKG, nasal and oral airflow.  Respiratory parameters of chest and abdominal movements were recorded with [place]Crystal motion transducers.  Oxygen saturation was recorded by pulse oximetry.       SLEEP ARCHITECTURE:  The total recording time of the polysomnogram was 409.8 minutes.  The total sleep time was 345.5 minutes.  The patient spent 10.9% of total sleep time in Stage N1, 69.5% in Stage N2, 8.1% in Stages N3 and N, and 11.6% in REM.   Sleep latency was 4.0 minutes.  REM latency was 300.0 minutes.  Sleep Efficiency was 84.3%.  Sleep Maintenance Efficiency was 84.5%.  Total wake time was 65.0 minutes for a total wake percentage of 14.8%.     RESPIRATORY EVENTS:  The polysomnogram revealed a presence of 11 obstructive, 2 central, and - mixed apneas resulting in an Apnea index of 2.3 events per hour.  There were 138 hypopneas (using 3% desaturation criteria) resulting in a Hypopnea index of 24.0 events per hour.  The combined Apnea/Hypopnea index (using 3% desaturation criteria for Hypopneas) was 26.2 events per hour.     Baseline oxygen saturation was 86.0%.  The lowest oxygen saturation was 74.0%.       LIMB ACTIVITY:  There were - limb movements recorded.  Of this total, - were classified as PLMs.  Of the PLMs, - were associated with arousals.  The Limb Movement index was - per hour while the PLM index was - per " hour.     CARDIAC SUMMARY:   The average pulse rate was 72.3 bpm.  The minimum pulse rate was 59.0 bpm while the maximum pulse rate was 155.0 bpm.         CONCLUSION:  Moderate Obstructive sleep apnea: AHI was 26.2/hr  Spo2 dominik 74%  SpO2 was below 88% for 308.1 minutes.  No Supine sleep.  Arousal was high 73.5/hr  Began Supplemental O2 at 2201 @ 1.0 LPM        DIAGNOSIS: Obstructive Sleep Apnea / 327.23, Sleep hypoxemia     RECOMMENDATIONS:  CPAP titration  Weight loss                     Pulmonary Studies Review 4/11/2023   SpO2 92   Ordering Provider -   Interpreting Provider -   Performing nurse/tech/RT -   Diagnosis -   Height 65   Weight 4518.55   BMI (Calculated) 47   Predicted Distance 234.92   Patient Race -   6MWT Status -   Patient Reported -   Was O2 used? -   Delivery Method -   6MW Distance walked (feet) -   Distance walked (meters) -   Did patient stop? -   Type of assistive device(s) used? -   Is extra documentation required for this patient? -   Oxygen Saturation -   Supplemental Oxygen -   Heart Rate -   Blood Pressure -   Shanice Dyspnea Rating  -   Oxygen Saturation -   Supplemental Oxygen -   Heart Rate -   Blood Pressure -   Shanice Dyspnea Rating  -   Recovery Time (seconds) -   Oxygen Saturation -   Supplemental Oxygen -   Heart Rate -   Blood Pressure -   Shanice Dyspnea Rating  -   Is procedure ready for interpretation? -   Did the patient stop or pause? -   Total Time Walked (Calculated) -   Total Laps Walked -   Final Partial Lap Distance (feet) -   Total Distance Feet (Calculated) -   Total Distance Meters (Calculated) -   Predicted Distance Meters (Calculated) 375.21   Percentage of Predicted (Calculated) -   Peak VO2 (Calculated) -   Mets -   Has The Patient Had a Previous Six Minute Walk Test? -   Oxygen Qualification? -   Oxygen Saturation -   Supplemental Oxygen -   Heart Rate -   Blood Pressure -   Shanice Dyspnea Rating  -   Oxygen Saturation -   Supplemental Oxygen -   Heart Rate -   Blood  Pressure -   Shanice Dyspnea Rating  -   Recovery Time (seconds) -   Oxygen Saturation -   Supplemental Oxygen -   Heart Rate -   Blood Pressure -   Shanice Dyspnea Rating  -       CT Head Without Contrast  Narrative: EXAMINATION:  CT HEAD WITHOUT CONTRAST    CLINICAL HISTORY:  Headache, new or worsening (Age >= 50y);    TECHNIQUE:  Low dose axial CT images obtained throughout the head without intravenous contrast. Sagittal and coronal reconstructions were performed.    COMPARISON:  None.    FINDINGS:  Age-related changes.    Ventricles and sulci are normal in size for age without evidence of hydrocephalus. No extra-axial blood or fluid collections.    No parenchymal mass, hemorrhage, edema or major vascular distribution infarct.    Skull/extracranial contents (limited evaluation): No fracture. Mastoid air cells and paranasal sinuses are essentially clear.  Impression: No acute abnormality.    Age-related changes    All CT scans   are performed using dose optimization techniques including the following: automated exposure control; adjustment of the mA and/or kV; use of iterative reconstruction technique.  Dose modulation was employed for ALARA by means of: Automated exposure control; adjustment of the mA and/or kV according to patient size (this includes techniques or standardized protocols for targeted exams where dose is matched to indication/reason for exam; i.e. extremities or head); and/or use of iterative reconstructive technique.    Electronically signed by: Remington Gallardo  Date:    03/05/2023  Time:    16:20      Office Spirometry Results:     No flowsheet data found.  Pulmonary Studies Review 4/11/2023   SpO2 92   Ordering Provider -   Interpreting Provider -   Performing nurse/tech/RT -   Diagnosis -   Height 65   Weight 4518.55   BMI (Calculated) 47   Predicted Distance 234.92   Patient Race -   6MWT Status -   Patient Reported -   Was O2 used? -   Delivery Method -   6MW Distance walked (feet) -   Distance  walked (meters) -   Did patient stop? -   Type of assistive device(s) used? -   Is extra documentation required for this patient? -   Oxygen Saturation -   Supplemental Oxygen -   Heart Rate -   Blood Pressure -   Shanice Dyspnea Rating  -   Oxygen Saturation -   Supplemental Oxygen -   Heart Rate -   Blood Pressure -   Shanice Dyspnea Rating  -   Recovery Time (seconds) -   Oxygen Saturation -   Supplemental Oxygen -   Heart Rate -   Blood Pressure -   Shanice Dyspnea Rating  -   Is procedure ready for interpretation? -   Did the patient stop or pause? -   Total Time Walked (Calculated) -   Total Laps Walked -   Final Partial Lap Distance (feet) -   Total Distance Feet (Calculated) -   Total Distance Meters (Calculated) -   Predicted Distance Meters (Calculated) 375.21   Percentage of Predicted (Calculated) -   Peak VO2 (Calculated) -   Mets -   Has The Patient Had a Previous Six Minute Walk Test? -   Oxygen Qualification? -   Oxygen Saturation -   Supplemental Oxygen -   Heart Rate -   Blood Pressure -   Shanice Dyspnea Rating  -   Oxygen Saturation -   Supplemental Oxygen -   Heart Rate -   Blood Pressure -   Shanice Dyspnea Rating  -   Recovery Time (seconds) -   Oxygen Saturation -   Supplemental Oxygen -   Heart Rate -   Blood Pressure -   Shanice Dyspnea Rating  -         Assessment:       TUTU (obstructive sleep apnea)  Discussed with patient Continuous Positive Airway Pressure titration in the hospital but she did not want to have the procedure done in the hospital.  Will order autoPAP     TUTU (obstructive sleep apnea)  -     CPAP/BIPAP SUPPLIES  -     CPAP FOR HOME USE  -     [facility] PAT[facility] EN[facility] USAGE; Standing    Former smoker    Chronic respiratory failure with hypoxia  -     HME - OTHER    Chronic diastolic congestive heart failure    Sleep-disordered breathing          Outpatient Encounter Medications as of 4/11/2023   Medication Sig Dispense Refill    acetaminophen (TYLENOL) 325 MG tablet Take 2 tablets  (650 mg total) by mouth every 6 (six) hours as needed for Pain.  0    albuterol (PROAIR HFA) 90 mcg/actuation inhaler USE 2 INHALATIONS EVERY 6 HOURS AS NEEDED FOR WHEEZING 25.5 g 11    albuterol-ipratropium (DUO-NEB) 2.5 mg-0.5 mg/3 mL nebulizer solution Take 3 mLs by nebulization every 6 (six) hours as needed for Wheezing. Rescue 75 mL 5    amLODIPine (NORVASC) 5 MG tablet Take 1 tablet (5 mg total) by mouth once daily. 90 tablet 3    atorvastatin (LIPITOR) 40 MG tablet Take 1 tablet (40 mg total) by mouth once daily. 90 tablet 3    blood sugar diagnostic Strp To check BG 2 times daily, to use with insurance preferred meter 100 each 11    blood-glucose meter kit To check BG 2 times daily, to use with insurance preferred meter 1 each 0    butalbital-acetaminophen-caffeine -40 mg (FIORICET, ESGIC) -40 mg per tablet Take 1 tablet by mouth every 6 (six) hours as needed for Headaches. 15 tablet 0    calcium carbonate/vitamin D3 (VITAMIN D-3 ORAL) Take 1 tablet by mouth once daily. (600 mg/ 400 iu)      ELDERBERRY FRUIT ORAL Take 50 mg by mouth once daily.      fluticasone propionate (FLONASE) 50 mcg/actuation nasal spray 1 spray (50 mcg total) by Each Nostril route once daily. 16 g 3    fluticasone-salmeterol diskus inhaler 500-50 mcg Inhale 1 puff into the lungs 2 (two) times daily. Controller 60 each 11    glipiZIDE (GLUCOTROL) 2.5 MG TR24 Take 1 tablet (2.5 mg total) by mouth daily with breakfast. 30 tablet 11    lancets Misc To check BG 2 times daily, to use with insurance preferred meter 100 each 11    LORazepam (ATIVAN) 2 MG Tab Take 1 tablet (2 mg total) by mouth once. Take 30-40 minutes before MRI for 1 dose 1 tablet 0    mv-min/iron/folic/calcium/vitK (WOMEN'S MULTIVITAMIN ORAL) Take 1 tablet by mouth once daily.      pulse oximeter (PULSE OXIMETER) device by Apply Externally route 2 (two) times a day. Use twice daily at 8 AM and 3 PM and record the value in MyChart as directed. 1 each 0    vitamin  D (VITAMIN D3) 1000 units Tab Take 1,000 Units by mouth once daily.      [DISCONTINUED] triamcinolone acetonide 0.1% (KENALOG) 0.1 % paste Apply to area BID after meals for 7 days. 5 g 0    [] guaiFENesin (MUCINEX) 600 mg 12 hr tablet Take 1 tablet (600 mg total) by mouth 2 (two) times daily. for 10 days 20 tablet 0    [DISCONTINUED] mupirocin (BACTROBAN) 2 % ointment Apply topically 2 (two) times daily. To abdominal area 30 g 2     No facility-administered encounter medications on file as of 2023.     Plan:       Requested Prescriptions      No prescriptions requested or ordered in this encounter     Problem List Items Addressed This Visit       Chronic diastolic congestive heart failure    Overview     Dx'd at [facility] during asthma exacerbation           Chronic respiratory failure with hypoxia    Relevant Orders    HME - OTHER    Former smoker    TUTU (obstructive sleep apnea) - Primary    Overview     Moderate Obstructive sleep apnea: AHI was 26.2/hr and nocturnal hypoxemia - requiring supplemental oxygen         Current Assessment & Plan     Discussed with patient Continuous Positive Airway Pressure titration in the hospital but she did not want to have the procedure done in the hospital.  Will order autoPAP          Relevant Orders    CPAP/BIPAP SUPPLIES    CPAP FOR HOME USE    [facility] PATIENT ENTERED CPAP USAGE    Sleep-disordered breathing          Follow up in about 6 weeks (around 2023) for CPAP download - review on day of visit, Review progress.    MEDICAL DECISION MAKING: Moderate to high complexity.  Overall, the multiple problems listed are of moderate to high severity that may impact quality of life and activities of daily living. Side effects of medications, treatment plan as well as options and alternatives reviewed and discussed with patient. There was counseling of patient concerning these issues.    Total time spent in counseling and coordination of care - 45  minutes of total  time spent on the encounter, which includes face to face time and non-face to face time preparing to see the patient (eg, review of tests), Obtaining and/or reviewing separately obtained history, Documenting clinical information in the electronic or other health record, Independently interpreting results (not separately reported) and communicating results to the patient/family/caregiver, or Care coordination (not separately reported).    Time was used in discussion of prognosis, risks, benefits of treatment, instructions and compliance with regimen . Discussion with other physicians and/or health care providers - home health or for use of durable medical equipment (oxygen, nebulizers, CPAP, BiPAP) occurred.

## 2023-04-11 NOTE — ASSESSMENT & PLAN NOTE
Discussed with patient Continuous Positive Airway Pressure titration in the hospital but she did not want to have the procedure done in the hospital.  Will order autoPAP

## 2023-04-13 DIAGNOSIS — I10 ESSENTIAL HYPERTENSION: ICD-10-CM

## 2023-04-13 NOTE — TELEPHONE ENCOUNTER
----- Message from Maria Esther Singh sent at 2023 10:52 AM CDT -----  Contact: Baylee Beach is calling to speak to the nurse to request a new prescription on her blood pressure medication, amLODIPine (NORVASC) 5 MG tablet she stated the pharmacy told her her script has . Please give her a call back at [phone]    Thanks  LJ

## 2023-04-14 RX ORDER — AMLODIPINE BESYLATE 5 MG/1
5 TABLET ORAL DAILY
Qty: 90 TABLET | Refills: 3 | Status: SHIPPED | OUTPATIENT
Start: 2023-04-14 | End: 2023-12-05 | Stop reason: SDUPTHER

## 2023-04-14 NOTE — TELEPHONE ENCOUNTER
Called pt, advised refill request received from [facility] on 4/13 and was sent to provider. Notified provider that pt almost out of medication, will send refill today. Pt verbalized understanding.

## 2023-04-14 NOTE — TELEPHONE ENCOUNTER
----- Message from Ashish Valdez sent at 4/14/2023 11:39 AM CDT -----  Contact: [phone]  Type:  RX Refill Request    Who Called: Baylee   Refill or New Rx:refill   RX Name and Strength:amLODIPine (NORVASC) 5 MG tablet  How is the patient currently taking it? (ex. 1XDay):1 a day   Is this a 30 day or 90 day RX:90  Preferred Pharmacy with phone number:  [id number] - [place], [place]  [address]  [address] [facility] [address]  [address] [facility] [address]  Phone: [phone] Fax: [phone]  Local or Mail Order:local   Ordering Provider:Becki Hernandez   Would the patient rather a call back or a response via [facility]? Call back/[employer]  Best Call Back Number:[phone]  Additional Information: pt only have 2 pills left, pt states she have been trying to get this med refill for a week now

## 2023-04-17 ENCOUNTER — TELEPHONE (OUTPATIENT)
Dept: PULMONOLOGY | Facility: CLINIC | Age: 63
End: 2023-04-17
Payer: OTHER GOVERNMENT

## 2023-04-17 NOTE — TELEPHONE ENCOUNTER
Message from Jodi MARSHALL in DME:    camiloy the pt I ask you about on Friday mrn# [id number] Baylee Wanda called me today now she said  she spoke with Dr. Azul on Thursday and he told her not to do the titration study and to just go ahead and get the apap without it, you please ask Dr. Azul did that conversation take place before I call her back and she said the sleep lab keeps calling her to come in for the titration study      Can you please clarify?

## 2023-04-18 ENCOUNTER — TELEPHONE (OUTPATIENT)
Dept: PULMONOLOGY | Facility: CLINIC | Age: 63
End: 2023-04-18
Payer: OTHER GOVERNMENT

## 2023-04-18 DIAGNOSIS — G47.33 OSA (OBSTRUCTIVE SLEEP APNEA): Primary | ICD-10-CM

## 2023-04-18 NOTE — TELEPHONE ENCOUNTER
Returned patients call back. Patient stated she does not want to do another sleep study. Patient stated she has a sleep study scheduled for 4/26. patient stated when she seen  Dr. Azul on 4/11, he told her she didn't have to do the sleep study because she already had one done. She stated she wasn't going through this anymore. patient said she was given the CPAP orders after her appt all she needs is a okay from the provider to get her CPAP supplies.----- Message from Kristopher Maradiaga sent at 4/18/2023 12:04 PM CDT -----  Contact: [phone]  Patient would like to consult with a nurse in regards to her scheduled sleep study. Please call to advise at [phone].

## 2023-04-26 ENCOUNTER — HOSPITAL ENCOUNTER (OUTPATIENT)
Dept: SLEEP MEDICINE | Facility: HOSPITAL | Age: 63
Discharge: HOME | End: 2023-04-26
Attending: NURSE PRACTITIONER
Payer: OTHER GOVERNMENT

## 2023-04-26 DIAGNOSIS — G47.33 OSA (OBSTRUCTIVE SLEEP APNEA): ICD-10-CM

## 2023-04-26 PROCEDURE — 95811 POLYSOM 6/>YRS CPAP 4/> PARM: CPT

## 2023-04-26 NOTE — Clinical Note
DIAGNOSIS: TUTU , Sleep related hypoxemia  RECOMMENDATIONS: 1. Weight loss 2. CPAP 13 cmwp with mask of choice 3. Oxygen bleed 2 -3 LPM will be required

## 2023-04-28 NOTE — ASSESSMENT & PLAN NOTE
Seasonal symptoms  Albuterol  Recently using inhaler more   Cyclosporine Counseling:  I discussed with the patient the risks of cyclosporine including but not limited to hypertension, gingival hyperplasia,myelosuppression, immunosuppression, liver damage, kidney damage, neurotoxicity, lymphoma, and serious infections. The patient understands that monitoring is required including baseline blood pressure, CBC, CMP, lipid panel and uric acid, and then 1-2 times monthly CMP and blood pressure.

## 2023-05-01 ENCOUNTER — OFFICE VISIT (OUTPATIENT)
Dept: INTERNAL MEDICINE | Facility: CLINIC | Age: 63
End: 2023-05-01
Payer: OTHER GOVERNMENT

## 2023-05-01 ENCOUNTER — LAB VISIT (OUTPATIENT)
Dept: LAB | Facility: HOSPITAL | Age: 63
End: 2023-05-01
Attending: NURSE PRACTITIONER
Payer: OTHER GOVERNMENT

## 2023-05-01 VITALS
SYSTOLIC BLOOD PRESSURE: 112 MMHG | TEMPERATURE: 98 F | OXYGEN SATURATION: 90 % | RESPIRATION RATE: 18 BRPM | HEART RATE: 72 BPM | WEIGHT: 281.75 LBS | BODY MASS INDEX: 46.94 KG/M2 | HEIGHT: 65 IN | DIASTOLIC BLOOD PRESSURE: 74 MMHG

## 2023-05-01 DIAGNOSIS — Z00.00 ROUTINE MEDICAL EXAM: Primary | ICD-10-CM

## 2023-05-01 DIAGNOSIS — J96.11 CHRONIC RESPIRATORY FAILURE WITH HYPOXIA: ICD-10-CM

## 2023-05-01 DIAGNOSIS — G89.29 CHRONIC FOOT PAIN, LEFT: ICD-10-CM

## 2023-05-01 DIAGNOSIS — E11.9 TYPE 2 DIABETES MELLITUS WITHOUT COMPLICATION, WITHOUT LONG-TERM CURRENT USE OF INSULIN: ICD-10-CM

## 2023-05-01 DIAGNOSIS — M79.672 CHRONIC FOOT PAIN, LEFT: ICD-10-CM

## 2023-05-01 DIAGNOSIS — I50.32 CHRONIC DIASTOLIC CONGESTIVE HEART FAILURE: ICD-10-CM

## 2023-05-01 DIAGNOSIS — M17.0 PRIMARY OSTEOARTHRITIS OF BOTH KNEES: ICD-10-CM

## 2023-05-01 DIAGNOSIS — Z99.81 ON HOME OXYGEN THERAPY: ICD-10-CM

## 2023-05-01 LAB
ESTIMATED AVG GLUCOSE: 126 MG/DL (ref 68–131)
GLUCOSE SERPL-MCNC: 109 MG/DL (ref 70–110)
HBA1C MFR BLD: 6 % (ref 4–5.6)

## 2023-05-01 PROCEDURE — 36415 COLL VENOUS BLD VENIPUNCTURE: CPT | Mod: PO | Performed by: NURSE PRACTITIONER

## 2023-05-01 PROCEDURE — 99999 PR PBB SHADOW E&M-EST. PATIENT-LVL V: CPT | Mod: PBBFAC,,, | Performed by: NURSE PRACTITIONER

## 2023-05-01 PROCEDURE — 99214 OFFICE O/P EST MOD 30 MIN: CPT | Mod: S$PBB,,, | Performed by: NURSE PRACTITIONER

## 2023-05-01 PROCEDURE — 82962 GLUCOSE BLOOD TEST: CPT | Mod: PBBFAC,PN | Performed by: NURSE PRACTITIONER

## 2023-05-01 PROCEDURE — 99214 PR OFFICE/OUTPT VISIT, EST, LEVL IV, 30-39 MIN: ICD-10-PCS | Mod: S$PBB,,, | Performed by: NURSE PRACTITIONER

## 2023-05-01 PROCEDURE — 83036 HEMOGLOBIN GLYCOSYLATED A1C: CPT | Performed by: NURSE PRACTITIONER

## 2023-05-01 PROCEDURE — 99999 PR PBB SHADOW E&M-EST. PATIENT-LVL V: ICD-10-PCS | Mod: PBBFAC,,, | Performed by: NURSE PRACTITIONER

## 2023-05-01 PROCEDURE — 95811 POLYSOM 6/>YRS CPAP 4/> PARM: CPT | Mod: 26,,, | Performed by: INTERNAL MEDICINE

## 2023-05-01 PROCEDURE — 99215 OFFICE O/P EST HI 40 MIN: CPT | Mod: PBBFAC,PN | Performed by: NURSE PRACTITIONER

## 2023-05-01 PROCEDURE — 95811 PR POLYSOMNOGRAPHY W/CPAP: ICD-10-PCS | Mod: 26,,, | Performed by: INTERNAL MEDICINE

## 2023-05-01 RX ORDER — LORAZEPAM 2 MG/1
2 TABLET ORAL ONCE
Qty: 1 TABLET | Refills: 0 | Status: SHIPPED | OUTPATIENT
Start: 2023-05-01 | End: 2023-12-07

## 2023-05-01 NOTE — PROCEDURES
"PATIENT: Baylee Muñoz  study Date: 4/26/2023  Referring Physician: Becki Hernandez NP    Indications for Polysomnography: The patient is a 62 year old Female who is 5' 5" and weighs 282.0 lbs.  Her BMI equals 47.0.  A full night PAP titration was performed.  PSG 03/15/2023  CONCLUSION:  1. Moderate Obstructive sleep apnea: AHI was 26.2/hr  2. Spo2 dominik 74%  3. SpO2 was below 88% for 308.1 minutes.  4. No Supine sleep.  5. Arousal was high 73.5/hr  6. Began Supplemental O2 at 2201 @ 1.0 LPM     DIAGNOSIS: Obstructive Sleep Apnea / 327.23, Sleep hypoxemia     RECOMMENDATIONS:  1. CPAP titration  2. Weight loss    Polysomnogram Data: The polysomnogram recorded the standard physiologic parameters including EEG, EOG, EMG, EKG, nasal and oral airflow.  Respiratory parameters of chest and abdominal movements were recorded with Peizo-Crystal motion transducers.  Oxygen saturation was recorded by pulse oximetry.      treatment: The patient was titrated at pressures ranging from 5* cm/H20 with supplemental oxygen at - up to 15* cm/H20 with supplemental oxygen at O2: 2.  The last pressure used in the study was 15* cm/H20 with supplemental oxygen at O2: 2.   (*=CPAP)        Sleep Architecture: The total recording time of the study was 497.1 minutes.  The total sleep time was 408.5 minutes.  The patient spent 6.5% of total sleep time in Stage N1, 53.7% in Stage N2, 17.1% in Stages N3, and 22.6% in REM.   Sleep latency was 6.3 minutes.  REM latency was 80.5 minutes.  Sleep Efficiency was 82.2%.  Sleep Maintenance Efficiency was 82.7%.  Total wake time was 89.0 minutes for a total wake percentage of 17.9%.    Respiratory Events: The polysomnogram revealed a presence of - obstructive, - central, and - mixed apneas resulting in an Apnea index of - events per hour.  There were 13 hypopneas (using 3% desaturation criteria) resulting in a Hypopnea index of 1.9 events per hour.  The combined Apnea/Hypopnea index (using 3% " "desaturation criteria for Hypopneas) was 1.9 events per hour.    Baseline oxygen saturation was 83.7%.  The lowest oxygen saturation was 63.0%.      LIMB ACTIVITY: There were - limb movements recorded.  Of this total, - were classified as PLMs.  Of the PLMs, - were associated with arousals.  The Limb Movement index was - per hour while the PLM index was - per hour.    CARDIAC SUMMARY:   The average pulse rate was 77.7 bpm.  The minimum pulse rate was 44.0 bpm while the maximum pulse rate was 100.0 bpm.    CONCLUSION:  Adeqyuate titration with overall AHI 1.9/hr  CPAP 13 cmwp was well tolerated with REM sleep  SpO2 dominik was 77%, Oxygen was used during test 1-2 LPM  Mask & Size: Resmed AirFit F20 M/ F30 M    DIAGNOSIS: TUTU , Sleep related hypoxemia    RECOMMENDATIONS:  Weight loss  CPAP 13 cmwp with mask of choice  Oxygen bleed 2 -3 LPM will be required  Dear PIRSCILL[facility] Alvarado-LIA[facility]  [address] [place] [address] [place] [id number]/Becki Hernandez NP         The sleep study that you ordered is complete.  You have ordered sleep LAB services to perform the sleep study for Baylee Muñoz .      Please find Sleep Study result in  the "Media tab" of Chart Review menu.        You can look  for the report in the  Media by the document type "Sleep Study Documents". Alphabetizing  "Document type" column helps to find the SLEEP STUDY report  Faster.       As the ordering provider, you are responsible for reviewing the results and implementing a treatment plan with your patient.    If you need a Sleep Medicine provider to explain the sleep study findings and arrange treatment for the patient, please refer patient for consultation to our Sleep Clinic via [facility] with Ambulatory Consult Sleep.     To do that please place an order for an  "Ambulatory Consult Sleep" -  order , it will go to our clinic work queue for our staff  to contact the patient for an appointment.      For any questions, please contact our sleep lab  " staff at [phone] to talk to clinical staff          Frankie Klein MD

## 2023-05-01 NOTE — PROGRESS NOTES
Subjective     Patient ID: Baylee Muñoz is a 62 y.o. female.    Chief Complaint: Follow-up (3 mo)    Patient presents for 3 month follow up.     Medical history: Obesity, Vit D Def, HTN, Smoker, Asthma, Gastritis, OA knee, Chronic Respiratory Failure, Nasal Congestion, GI Bleed, HLP, Diverticulitis, Type II DM, TUTU    Reports left foot pain.  Was told of ligament years ago while in .  Reports pain is getting worse over time.  Pain starts on left side of foot and radiates up the ankle.  Tried Biofreeze.      Continues to have bilateral knee pain.  Interested in pain management through medical marijuana (topical).  No interested in narcotic pain prescription.      She's still working.      Lost about 5 lbs since our last visit.  Reports she's minimized eating chips and drinking sodas.          Follow-up  Associated symptoms include arthralgias and joint swelling. Pertinent negatives include no chills, coughing or fatigue.   Review of Systems   Constitutional:  Negative for chills and fatigue.   Respiratory:  Positive for shortness of breath. Negative for cough.    Cardiovascular:  Negative for leg swelling.   Gastrointestinal:  Negative for constipation and diarrhea.   Musculoskeletal:  Positive for arthralgias, gait problem and joint swelling.   Psychiatric/Behavioral:  Negative for agitation and confusion.         Objective     Physical Exam  Vitals reviewed.   Constitutional:       Appearance: Normal appearance.   HENT:      Head: Normocephalic.   Cardiovascular:      Rate and Rhythm: Normal rate and regular rhythm.   Pulmonary:      Effort: Pulmonary effort is normal.      Breath sounds: Normal breath sounds.   Abdominal:      General: Bowel sounds are normal.   Musculoskeletal:         General: Swelling and tenderness present.        Feet:    Feet:      Comments: Pain/swelling  Skin:     General: Skin is warm.   Neurological:      General: No focal deficit present.      Mental Status: She is alert and  oriented to person, place, and time.   Psychiatric:         Mood and Affect: Mood normal.         Behavior: Behavior normal. Behavior is cooperative.          Assessment and Plan     Problem List Items Addressed This Visit       Primary osteoarthritis of both knees    Chronic respiratory failure with hypoxia    Chronic diastolic congestive heart failure    Type 2 diabetes mellitus without complication, without long-term current use of insulin    Relevant Orders    HEMOGLOBIN A1C    POCT Glucose, Hand-Held Device (Completed)    On home oxygen therapy     Other Visit Diagnoses       Routine medical exam    -  Primary    Chronic foot pain, left        Relevant Orders    MRI Foot (Forefoot) Left Without Contrast    MRI Foot (Midfoot) Left Without Contrast    MRI Foot (Hindfoot) Left Without Contrast            Routine medical exam    Type 2 diabetes mellitus without complication, without long-term current use of insulin  Comments:  Hemaglobin A1C today. Continue current treatment plan  Orders:  -     HEMOGLOBIN A1C; Future; Expected date: 05/01/2023  -     POCT Glucose, Hand-Held Device    Primary osteoarthritis of both knees  Comments:  Continue current treatment plan    Chronic diastolic congestive heart failure    Chronic foot pain, left  -     MRI Foot (Forefoot) Left Without Contrast; Future; Expected date: 05/01/2023  -     MRI Foot (Midfoot) Left Without Contrast; Future; Expected date: 05/01/2023  -     MRI Foot (Hindfoot) Left Without Contrast; Future; Expected date: 05/01/2023    Chronic respiratory failure with hypoxia  Comments:  Stable.  Continue current treatment plan    On home oxygen therapy    Other orders  -     LORazepam (ATIVAN) 2 MG Tab; Take 1 tablet (2 mg total) by mouth once. Take 30-40 minutes before MRI for 1 dose  Dispense: 1 tablet; Refill: 0         Lab today     Give medical marijuana provider information    Three month follow up

## 2023-05-03 DIAGNOSIS — E11.9 TYPE 2 DIABETES MELLITUS WITHOUT COMPLICATION: ICD-10-CM

## 2023-05-27 NOTE — PROGRESS NOTES
Working mammogram report; I called pt to schedule overdue mammogram,pt declined to schedule at this time.     
Walk in

## 2023-05-30 ENCOUNTER — PATIENT OUTREACH (OUTPATIENT)
Dept: ADMINISTRATIVE | Facility: HOSPITAL | Age: 63
End: 2023-05-30
Payer: OTHER GOVERNMENT

## 2023-05-30 NOTE — PROGRESS NOTES
DM EYE EXAM: per chart review pt is overdue for dm eye exam, spoke to pt she states she went to [facility] but does not remember when? Pt requested I call [facility], I called spoke with Jacqueline who stated she could not confirm pt last visit, pt would need to call themselves, informed pt of what I was told, will fax request for record to Liza Ferrer , will set  remind me 1 wk.

## 2023-05-30 NOTE — PROGRESS NOTES
Pt called back and stated [facility] told her last eye visit was 02/2021, she will check with insurance she would prefer to see [facility] but not sure the visit is covered, she will ck and call back if needed, if not she will r/s with [facility].

## 2023-05-30 NOTE — LETTER
AUTHORIZATION FOR RELEASE OF   CONFIDENTIAL INFORMATION    Dear [facility],    We are seeing Baylee Muñoz, date of birth 1960, in the clinic at [facility]. Becki Hernandez NP is the patient's PCP. Baylee Muñoz has an outstanding lab/procedure at the time we reviewed her chart. In order to help keep her health information updated, she has authorized us to request the following medical record(s):        (  )  MAMMOGRAM                                      (  )  COLONOSCOPY      (  )  PAP SMEAR                                          (  )  OUTSIDE LAB RESULTS     (  )  DEXA SCAN                                          (x  ) DM  EYE EXAM            (  )  FOOT EXAM                                          (  )  ENTIRE RECORD     (  )  OUTSIDE IMMUNIZATIONS                 (  )  _______________    This pt stated she has had a DM eye exam 2021, please fax over record to help maintain pt health maintenance record.  She is aware she is in need of a DM eye exam       Please fax records to [facility],Andra W LPN -[phone]     If you have any questions, please contact Pepper SANCHEZ LPN -[phone].           Patient Name: Baylee Muñoz  : 1960  Patient Phone #: [phone]     Thanks   Pepper SANCHEZ [facility]  Care Coordination Department  [facility]

## 2023-05-30 NOTE — LETTER
AUTHORIZATION FOR RELEASE OF   CONFIDENTIAL INFORMATION    Dear [facility],    We are seeing Baylee Muñoz, date of birth 1960, in the clinic at [facility]. Becki Hernandez NP is the patient's PCP. Baylee Muñoz has an outstanding DM EYE EXAM at the time we reviewed her chart. In order to help keep her health information updated, she has authorized us to request the following medical record(s):        (  )  MAMMOGRAM                                      (  )  COLONOSCOPY      (  )  PAP SMEAR                                          (  )  OUTSIDE LAB RESULTS     (  )  DEXA SCAN                                          (  X) DM EYE EXAM            (  )  FOOT EXAM                                          (  )  ENTIRE RECORD     (  )  OUTSIDE IMMUNIZATIONS                 (  )  _______________    This pt stated she has had a DM eye exam 2021, please fax over record to help maintain pt health maintenance record.  She is aware she is in need of a DM eye exam         Please FAX records to [facility], Andra W LPN -[phone]     If you have any questions, please contact Pepper SANCHEZ LPN -[phone].           Patient Name: Baylee Muñoz  : 1960  Patient Phone #: [phone]     Thanks  Pepper SANCHEZ [facility]  Care Coordination Department  [facility]

## 2023-06-21 DIAGNOSIS — E11.9 TYPE 2 DIABETES MELLITUS WITHOUT COMPLICATION: ICD-10-CM

## 2023-09-06 ENCOUNTER — APPOINTMENT (OUTPATIENT)
Dept: RADIOLOGY | Facility: HOSPITAL | Age: 63
End: 2023-09-06
Attending: NURSE PRACTITIONER
Payer: OTHER GOVERNMENT

## 2023-09-06 ENCOUNTER — OFFICE VISIT (OUTPATIENT)
Dept: INTERNAL MEDICINE | Facility: CLINIC | Age: 63
End: 2023-09-06
Payer: OTHER GOVERNMENT

## 2023-09-06 VITALS
SYSTOLIC BLOOD PRESSURE: 140 MMHG | HEART RATE: 88 BPM | DIASTOLIC BLOOD PRESSURE: 82 MMHG | OXYGEN SATURATION: 92 % | HEIGHT: 65 IN | TEMPERATURE: 99 F | RESPIRATION RATE: 18 BRPM | BODY MASS INDEX: 47.83 KG/M2 | WEIGHT: 287.06 LBS

## 2023-09-06 DIAGNOSIS — G47.33 OSA (OBSTRUCTIVE SLEEP APNEA): ICD-10-CM

## 2023-09-06 DIAGNOSIS — E11.9 TYPE 2 DIABETES MELLITUS WITHOUT COMPLICATION, WITHOUT LONG-TERM CURRENT USE OF INSULIN: ICD-10-CM

## 2023-09-06 DIAGNOSIS — M25.462 PAIN AND SWELLING OF LEFT KNEE: ICD-10-CM

## 2023-09-06 DIAGNOSIS — S89.92XA INJURY OF LEFT KNEE, INITIAL ENCOUNTER: ICD-10-CM

## 2023-09-06 DIAGNOSIS — Z11.59 ENCOUNTER FOR HEPATITIS C SCREENING TEST FOR LOW RISK PATIENT: ICD-10-CM
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
## 2023-09-06 DIAGNOSIS — M25.562 PAIN AND SWELLING OF LEFT KNEE: ICD-10-CM

## 2023-09-06 DIAGNOSIS — S80.02XA TRAUMATIC HEMATOMA OF LEFT KNEE, INITIAL ENCOUNTER: ICD-10-CM

## 2023-09-06 DIAGNOSIS — E66.01 MORBID OBESITY: ICD-10-CM

## 2023-09-06 PROCEDURE — 73560 X-RAY EXAM OF KNEE 1 OR 2: CPT | Mod: TC,59,PO,RT

## 2023-09-06 PROCEDURE — 73562 XR KNEE ORTHO LEFT: ICD-10-PCS | Mod: 26,LT,, | Performed by: RADIOLOGY

## 2023-09-06 PROCEDURE — 73560 XR KNEE ORTHO LEFT: ICD-10-PCS | Mod: 26,RT,, | Performed by: RADIOLOGY

## 2023-09-06 PROCEDURE — 73560 X-RAY EXAM OF KNEE 1 OR 2: CPT | Mod: 26,RT,, | Performed by: RADIOLOGY

## 2023-09-06 PROCEDURE — 99999 PR PBB SHADOW E&M-EST. PATIENT-LVL V: ICD-10-PCS | Mod: PBBFAC,,, | Performed by: NURSE PRACTITIONER

## 2023-09-06 PROCEDURE — 99213 OFFICE O/P EST LOW 20 MIN: CPT | Mod: S$PBB,,, | Performed by: NURSE PRACTITIONER

## 2023-09-06 PROCEDURE — 99999 PR PBB SHADOW E&M-EST. PATIENT-LVL V: CPT | Mod: PBBFAC,,, | Performed by: NURSE PRACTITIONER

## 2023-09-06 PROCEDURE — 73562 X-RAY EXAM OF KNEE 3: CPT | Mod: 26,LT,, | Performed by: RADIOLOGY

## 2023-09-06 PROCEDURE — 99213 PR OFFICE/OUTPT VISIT, EST, LEVL III, 20-29 MIN: ICD-10-PCS | Mod: S$PBB,,, | Performed by: NURSE PRACTITIONER

## 2023-09-06 PROCEDURE — 73562 X-RAY EXAM OF KNEE 3: CPT | Mod: TC,PO,LT

## 2023-09-06 PROCEDURE — 99215 OFFICE O/P EST HI 40 MIN: CPT | Mod: PBBFAC,PN | Performed by: NURSE PRACTITIONER

## 2023-09-06 NOTE — PROGRESS NOTES
Subjective     Patient ID: Baylee Muñoz is a 63 y.o. female.    Chief Complaint: Fall and Knee Injury (Left due to fall 2 wks ago w/ edema)    Patient presents for left knee injury.  Fall at work about 2 weeks ago.  Fell on left side.  Injury to left elbow-improving and left knee.      Had evaluation to [facility] and x-ray of right knee.  No imaging completed on left knee.      Intermittent SOB.  Reports heat is making it more difficult.      Fall  Pertinent negatives include no abdominal pain or fever.   Knee Injury  Associated symptoms include arthralgias and joint swelling. Pertinent negatives include no abdominal pain, chest pain, chills, coughing, fatigue or fever.     Review of Systems   Constitutional:  Negative for chills, fatigue and fever.   Respiratory:  Positive for shortness of breath. Negative for cough.    Cardiovascular:  Negative for chest pain and leg swelling.   Gastrointestinal:  Negative for abdominal pain, constipation and diarrhea.   Musculoskeletal:  Positive for arthralgias, gait problem and joint swelling.   Psychiatric/Behavioral:  Negative for agitation and confusion.           Objective     Physical Exam  Vitals reviewed.   Constitutional:       Appearance: Normal appearance.   HENT:      Head: Normocephalic.   Cardiovascular:      Rate and Rhythm: Normal rate and regular rhythm.      Pulses:           Dorsalis pedis pulses are 2+ on the right side and 2+ on the left side.   Pulmonary:      Effort: Pulmonary effort is normal.      Breath sounds: Decreased air movement present.   Musculoskeletal:         General: Tenderness present.      Left elbow: Swelling present.        Arms:       Left knee: Swelling present. Normal range of motion. Tenderness present.        Legs:    Feet:      Right foot:      Protective Sensation: 6 sites tested.  6 sites sensed.      Skin integrity: Skin integrity normal.      Left foot:      Protective Sensation: 6 sites tested.  6 sites sensed.       Skin integrity: Skin integrity normal.   Skin:     General: Skin is warm.   Neurological:      General: No focal deficit present.      Mental Status: She is alert.   Psychiatric:         Mood and Affect: Mood normal.         Behavior: Behavior normal. Behavior is cooperative.            Assessment and Plan     1. Fall, initial encounter    2. Injury of left knee, initial encounter  -     X-ray Knee Ortho Left; Future; Expected date: 09/06/2023    3. Pain and swelling of left knee    4. Type 2 diabetes mellitus without complication, without long-term current use of insulin  -     Foot Exam Performed  -     HEMOGLOBIN A1C; Future; Expected date: 09/06/2023  -     Urinalysis; Future; Expected date: 09/06/2023    5. Morbid obesity    6. TUTU (obstructive sleep apnea)  Overview:  Moderate Obstructive sleep apnea: AHI was 26.2/hr and nocturnal hypoxemia - requiring supplemental oxygen      7. Encounter for hepatitis C screening test for low risk patient  -     HEPATITIS C ANTIBODY; Future; Expected date: 09/06/2023    8. Traumatic hematoma of left knee, initial encounter  -     Ambulatory referral/consult to Orthopedics; Future; Expected date: 09/13/2023        Labs today (add lipid and microal) X-ray today     Three month follow up       Follow up in about 3 months (around 12/6/2023).

## 2023-09-13 ENCOUNTER — TELEPHONE (OUTPATIENT)
Dept: INTERNAL MEDICINE | Facility: CLINIC | Age: 63
End: 2023-09-13
Payer: OTHER GOVERNMENT

## 2023-09-13 ENCOUNTER — PATIENT MESSAGE (OUTPATIENT)
Dept: SPORTS MEDICINE | Facility: CLINIC | Age: 63
End: 2023-09-13
Payer: OTHER GOVERNMENT

## 2023-09-13 NOTE — TELEPHONE ENCOUNTER
----- Message from Martha Nair sent at 9/13/2023  9:58 AM CDT -----  Name of Who is Calling:Patient           What is the request in detail:Patient is requesting a call back regarding lab results           Can the clinic reply by [facility]:no           [facility] [phone]

## 2023-09-13 NOTE — TELEPHONE ENCOUNTER
I returned a call back to the pt and she wanted to get her lab results since she's unable to get into her portal . She verbalized understanding of results of labs and xray. //kah

## 2023-09-21 ENCOUNTER — PATIENT OUTREACH (OUTPATIENT)
Dept: ADMINISTRATIVE | Facility: HOSPITAL | Age: 63
End: 2023-09-21
Payer: OTHER GOVERNMENT

## 2023-09-21 NOTE — PROGRESS NOTES
HTN REPORT: per chart review, bp recheck is needed, spoke to pt, she does not have a bp cuff at home, declined nv bp check.

## 2023-10-03 ENCOUNTER — OFFICE VISIT (OUTPATIENT)
Dept: SPORTS MEDICINE | Facility: CLINIC | Age: 63
End: 2023-10-03
Payer: OTHER GOVERNMENT

## 2023-10-03 VITALS — BODY MASS INDEX: 47.83 KG/M2 | HEIGHT: 65 IN | WEIGHT: 287.06 LBS

## 2023-10-03 DIAGNOSIS — S80.02XA TRAUMATIC HEMATOMA OF KNEE, LEFT, INITIAL ENCOUNTER: Primary | ICD-10-CM

## 2023-10-03 PROCEDURE — 99999 PR PBB SHADOW E&M-EST. PATIENT-LVL III: ICD-10-PCS | Mod: PBBFAC,,, | Performed by: STUDENT IN AN ORGANIZED HEALTH CARE EDUCATION/TRAINING PROGRAM

## 2023-10-03 PROCEDURE — 99203 PR OFFICE/OUTPT VISIT, NEW, LEVL III, 30-44 MIN: ICD-10-PCS | Mod: S$PBB,,, | Performed by: STUDENT IN AN ORGANIZED HEALTH CARE EDUCATION/TRAINING PROGRAM

## 2023-10-03 PROCEDURE — 99999 PR PBB SHADOW E&M-EST. PATIENT-LVL III: CPT | Mod: PBBFAC,,, | Performed by: STUDENT IN AN ORGANIZED HEALTH CARE EDUCATION/TRAINING PROGRAM

## 2023-10-03 PROCEDURE — 99213 OFFICE O/P EST LOW 20 MIN: CPT | Mod: PBBFAC | Performed by: STUDENT IN AN ORGANIZED HEALTH CARE EDUCATION/TRAINING PROGRAM

## 2023-10-03 PROCEDURE — 99203 OFFICE O/P NEW LOW 30 MIN: CPT | Mod: S$PBB,,, | Performed by: STUDENT IN AN ORGANIZED HEALTH CARE EDUCATION/TRAINING PROGRAM

## 2023-10-03 NOTE — PROGRESS NOTES
Orthopaedics Sports Medicine     Knee Initial Visit         10/3/2023    Referring MD: Self, Aaareferral    Chief Complaint   Patient presents with    Left Knee - Pain, Swelling       History of Present Illness:   Baylee Muñoz is a 63 y.o. year old female patient presents with pain and dysfunction involving the left knee.     Onset of the symptoms was about a month ago.     Inciting event: Fall onto knee.     Current symptoms include swelling into the lower leg and some pain.     Pain is aggravated by ROM, weight bearing.      Evaluation to date: Xray.     Treatment to date: Rest, activity modification, Aleve, ice.       Past Medical History:   Past Medical History:   Diagnosis Date    Allergy     Arthritis     Asthma     Admit to [facility] 1/6/19 by Dr. Michael Randall for asthma exaceration, acute bronchitis, acute dCHF, hypoxia, elevated troponin/BNP (thought to be 2/2 asthma exac & acute dCHF by cards), tx'd w/ IV lasix, duonebs, IV solumedrol and DC'd on Z-prema, prednisone taper    CKD (chronic kidney disease) stage 2, GFR 60-89 ml/min     H/o ARF 2/2 poor water intake; encouraged to increase water intake and avoid NSAIDS & contrast dye    COVID 06/2022    Depression     Diastolic heart failure 01/06/2019    Dx'd at [facility] during asthma exacerbation    Diverticulitis 2005    Dyslipidemia 08/28/2018    Former smoker     Quit 12/31/18; smoked 0.12 packs/day x 37 years    Gastritis     Hypertension     Pneumonia 06/2022    Prediabetes     A1c 6.1% on 9/29/19    Prediabetes     Severe obesity (BMI >= 40)     Vertigo     Vitamin D deficiency        Past Surgical History:   Past Surgical History:   Procedure Laterality Date    COLONOSCOPY N/A 8/10/2022    Procedure: COLONOSCOPY;  Surgeon: Dariela Devi MD;  Location: [facility] ENDO;  Service: Endoscopy;  Laterality: N/A;    COLONOSCOPY N/A 11/16/2022    Procedure: COLONOSCOPY;  Surgeon: Angie Neves MD;  Location: [facility] ENDO;  Service: Endoscopy;  Laterality: N/A;     DILATION AND CURETTAGE OF UTERUS      missed , bleeding    ESOPHAGOGASTRODUODENOSCOPY N/A 2022    Procedure: EGD (ESOPHAGOGASTRODUODENOSCOPY);  Surgeon: Dariela Devi MD;  Location: [facility];  Service: Endoscopy;  Laterality: N/A;    EXPLORATORY LAPAROTOMY      lysis of adhesions, open tube    KNEE SURGERY Right     torn mcl    laser to cervix         Medications:  Patient's Medications   New Prescriptions    No medications on file   Previous Medications    ACETAMINOPHEN (TYLENOL) 325 MG TABLET    Take 2 tablets (650 mg total) by mouth every 6 (six) hours as needed for Pain.    ALBUTEROL (PROAIR HFA) 90 MCG/ACTUATION INHALER    USE 2 INHALATIONS EVERY 6 HOURS AS NEEDED FOR WHEEZING    ALBUTEROL-IPRATROPIUM (DUO-NEB) 2.5 MG-0.5 MG/3 ML NEBULIZER SOLUTION    Take 3 mLs by nebulization every 6 (six) hours as needed for Wheezing. Rescue    AMLODIPINE (NORVASC) 5 MG TABLET    Take 1 tablet (5 mg total) by mouth once daily.    ATORVASTATIN (LIPITOR) 40 MG TABLET    Take 1 tablet (40 mg total) by mouth once daily.    BLOOD SUGAR DIAGNOSTIC STRP    To check BG 2 times daily, to use with insurance preferred meter    BLOOD-GLUCOSE METER KIT    To check BG 2 times daily, to use with insurance preferred meter    BUTALBITAL-ACETAMINOPHEN-CAFFEINE -40 MG (FIORICET, ESGIC) -40 MG PER TABLET    Take 1 tablet by mouth every 6 (six) hours as needed for Headaches.    CALCIUM CARBONATE/VITAMIN D3 (VITAMIN D-3 ORAL)    Take 1 tablet by mouth once daily. (600 mg/ 400 iu)    ELDERBERRY FRUIT ORAL    Take 50 mg by mouth once daily.    FLUTICASONE PROPIONATE (FLONASE) 50 MCG/ACTUATION NASAL SPRAY    1 spray (50 mcg total) by Each Nostril route once daily.    FLUTICASONE-SALMETEROL DISKUS INHALER 500-50 MCG    Inhale 1 puff into the lungs 2 (two) times daily. Controller    GLIPIZIDE (GLUCOTROL) 2.5 MG TR24    Take 1 tablet (2.5 mg total) by mouth daily with breakfast.    LANCETS MISC    To check BG 2 times  "daily, to use with insurance preferred meter    LORAZEPAM (ATIVAN) 2 MG TAB    Take 1 tablet (2 mg total) by mouth once. Take 30-40 minutes before MRI for 1 dose    MV-MIN/IRON/FOLIC/CALCIUM/VITK (WOMEN'S MULTIVITAMIN ORAL)    Take 1 tablet by mouth once daily.    PULSE OXIMETER (PULSE OXIMETER) DEVICE    by Apply Externally route 2 (two) times a day. Use twice daily at 8 AM and 3 PM and record the value in MyChart as directed.    VITAMIN D (VITAMIN D3) 1000 UNITS TAB    Take 1,000 Units by mouth once daily.   Modified Medications    No medications on file   Discontinued Medications    No medications on file        Allergies:   Review of patient's allergies indicates:   Allergen Reactions    Coconut Anaphylaxis    Iodine and iodide containing products Anaphylaxis    Dairy aid [lactase] Diarrhea and Nausea And Vomiting    Penicillins Hives       Social History:   [place]: Lauren  occupation:  at [employer]  alcohol use: She reports no history of alcohol use.  tobacco use: She reports that she quit smoking about 3 years ago. Her smoking use included cigarettes. She started smoking about 43 years ago. She has a 10.0 pack-year smoking history. She has never been exposed to tobacco smoke. She has never used smokeless tobacco.    Review of systems:  history of recent illness, fevers, shakes, or chills: no  history of cardiac problems or chest pain: no  history of of pulmonary problems or asthma: Asthma, uses oxygen  history of diabetes: Type 2  history of prior DVT or clotting problems: no  history of sleep apnea: no    Physical Examination:  Estimated body mass index is 47.77 kg/m² as calculated from the following:    Height as of this encounter: 5' 5" (1.651 m).    Weight as of this encounter: 130.2 kg (287 lb 0.6 oz).    Standing exam  stance: normal alignment, no significant leg-length discrepancy  gait: antalgic      Knee      RIGHT  LEFT  Skin:     Intact   Intact  ROM:     0-130  0-130  Effusion:    Neg   " +  Medial joint line tenderness:  Neg   Neg  Lateral joint line tenderness:  Neg   Neg  Jo Ann:     Neg   Neg  Patella crepitus:   Neg   Neg  Patella tenderness:   Neg   Neg  Patella grind:      Neg   Neg  Lachman:    Neg   Neg  Valgus stress:    Neg   Neg  Varus stress:    Neg   Neg  Posterior drawer:   Neg   Neg  N-V               intact  intact  Hip:    nml    nml   Lower extremity edema: Negative negative    +soft tissue swelling pre-patellar bursa    Neurovascular exam  - motor function grossly intact bilaterally to hip flexion, knee extension and flexion, ankle dorsiflexion and plantarflexion  - sensation intact to light touch bilaterally to femoral, tibial, tibial and peroneal distributions  - symmetrical pedal pulses    Imaging:   XR Results:  Results for orders placed in visit on 09/06/23    X-ray Knee Ortho Left    Narrative  EXAM:  XR KNEE ORTHO LEFT    CLINICAL HISTORY: Left knee pain.  Trauma.    TECHNIQUE: 4 views of the left knee.    COMPARISON: None available.    FINDINGS:  Pronounced prepatellar soft tissue swelling about the left knee.  There is likely a subcutaneous hematoma measuring 9 cm craniocaudal x 12 cm transverse x 2 cm AP.  No acute fracture is evident.  Joint alignment is anatomic.  No significant joint effusion.  There is medial and lateral joint space narrowing of both knees, right greater than left.  There are adjacent osteophytes.    Impression  Pronounced patellar swelling of the left knee with possible subcutaneous hematoma.  No acute fractures.  Osteoarthritis of the knee joints, right greater than left.    Finalized on: 9/6/2023 4:36 PM By:  Natalio Garcia MD  [facility]# [id number]      2023-09-06 [id number]    [facility]      MRI Results:  No results found for this or any previous visit.      CT Results:  No results found for this or any previous visit.      Physician Read: I agree with the above impression.    Impression:  63 y.o. female with left knee prepatellar  hematoma    Plan:  Discussed diagnosis and treatment options with the patient today. Her clinical exam and imaging are consistent with prepatellar hematoma  We went over treatment plan including compression, ice, nsaids, and observation. I reassured her that the hematoma will resolve over time. I do not recommend incision and drainage as I think this would increase her risk of infection and delayed wound healing.  I recommend a compression sleeve for knee and ice 2-3 times a day.  Follow-up as needed.        Dom Powell MD    I, Stefania Lynda, acted as a scribe for Dom Powell MD for the duration of this office visit.

## 2023-10-24 ENCOUNTER — TELEPHONE (OUTPATIENT)
Dept: INTERNAL MEDICINE | Facility: CLINIC | Age: 63
End: 2023-10-24
Payer: OTHER GOVERNMENT

## 2023-10-24 NOTE — TELEPHONE ENCOUNTER
Called pt regarding dm eye exam. Pt declined at this time, states had a death in the family and other personal concerns.

## 2023-10-26 ENCOUNTER — TELEPHONE (OUTPATIENT)
Dept: INTERNAL MEDICINE | Facility: CLINIC | Age: 63
End: 2023-10-26
Payer: OTHER GOVERNMENT

## 2023-10-26 NOTE — TELEPHONE ENCOUNTER
Called pt regarding bp check, pt reports she will call back with bp reading or come by for a nurse visit.

## 2023-10-27 ENCOUNTER — CLINICAL SUPPORT (OUTPATIENT)
Dept: INTERNAL MEDICINE | Facility: CLINIC | Age: 63
End: 2023-10-27
Payer: OTHER GOVERNMENT

## 2023-10-27 VITALS — DIASTOLIC BLOOD PRESSURE: 84 MMHG | SYSTOLIC BLOOD PRESSURE: 124 MMHG

## 2023-10-27 DIAGNOSIS — I10 ESSENTIAL HYPERTENSION: Primary | ICD-10-CM

## 2023-10-27 PROCEDURE — 99999 PR PBB SHADOW E&M-EST. PATIENT-LVL I: ICD-10-PCS | Mod: PBBFAC,,,

## 2023-10-27 PROCEDURE — 99999 PR PBB SHADOW E&M-EST. PATIENT-LVL I: CPT | Mod: PBBFAC,,,

## 2023-10-27 PROCEDURE — 99211 OFF/OP EST MAY X REQ PHY/QHP: CPT | Mod: PBBFAC,PN

## 2023-12-01 RX ORDER — GLIPIZIDE 2.5 MG/1
2.5 TABLET, EXTENDED RELEASE ORAL
Qty: 30 TABLET | Refills: 11 | Status: SHIPPED | OUTPATIENT
Start: 2023-12-01 | End: 2024-11-30

## 2023-12-01 NOTE — TELEPHONE ENCOUNTER
----- Message from Vinnie Batres sent at 12/1/2023 10:33 AM CST -----  Contact: Baylee  Type:  RX Refill Request  Who Called: Baylee   Refill or New Rx:refill  RX Name and Strength:glipiZIDE (GLUCOTROL) 2.5 MG TR24  How is the patient currently taking it? (ex. 1XDay):1xday  Is this a 30 day or 90 day RX:90day  Preferred Pharmacy with phone number:  [id number] - [place], [place]  [facility] [facility] [address] [facility] AT [facility] [address]  [address] [facility] [address]  Phone: [phone] Fax: [phone]  Local or Mail Order:local  Ordering Provider:Becki Hernandez Np  Would the patient rather a call back or a response via [facility]? Call back   Best Call Back Number:[phone]  Additional Information:  Patient's bottle says 3 more refills but pharmacy saying they need a new prescription to refill  Thanks   Am

## 2023-12-05 DIAGNOSIS — I10 ESSENTIAL HYPERTENSION: ICD-10-CM

## 2023-12-05 RX ORDER — AMLODIPINE BESYLATE 5 MG/1
5 TABLET ORAL DAILY
Qty: 90 TABLET | Refills: 3 | Status: SHIPPED | OUTPATIENT
Start: 2023-12-05 | End: 2024-12-04

## 2023-12-07 ENCOUNTER — OFFICE VISIT (OUTPATIENT)
Dept: INTERNAL MEDICINE | Facility: CLINIC | Age: 63
End: 2023-12-07
Payer: OTHER GOVERNMENT

## 2023-12-07 VITALS
OXYGEN SATURATION: 94 % | HEART RATE: 88 BPM | WEIGHT: 289 LBS | SYSTOLIC BLOOD PRESSURE: 124 MMHG | DIASTOLIC BLOOD PRESSURE: 66 MMHG | HEIGHT: 65 IN | TEMPERATURE: 99 F | RESPIRATION RATE: 18 BRPM | BODY MASS INDEX: 48.15 KG/M2

## 2023-12-07 DIAGNOSIS — I10 ESSENTIAL HYPERTENSION: ICD-10-CM

## 2023-12-07 DIAGNOSIS — J44.9 OBSTRUCTIVE LUNG DISEASE: ICD-10-CM

## 2023-12-07 DIAGNOSIS — J96.11 CHRONIC RESPIRATORY FAILURE WITH HYPOXIA: ICD-10-CM

## 2023-12-07 DIAGNOSIS — Z00.00 ROUTINE MEDICAL EXAM: Primary | ICD-10-CM

## 2023-12-07 DIAGNOSIS — I10 PRIMARY HYPERTENSION: ICD-10-CM

## 2023-12-07 DIAGNOSIS — E11.9 TYPE 2 DIABETES MELLITUS WITHOUT COMPLICATION, WITHOUT LONG-TERM CURRENT USE OF INSULIN: ICD-10-CM

## 2023-12-07 DIAGNOSIS — F41.9 ANXIETY: ICD-10-CM

## 2023-12-07 DIAGNOSIS — R09.81 NASAL CONGESTION: ICD-10-CM

## 2023-12-07 LAB — GLUCOSE SERPL-MCNC: 125 MG/DL (ref 70–110)

## 2023-12-07 PROCEDURE — 82962 GLUCOSE BLOOD TEST: CPT | Mod: PBBFAC,PN | Performed by: NURSE PRACTITIONER

## 2023-12-07 PROCEDURE — 99999PBSHW POCT GLUCOSE, HAND-HELD DEVICE: Mod: PBBFAC,,,

## 2023-12-07 PROCEDURE — 99999 PR PBB SHADOW E&M-EST. PATIENT-LVL V: ICD-10-PCS | Mod: PBBFAC,,, | Performed by: NURSE PRACTITIONER

## 2023-12-07 PROCEDURE — 99999 PR PBB SHADOW E&M-EST. PATIENT-LVL V: CPT | Mod: PBBFAC,,, | Performed by: NURSE PRACTITIONER

## 2023-12-07 PROCEDURE — 99215 OFFICE O/P EST HI 40 MIN: CPT | Mod: PBBFAC,PN | Performed by: NURSE PRACTITIONER

## 2023-12-07 PROCEDURE — 99214 PR OFFICE/OUTPT VISIT, EST, LEVL IV, 30-39 MIN: ICD-10-PCS | Mod: S$PBB,,, | Performed by: NURSE PRACTITIONER

## 2023-12-07 PROCEDURE — 99999PBSHW POCT GLUCOSE, HAND-HELD DEVICE: ICD-10-PCS | Mod: PBBFAC,,,

## 2023-12-07 PROCEDURE — 99214 OFFICE O/P EST MOD 30 MIN: CPT | Mod: S$PBB,,, | Performed by: NURSE PRACTITIONER

## 2023-12-07 RX ORDER — ATORVASTATIN CALCIUM 40 MG/1
40 TABLET, FILM COATED ORAL DAILY
Qty: 90 TABLET | Refills: 3 | Status: SHIPPED | OUTPATIENT
Start: 2023-12-07

## 2023-12-07 RX ORDER — BUSPIRONE HYDROCHLORIDE 5 MG/1
5 TABLET ORAL 2 TIMES DAILY
Qty: 60 TABLET | Refills: 2 | Status: SHIPPED | OUTPATIENT
Start: 2023-12-07

## 2023-12-07 RX ORDER — FLUTICASONE PROPIONATE 50 MCG
1 SPRAY, SUSPENSION (ML) NASAL DAILY
Qty: 16 G | Refills: 3 | Status: SHIPPED | OUTPATIENT
Start: 2023-12-07

## 2023-12-07 RX ORDER — ALBUTEROL SULFATE 90 UG/1
AEROSOL, METERED RESPIRATORY (INHALATION)
Qty: 25.5 G | Refills: 11 | Status: SHIPPED | OUTPATIENT
Start: 2023-12-07

## 2023-12-07 NOTE — PROGRESS NOTES
Subjective     Patient ID: Baylee Muñoz is a 63 y.o. female.    Chief Complaint: Follow-up (3 mo)    Patient presents for routine exam.  Reports doing ok.  Some changes in breathing.  Having issues with pharmacy getting prescriptions.     Medical history: Obesity, Vit D Def, HTN, Smoker, Asthma, Gastritis, OA knee, Chronic Respiratory Failure, Nasal Congestion, GI Bleed, HLP, Diverticulitis, Type II DM, TUTU    CPAP-was ordered but has $150 copayment.  Does not have at this time.      Reports some stress/anxious and agitation.  More around grandson (recently diagnosed with autism)-  frustrated with mother around discipline.   Reports feeling this way every other day.  Doing ok with when she's alone.  Having difficulty with sleeping.  Will take Benadryl sometimes.  Been going on for some time.    Denies signs of depression.      Follow-up  Associated symptoms include arthralgias and coughing. Pertinent negatives include no abdominal pain, chest pain, chills, congestion or fatigue.     Review of Systems   Constitutional:  Negative for chills and fatigue.   HENT:  Negative for nasal congestion, postnasal drip and rhinorrhea.    Respiratory:  Positive for cough and shortness of breath.    Cardiovascular:  Negative for chest pain and leg swelling.   Gastrointestinal:  Negative for abdominal pain and blood in stool.   Musculoskeletal:  Positive for arthralgias and gait problem.   Psychiatric/Behavioral:  Positive for agitation. Negative for confusion and dysphoric mood. The patient is nervous/anxious.           Objective     Physical Exam  Vitals reviewed.   Constitutional:       Appearance: She is obese.   HENT:      Head: Normocephalic.      Right Ear: Tympanic membrane normal.      Left Ear: Tympanic membrane normal.      Nose: Nose normal.   Cardiovascular:      Rate and Rhythm: Normal rate and regular rhythm.   Pulmonary:      Effort: Pulmonary effort is normal.      Breath sounds: Decreased breath sounds present.    Abdominal:      General: Bowel sounds are normal. There is no distension.      Tenderness: There is no abdominal tenderness.   Skin:     General: Skin is warm.   Neurological:      Mental Status: She is alert.   Psychiatric:         Mood and Affect: Mood normal.         Behavior: Behavior is cooperative.            Assessment and Plan     1. Routine medical exam    2. Chronic respiratory failure with hypoxia  -     albuterol (PROAIR HFA) 90 mcg/actuation inhaler; USE 2 INHALATIONS EVERY 6 HOURS AS NEEDED FOR WHEEZING  Dispense: 25.5 g; Refill: 11    3. Essential hypertension  -     atorvastatin (LIPITOR) 40 MG tablet; Take 1 tablet (40 mg total) by mouth once daily.  Dispense: 90 tablet; Refill: 3    4. Obstructive lung disease  -     albuterol (PROAIR HFA) 90 mcg/actuation inhaler; USE 2 INHALATIONS EVERY 6 HOURS AS NEEDED FOR WHEEZING  Dispense: 25.5 g; Refill: 11    5. Nasal congestion  -     fluticasone propionate (FLONASE) 50 mcg/actuation nasal spray; 1 spray (50 mcg total) by Each Nostril route once daily.  Dispense: 16 g; Refill: 3    6. Primary hypertension    7. Type 2 diabetes mellitus without complication, without long-term current use of insulin  -     HEMOGLOBIN A1C; Future; Expected date: 12/07/2023  -     Basic Metabolic Panel; Future; Expected date: 12/07/2023      Labs today     3 month follow up    Schedule routine follow up with pulmonary 2024 usually see Dr. Azul          No follow-ups on file.

## 2024-01-06 ENCOUNTER — TELEPHONE (OUTPATIENT)
Dept: INTERNAL MEDICINE | Facility: CLINIC | Age: 64
End: 2024-01-06
Payer: OTHER GOVERNMENT

## 2024-01-06 NOTE — TELEPHONE ENCOUNTER
I called and left the pt a vm stating that she was ordered labs by Becki HUMPHREYS. They were scheduled for 02/22/24 as they will need to be completed a week before her appr on 03/07/24. //kah

## 2024-02-14 DIAGNOSIS — Z12.31 OTHER SCREENING MAMMOGRAM: ICD-10-CM

## 2024-02-22 ENCOUNTER — LAB VISIT (OUTPATIENT)
Dept: LAB | Facility: HOSPITAL | Age: 64
End: 2024-02-22
Attending: NURSE PRACTITIONER
Payer: OTHER GOVERNMENT

## 2024-02-22 DIAGNOSIS — E11.9 TYPE 2 DIABETES MELLITUS WITHOUT COMPLICATION, WITHOUT LONG-TERM CURRENT USE OF INSULIN: ICD-10-CM

## 2024-02-22 LAB
ANION GAP SERPL CALC-SCNC: 9 MMOL/L (ref 8–16)
BUN SERPL-MCNC: 11 MG/DL (ref 8–23)
CALCIUM SERPL-MCNC: 9 MG/DL (ref 8.7–10.5)
CHLORIDE SERPL-SCNC: 93 MMOL/L (ref 95–110)
CO2 SERPL-SCNC: 39 MMOL/L (ref 23–29)
CREAT SERPL-MCNC: 0.8 MG/DL (ref 0.5–1.4)
EST. GFR  (NO RACE VARIABLE): >60 ML/MIN/1.73 M^2
ESTIMATED AVG GLUCOSE: 120 MG/DL (ref 68–131)
GLUCOSE SERPL-MCNC: 96 MG/DL (ref 70–110)
HBA1C MFR BLD: 5.8 % (ref 4–5.6)
POTASSIUM SERPL-SCNC: 4 MMOL/L (ref 3.5–5.1)
SODIUM SERPL-SCNC: 141 MMOL/L (ref 136–145)

## 2024-02-22 PROCEDURE — 83036 HEMOGLOBIN GLYCOSYLATED A1C: CPT | Performed by: NURSE PRACTITIONER

## 2024-02-22 PROCEDURE — 80048 BASIC METABOLIC PNL TOTAL CA: CPT | Performed by: NURSE PRACTITIONER

## 2024-02-22 PROCEDURE — 36415 COLL VENOUS BLD VENIPUNCTURE: CPT | Mod: PN | Performed by: NURSE PRACTITIONER

## 2024-03-08 DIAGNOSIS — J44.9 OBSTRUCTIVE LUNG DISEASE: ICD-10-CM

## 2024-03-08 RX ORDER — FLUTICASONE PROPIONATE AND SALMETEROL 500; 50 UG/1; UG/1
1 POWDER RESPIRATORY (INHALATION) 2 TIMES DAILY
Qty: 60 EACH | Refills: 11 | Status: SHIPPED | OUTPATIENT
Start: 2024-03-08

## 2024-03-11 ENCOUNTER — OFFICE VISIT (OUTPATIENT)
Dept: INTERNAL MEDICINE | Facility: CLINIC | Age: 64
End: 2024-03-11
Payer: OTHER GOVERNMENT

## 2024-03-11 ENCOUNTER — APPOINTMENT (OUTPATIENT)
Dept: RADIOLOGY | Facility: HOSPITAL | Age: 64
End: 2024-03-11
Attending: NURSE PRACTITIONER
Payer: OTHER GOVERNMENT

## 2024-03-11 VITALS
HEIGHT: 65 IN | HEART RATE: 96 BPM | DIASTOLIC BLOOD PRESSURE: 66 MMHG | SYSTOLIC BLOOD PRESSURE: 144 MMHG | BODY MASS INDEX: 48.32 KG/M2 | WEIGHT: 290 LBS | TEMPERATURE: 99 F

## 2024-03-11 DIAGNOSIS — I10 PRIMARY HYPERTENSION: ICD-10-CM

## 2024-03-11 DIAGNOSIS — J96.11 CHRONIC RESPIRATORY FAILURE WITH HYPOXIA: ICD-10-CM

## 2024-03-11 DIAGNOSIS — Z00.00 ROUTINE MEDICAL EXAM: Primary | ICD-10-CM

## 2024-03-11 DIAGNOSIS — H60.93 OTITIS EXTERNA OF BOTH EARS, UNSPECIFIED CHRONICITY, UNSPECIFIED TYPE: ICD-10-CM

## 2024-03-11 DIAGNOSIS — E78.5 HYPERLIPIDEMIA, UNSPECIFIED HYPERLIPIDEMIA TYPE: ICD-10-CM

## 2024-03-11 DIAGNOSIS — I50.32 CHRONIC DIASTOLIC CONGESTIVE HEART FAILURE: ICD-10-CM

## 2024-03-11 PROCEDURE — 99214 OFFICE O/P EST MOD 30 MIN: CPT | Mod: S$PBB,,, | Performed by: NURSE PRACTITIONER

## 2024-03-11 PROCEDURE — 71046 X-RAY EXAM CHEST 2 VIEWS: CPT | Mod: TC,PN

## 2024-03-11 PROCEDURE — 99999 PR PBB SHADOW E&M-EST. PATIENT-LVL V: CPT | Mod: PBBFAC,,, | Performed by: NURSE PRACTITIONER

## 2024-03-11 PROCEDURE — 99215 OFFICE O/P EST HI 40 MIN: CPT | Mod: PBBFAC,25,PN | Performed by: NURSE PRACTITIONER

## 2024-03-11 PROCEDURE — 96372 THER/PROPH/DIAG INJ SC/IM: CPT | Mod: PBBFAC,PN

## 2024-03-11 PROCEDURE — 99999PBSHW PR PBB SHADOW TECHNICAL ONLY FILED TO HB: Mod: PBBFAC,,,

## 2024-03-11 PROCEDURE — 71046 X-RAY EXAM CHEST 2 VIEWS: CPT | Mod: 26,,, | Performed by: RADIOLOGY

## 2024-03-11 RX ORDER — IPRATROPIUM BROMIDE AND ALBUTEROL SULFATE 2.5; .5 MG/3ML; MG/3ML
3 SOLUTION RESPIRATORY (INHALATION) EVERY 6 HOURS PRN
Qty: 75 ML | Refills: 5 | Status: SHIPPED | OUTPATIENT
Start: 2024-03-11 | End: 2025-03-11

## 2024-03-11 RX ORDER — METHYLPREDNISOLONE ACETATE 40 MG/ML
40 INJECTION, SUSPENSION INTRA-ARTICULAR; INTRALESIONAL; INTRAMUSCULAR; SOFT TISSUE
Status: COMPLETED | OUTPATIENT
Start: 2024-03-11 | End: 2024-03-11

## 2024-03-11 RX ADMIN — METHYLPREDNISOLONE ACETATE 40 MG: 40 INJECTION, SUSPENSION INTRALESIONAL; INTRAMUSCULAR; INTRASYNOVIAL; SOFT TISSUE at 04:03

## 2024-03-11 NOTE — PROGRESS NOTES
Subjective     Patient ID: Baylee Muñoz is a 63 y.o. female.    Chief Complaint: Follow-up (3m )    Patient presents for 3 month follow up.      Reports left ankle pain (right side).  Intermittent pain and swelling.  Wearing ankle brace.  Unable to take ASA-hx of GI bleed.   Oxygen sat 85%---2LPM    Reports indigestion-after eating sometimes.  Will drink a soda    Due for eye exam--usually go to [facility]     Medical history: Obesity, Vit D Def, HTN, Smoker, Asthma, Gastritis, OA knee, Chronic Respiratory Failure, Nasal Congestion, GI Bleed, HLP, Diverticulitis, Type II DM, TUTU    Follow-up  Pertinent negatives include no abdominal pain, arthralgias, chills, coughing or fatigue.     Review of Systems   Constitutional:  Negative for chills and fatigue.   Respiratory:  Positive for shortness of breath. Negative for cough.    Gastrointestinal:  Negative for abdominal pain, constipation and diarrhea.   Musculoskeletal:  Positive for gait problem. Negative for arthralgias.   Psychiatric/Behavioral:  Negative for agitation and confusion.           Objective     Physical Exam  Vitals reviewed.   Constitutional:       Appearance: She is obese.   Cardiovascular:      Rate and Rhythm: Normal rate and regular rhythm.   Pulmonary:      Effort: Pulmonary effort is normal.      Breath sounds: Decreased breath sounds present.   Neurological:      General: No focal deficit present.      Mental Status: She is alert.   Psychiatric:         Mood and Affect: Mood normal.         Behavior: Behavior normal. Behavior is cooperative.            Assessment and Plan     1. Routine medical exam    2. Hyperlipidemia, unspecified hyperlipidemia type  Comments:  Stable.  Continue current treatment plan.    3. Primary hypertension  Comments:  Stable. Continue current treatment plan.    4. Chronic respiratory failure with hypoxia  Comments:  Stable. Continue current treatment plan.  Orders:  -     X-Ray Chest PA And Lateral; Future; Expected date:  03/11/2024  -     Ambulatory referral/consult to Cardiology; Future; Expected date: 03/18/2024  -     methylPREDNISolone acetate injection 40 mg    5. Chronic diastolic congestive heart failure  Comments:  Stable. Continue current treatment plan.  Overview:  Dx'd at [facility] during asthma exacerbation      6. Otitis externa of both ears, unspecified chronicity, unspecified type  -     neomycin-polymyxin-hydrocortisone (CORTISPORIN) 3.5-10,000-1 mg/mL-unit/mL-% otic suspension; Place 4 drops into both ears 4 (four) times daily. for 7 days  Dispense: 10 mL; Refill: 0    Other orders  -     albuterol-ipratropium (DUO-NEB) 2.5 mg-0.5 mg/3 mL nebulizer solution; Take 3 mLs by nebulization every 6 (six) hours as needed for Wheezing. Rescue  Dispense: 75 mL; Refill: 5    Schedule routine follow up--pulmonary Dr. Azul     Fax cardiology referral to [facility]     Depo medrol today            Follow up in about 1 week (around 3/18/2024).

## 2024-03-12 ENCOUNTER — TELEPHONE (OUTPATIENT)
Dept: INTERNAL MEDICINE | Facility: CLINIC | Age: 64
End: 2024-03-12
Payer: OTHER GOVERNMENT

## 2024-03-12 RX ORDER — NEOMYCIN SULFATE, POLYMYXIN B SULFATE AND HYDROCORTISONE 10; 3.5; 1 MG/ML; MG/ML; [USP'U]/ML
4 SUSPENSION/ DROPS AURICULAR (OTIC) 4 TIMES DAILY
Qty: 10 ML | Refills: 0 | Status: SHIPPED | OUTPATIENT
Start: 2024-03-12 | End: 2024-03-19

## 2024-03-12 NOTE — TELEPHONE ENCOUNTER
I returned  a call back to the pt and she was requesting an excuse to return to work on tomorrow . She was seen yesterday and was feeling under the weather still . I wrote her an excuse for today stating return tomorrow. angelai//kah

## 2024-03-12 NOTE — TELEPHONE ENCOUNTER
----- Message from Jody Lovelace sent at 3/12/2024  7:52 AM CDT -----  Baylee Muñoz calling regarding Patient Advice. PT is needing to het a copy of her visit summary and a excuse note to return back to work today, please  inform when she can come in today to pick it up,  [phone]

## 2024-03-12 NOTE — LETTER
March 12, 2024    Baylee Muñoz  [address]  [place] [address]             [facility]  Internal Medicine  [address] [place] [address]  [place] [address]  Phone: [phone]  Fax: [phone]   March 12, 2024     Patient: Baylee Muñoz   YOB: 1960   Date of Visit: 3/12/2024       To Whom it May Concern:    Baylee Muñoz was seen in my clinic on 03/11/2024. She may return to work on 03/13/2024 .    Please excuse her from any classes or work missed.    If you have any questions or concerns, please don't hesitate to call.    Sincerely,         Felicity Sun MA

## 2024-03-15 ENCOUNTER — TELEPHONE (OUTPATIENT)
Dept: PULMONOLOGY | Facility: CLINIC | Age: 64
End: 2024-03-15
Payer: OTHER GOVERNMENT

## 2024-03-15 NOTE — TELEPHONE ENCOUNTER
Left vm on pt  number stating that the appt 3/18/24 has been cancelled and asked for pt to return call to reschedule appt.

## 2024-04-03 DIAGNOSIS — I10 ESSENTIAL HYPERTENSION: ICD-10-CM

## 2024-04-04 RX ORDER — ATORVASTATIN CALCIUM 40 MG/1
40 TABLET, FILM COATED ORAL DAILY
Qty: 90 TABLET | Refills: 3 | Status: SHIPPED | OUTPATIENT
Start: 2024-04-04

## 2024-05-07 ENCOUNTER — HOSPITAL ENCOUNTER (EMERGENCY)
Facility: HOSPITAL | Age: 64
Discharge: HOME | End: 2024-05-07
Attending: FAMILY MEDICINE
Payer: OTHER GOVERNMENT

## 2024-05-07 VITALS
SYSTOLIC BLOOD PRESSURE: 152 MMHG | TEMPERATURE: 98 F | DIASTOLIC BLOOD PRESSURE: 83 MMHG | OXYGEN SATURATION: 98 % | RESPIRATION RATE: 18 BRPM | HEART RATE: 74 BPM

## 2024-05-07 DIAGNOSIS — K62.5 RECTAL BLEEDING: Primary | ICD-10-CM

## 2024-05-07 LAB
ALBUMIN SERPL BCP-MCNC: 3.4 G/DL (ref 3.5–5.2)
ALP SERPL-CCNC: 92 U/L (ref 55–135)
ALT SERPL W/O P-5'-P-CCNC: 14 U/L (ref 10–44)
ANION GAP SERPL CALC-SCNC: 8 MMOL/L (ref 8–16)
APTT PPP: 29.8 SEC (ref 21–32)
AST SERPL-CCNC: 20 U/L (ref 10–40)
BASOPHILS # BLD AUTO: 0.02 K/UL (ref 0–0.2)
BASOPHILS NFR BLD: 0.3 % (ref 0–1.9)
BILIRUB SERPL-MCNC: 0.4 MG/DL (ref 0.1–1)
BUN SERPL-MCNC: 20 MG/DL (ref 8–23)
CALCIUM SERPL-MCNC: 9.9 MG/DL (ref 8.7–10.5)
CHLORIDE SERPL-SCNC: 95 MMOL/L (ref 95–110)
CO2 SERPL-SCNC: 38 MMOL/L (ref 23–29)
CREAT SERPL-MCNC: 1 MG/DL (ref 0.5–1.4)
DIFFERENTIAL METHOD BLD: ABNORMAL
EOSINOPHIL # BLD AUTO: 0.1 K/UL (ref 0–0.5)
EOSINOPHIL NFR BLD: 0.7 % (ref 0–8)
ERYTHROCYTE [DISTWIDTH] IN BLOOD BY AUTOMATED COUNT: 16.6 % (ref 11.5–14.5)
EST. GFR  (NO RACE VARIABLE): >60 ML/MIN/1.73 M^2
GLUCOSE SERPL-MCNC: 98 MG/DL (ref 70–110)
HCT VFR BLD AUTO: 40.1 % (ref 37–48.5)
HGB BLD-MCNC: 11 G/DL (ref 12–16)
HIV 1+2 AB+HIV1 P24 AG SERPL QL IA: NEGATIVE
IMM GRANULOCYTES # BLD AUTO: 0.02 K/UL (ref 0–0.04)
IMM GRANULOCYTES NFR BLD AUTO: 0.3 % (ref 0–0.5)
INR PPP: 0.9 (ref 0.8–1.2)
LYMPHOCYTES # BLD AUTO: 2.2 K/UL (ref 1–4.8)
LYMPHOCYTES NFR BLD: 29.2 % (ref 18–48)
MCH RBC QN AUTO: 25.3 PG (ref 27–31)
MCHC RBC AUTO-ENTMCNC: 27.4 G/DL (ref 32–36)
MCV RBC AUTO: 92 FL (ref 82–98)
MONOCYTES # BLD AUTO: 0.6 K/UL (ref 0.3–1)
MONOCYTES NFR BLD: 8.4 % (ref 4–15)
NEUTROPHILS # BLD AUTO: 4.5 K/UL (ref 1.8–7.7)
NEUTROPHILS NFR BLD: 61.1 % (ref 38–73)
NRBC BLD-RTO: 0 /100 WBC
PLATELET # BLD AUTO: 264 K/UL (ref 150–450)
PMV BLD AUTO: 10.6 FL (ref 9.2–12.9)
POTASSIUM SERPL-SCNC: 4.4 MMOL/L (ref 3.5–5.1)
PROT SERPL-MCNC: 7.5 G/DL (ref 6–8.4)
PROTHROMBIN TIME: 10.3 SEC (ref 9–12.5)
RBC # BLD AUTO: 4.35 M/UL (ref 4–5.4)
SODIUM SERPL-SCNC: 141 MMOL/L (ref 136–145)
WBC # BLD AUTO: 7.36 K/UL (ref 3.9–12.7)

## 2024-05-07 PROCEDURE — 85730 THROMBOPLASTIN TIME PARTIAL: CPT | Performed by: NURSE PRACTITIONER

## 2024-05-07 PROCEDURE — 80053 COMPREHEN METABOLIC PANEL: CPT | Performed by: NURSE PRACTITIONER

## 2024-05-07 PROCEDURE — 85025 COMPLETE CBC W/AUTO DIFF WBC: CPT | Performed by: NURSE PRACTITIONER

## 2024-05-07 PROCEDURE — 87389 HIV-1 AG W/HIV-1&-2 AB AG IA: CPT | Performed by: EMERGENCY MEDICINE

## 2024-05-07 PROCEDURE — 99283 EMERGENCY DEPT VISIT LOW MDM: CPT

## 2024-05-07 PROCEDURE — 85610 PROTHROMBIN TIME: CPT | Performed by: NURSE PRACTITIONER

## 2024-05-07 NOTE — Clinical Note
"Baylee "Baylee" Wanda was seen and treated in our emergency department on 5/7/2024.  She may return to work on 05/08/2024.       If you have any questions or concerns, please don't hesitate to call.      Merle Oliveira MD"

## 2024-05-07 NOTE — FIRST PROVIDER EVALUATION
Medical screening examination initiated.  I have conducted a focused provider triage encounter, findings are as follows:    Brief history of present illness:  63-year-old female presents emergency department for lower back pain and rectal bleeding.    Vitals:    05/07/24 1839   BP: 138/73   Pulse: 78   Resp: 18   Temp: 97.8 °F (36.6 °C)   TempSrc: Oral   SpO2: (!) 92%       Pertinent physical exam:  No acute distress    Brief workup plan:  Labs, further eval    Preliminary workup initiated; this workup will be continued and followed by the physician or advanced practice provider that is assigned to the patient when roomed.

## 2024-05-08 NOTE — ED PROVIDER NOTES
SCRIBE #1 NOTE: I, Vonnie Feliciano, am scribing for, and in the presence of, Merle Oliveira MD. I have scribed the entire note.       History     Chief Complaint   Patient presents with    Rectal Bleeding     Pt c/o bright red bloody stools. Pt reports this happening today and was a lot in the bm. Pt on 3L nc.     Review of patient's allergies indicates:   Allergen Reactions    Coconut Anaphylaxis    Iodine and iodide containing products Anaphylaxis    Dairy aid [lactase] Diarrhea and Nausea And Vomiting    Penicillins Hives         History of Present Illness     HPI    5/7/2024, 9:12 PM  History obtained from the patient      History of Present Illness: Baylee Muñoz is a 63 y.o. female patient with a PMHx of HTN who presents to the Emergency Department for evaluation of red bloody stool which onset PTA. Patient noted she had a lot of blood in her stool during recent bowel movement. Symptoms are constant and moderate in severity. No mitigating or exacerbating factors reported. Patient denies any fever, chills. SOB, CP, numbness, and all other sxs at this time. No further complaints or concerns at this time.       Arrival mode: Personal vehicle    PCP: Becki Hernandez NP        Past Medical History:  Past Medical History:   Diagnosis Date    Allergy     Arthritis     Asthma     Admit to [facility] 1/6/19 by Dr. Michael Randall for asthma exaceration, acute bronchitis, acute dCHF, hypoxia, elevated troponin/BNP (thought to be 2/2 asthma exac & acute dCHF by cards), tx'd w/ IV lasix, duonebs, IV solumedrol and DC'd on Z-prema, prednisone taper    CKD (chronic kidney disease) stage 2, GFR 60-89 ml/min     H/o ARF 2/2 poor water intake; encouraged to increase water intake and avoid NSAIDS & contrast dye    COVID 06/2022    Depression     Diastolic heart failure 01/06/2019    Dx'd at [facility] during asthma exacerbation    Diverticulitis 2005    Dyslipidemia 08/28/2018    Former smoker     Quit 12/31/18; smoked 0.12 packs/day x  37 years    Gastritis     Hypertension     Pneumonia 2022    Prediabetes     A1c 6.1% on 19    Prediabetes     Severe obesity (BMI >= 40)     Vertigo     Vitamin D deficiency        Past Surgical History:  Past Surgical History:   Procedure Laterality Date    COLONOSCOPY N/A 08/10/2022    Procedure: COLONOSCOPY;  Surgeon: Dariela Devi MD;  Location: [facility];  Service: Endoscopy;  Laterality: N/A;    COLONOSCOPY N/A 2022    Procedure: COLONOSCOPY;  Surgeon: Angie Neves MD;  Location: [facility];  Service: Endoscopy;  Laterality: N/A;    DILATION AND CURETTAGE OF UTERUS      missed , bleeding    ESOPHAGOGASTRODUODENOSCOPY N/A 2022    Procedure: EGD (ESOPHAGOGASTRODUODENOSCOPY);  Surgeon: Dariela Devi MD;  Location: [facility];  Service: Endoscopy;  Laterality: N/A;    EXPLORATORY LAPAROTOMY      lysis of adhesions, open tube    KNEE SURGERY Right     torn mcl    laser to cervix           Family History:  Family History   Problem Relation Name Age of Onset    Stroke Mother      Heart disease Mother      Kidney disease Father      Heart disease Father      Hypertension Father      HIV Sister      COPD Sister      Bipolar disorder Sister         Social History:  Social History     Tobacco Use    Smoking status: Former     Current packs/day: 0.00     Average packs/day: 0.3 packs/day for 40.0 years (10.0 ttl pk-yrs)     Types: Cigarettes     Start date: 1980     Quit date: 2020     Years since quittin.3     Passive exposure: Never    Smokeless tobacco: Never   Substance and Sexual Activity    Alcohol use: No    Drug use: No    Sexual activity: Never        Review of Systems     Review of Systems   Constitutional:  Negative for chills and fever.   HENT:  Negative for sore throat.    Respiratory:  Negative for shortness of breath.    Cardiovascular:  Negative for chest pain.   Gastrointestinal:  Positive for blood in stool. Negative for nausea.   Genitourinary:   Negative for dysuria.   Musculoskeletal:  Negative for back pain.   Skin:  Negative for rash.   Neurological:  Negative for weakness.   Hematological:  Does not bruise/bleed easily.   All other systems reviewed and are negative.       Physical Exam     Initial Vitals [05/07/24 1839]   BP Pulse Resp Temp SpO2   138/73 78 18 97.8 °F (36.6 °C) (!) 92 %      MAP       --          Physical Exam  Nursing Notes and Vital Signs Reviewed.  Constitutional: Patient is in no apparent distress. Well-developed and well-nourished.  Head: Atraumatic. Normocephalic.  Eyes: PERRL. EOM intact. Conjunctivae are not pale. No scleral icterus.  ENT: Mucous membranes are moist. Oropharynx is clear and symmetric.    Neck: Supple. Full ROM. No lymphadenopathy.  Cardiovascular: Regular rate. Regular rhythm. No murmurs, rubs, or gallops. Distal pulses are 2+ and symmetric.  Pulmonary/Chest: No respiratory distress. Clear to auscultation bilaterally. No wheezing or rales.  Abdominal: Soft and non-distended.  There is no tenderness.  No rebound, guarding, or rigidity. Good bowel sounds.  Genitourinary: No CVA tenderness  Musculoskeletal: Moves all extremities. No obvious deformities. No edema. No calf tenderness.  Skin: Warm and dry.  Neurological:  Alert, awake, and appropriate.  Normal speech.  No acute focal neurological deficits are appreciated.  Psychiatric: Normal affect. Good eye contact. Appropriate in content.     ED Course   Procedures  ED Vital Signs:  Vitals:    05/07/24 1839 05/07/24 2119   BP: 138/73 (!) 152/83   Pulse: 78 74   Resp: 18    Temp: 97.8 °F (36.6 °C)    TempSrc: Oral    SpO2: (!) 92% 98%       Abnormal Lab Results:  Labs Reviewed   CBC W/ AUTO DIFFERENTIAL - Abnormal; Notable for the following components:       Result Value    Hemoglobin 11.0 (*)     MCH 25.3 (*)     MCHC 27.4 (*)     RDW 16.6 (*)     All other components within normal limits    Narrative:     Release to patient->Immediate   COMPREHENSIVE METABOLIC  PANEL - Abnormal; Notable for the following components:    CO2 38 (*)     Albumin 3.4 (*)     All other components within normal limits    Narrative:     Release to patient->Immediate   HIV 1 / 2 ANTIBODY    Narrative:     Release to patient->Immediate   PROTIME-INR    Narrative:     Release to patient->Immediate   APTT    Narrative:     Release to patient->Immediate        All Lab Results:  Results for orders placed or performed during the hospital encounter of 05/07/24   HIV 1/2 Ag/Ab (4th Gen)   Result Value Ref Range    HIV 1/2 Ag/Ab Negative Negative   CBC auto differential   Result Value Ref Range    WBC 7.36 3.90 - 12.70 K/uL    RBC 4.35 4.00 - 5.40 M/uL    Hemoglobin 11.0 (L) 12.0 - 16.0 g/dL    Hematocrit 40.1 37.0 - 48.5 %    MCV 92 82 - 98 fL    MCH 25.3 (L) 27.0 - 31.0 pg    MCHC 27.4 (L) 32.0 - 36.0 g/dL    RDW 16.6 (H) 11.5 - 14.5 %    Platelets 264 150 - 450 K/uL    MPV 10.6 9.2 - 12.9 fL    Immature Granulocytes 0.3 0.0 - 0.5 %    Gran # (ANC) 4.5 1.8 - 7.7 K/uL    Immature Grans (Abs) 0.02 0.00 - 0.04 K/uL    Lymph # 2.2 1.0 - 4.8 K/uL    Mono # 0.6 0.3 - 1.0 K/uL    Eos # 0.1 0.0 - 0.5 K/uL    Baso # 0.02 0.00 - 0.20 K/uL    nRBC 0 0 /100 WBC    Gran % 61.1 38.0 - 73.0 %    Lymph % 29.2 18.0 - 48.0 %    Mono % 8.4 4.0 - 15.0 %    Eosinophil % 0.7 0.0 - 8.0 %    Basophil % 0.3 0.0 - 1.9 %    Differential Method Automated    Comprehensive metabolic panel   Result Value Ref Range    Sodium 141 136 - 145 mmol/L    Potassium 4.4 3.5 - 5.1 mmol/L    Chloride 95 95 - 110 mmol/L    CO2 38 (H) 23 - 29 mmol/L    Glucose 98 70 - 110 mg/dL    BUN 20 8 - 23 mg/dL    Creatinine 1.0 0.5 - 1.4 mg/dL    Calcium 9.9 8.7 - 10.5 mg/dL    Total Protein 7.5 6.0 - 8.4 g/dL    Albumin 3.4 (L) 3.5 - 5.2 g/dL    Total Bilirubin 0.4 0.1 - 1.0 mg/dL    Alkaline Phosphatase 92 55 - 135 U/L    AST 20 10 - 40 U/L    ALT 14 10 - 44 U/L    eGFR >60 >60 mL/min/1.73 m^2    Anion Gap 8 8 - 16 mmol/L   Protime-INR   Result Value Ref  Range    Prothrombin Time 10.3 9.0 - 12.5 sec    INR 0.9 0.8 - 1.2   APTT   Result Value Ref Range    aPTT 29.8 21.0 - 32.0 sec         Imaging Results:  Imaging Results    None             ED Discussion       9:27 PM: Reassessed pt at this time. Discussed with pt all pertinent ED information and results. Discussed pt dx and plan of tx. Gave pt all f/u and return to the ED instructions. All questions and concerns were addressed at this time. Pt expresses understanding of information and instructions, and is comfortable with plan to discharge. Pt is stable for discharge.    I discussed with patient and/or family/caretaker that evaluation in the ED does not suggest any emergent or life threatening medical conditions requiring immediate intervention beyond what was provided in the ED, and I believe patient is safe for discharge.  Regardless, an unremarkable evaluation in the ED does not preclude the development or presence of a serious of life threatening condition. As such, patient was instructed to return immediately for any worsening or change in current symptoms.         Medical Decision Making  Amount and/or Complexity of Data Reviewed  Labs: ordered. Decision-making details documented in ED Course.                ED Medication(s):  Medications - No data to display    Discharge Medication List as of 5/7/2024  9:12 PM           Follow-up Information       Schedule an appointment as soon as possible for a visit  with Dariela Devi MD.    Specialty: Gastroenterology  Why: As needed  Contact information:  [address] [facility] [address] [phone]                                 Scribe Attestation:   Scribe #1: I performed the above scribed service and the documentation accurately describes the services I performed. I attest to the accuracy of the note.     Attending:   Physician Attestation Statement for Scribe #1: I, Merle Oliveira MD, personally performed the services described in  this documentation, as scribed by Vonnie Feliciano, in my presence, and it is both accurate and complete.           Clinical Impression       ICD-10-CM ICD-9-CM   1. Rectal bleeding  K62.5 569.3       Disposition:   Disposition: Discharged  Condition: Stable         Merle Oliveira MD  05/09/24 3373

## 2024-06-19 DIAGNOSIS — I10 ESSENTIAL HYPERTENSION: ICD-10-CM

## 2024-06-19 NOTE — TELEPHONE ENCOUNTER
Requested Prescriptions     Pending Prescriptions Disp Refills    amLODIPine (NORVASC) 5 MG tablet 90 tablet 3     Sig: Take 1 tablet (5 mg total) by mouth once daily.

## 2024-06-20 RX ORDER — AMLODIPINE BESYLATE 5 MG/1
5 TABLET ORAL DAILY
Qty: 90 TABLET | Refills: 3 | Status: SHIPPED | OUTPATIENT
Start: 2024-06-20 | End: 2025-06-20

## 2024-08-28 DIAGNOSIS — E11.9 TYPE 2 DIABETES MELLITUS WITHOUT COMPLICATION: ICD-10-CM

## 2024-09-12 DIAGNOSIS — E11.9 TYPE 2 DIABETES MELLITUS WITHOUT COMPLICATION: ICD-10-CM

## 2024-09-26 NOTE — TELEPHONE ENCOUNTER
----- Message from Thor Mendez sent at 9/26/2024  3:44 PM CDT -----  Contact: self  ..Type:  RX Refill Request    Who Called: .Baylee Muñoz  Refill or New Rx:Refill   RX Name and Strength:glipiZIDE (GLUCOTROL) 2.5 MG TR24  How is the patient currently taking it? (ex. 1XDay):1xdsy   Is this a 30 day or 90 day RX: 90  Preferred Pharmacy with phone number: .  [id number] - [place], [place]  [address] [address] [facility] [address]  [address] [address]  Phone: [phone] Fax: [phone]  Local or Mail Order:local   Ordering Provider:David   Would the patient rather a call back or a response via [facility]? Call back   Best Call Back Number:.[phone]   Additional Information:

## 2024-09-27 RX ORDER — GLIPIZIDE 2.5 MG/1
2.5 TABLET, EXTENDED RELEASE ORAL
Qty: 30 TABLET | Refills: 11 | Status: SHIPPED | OUTPATIENT
Start: 2024-09-27 | End: 2025-09-27

## 2024-10-14 ENCOUNTER — TELEPHONE (OUTPATIENT)
Dept: INTERNAL MEDICINE | Facility: CLINIC | Age: 64
End: 2024-10-14
Payer: OTHER GOVERNMENT

## 2024-11-15 ENCOUNTER — TELEPHONE (OUTPATIENT)
Dept: INTERNAL MEDICINE | Facility: CLINIC | Age: 64
End: 2024-11-15
Payer: OTHER GOVERNMENT

## 2024-12-23 DIAGNOSIS — J44.9 OBSTRUCTIVE LUNG DISEASE: ICD-10-CM

## 2024-12-23 DIAGNOSIS — J96.11 CHRONIC RESPIRATORY FAILURE WITH HYPOXIA: ICD-10-CM

## 2024-12-23 RX ORDER — ALBUTEROL SULFATE 90 UG/1
INHALANT RESPIRATORY (INHALATION)
Qty: 25.5 G | Refills: 11 | Status: SHIPPED | OUTPATIENT
Start: 2024-12-23

## 2024-12-23 NOTE — TELEPHONE ENCOUNTER
Requested Prescriptions     Pending Prescriptions Disp Refills    albuterol (PROAIR HFA) 90 mcg/actuation inhaler 25.5 g 11     Sig: USE 2 INHALATIONS EVERY 6 HOURS AS NEEDED FOR WHEEZING     LV 03/11/2024  LF 12/07/2023  NV none scheduled.

## 2025-01-01 ENCOUNTER — HOSPITAL ENCOUNTER (EMERGENCY)
Facility: HOSPITAL | Age: 65
Discharge: HOME | End: 2025-01-01
Attending: EMERGENCY MEDICINE

## 2025-01-01 VITALS
TEMPERATURE: 98 F | HEIGHT: 65 IN | SYSTOLIC BLOOD PRESSURE: 129 MMHG | DIASTOLIC BLOOD PRESSURE: 69 MMHG | RESPIRATION RATE: 20 BRPM | HEART RATE: 73 BPM | BODY MASS INDEX: 48.26 KG/M2 | OXYGEN SATURATION: 95 %

## 2025-01-01 DIAGNOSIS — K92.2 LOWER GI BLEED: Primary | ICD-10-CM

## 2025-01-01 DIAGNOSIS — R06.02 SHORTNESS OF BREATH: ICD-10-CM

## 2025-01-01 DIAGNOSIS — J18.9 PNEUMONIA OF BOTH LOWER LOBES DUE TO INFECTIOUS ORGANISM: ICD-10-CM

## 2025-01-01 LAB
ABO + RH BLD: NORMAL
ALBUMIN SERPL BCP-MCNC: 3.2 G/DL (ref 3.5–5.2)
ALP SERPL-CCNC: 91 U/L (ref 40–150)
ALT SERPL W/O P-5'-P-CCNC: 18 U/L (ref 10–44)
ANION GAP SERPL CALC-SCNC: 12 MMOL/L (ref 8–16)
AST SERPL-CCNC: 26 U/L (ref 10–40)
BASOPHILS # BLD AUTO: 0.01 K/UL (ref 0–0.2)
BASOPHILS NFR BLD: 0.2 % (ref 0–1.9)
BILIRUB SERPL-MCNC: 0.4 MG/DL (ref 0.1–1)
BLD GP AB SCN CELLS X3 SERPL QL: NORMAL
BNP SERPL-MCNC: 28 PG/ML (ref 0–99)
BUN SERPL-MCNC: 15 MG/DL (ref 8–23)
CALCIUM SERPL-MCNC: 9.3 MG/DL (ref 8.7–10.5)
CHLORIDE SERPL-SCNC: 94 MMOL/L (ref 95–110)
CO2 SERPL-SCNC: 36 MMOL/L (ref 23–29)
CREAT SERPL-MCNC: 0.9 MG/DL (ref 0.5–1.4)
DIFFERENTIAL METHOD BLD: ABNORMAL
EOSINOPHIL # BLD AUTO: 0 K/UL (ref 0–0.5)
EOSINOPHIL NFR BLD: 0.3 % (ref 0–8)
ERYTHROCYTE [DISTWIDTH] IN BLOOD BY AUTOMATED COUNT: 16.9 % (ref 11.5–14.5)
EST. GFR  (NO RACE VARIABLE): >60 ML/MIN/1.73 M^2
GLUCOSE SERPL-MCNC: 113 MG/DL (ref 70–110)
HCT VFR BLD AUTO: 37.6 % (ref 37–48.5)
HCV AB SERPL QL IA: NEGATIVE
HEP C VIRUS HOLD SPECIMEN: NORMAL
HGB BLD-MCNC: 10.3 G/DL (ref 12–16)
IMM GRANULOCYTES # BLD AUTO: 0.02 K/UL (ref 0–0.04)
IMM GRANULOCYTES NFR BLD AUTO: 0.3 % (ref 0–0.5)
LYMPHOCYTES # BLD AUTO: 1.7 K/UL (ref 1–4.8)
LYMPHOCYTES NFR BLD: 25.2 % (ref 18–48)
MCH RBC QN AUTO: 25.6 PG (ref 27–31)
MCHC RBC AUTO-ENTMCNC: 27.4 G/DL (ref 32–36)
MCV RBC AUTO: 93 FL (ref 82–98)
MONOCYTES # BLD AUTO: 0.6 K/UL (ref 0.3–1)
MONOCYTES NFR BLD: 9.6 % (ref 4–15)
NEUTROPHILS # BLD AUTO: 4.2 K/UL (ref 1.8–7.7)
NEUTROPHILS NFR BLD: 64.4 % (ref 38–73)
NRBC BLD-RTO: 0 /100 WBC
PLATELET # BLD AUTO: 246 K/UL (ref 150–450)
PMV BLD AUTO: 10.5 FL (ref 9.2–12.9)
POTASSIUM SERPL-SCNC: 4.4 MMOL/L (ref 3.5–5.1)
PROT SERPL-MCNC: 7.7 G/DL (ref 6–8.4)
RBC # BLD AUTO: 4.03 M/UL (ref 4–5.4)
SODIUM SERPL-SCNC: 142 MMOL/L (ref 136–145)
SPECIMEN OUTDATE: NORMAL
TROPONIN I SERPL DL<=0.01 NG/ML-MCNC: 0.01 NG/ML (ref 0–0.03)
WBC # BLD AUTO: 6.56 K/UL (ref 3.9–12.7)

## 2025-01-01 PROCEDURE — 85025 COMPLETE CBC W/AUTO DIFF WBC: CPT | Performed by: EMERGENCY MEDICINE

## 2025-01-01 PROCEDURE — 80053 COMPREHEN METABOLIC PANEL: CPT | Performed by: EMERGENCY MEDICINE

## 2025-01-01 PROCEDURE — 83880 ASSAY OF NATRIURETIC PEPTIDE: CPT | Performed by: EMERGENCY MEDICINE

## 2025-01-01 PROCEDURE — 86850 RBC ANTIBODY SCREEN: CPT | Performed by: EMERGENCY MEDICINE

## 2025-01-01 PROCEDURE — 86803 HEPATITIS C AB TEST: CPT | Performed by: EMERGENCY MEDICINE

## 2025-01-01 PROCEDURE — 93010 ELECTROCARDIOGRAM REPORT: CPT | Mod: ,,, | Performed by: STUDENT IN AN ORGANIZED HEALTH CARE EDUCATION/TRAINING PROGRAM

## 2025-01-01 PROCEDURE — 84484 ASSAY OF TROPONIN QUANT: CPT | Performed by: EMERGENCY MEDICINE

## 2025-01-01 PROCEDURE — 99285 EMERGENCY DEPT VISIT HI MDM: CPT | Mod: 25

## 2025-01-01 PROCEDURE — 93005 ELECTROCARDIOGRAM TRACING: CPT

## 2025-01-01 RX ORDER — DOXYCYCLINE 100 MG/1
100 CAPSULE ORAL 2 TIMES DAILY
Qty: 20 CAPSULE | Refills: 0 | Status: SHIPPED | OUTPATIENT
Start: 2025-01-01 | End: 2025-01-11

## 2025-01-01 NOTE — ED PROVIDER NOTES
SCRIBE #1 NOTE: I, Shira Forbes, am scribing for, and in the presence of, Andrea Galo MD. I have scribed the entire note.       History     Chief Complaint   Patient presents with    Rectal Bleeding     Pt complaining of dark red rectal bleeding with clots that started Monday morning. Hx COPD/Asthma, and rectal bleeding. Pt hypoxic in triage with SPO2 88 on 3L NC.     Fatigue     Review of patient's allergies indicates:   Allergen Reactions    Coconut Anaphylaxis    Iodine and iodide containing products Anaphylaxis    Dairy aid [lactase] Diarrhea and Nausea And Vomiting    Penicillins Hives         History of Present Illness     HPI    1/1/2025, 1:09 AM  History obtained from the patient      History of Present Illness: Baylee Muñoz is a 64 y.o. female patient with a PMHx of HTN, asthma, diverticulitis, gastritis, severe obesity, dyslipidemia, diastolic heart failure, CKD, pneumonia, and prediabetes who presents to the Emergency Department for evaluation of bloody stool which onset on Monday. Pt states that she had a dark red/maroon colored BM on Monday and PTA. She did not have a BM yesterday. Pt also c/o weakness, decreased appetite, LLQ abdominal pain, and anxiety. No mitigating or exacerbating factors reported. Patient denies any fever, CP, N/V, and all other sxs at this time. No prior Tx reported. Pt does not take blood thinners. No further complaints or concerns at this time.       Arrival mode: Personal vehicle     PCP: Becki Hernandez NP        Past Medical History:  Past Medical History:   Diagnosis Date    Allergy     Arthritis     Asthma     Admit to [facility] 1/6/19 by Dr. Michael Randall for asthma exaceration, acute bronchitis, acute dCHF, hypoxia, elevated troponin/BNP (thought to be 2/2 asthma exac & acute dCHF by cards), tx'd w/ IV lasix, duonebs, IV solumedrol and DC'd on Z-prema, prednisone taper    CKD (chronic kidney disease) stage 2, GFR 60-89 ml/min     H/o ARF 2/2 poor water intake;  encouraged to increase water intake and avoid NSAIDS & contrast dye    COVID 2022    Depression     Diastolic heart failure 2019    Dx'd at [facility] during asthma exacerbation    Diverticulitis 2005    Dyslipidemia 2018    Former smoker     Quit 18; smoked 0.12 packs/day x 37 years    Gastritis     Hypertension     Pneumonia 2022    Prediabetes     A1c 6.1% on 19    Prediabetes     Severe obesity (BMI >= 40)     Vertigo     Vitamin D deficiency        Past Surgical History:  Past Surgical History:   Procedure Laterality Date    COLONOSCOPY N/A 08/10/2022    Procedure: COLONOSCOPY;  Surgeon: Dariela Devi MD;  Location: [facility];  Service: Endoscopy;  Laterality: N/A;    COLONOSCOPY N/A 2022    Procedure: COLONOSCOPY;  Surgeon: Angie Neves MD;  Location: [facility];  Service: Endoscopy;  Laterality: N/A;    DILATION AND CURETTAGE OF UTERUS      missed , bleeding    ESOPHAGOGASTRODUODENOSCOPY N/A 2022    Procedure: EGD (ESOPHAGOGASTRODUODENOSCOPY);  Surgeon: Dariela Devi MD;  Location: [facility];  Service: Endoscopy;  Laterality: N/A;    EXPLORATORY LAPAROTOMY      lysis of adhesions, open tube    KNEE SURGERY Right     torn mcl    laser to cervix           Family History:  Family History   Problem Relation Name Age of Onset    Stroke Mother      Heart disease Mother      Kidney disease Father      Heart disease Father      Hypertension Father      HIV Sister      COPD Sister      Bipolar disorder Sister         Social History:  Social History     Tobacco Use    Smoking status: Former     Current packs/day: 0.00     Average packs/day: 0.3 packs/day for 40.0 years (10.0 ttl pk-yrs)     Types: Cigarettes     Start date: 1980     Quit date: 2020     Years since quittin.0     Passive exposure: Never    Smokeless tobacco: Never   Substance and Sexual Activity    Alcohol use: No    Drug use: No    Sexual activity: Never        Review of Systems      Review of Systems   Constitutional:  Positive for appetite change (decreased). Negative for fever.   HENT:  Negative for sore throat.    Respiratory:  Negative for shortness of breath.    Cardiovascular:  Negative for chest pain.   Gastrointestinal:  Positive for blood in stool (dark red/maroon). Negative for nausea and vomiting.   Genitourinary:  Negative for dysuria.   Musculoskeletal:  Negative for back pain.   Skin:  Negative for rash.   Neurological:  Positive for weakness.   Hematological:  Does not bruise/bleed easily.   Psychiatric/Behavioral:  The patient is nervous/anxious.       Physical Exam     Initial Vitals [01/01/25 0040]   BP Pulse Resp Temp SpO2   (!) 173/80 89 (!) 26 98.2 °F (36.8 °C) (!) 88 %      MAP       --          Physical Exam  Nursing Notes and Vital Signs Reviewed.  Constitutional: Patient is in no acute distress. Well-developed and well-nourished.  Head: Atraumatic. Normocephalic.  Eyes: PERRL. EOM intact. Conjunctivae are not pale. No scleral icterus.  ENT: Mucous membranes are moist. Oropharynx is clear and symmetric.    Neck: Supple. Full ROM. No lymphadenopathy.  Cardiovascular: Regular rate. Regular rhythm. No murmurs, rubs, or gallops. Distal pulses are 2+ and symmetric.  Pulmonary/Chest: No respiratory distress. Clear to auscultation bilaterally. No wheezing or rales.  Abdominal: Soft and non-distended.  There is mild LLQ tenderness.  No rebound, guarding, or rigidity. Good bowel sounds.  Genitourinary: No CVA tenderness  Musculoskeletal: Moves all extremities. No obvious deformities. No edema. No calf tenderness.  Skin: Warm and dry.  Neurological:  Alert, awake, and appropriate.  Normal speech.  No acute focal neurological deficits are appreciated.  Psychiatric: Normal affect. Good eye contact. Appropriate in content.     ED Course   Procedures  ED Vital Signs:  Vitals:    01/01/25 0040 01/01/25 0100 01/01/25 0117 01/01/25 0230   BP: (!) 173/80 131/69 118/63 130/61   Pulse:  "89 85 83 80   Resp: (!) 26 (!) 21 20 (!) 26   Temp: 98.2 °F (36.8 °C)      TempSrc: Oral      SpO2: (!) 88% (!) 93% 95% 95%   Height: 5' 5" (1.651 m)       01/01/25 0302 01/01/25 0330 01/01/25 0402 01/01/25 0442   BP: 139/74 129/66 (!) 126/58 132/68   Pulse: 72 82 78 83   Resp: (!) 22 (!) 22 16 19   Temp:       TempSrc:       SpO2: 96% (!) 94% 95% 95%   Height:        01/01/25 0502 01/01/25 0615   BP: 121/61 129/69   Pulse: 72 73   Resp: (!) 22 20   Temp:     TempSrc:     SpO2: 96% 95%   Height:         Abnormal Lab Results:  Labs Reviewed   CBC W/ AUTO DIFFERENTIAL - Abnormal       Result Value    WBC 6.56      RBC 4.03      Hemoglobin 10.3 (*)     Hematocrit 37.6      MCV 93      MCH 25.6 (*)     MCHC 27.4 (*)     RDW 16.9 (*)     Platelets 246      MPV 10.5      Immature Granulocytes 0.3      Gran # (ANC) 4.2      Immature Grans (Abs) 0.02      Lymph # 1.7      Mono # 0.6      Eos # 0.0      Baso # 0.01      nRBC 0      Gran % 64.4      Lymph % 25.2      Mono % 9.6      Eosinophil % 0.3      Basophil % 0.2      Differential Method Automated      Narrative:     Release to patient->Immediate   COMPREHENSIVE METABOLIC PANEL - Abnormal    Sodium 142      Potassium 4.4      Chloride 94 (*)     CO2 36 (*)     Glucose 113 (*)     BUN 15      Creatinine 0.9      Calcium 9.3      Total Protein 7.7      Albumin 3.2 (*)     Total Bilirubin 0.4      Alkaline Phosphatase 91      AST 26      ALT 18      eGFR >60      Anion Gap 12      Narrative:     Release to patient->Immediate   TROPONIN I    Troponin I 0.008      Narrative:     Release to patient->Immediate   B-TYPE NATRIURETIC PEPTIDE    BNP 28      Narrative:     Release to patient->Immediate   HEPATITIS C ANTIBODY    Hepatitis C Ab Negative      Narrative:     Release to patient->Immediate   HEP C VIRUS HOLD SPECIMEN    HEP C Virus Hold Specimen Hold for HCV sendout      Narrative:     Release to patient->Immediate   TYPE & SCREEN    Group & Rh O POS      Indirect Geoffrey " NEG      Specimen Outdate 01/04/2025 23:59          All Lab Results:  Results for orders placed or performed during the hospital encounter of 01/01/25   EKG 12-lead    Collection Time: 01/01/25 12:55 AM   Result Value Ref Range    QRS Duration 80 ms    [facility] QTC Calculation 437 ms   CBC auto differential    Collection Time: 01/01/25  1:38 AM   Result Value Ref Range    WBC 6.56 3.90 - 12.70 K/uL    RBC 4.03 4.00 - 5.40 M/uL    Hemoglobin 10.3 (L) 12.0 - 16.0 g/dL    Hematocrit 37.6 37.0 - 48.5 %    MCV 93 82 - 98 fL    MCH 25.6 (L) 27.0 - 31.0 pg    MCHC 27.4 (L) 32.0 - 36.0 g/dL    RDW 16.9 (H) 11.5 - 14.5 %    Platelets 246 150 - 450 K/uL    MPV 10.5 9.2 - 12.9 fL    Immature Granulocytes 0.3 0.0 - 0.5 %    Gran # (ANC) 4.2 1.8 - 7.7 K/uL    Immature Grans (Abs) 0.02 0.00 - 0.04 K/uL    Lymph # 1.7 1.0 - 4.8 K/uL    Mono # 0.6 0.3 - 1.0 K/uL    Eos # 0.0 0.0 - 0.5 K/uL    Baso # 0.01 0.00 - 0.20 K/uL    nRBC 0 0 /100 WBC    Gran % 64.4 38.0 - 73.0 %    Lymph % 25.2 18.0 - 48.0 %    Mono % 9.6 4.0 - 15.0 %    Eosinophil % 0.3 0.0 - 8.0 %    Basophil % 0.2 0.0 - 1.9 %    Differential Method Automated    Comprehensive metabolic panel    Collection Time: 01/01/25  1:38 AM   Result Value Ref Range    Sodium 142 136 - 145 mmol/L    Potassium 4.4 3.5 - 5.1 mmol/L    Chloride 94 (L) 95 - 110 mmol/L    CO2 36 (H) 23 - 29 mmol/L    Glucose 113 (H) 70 - 110 mg/dL    BUN 15 8 - 23 mg/dL    Creatinine 0.9 0.5 - 1.4 mg/dL    Calcium 9.3 8.7 - 10.5 mg/dL    Total Protein 7.7 6.0 - 8.4 g/dL    Albumin 3.2 (L) 3.5 - 5.2 g/dL    Total Bilirubin 0.4 0.1 - 1.0 mg/dL    Alkaline Phosphatase 91 40 - 150 U/L    AST 26 10 - 40 U/L    ALT 18 10 - 44 U/L    eGFR >60 >60 mL/min/1.73 m^2    Anion Gap 12 8 - 16 mmol/L   Troponin I    Collection Time: 01/01/25  1:38 AM   Result Value Ref Range    Troponin I 0.008 0.000 - 0.026 ng/mL   Brain natriuretic peptide    Collection Time: 01/01/25  1:38 AM   Result Value Ref Range    BNP 28 0 - 99 pg/mL    Hepatitis C Antibody    Collection Time: 01/01/25  1:38 AM   Result Value Ref Range    Hepatitis C Ab Negative Negative   HCV Virus Hold Specimen    Collection Time: 01/01/25  1:38 AM   Result Value Ref Range    HEP C Virus Hold Specimen Hold for HCV sendout    Type & Screen    Collection Time: 01/01/25  1:38 AM   Result Value Ref Range    Group & Rh O POS     Indirect Geoffrey NEG     Specimen Outdate 01/04/2025 23:59         Imaging Results:  Imaging Results              CT Abdomen Pelvis  Without Contrast (Final result)  Result time 01/01/25 07:39:16      Final result by Dom Nicolas MD (01/01/25 07:39:16)                   Impression:      No acute abdominopelvic abnormality.    Colonic diverticulosis without evidence of acute diverticulitis.    Fat-containing umbilical hernia.    Patchy opacities in the lower lung concerning for multilobar pneumonia.      All CT scans at [this location] are performed using dose modulation techniques as appropriate to a performed exam including the following: automated exposure control; adjustment of the mA and/or kV according to patient size (this includes techniques or standardized protocols for targeted exams where dose is matched to indication / reason for exam; i.e. extremities or head); use of iterative reconstruction technique.    Finalized on: 1/1/2025 7:39 AM By:  Dom Nicolas MD  [facility]# [id number]      2025-01-01 07:41:17.665    [facility]               Narrative:    EXAM: CT ABDOMEN PELVIS WITHOUT CONTRAST    CLINICAL HISTORY: GI bleed;LLQ abdominal pain;    TECHNIQUE: Contiguous axial images obtained through the abomen and pelvis without intravenous contrast.  Sagittal and coronal reconstructions performed.    COMPARISON: None available.    FINDINGS:   The liver is normal in size, countour, and attenuation. No focal lesion seen.        Gallbladder and biliary tree are unremarkable. No cholelithiasis or ductal dilatation.        Spleen is normal in size and  attenuation. No focal lesion seen.        Pancreas is normal in size, contour, and attenuation. No focal lesion or ductal dilatation.        Adrenal glands are unremarkable.        Kidneys are normal in size and appearance. No focal lesion, urolithiasis, or hydroureteronephrosis.    Colonic diverticulosis without evidence of acute diverticulitis. Gastrointestinal tract is otherwise unremarkable. No evidence of obstruction, mass lesion, or inflammation.        Urinary bladder is unremarkable in appearance.        Uterus and adnexal structures are unremarkable.    Scattered atherosclerosis noted. Major vessels of the abdomen and pelvis otherwise appear unremarkable although evaluation limited without intravenous contrast.    No suspicious lymphadenopathy.    No significant ascites, pneumoperitoneum, or peritoneal implant. There is a small fat-containing umbilical hernia.    Degenerative changes noted in spine. No acute bony abnormality. No aggressive lytic or blastic lesion.        Patchy opacities noted in the lower lungs.                                         X-Ray Chest AP (Final result)  Result time 01/01/25 01:59:27      Final result by Margarita Fraga MD (01/01/25 01:59:27)                   Impression:      As above      Electronically signed by: Margarita Fraga  Date:    01/01/2025  Time:    01:59               Narrative:    EXAMINATION:  XR CHEST AP PORTABLE    CLINICAL HISTORY:  shortness of breath;    TECHNIQUE:  Single frontal view of the chest was performed.    COMPARISON:  03/11/2024    FINDINGS:  Bibasilar airspace disease and/or atelectasis.  Normal heart size.                                  5:43 AM: Per STAT radiology, pt's CT Abdomen and Pelvis without IV Contrast results: No definite acute inflammatory or obstructive process is seen within the abdomen or pelvis. No bowel wall thickening or obstruction. Diffuse colonic diverticulosis without evidence of an acute diverticulitis.  Subpleural airspace consolidation seen in both lower lobes, with some scarring and architectural distortion seen in the left lower lobe. This is somewhat atypical appearance for community acquired pneumonia, and may be due to a chronic or atypical multifocal infectious process. Please correlate with patient's respiratory symptoms. Please compare to any prior chest or abdominal CT if available.   Radiologist: Leno Montiel MD    The EKG was ordered, reviewed, and independently interpreted by the ED provider.  Interpretation time: 12:55  Rate: 83 BPM  Rhythm: normal sinus rhythm  Interpretation: No acute ST changes. No STEMI.         The Emergency Provider reviewed the vital signs and test results, which are outlined above.     ED Discussion       5:48 AM: Reassessed pt at this time. Discussed with pt all pertinent ED information and results. Discussed pt dx and plan of tx. Gave pt all f/u and return to the ED instructions. All questions and concerns were addressed at this time. Pt expresses understanding of information and instructions, and is comfortable with plan to discharge. Pt is stable for discharge.    I discussed with patient and/or family/caretaker that evaluation in the ED does not suggest any emergent or life threatening medical conditions requiring immediate intervention beyond what was provided in the ED, and I believe patient is safe for discharge.  Regardless, an unremarkable evaluation in the ED does not preclude the development or presence of a serious of life threatening condition. As such, patient was instructed to return immediately for any worsening or change in current symptoms.        Medical Decision Making  Amount and/or Complexity of Data Reviewed  Labs: ordered. Decision-making details documented in ED Course.  Radiology: ordered. Decision-making details documented in ED Course.  ECG/medicine tests: ordered and independent interpretation performed. Decision-making details documented in ED  Course.    Risk  Prescription drug management.                ED Medication(s):  Medications - No data to display    Discharge Medication List as of 1/1/2025  5:44 AM        START taking these medications    Details   doxycycline (VIBRAMYCIN) 100 MG Cap Take 1 capsule (100 mg total) by mouth 2 (two) times daily. for 10 days, Starting Wed 1/1/2025, Until Sat 1/11/2025, Print              Follow-up Information       Becki Hernandez NP In 1 day.    Specialty: Internal Medicine  Contact information:  [id number] [place] [id number] [phone]               [facility] In 2 days.    Specialty: Gastroenterology  Contact information:  [address] [facility] [id number] [phone]             [facility] - Emergency Dept..    Specialty: Emergency Medicine  Why: As needed, If symptoms worsen  Contact information:  [address] [facility] [id number] [phone]                               Scribe Attestation:   Scribe #1: I performed the above scribed service and the documentation accurately describes the services I performed. I attest to the accuracy of the note.     Attending:   Physician Attestation Statement for Scribe #1: I, Andrea Galo MD, personally performed the services described in this documentation, as scribed by Shira Forbes, in my presence, and it is both accurate and complete.           Clinical Impression       ICD-10-CM ICD-9-CM   1. Lower GI bleed  K92.2 578.9   2. Shortness of breath  R06.02 786.05   3. Pneumonia of both lower lobes due to infectious organism  J18.9 486       Disposition:   Disposition: Discharged  Condition: Stable        Andrea Galo MD  01/02/25 0541

## 2025-01-05 LAB
OHS QRS DURATION: 80 MS
OHS QTC CALCULATION: 437 MS

## 2025-01-14 ENCOUNTER — APPOINTMENT (OUTPATIENT)
Dept: RADIOLOGY | Facility: HOSPITAL | Age: 65
End: 2025-01-14
Attending: NURSE PRACTITIONER
Payer: OTHER GOVERNMENT

## 2025-01-14 ENCOUNTER — OFFICE VISIT (OUTPATIENT)
Dept: INTERNAL MEDICINE | Facility: CLINIC | Age: 65
End: 2025-01-14
Payer: OTHER GOVERNMENT

## 2025-01-14 VITALS
HEIGHT: 65 IN | HEART RATE: 90 BPM | BODY MASS INDEX: 48.36 KG/M2 | SYSTOLIC BLOOD PRESSURE: 120 MMHG | DIASTOLIC BLOOD PRESSURE: 66 MMHG | RESPIRATION RATE: 14 BRPM | WEIGHT: 290.25 LBS | OXYGEN SATURATION: 92 % | TEMPERATURE: 98 F

## 2025-01-14 DIAGNOSIS — Z09 HOSPITAL DISCHARGE FOLLOW-UP: Primary | ICD-10-CM

## 2025-01-14 DIAGNOSIS — G89.29 CHRONIC PAIN OF RIGHT KNEE: ICD-10-CM

## 2025-01-14 DIAGNOSIS — E66.01 MORBID OBESITY: ICD-10-CM

## 2025-01-14 DIAGNOSIS — J96.11 CHRONIC RESPIRATORY FAILURE WITH HYPOXIA: ICD-10-CM

## 2025-01-14 DIAGNOSIS — I50.32 CHRONIC DIASTOLIC CONGESTIVE HEART FAILURE: ICD-10-CM

## 2025-01-14 DIAGNOSIS — M25.561 CHRONIC PAIN OF RIGHT KNEE: ICD-10-CM

## 2025-01-14 DIAGNOSIS — M17.11 PRIMARY OSTEOARTHRITIS OF RIGHT KNEE: Primary | ICD-10-CM

## 2025-01-14 DIAGNOSIS — E11.9 TYPE 2 DIABETES MELLITUS WITHOUT COMPLICATION, WITHOUT LONG-TERM CURRENT USE OF INSULIN: ICD-10-CM

## 2025-01-14 DIAGNOSIS — E78.5 HYPERLIPIDEMIA, UNSPECIFIED HYPERLIPIDEMIA TYPE: ICD-10-CM

## 2025-01-14 DIAGNOSIS — I10 PRIMARY HYPERTENSION: ICD-10-CM

## 2025-01-14 DIAGNOSIS — R19.5 DARK STOOLS: ICD-10-CM

## 2025-01-14 PROCEDURE — 99214 OFFICE O/P EST MOD 30 MIN: CPT | Mod: S$PBB,,, | Performed by: NURSE PRACTITIONER

## 2025-01-14 PROCEDURE — 73562 X-RAY EXAM OF KNEE 3: CPT | Mod: TC,PN,RT

## 2025-01-14 PROCEDURE — 99999 PR PBB SHADOW E&M-EST. PATIENT-LVL V: CPT | Mod: PBBFAC,,, | Performed by: NURSE PRACTITIONER

## 2025-01-14 PROCEDURE — 73562 X-RAY EXAM OF KNEE 3: CPT | Mod: 26,RT,, | Performed by: RADIOLOGY

## 2025-01-14 PROCEDURE — 99215 OFFICE O/P EST HI 40 MIN: CPT | Mod: PBBFAC,25,PN | Performed by: NURSE PRACTITIONER

## 2025-01-14 PROCEDURE — G2211 COMPLEX E/M VISIT ADD ON: HCPCS | Mod: S$PBB,,, | Performed by: NURSE PRACTITIONER

## 2025-01-14 PROCEDURE — 73560 X-RAY EXAM OF KNEE 1 OR 2: CPT | Mod: 26,59,LT, | Performed by: RADIOLOGY

## 2025-01-14 RX ORDER — KETOROLAC TROMETHAMINE 10 MG/1
10 TABLET, FILM COATED ORAL
COMMUNITY
Start: 2024-09-16 | End: 2025-01-14 | Stop reason: SDUPTHER

## 2025-01-14 RX ORDER — CAPSAICIN 0.03 G/100G
CREAM TOPICAL 2 TIMES DAILY
Qty: 25 G | Refills: 2 | Status: SHIPPED | OUTPATIENT
Start: 2025-01-14

## 2025-01-14 RX ORDER — CAPSAICIN 0.03 G/100G
CREAM TOPICAL
COMMUNITY
Start: 2024-09-18 | End: 2025-01-14 | Stop reason: SDUPTHER

## 2025-01-14 RX ORDER — KETOROLAC TROMETHAMINE 10 MG/1
10 TABLET, FILM COATED ORAL
Qty: 20 TABLET | Refills: 2 | Status: SHIPPED | OUTPATIENT
Start: 2025-01-14

## 2025-01-14 NOTE — PROGRESS NOTES
Patient ID: Baylee Muñoz is a 64 y.o. female.    Chief Complaint: Follow-up (Pneumonia Dx), Hip Pain (Rt), and Knee Pain (Rt)    History of Present Illness    CHIEF COMPLAINT:  Ms. Muñoz presents today for follow-up after an emergency visit for a Lower GI bleed.    RECENT GI BLEED:  She had an emergency visit on January 1st for dark red bowel movement. She reports completing prescribed doxycycline 100mg twice daily for 10 days and feeling much better.    CURRENT SYMPTOMS:  She reports left ankle pain and swelling, for which she wears a brace. She also experiences right knee pain. She is currently on oxygen therapy.    WEIGHT MANAGEMENT:  She expresses interest in GLP-1 medication for weight management.    MEDICAL HISTORY:  She has type 2 diabetes, morbid obesity, hypertension, hyperlipidemia, and is a former smoker. She has chronic diastolic congestive heart failure and chronic respiratory failure with hypoxia requiring home oxygen therapy. She has obstructive sleep apnea and asthma.    GASTROINTESTINAL HISTORY:  She has a history of colon polyps, gastritis, diverticulosis, and previous GI bleed. Colonoscopy in 2022 revealed a 2-3mm polyp in the sigmoid colon. Upper GI endoscopy in 2022 showed a 3cm hernia, two bleeding angioectasias in the stomach, and gastritis in the setting of a GI bleed.    IMAGING:  CT abdomen and pelvis during recent emergency visit showed opacities in the lower lung concerning for multilobular pneumonia.      ROS:  Constitutional: negative diminished activity, negative appetite change, negative fatigue, negative fever  HENT: negative ear discharge, negative ear pain, negative mouth sores, negative nosebleeds, negative sore throat, negative tinnitus  Eyes: negative discharge, negative eye redness, negative eye itchiness  Respiratory: negative apnea, negative cough, POSITIVE shortness of breath  Cardiovascular: negative chest pain, negative leg swelling  Gastrointestinal: negative abdominal  distention, negative abdominal pain, negative blood in stool, negative constipation, negative diarrhea, negative nausea, negative vomiting, POSITIVE CHANGE Genitourinary: negative difficulty urinating, negative flank pain, negative frequency, negative hematuria  Musculoskeletal: negative back pain, negative abnormal gait, negative neck pain, negative neck stiffness, POSITIVE JOINT PAIN, negative muscle pain, POSITIVE JOINT SWELLING  Skin: negative rash, negative wound, negative abnormal moles  Allergic/Immunologic: negative seasonal allergies, negative food allergies  Neurological: negative dizziness, negative seizures, negative numbness, negative tingling, negative headaches, negative balance issues  Psychiatric: negative agitation, negative confusion, negative hallucinations, negative sleep difficulty, negative suicidal ideation         Physical Exam    Vital Signs: Reviewed.  Constitutional: Well-appearing. Well-developed. No acute distress.  HENT: Normocephalic. Tympanic membranes normal bilaterally. Nares patent. Oropharynx clear. No erythema. No exudate.  Cardiovascular: Normal rate and regular rhythm. Normal heart sounds.  Pulmonary: Pulmonary effort is normal. DIMINISHED breath sounds.  Abdominal: Bowel sounds are normal. Abdomen is soft. No tenderness.  Musculoskeletal: No muscle tenderness.RIGHT KNEE PAIN-WITH ROM.  Skin: Warm. Dry.  Neurological: No focal deficit is present. Alert and oriented to person, place, and time.  Psychiatric: Mood is normal. Behavior is normal. Behavior is cooperative.         Assessment & Plan    MULTILOBULAR PNEUMONIA:  - Assessed patient's improvement following doxycycline treatment for multilobular pneumonia.  - Completed doxycycline 100 mg twice daily for 10 days.    CHRONIC CONDITIONS MANAGEMENT:  - Evaluated ongoing management of multiple chronic conditions including type 2 diabetes, osteoarthritis, obstructive sleep apnea, morbid obesity, hypertension, hyperlipidemia,  chronic respiratory failure, chronic diastolic congestive heart failure, and asthma.    WEIGHT MANAGEMENT:  - Considered GLP-1 agonist therapy for weight management; deferred decision pending lab results.    GASTROINTESTINAL ISSUES:  - Reviewed recent GI procedures: 2022 colonoscopy showed small sigmoid polyp; 2022 upper GI revealed hernia and gastric angioectasias.  - Ms. Muñoz to collect stool sample at home using provided kit.  - Provided stool collection kit for home use.    LEFT ANKLE PAIN:  - Ms. Muoñz to wear ankle brace for left ankle pain and swelling.    RIGHT KNEE ISSUE:  - Ordered X-ray of right knee.    LABS:  - Ordered labs: CBC, CMP, lipid panel, hemoglobin A1c, microalbumin, creatinine, urine TSH, and BNP.    PULMONOLOGY REFERRAL:  - Referred to pulmonology at [facility]     CARDIOLOGY REFERRAL:  - Referred to cardiology at [facility],     PODIATRY REFERRAL:  - Referred to podiatry at [facility]     OTHER INSTRUCTIONS:  - Release of information to be sent to obtain eye exam report from [facility].    FOLLOW UP:  - Follow up in 6 months or sooner if needed.        Labs today     Schedule pulmonary appointment--Dr. Azul or Tesha     Cardiology-[facility]     X-ray today       Podiatry--Dr. Rangel Hernandez     [facility] exam     Follow up in about 6 months (around 7/14/2025).    This note was generated with the assistance of ambient listening technology. Verbal consent was obtained by the patient and accompanying visitor(s) for the recording of patient appointment to facilitate this note. I attest to having reviewed and edited the generated note for accuracy, though some syntax or spelling errors may persist. Please contact the author of this note for any clarification.

## 2025-01-21 ENCOUNTER — TELEPHONE (OUTPATIENT)
Dept: PODIATRY | Facility: CLINIC | Age: 65
End: 2025-01-21
Payer: OTHER GOVERNMENT

## 2025-01-22 ENCOUNTER — TELEPHONE (OUTPATIENT)
Dept: INTERNAL MEDICINE | Facility: CLINIC | Age: 65
End: 2025-01-22
Payer: OTHER GOVERNMENT

## 2025-01-22 DIAGNOSIS — D64.9 ANEMIA, UNSPECIFIED TYPE: Primary | ICD-10-CM

## 2025-01-22 DIAGNOSIS — Z87.19 HISTORY OF GI BLEED: ICD-10-CM

## 2025-01-22 NOTE — TELEPHONE ENCOUNTER
Spoke to patient and discussed results, patient has a GI MD and states she will call them and make a follow up appt when she can look at her calendar and go.         ----- Message from Becki Hernandez NP sent at 1/22/2025  7:18 AM CST -----  Please inform of labs.  Mild decrease noted to anemia markers. Due to recent blood in stools and history of GI blood, I recommend she follow up with GI.  Please schedule.     A1C-5.4%-controlled.     Other labs are stable.      Please keep all appointments with specialist.

## 2025-01-30 ENCOUNTER — PATIENT OUTREACH (OUTPATIENT)
Dept: ADMINISTRATIVE | Facility: HOSPITAL | Age: 65
End: 2025-01-30
Payer: OTHER GOVERNMENT

## 2025-02-02 ENCOUNTER — HOSPITAL ENCOUNTER (EMERGENCY)
Facility: HOSPITAL | Age: 65
Discharge: HOME | End: 2025-02-02
Attending: FAMILY MEDICINE
Payer: OTHER GOVERNMENT

## 2025-02-02 VITALS
SYSTOLIC BLOOD PRESSURE: 147 MMHG | HEART RATE: 83 BPM | OXYGEN SATURATION: 90 % | RESPIRATION RATE: 16 BRPM | TEMPERATURE: 99 F | DIASTOLIC BLOOD PRESSURE: 66 MMHG

## 2025-02-02 DIAGNOSIS — M54.31 SCIATICA OF RIGHT SIDE: Primary | ICD-10-CM

## 2025-02-02 PROCEDURE — 99284 EMERGENCY DEPT VISIT MOD MDM: CPT | Mod: 25

## 2025-02-02 PROCEDURE — 96372 THER/PROPH/DIAG INJ SC/IM: CPT | Performed by: NURSE PRACTITIONER

## 2025-02-02 PROCEDURE — 25000003 PHARM REV CODE 250: Performed by: NURSE PRACTITIONER

## 2025-02-02 PROCEDURE — 63600175 PHARM REV CODE 636 W HCPCS: Performed by: NURSE PRACTITIONER

## 2025-02-02 RX ORDER — HYDROCODONE BITARTRATE AND ACETAMINOPHEN 5; 325 MG/1; MG/1
1 TABLET ORAL
Status: COMPLETED | OUTPATIENT
Start: 2025-02-02 | End: 2025-02-02

## 2025-02-02 RX ORDER — BUTALBITAL, ACETAMINOPHEN AND CAFFEINE 50; 325; 40 MG/1; MG/1; MG/1
1 TABLET ORAL EVERY 6 HOURS PRN
Qty: 15 TABLET | Refills: 0 | Status: SHIPPED | OUTPATIENT
Start: 2025-02-02 | End: 2025-02-04

## 2025-02-02 RX ORDER — DEXAMETHASONE SODIUM PHOSPHATE 4 MG/ML
8 INJECTION, SOLUTION INTRA-ARTICULAR; INTRALESIONAL; INTRAMUSCULAR; INTRAVENOUS; SOFT TISSUE
Status: COMPLETED | OUTPATIENT
Start: 2025-02-02 | End: 2025-02-02

## 2025-02-02 RX ADMIN — DEXAMETHASONE SODIUM PHOSPHATE 8 MG: 4 INJECTION, SOLUTION INTRA-ARTICULAR; INTRALESIONAL; INTRAMUSCULAR; INTRAVENOUS; SOFT TISSUE at 10:02

## 2025-02-02 RX ADMIN — HYDROCODONE BITARTRATE AND ACETAMINOPHEN 1 TABLET: 5; 325 TABLET ORAL at 10:02

## 2025-02-02 NOTE — ED PROVIDER NOTES
Encounter Date: 2/2/2025       History     Chief Complaint   Patient presents with    Hip Pain     Pt reports pain to R. Hip. Pt states it's her sciatic nerve. Pt reports receiving a steroid shot 6 months ago, which helped her, but pain returned on Friday. Pt states pain does not radiate.      64-year-old female with complaint of right lateral buttock pain with radiation right hip over the past several weeks.  Patient reports pain has worsened over the past 2 days.  Patient reports steroid shots work really well for patient's pain.  Patient denies abdominal pain.  Patient denies any fever or chills.  Patient denies any other complaints.  Patient has history of COPD and is on chronic oxygen.        Review of patient's allergies indicates:   Allergen Reactions    Coconut Anaphylaxis    Iodine and iodide containing products Anaphylaxis    Dairy aid [lactase] Diarrhea and Nausea And Vomiting    Penicillins Hives     Past Medical History:   Diagnosis Date    Allergy     Arthritis     Asthma     Admit to [facility] 1/6/19 by Dr. Michael Randall for asthma exaceration, acute bronchitis, acute dCHF, hypoxia, elevated troponin/BNP (thought to be 2/2 asthma exac & acute dCHF by cards), tx'd w/ IV lasix, duonebs, IV solumedrol and DC'd on Z-prema, prednisone taper    CKD (chronic kidney disease) stage 2, GFR 60-89 ml/min     H/o ARF 2/2 poor water intake; encouraged to increase water intake and avoid NSAIDS & contrast dye    COVID 06/2022    Depression     Diastolic heart failure 01/06/2019    Dx'd at [facility] during asthma exacerbation    Diverticulitis 2005    Dyslipidemia 08/28/2018    Former smoker     Quit 12/31/18; smoked 0.12 packs/day x 37 years    Gastritis     Hypertension     Pneumonia 06/2022    Prediabetes     A1c 6.1% on 9/29/19    Prediabetes     Severe obesity (BMI >= 40)     Vertigo     Vitamin D deficiency      Past Surgical History:   Procedure Laterality Date    COLONOSCOPY N/A 08/10/2022    Procedure: COLONOSCOPY;   Surgeon: Dariela Devi MD;  Location: [facility];  Service: Endoscopy;  Laterality: N/A;    COLONOSCOPY N/A 2022    Procedure: COLONOSCOPY;  Surgeon: Angie Neves MD;  Location: [facility];  Service: Endoscopy;  Laterality: N/A;    DILATION AND CURETTAGE OF UTERUS      missed , bleeding    ESOPHAGOGASTRODUODENOSCOPY N/A 2022    Procedure: EGD (ESOPHAGOGASTRODUODENOSCOPY);  Surgeon: Dariela Devi MD;  Location: [facility];  Service: Endoscopy;  Laterality: N/A;    EXPLORATORY LAPAROTOMY      lysis of adhesions, open tube    KNEE SURGERY Right     torn mcl    laser to cervix       Family History   Problem Relation Name Age of Onset    Stroke Mother      Heart disease Mother      Kidney disease Father      Heart disease Father      Hypertension Father      HIV Sister      COPD Sister      Bipolar disorder Sister       Social History     Tobacco Use    Smoking status: Former     Current packs/day: 0.00     Average packs/day: 0.3 packs/day for 40.0 years (10.0 ttl pk-yrs)     Types: Cigarettes     Start date: 1980     Quit date: 2020     Years since quittin.0     Passive exposure: Never    Smokeless tobacco: Never   Substance Use Topics    Alcohol use: No    Drug use: No     Review of Systems   Constitutional:  Negative for fever.   HENT:  Negative for sore throat.    Respiratory:  Negative for shortness of breath.    Cardiovascular:  Negative for chest pain.   Gastrointestinal:  Negative for nausea.   Genitourinary:  Negative for dysuria.   Musculoskeletal:  Positive for back pain.        Hip pain    Skin:  Negative for rash.   Neurological:  Negative for weakness.   Hematological:  Does not bruise/bleed easily.       Physical Exam     Initial Vitals [25 0922]   BP Pulse Resp Temp SpO2   (!) 147/66 83 20 98.6 °F (37 °C) (!) 90 %      MAP       --         Physical Exam    Nursing note and vitals reviewed.  Constitutional: She appears well-developed and well-nourished.    HENT:   Head: Normocephalic and atraumatic.   Eyes: Conjunctivae and EOM are normal. Pupils are equal, round, and reactive to light.   Neck: Neck supple.   Normal range of motion.  Cardiovascular:  Normal rate, regular rhythm, normal heart sounds and intact distal pulses.           Pulmonary/Chest: Breath sounds normal.   Abdominal: Abdomen is soft. There is no abdominal tenderness. There is no rebound and no guarding.   Musculoskeletal:         General: Normal range of motion.      Cervical back: Normal range of motion and neck supple.      Comments: No spine tenderness, no hip tenderness, pain in lower back and buttock with range of motion of lumbar spine, straight leg negative     Neurological: She is alert and oriented to person, place, and time. She has normal strength and normal reflexes.   Skin: Skin is warm and dry.   Psychiatric: She has a normal mood and affect. Her behavior is normal. Thought content normal.         ED Course   Procedures  Labs Reviewed - No data to display       Imaging Results    None          Medications   dexAMETHasone injection 8 mg (has no administration in time range)   HYDROcodone-acetaminophen 5-325 mg per tablet 1 tablet (has no administration in time range)     Medical Decision Making  Risk  Prescription drug management.                                      Clinical Impression:  Final diagnoses:  [M54.31] Sciatica of right side (Primary)          ED Disposition Condition    Discharge Stable          ED Prescriptions       Medication Sig Dispense Start Date End Date Auth. Provider    butalbital-acetaminophen-caffeine -40 mg (FIORICET, ESGIC) -40 mg per tablet Take 1 tablet by mouth every 6 (six) hours as needed for Pain. 15 tablet 2/2/2025 -- Derek Mercedes NP          Follow-up Information       Follow up With Specialties Details Why Contact Info    Becki Hernandez NP Internal Medicine Schedule an appointment as soon as possible for a visit in 2 days  [id number]  [facility] [address]  [phone]               Derek Mercedes, NP  02/02/25 0938

## 2025-02-04 ENCOUNTER — TELEPHONE (OUTPATIENT)
Dept: INTERNAL MEDICINE | Facility: CLINIC | Age: 65
End: 2025-02-04
Payer: OTHER GOVERNMENT

## 2025-02-04 ENCOUNTER — OFFICE VISIT (OUTPATIENT)
Dept: INTERNAL MEDICINE | Facility: CLINIC | Age: 65
End: 2025-02-04
Payer: OTHER GOVERNMENT

## 2025-02-04 VITALS
OXYGEN SATURATION: 87 % | HEIGHT: 65 IN | HEART RATE: 88 BPM | BODY MASS INDEX: 48.26 KG/M2 | DIASTOLIC BLOOD PRESSURE: 70 MMHG | TEMPERATURE: 98 F | RESPIRATION RATE: 18 BRPM | SYSTOLIC BLOOD PRESSURE: 128 MMHG | WEIGHT: 289.69 LBS

## 2025-02-04 DIAGNOSIS — M54.16 CHRONIC RADICULAR LOW BACK PAIN: Primary | ICD-10-CM

## 2025-02-04 DIAGNOSIS — G89.29 CHRONIC RADICULAR LOW BACK PAIN: Primary | ICD-10-CM

## 2025-02-04 PROCEDURE — 99214 OFFICE O/P EST MOD 30 MIN: CPT | Mod: S$PBB,,, | Performed by: FAMILY MEDICINE

## 2025-02-04 PROCEDURE — 99999 PR PBB SHADOW E&M-EST. PATIENT-LVL V: CPT | Mod: PBBFAC,,, | Performed by: FAMILY MEDICINE

## 2025-02-04 PROCEDURE — 99215 OFFICE O/P EST HI 40 MIN: CPT | Mod: PBBFAC,PN | Performed by: FAMILY MEDICINE

## 2025-02-04 RX ORDER — TIZANIDINE 4 MG/1
4 TABLET ORAL EVERY 8 HOURS PRN
Qty: 30 TABLET | Refills: 0 | Status: SHIPPED | OUTPATIENT
Start: 2025-02-04 | End: 2025-02-14

## 2025-02-04 RX ORDER — GABAPENTIN 100 MG/1
100 CAPSULE ORAL 3 TIMES DAILY PRN
Qty: 90 CAPSULE | Refills: 0 | Status: SHIPPED | OUTPATIENT
Start: 2025-02-04 | End: 2025-03-06

## 2025-02-04 NOTE — TELEPHONE ENCOUNTER
----- Message from Brie sent at 2/4/2025 10:04 AM CST -----  Contact: Baylee  Type:  Needs Medical Advice    Who Called: Baylee  Symptoms (please be specific): Severe sciatic pain  How long has patient had these symptoms: Approximately 5 days  Pharmacy name and phone #:    [id number] - [place]  [address]  [address] [facility] [address]  [address] [facility] [address]  Phone: [phone] Fax: [phone]  Would the patient rather a call back or a response via [facility]? call  Best Call Back Number: [phone]   Additional Information: Patient reports being seen in the hospital yesterday due to experiencing severe sciatic pain and reports injection received is not working well and keeping patient awake. Patient request to receive a medication to help fall asleep. Please give patient an immediate call back to assist.  Thank you,  GH

## 2025-02-04 NOTE — PROGRESS NOTES
Subjective:       Patient ID: Baylee Muñoz is a 64 y.o. female.    Chief Complaint: Sciatica    Baylee Muñoz is a 64 y.o. female who presents to clinic for Sciatica.    She was recently seen at [facility] on 2/2/2025, was given steroid shot but it has not improved her pain. She was previous prescribed diclofenac but stopped due to history of GI bleed. Last Thursday started having pain in her back and could not move, went to [facility]. She was discharge on Fioricet for pain which is not helping her. She had issue of incontinence due to not being able to sit down on the toilet     She has COPD and has been unable to wean off of oxygen currently on 3L [place]        Review of Systems   Constitutional:  Negative for chills and fever.   HENT:  Negative for congestion, rhinorrhea and sore throat.    Respiratory:  Positive for shortness of breath. Negative for cough and wheezing.    Cardiovascular:  Negative for chest pain, palpitations and leg swelling.   Gastrointestinal:  Negative for abdominal pain, constipation, diarrhea, nausea and vomiting.   Genitourinary:  Negative for dysuria, frequency and urgency.   Musculoskeletal:  Positive for back pain.   Neurological:  Negative for headaches.   Psychiatric/Behavioral:  Negative for dysphoric mood.        Objective:      Physical Exam  Vitals reviewed.   HENT:      Head: Normocephalic and atraumatic.      Nose: No congestion or rhinorrhea.   Eyes:      General: No scleral icterus.        Right eye: No discharge.         Left eye: No discharge.      Extraocular Movements: Extraocular movements intact.      Conjunctiva/sclera: Conjunctivae normal.      Pupils: Pupils are equal, round, and reactive to light.   Cardiovascular:      Rate and Rhythm: Normal rate and regular rhythm.      Heart sounds: No murmur heard.     No friction rub. No gallop.   Pulmonary:      Effort: Pulmonary effort is normal. No respiratory distress.      Breath sounds: Normal breath sounds. No stridor.  No wheezing or rhonchi.   Abdominal:      General: Bowel sounds are normal.      Palpations: Abdomen is soft.   Musculoskeletal:      Lumbar back: Tenderness present.      Right lower leg: No edema.      Left lower leg: No edema.   Skin:     General: Skin is warm.   Neurological:      General: No focal deficit present.      Mental Status: She is alert.   Psychiatric:         Mood and Affect: Mood normal.         Behavior: Behavior normal.         Assessment:       1. Chronic radicular low back pain        Plan:   1. Chronic radicular low back pain  Chronic, uncontrolled  -     Ambulatory referral/consult to Pain Clinic; Future; Expected date: 02/11/2025  -     tiZANidine (ZANAFLEX) 4 MG tablet; Take 1 tablet (4 mg total) by mouth every 8 (eight) hours as needed (back pain).  Dispense: 30 tablet; Refill: 0  -     gabapentin (NEURONTIN) 100 MG capsule; Take 1 capsule (100 mg total) by mouth 3 (three) times daily as needed (back pain).  Dispense: 90 capsule; Refill: 0    Future Appointments   Date Time Provider Department Center   2/26/2025 10:00 AM [facility]   5/28/2025  3:30 PM Matt Loja MD [facility] INT [facility] [place]   7/14/2025 11:20 AM Becki Hernandez, NP [facility]       Camelia Santos MD  [facility] [address] [place] [address]  [phone]

## 2025-02-04 NOTE — TELEPHONE ENCOUNTER
I returned a  call back and the pt was experiencing sciatica pain and she was assisted with scheduling an appt with  today. She verbalized understanding. Megani //js

## 2025-02-04 NOTE — PATIENT INSTRUCTIONS
Advised to not take gabapentin and tizanidine at the same time due to risk of drowsiness/sleepiness. Take one of the other.

## 2025-02-25 ENCOUNTER — TELEPHONE (OUTPATIENT)
Dept: PULMONOLOGY | Facility: CLINIC | Age: 65
End: 2025-02-25
Payer: OTHER GOVERNMENT

## 2025-03-21 ENCOUNTER — PATIENT OUTREACH (OUTPATIENT)
Dept: ADMINISTRATIVE | Facility: HOSPITAL | Age: 65
End: 2025-03-21
Payer: OTHER GOVERNMENT

## 2025-03-24 DIAGNOSIS — R09.81 NASAL CONGESTION: ICD-10-CM

## 2025-03-24 NOTE — TELEPHONE ENCOUNTER
Pt's lv was on 02/04/25. Pt informed that she will be notified once authorized and approved. She verbalized understanding. angelai //js

## 2025-03-24 NOTE — TELEPHONE ENCOUNTER
----- Message from Judith sent at 3/24/2025 12:22 PM CDT -----  Contact: Gilberto  .Type:  RX Refill RequestWho Called: Gilberto Refill or New Rx:refill RX Name and Strength:fluticasone propionate (FLONASE) 50 mcg/actuation nasal spray How is the patient currently taking it? (ex. 1XDay):as prescribed Is this a 30 day or 90 day RX:90 days Preferred Pharmacy with phone number:.[id number] - [place] - [address]  [address] [facility] [id number] [facility] [address]: [phone] Fax: [phone] or Mail Order:Local Ordering Provider:Becki Hernandez Would the patient rather a call back or a response via [facility]? Call Best Call Back Number:.[phone] Additional Information: Pt requesting medication refill

## 2025-03-25 RX ORDER — FLUTICASONE PROPIONATE 50 MCG
1 SPRAY, SUSPENSION (ML) NASAL DAILY
Qty: 16 G | Refills: 3 | Status: SHIPPED | OUTPATIENT
Start: 2025-03-25

## 2025-03-27 DIAGNOSIS — J44.9 OBSTRUCTIVE LUNG DISEASE: ICD-10-CM

## 2025-03-28 RX ORDER — FLUTICASONE PROPIONATE AND SALMETEROL 500; 50 UG/1; UG/1
1 POWDER RESPIRATORY (INHALATION) 2 TIMES DAILY
Qty: 60 EACH | Refills: 11 | Status: SHIPPED | OUTPATIENT
Start: 2025-03-28

## 2025-04-03 DIAGNOSIS — G89.29 CHRONIC RADICULAR LOW BACK PAIN: ICD-10-CM

## 2025-04-03 DIAGNOSIS — M54.16 CHRONIC RADICULAR LOW BACK PAIN: ICD-10-CM

## 2025-04-08 DIAGNOSIS — G89.29 CHRONIC RADICULAR LOW BACK PAIN: ICD-10-CM

## 2025-04-08 DIAGNOSIS — M54.16 CHRONIC RADICULAR LOW BACK PAIN: ICD-10-CM

## 2025-04-09 RX ORDER — GABAPENTIN 100 MG/1
100 CAPSULE ORAL 3 TIMES DAILY PRN
Qty: 90 CAPSULE | Refills: 0 | Status: SHIPPED | OUTPATIENT
Start: 2025-04-09 | End: 2025-05-09

## 2025-04-09 RX ORDER — GABAPENTIN 100 MG/1
100 CAPSULE ORAL 3 TIMES DAILY PRN
Qty: 90 CAPSULE | Refills: 0 | OUTPATIENT
Start: 2025-04-09 | End: 2025-05-09

## 2025-05-05 DIAGNOSIS — M54.16 CHRONIC RADICULAR LOW BACK PAIN: ICD-10-CM

## 2025-05-05 DIAGNOSIS — G89.29 CHRONIC RADICULAR LOW BACK PAIN: ICD-10-CM

## 2025-05-06 RX ORDER — GABAPENTIN 100 MG/1
100 CAPSULE ORAL 3 TIMES DAILY PRN
Qty: 90 CAPSULE | Refills: 5 | Status: SHIPPED | OUTPATIENT
Start: 2025-05-06 | End: 2025-06-05

## 2025-05-27 ENCOUNTER — TELEPHONE (OUTPATIENT)
Dept: INTERNAL MEDICINE | Facility: CLINIC | Age: 65
End: 2025-05-27
Payer: OTHER GOVERNMENT

## 2025-05-29 ENCOUNTER — OFFICE VISIT (OUTPATIENT)
Dept: INTERNAL MEDICINE | Facility: CLINIC | Age: 65
End: 2025-05-29
Payer: OTHER GOVERNMENT

## 2025-05-29 VITALS
TEMPERATURE: 98 F | SYSTOLIC BLOOD PRESSURE: 116 MMHG | HEIGHT: 65 IN | RESPIRATION RATE: 18 BRPM | OXYGEN SATURATION: 91 % | HEART RATE: 94 BPM | BODY MASS INDEX: 47.16 KG/M2 | WEIGHT: 283.06 LBS | DIASTOLIC BLOOD PRESSURE: 60 MMHG

## 2025-05-29 DIAGNOSIS — R05.1 ACUTE COUGH: ICD-10-CM

## 2025-05-29 DIAGNOSIS — J44.1 COPD WITH EXACERBATION: Primary | ICD-10-CM

## 2025-05-29 DIAGNOSIS — Z99.81 ON HOME OXYGEN THERAPY: ICD-10-CM

## 2025-05-29 DIAGNOSIS — D64.9 ANEMIA, UNSPECIFIED TYPE: ICD-10-CM

## 2025-05-29 DIAGNOSIS — E11.9 TYPE 2 DIABETES MELLITUS WITHOUT COMPLICATION, WITHOUT LONG-TERM CURRENT USE OF INSULIN: ICD-10-CM

## 2025-05-29 DIAGNOSIS — Z12.31 BREAST CANCER SCREENING BY MAMMOGRAM: ICD-10-CM

## 2025-05-29 DIAGNOSIS — H60.501 ACUTE NON-INFECTIVE OTITIS EXTERNA OF RIGHT EAR, UNSPECIFIED TYPE: ICD-10-CM

## 2025-05-29 LAB
CTP QC/QA: YES
CTP QC/QA: YES
POC MOLECULAR INFLUENZA A AGN: NEGATIVE
POC MOLECULAR INFLUENZA B AGN: NEGATIVE
SARS-COV-2 RDRP RESP QL NAA+PROBE: NEGATIVE

## 2025-05-29 PROCEDURE — G2211 COMPLEX E/M VISIT ADD ON: HCPCS | Mod: S$PBB,,, | Performed by: NURSE PRACTITIONER

## 2025-05-29 PROCEDURE — 99999 PR PBB SHADOW E&M-EST. PATIENT-LVL V: CPT | Mod: PBBFAC,,, | Performed by: NURSE PRACTITIONER

## 2025-05-29 PROCEDURE — 99214 OFFICE O/P EST MOD 30 MIN: CPT | Mod: S$PBB,,, | Performed by: NURSE PRACTITIONER

## 2025-05-29 PROCEDURE — 99999PBSHW POCT INFLUENZA A/B MOLECULAR: Mod: PBBFAC,,,

## 2025-05-29 PROCEDURE — 87635 SARS-COV-2 COVID-19 AMP PRB: CPT | Mod: PBBFAC,PN | Performed by: NURSE PRACTITIONER

## 2025-05-29 PROCEDURE — 87502 INFLUENZA DNA AMP PROBE: CPT | Mod: PBBFAC,PN | Performed by: NURSE PRACTITIONER

## 2025-05-29 PROCEDURE — 99215 OFFICE O/P EST HI 40 MIN: CPT | Mod: PBBFAC,PN | Performed by: NURSE PRACTITIONER

## 2025-05-29 PROCEDURE — 99999PBSHW: Mod: PBBFAC,,,

## 2025-05-29 RX ORDER — PREDNISONE 20 MG/1
TABLET ORAL
Qty: 6 TABLET | Refills: 0 | Status: SHIPPED | OUTPATIENT
Start: 2025-05-29

## 2025-05-29 RX ORDER — PROMETHAZINE HYDROCHLORIDE AND DEXTROMETHORPHAN HYDROBROMIDE 6.25; 15 MG/5ML; MG/5ML
5 SYRUP ORAL
Qty: 118 ML | Refills: 0 | Status: SHIPPED | OUTPATIENT
Start: 2025-05-29 | End: 2025-06-08

## 2025-05-29 RX ORDER — DOXYCYCLINE 100 MG/1
100 CAPSULE ORAL 2 TIMES DAILY
Qty: 20 CAPSULE | Refills: 0 | Status: SHIPPED | OUTPATIENT
Start: 2025-05-29 | End: 2025-06-08

## 2025-05-29 NOTE — PROGRESS NOTES
Patient ID: Baylee Muñoz is a 64 y.o. female.    Chief Complaint: URI, Nasal Congestion, and Cough (X 1 wk)    History of Present Illness    CHIEF COMPLAINT:  Ms. Muñoz presents today with cough, congestion, and right ear pain.    HISTORY OF PRESENT ILLNESS:  She reports symptoms started two weeks ago with increased need for supplemental oxygen during daytime hours. She experiences fatigue and while able to work, she is extremely tired upon returning home.    MEDICATIONS:  She has been using prescription inhalers and used nebulizer once during current illness. She is taking Loratadine 10 mg which has provided symptomatic relief.      ROS:  Constitutional: negative diminished activity, negative appetite change, POSITIVE FATIGUE, negative fever  HENT: negative ear discharge, POSITIVE EAR PAIN, negative mouth sores, negative nosebleeds, negative sore throat, negative tinnitus  Eyes: negative discharge, negative eye redness, negative eye itchiness  Respiratory: negative apnea, POSITIVE COUGH, POSITIVE SHORTNESS OF BREATH, POSITIVE DIFFICULTY BREATHING, POSITIVE WHEEZING  Cardiovascular: negative chest pain, negative leg swelling  Gastrointestinal: negative abdominal distention, negative abdominal pain, negative blood in stool, negative constipation, negative diarrhea, negative nausea, negative vomiting  Genitourinary: negative difficulty urinating, negative flank pain, negative frequency, negative hematuria  Musculoskeletal: negative back pain, negative abnormal gait, negative neck pain, negative neck stiffness, negative joint pain, negative muscle pain  Skin: negative rash, negative wound, negative abnormal moles  Neurological: negative dizziness, negative seizures, negative numbness, negative tingling, negative headaches, negative balance issues  Psychiatric: negative agitation, negative confusion, negative hallucinations, negative sleep difficulty, negative suicidal ideation         Physical Exam    Vital Signs:  Reviewed.  Constitutional: Well-appearing. Well-developed. No acute distress.  HENT: Normocephalic. Tympanic membranes normal bilaterally. Nares patent. Oropharynx clear. No erythema. No exudate. RIGHT EAR CANAL ERYTHEMATOUS.  Cardiovascular: Normal rate and regular rhythm. Normal heart sounds.  Pulmonary: Pulmonary effort is normal. Normal breath sounds. POSTERIOR WHEEZING. ANTERIOR WHEEZING.  Abdominal: Bowel sounds are normal. Abdomen is soft. No tenderness.  Musculoskeletal: No muscle tenderness. No joint tenderness. Normal range of motion.  Skin: Warm. Dry.  Neurological: No focal deficit is present. Alert and oriented to person, place, and time.  Psychiatric: Mood is normal. Behavior is normal. Behavior is cooperative.         Assessment & Plan      CHRONIC OBSTRUCTIVE PULMONARY DISEASE (COPD) EXACERBATION:  - Ms. Muñoz experiencing exacerbation with increased oxygen requirement, fatigue, and acute cough that started approximately 2 weeks ago with concurrent congestion and right ear pain.  - Ms. Muñoz reports increased coughing and oxygen needs, now requiring daytime oxygen which is unusual for them.  - Examination revealed posterior and anterior wheezing.  - Encouraged use of nebulizer every 4-6 hours as needed for symptom management.  - Referred to Pulmonology for specialized evaluation and management.    TYPE 2 DIABETES MELLITUS:  - Ordered comprehensive laboratory panel including Hemoglobin A1c and Microalbumin creatinine urine to evaluate glycemic control and renal function.    ANEMIA:  - Ordered comprehensive laboratory panel including CBC, Iron, TIBC, Ferritin, and BMP to evaluate hematologic status and iron stores.    OTITIS EXTERNA (RIGHT EAR):  - Documented erythema in the right ear canal consistent with otitis externa diagnosis.    FATIGUE:  - Ms. Muñoz is able to work but experiences extreme tiredness when at home.  - Increased fatigue is likely related to COPD exacerbation and increased oxygen  requirements.    PREVENTIVE CARE:  - Ordered Mammogram for breast cancer screening.  - Ordered comprehensive lab panel to evaluate overall health status and monitor chronic conditions including BMP.    FOLLOW-UP:  - Follow up in 2 months as scheduled.  - Contact the office sooner if needed.          Follow up in about 2 months (around 7/29/2025).    This note was generated with the assistance of ambient listening technology. Verbal consent was obtained by the patient and accompanying visitor(s) for the recording of patient appointment to facilitate this note. I attest to having reviewed and edited the generated note for accuracy, though some syntax or spelling errors may persist. Please contact the author of this note for any clarification.

## 2025-06-02 DIAGNOSIS — J96.11 CHRONIC RESPIRATORY FAILURE WITH HYPOXIA: Primary | ICD-10-CM

## 2025-06-17 ENCOUNTER — TELEPHONE (OUTPATIENT)
Dept: PAIN MEDICINE | Facility: CLINIC | Age: 65
End: 2025-06-17
Payer: OTHER GOVERNMENT

## 2025-06-23 DIAGNOSIS — I10 ESSENTIAL HYPERTENSION: ICD-10-CM

## 2025-06-23 RX ORDER — GLIPIZIDE 2.5 MG/1
2.5 TABLET, EXTENDED RELEASE ORAL
Qty: 90 TABLET | Refills: 3 | Status: SHIPPED | OUTPATIENT
Start: 2025-06-23 | End: 2026-06-23

## 2025-06-23 RX ORDER — ATORVASTATIN CALCIUM 40 MG/1
40 TABLET, FILM COATED ORAL DAILY
Qty: 90 TABLET | Refills: 3 | Status: SHIPPED | OUTPATIENT
Start: 2025-06-23

## 2025-06-23 RX ORDER — AMLODIPINE BESYLATE 5 MG/1
5 TABLET ORAL DAILY
Qty: 90 TABLET | Refills: 3 | Status: SHIPPED | OUTPATIENT
Start: 2025-06-23 | End: 2026-06-23

## 2025-06-23 NOTE — TELEPHONE ENCOUNTER
Copied from CRM #[id number]. Topic: Medications - Medication Refill  >> 2025  8:08 AM Catherine wrote:  .Type:  RX Refill Request    Who Called: Baylee Muñoz  Refill or New Rx:Refill  RX Name and Strength: - amLODIPine (NORVASC) 5 MG tablet  Is this a 30 day or 90 day RX:90  [facility] with phone number[id number] [place] [place] - [address]  AT [place] [address] [address] [address]  Phone: [phone] Fax: [phone]  Local or Mail Order:Local  Ordering Provider:Becki Hernandez  Would the patient rather a call back or a response via [facility]? Call  Best Call Back Number:.[phone] (home)  Additional Information: Patient would like a call back    RX Name and Strength:atorvastatin (LIPITOR) 40 MG tablet  Is this a 30 day or 90 day RX:90  Preferred Pharmacy with phone number:.  [id number] - [place], [place] - [address]  AT [facility] [address] [facility] & [address] [address]  Phone: [phone] Fax: [phone]  Local or Mail Order:Local  Ordering Provider:Becki Hernandez    RX Name and Strength:glipiZIDE (GLUCOTROL) 2.5 MG TR24  Is this a 30 day or 90 day RX:90  Preferred Pharmacy with phone number:.  [facility] #[id number] - [place], [place] - [address]  AT [facility] & [address] [address]  Phone: [phone] Fax: [phone]  Local or Mail Order:Local  Ordering Provider:Becki Hernandez    I returned a call back to the pt and she was informed that her medication refills will be sent to Becki Hernandez NP and she verbalized understanding. She asked that I reschedule her lab for the day she goes to [facility] on tomorrow. //kah

## 2025-06-24 ENCOUNTER — OFFICE VISIT (OUTPATIENT)
Dept: PULMONOLOGY | Facility: CLINIC | Age: 65
End: 2025-06-24
Payer: OTHER GOVERNMENT

## 2025-06-24 ENCOUNTER — LAB VISIT (OUTPATIENT)
Dept: LAB | Facility: HOSPITAL | Age: 65
End: 2025-06-24
Attending: NURSE PRACTITIONER
Payer: OTHER GOVERNMENT

## 2025-06-24 VITALS
BODY MASS INDEX: 47.86 KG/M2 | WEIGHT: 287.25 LBS | DIASTOLIC BLOOD PRESSURE: 68 MMHG | HEIGHT: 65 IN | OXYGEN SATURATION: 90 % | SYSTOLIC BLOOD PRESSURE: 118 MMHG | HEART RATE: 83 BPM | RESPIRATION RATE: 18 BRPM

## 2025-06-24 DIAGNOSIS — E11.9 TYPE 2 DIABETES MELLITUS WITHOUT COMPLICATION, WITHOUT LONG-TERM CURRENT USE OF INSULIN: ICD-10-CM

## 2025-06-24 DIAGNOSIS — G47.33 OSA (OBSTRUCTIVE SLEEP APNEA): ICD-10-CM

## 2025-06-24 DIAGNOSIS — J96.11 CHRONIC RESPIRATORY FAILURE WITH HYPOXIA: Primary | ICD-10-CM

## 2025-06-24 DIAGNOSIS — D64.9 ANEMIA, UNSPECIFIED TYPE: ICD-10-CM

## 2025-06-24 DIAGNOSIS — J45.909 ASTHMA, UNSPECIFIED ASTHMA SEVERITY, UNSPECIFIED WHETHER COMPLICATED, UNSPECIFIED WHETHER PERSISTENT: ICD-10-CM

## 2025-06-24 DIAGNOSIS — J44.9 OBSTRUCTIVE LUNG DISEASE: ICD-10-CM

## 2025-06-24 DIAGNOSIS — J18.9 PNEUMONIA DUE TO INFECTIOUS ORGANISM, UNSPECIFIED LATERALITY, UNSPECIFIED PART OF LUNG: ICD-10-CM

## 2025-06-24 DIAGNOSIS — F41.9 ANXIETY: ICD-10-CM

## 2025-06-24 LAB
ABSOLUTE EOSINOPHIL (OHS): 0.09 K/UL
ABSOLUTE MONOCYTE (OHS): 0.83 K/UL (ref 0.3–1)
ABSOLUTE NEUTROPHIL COUNT (OHS): 4.77 K/UL (ref 1.8–7.7)
ANION GAP (OHS): 10 MMOL/L (ref 8–16)
BASOPHILS # BLD AUTO: 0.02 K/UL
BASOPHILS NFR BLD AUTO: 0.2 %
BUN SERPL-MCNC: 13 MG/DL (ref 8–23)
CALCIUM SERPL-MCNC: 9.6 MG/DL (ref 8.7–10.5)
CHLORIDE SERPL-SCNC: 96 MMOL/L (ref 95–110)
CO2 SERPL-SCNC: 38 MMOL/L (ref 23–29)
CREAT SERPL-MCNC: 0.9 MG/DL (ref 0.5–1.4)
EAG (OHS): 120 MG/DL (ref 68–131)
ERYTHROCYTE [DISTWIDTH] IN BLOOD BY AUTOMATED COUNT: 17.2 % (ref 11.5–14.5)
FERRITIN SERPL-MCNC: 45 NG/ML (ref 20–300)
GFR SERPLBLD CREATININE-BSD FMLA CKD-EPI: >60 ML/MIN/1.73/M2
GLUCOSE SERPL-MCNC: 63 MG/DL (ref 70–110)
HBA1C MFR BLD: 5.8 % (ref 4–5.6)
HCT VFR BLD AUTO: 39.5 % (ref 37–48.5)
HGB BLD-MCNC: 10.1 GM/DL (ref 12–16)
IMM GRANULOCYTES # BLD AUTO: 0.03 K/UL (ref 0–0.04)
IMM GRANULOCYTES NFR BLD AUTO: 0.3 % (ref 0–0.5)
IRON SATN MFR SERPL: 12 % (ref 20–50)
IRON SERPL-MCNC: 44 UG/DL (ref 30–160)
LYMPHOCYTES # BLD AUTO: 3.22 K/UL (ref 1–4.8)
MCH RBC QN AUTO: 24.8 PG (ref 27–31)
MCHC RBC AUTO-ENTMCNC: 25.6 G/DL (ref 32–36)
MCV RBC AUTO: 97 FL (ref 82–98)
NUCLEATED RBC (/100WBC) (OHS): 0 /100 WBC
PLATELET # BLD AUTO: 323 K/UL (ref 150–450)
PMV BLD AUTO: 11.8 FL (ref 9.2–12.9)
POTASSIUM SERPL-SCNC: 4.4 MMOL/L (ref 3.5–5.1)
RBC # BLD AUTO: 4.08 M/UL (ref 4–5.4)
RELATIVE EOSINOPHIL (OHS): 1 %
RELATIVE LYMPHOCYTE (OHS): 35.9 % (ref 18–48)
RELATIVE MONOCYTE (OHS): 9.3 % (ref 4–15)
RELATIVE NEUTROPHIL (OHS): 53.3 % (ref 38–73)
SODIUM SERPL-SCNC: 144 MMOL/L (ref 136–145)
TIBC SERPL-MCNC: 366 UG/DL (ref 250–450)
TRANSFERRIN SERPL-MCNC: 247 MG/DL (ref 200–375)
WBC # BLD AUTO: 8.96 K/UL (ref 3.9–12.7)

## 2025-06-24 PROCEDURE — 84466 ASSAY OF TRANSFERRIN: CPT

## 2025-06-24 PROCEDURE — 99215 OFFICE O/P EST HI 40 MIN: CPT | Mod: PBBFAC | Performed by: PHYSICIAN ASSISTANT

## 2025-06-24 PROCEDURE — 82728 ASSAY OF FERRITIN: CPT

## 2025-06-24 PROCEDURE — 36415 COLL VENOUS BLD VENIPUNCTURE: CPT

## 2025-06-24 PROCEDURE — 99999 PR PBB SHADOW E&M-EST. PATIENT-LVL V: CPT | Mod: PBBFAC,,, | Performed by: PHYSICIAN ASSISTANT

## 2025-06-24 PROCEDURE — 85025 COMPLETE CBC W/AUTO DIFF WBC: CPT

## 2025-06-24 PROCEDURE — 83036 HEMOGLOBIN GLYCOSYLATED A1C: CPT

## 2025-06-24 PROCEDURE — 80048 BASIC METABOLIC PNL TOTAL CA: CPT

## 2025-06-24 RX ORDER — LORAZEPAM 1 MG/1
1 TABLET ORAL EVERY 6 HOURS PRN
Qty: 2 TABLET | Refills: 0 | Status: SHIPPED | OUTPATIENT
Start: 2025-06-24 | End: 2025-07-24

## 2025-06-25 NOTE — PROGRESS NOTES
Subjective:       Patient ID: Baylee Muñoz is a 64 y.o. female.    Chief Complaint: Asthma      History of Present Illness    CHIEF COMPLAINT:  Baylee presents for follow-up of respiratory issues, including recent pneumonia, and to discuss weaning off supplemental oxygen.    HPI:  Baylee has a history of pneumonia and COVID-19 hospitalization, from which she recovered. She subsequently contracted pneumonia again but is now feeling better. She is currently attempting to wean herself off supplemental oxygen for the second time. She has anxiety about being away from her oxygen and uses it at work when she feels overexerted or has asthma symptoms. Her SpO2 is 95% while wearing the oxygen.    She recently had a cold affecting her sinuses, contracted from her children. On January 29th, she was treated with azithromycin, cough medicine, and steroids, which resolved the symptoms.    She has increased asthma symptoms since her illness. Previously, she used her inhaler once a year, but now requires daily use. She uses her inhaler twice daily and notes that if she does not maintain this regimen, it affects her whole system.    She reports issues obtaining her medications from [facility], stating they often do not have her prescriptions ready when needed, despite being on auto-fill. She is considering switching to [facility] pharmacy.    Does not have CPAP machine - could not afford at the time last ordered. Sleeping with oxygen in [place]z of CPAP.    SOCIAL HISTORY:  Occupation: Employed      ROS:  General: -fever, -chills, -fatigue, -weight gain, -weight loss  Eyes: -vision changes, -redness, -discharge  ENT: -ear pain, -nasal congestion, -sore throat  Cardiovascular: -chest pain, -palpitations, -lower extremity edema  Respiratory: -cough, -shortness of breath, +exertional dyspnea, +difficulty breathing  Gastrointestinal: -abdominal pain, -nausea, -vomiting, -diarrhea, -constipation, -blood in stool  Genitourinary: -dysuria,  "-hematuria, -frequency  Musculoskeletal: -joint pain, -muscle pain  Skin: -rash, -lesion  Neurological: -headache, -dizziness, -numbness, -tingling  Psychiatric: +anxiety, -depression, -sleep difficulty, +panic attacks, +excessive fear          Immunization History   Administered Date(s) Administered    COVID-19, MRNA, LN-S, PF (Pfizer) (Purple Cap) 08/28/2021, 09/27/2021    Pneumococcal Polysaccharide - 23 Valent 12/12/2017      Tobacco Use: Medium Risk (6/24/2025)    Patient History     Smoking Tobacco Use: Former     Smokeless Tobacco Use: Never     Passive Exposure: Never      Past Medical History:   Diagnosis Date    Allergy     Arthritis     Asthma     Admit to [facility] 1/6/19 by Dr. Michael Randall for asthma exaceration, acute bronchitis, acute dCHF, hypoxia, elevated troponin/BNP (thought to be 2/2 asthma exac & acute dCHF by cards), tx'd w/ IV lasix, duonebs, IV solumedrol and DC'd on Z-prema, prednisone taper    CKD (chronic kidney disease) stage 2, GFR 60-89 ml/min     H/o ARF 2/2 poor water intake; encouraged to increase water intake and avoid NSAIDS & contrast dye    COVID 06/2022    Depression     Diastolic heart failure 01/06/2019    Dx'd at [place] during asthma exacerbation    Diverticulitis 2005    Dyslipidemia 08/28/2018    Former smoker     Quit 12/31/18; smoked 0.12 packs/day x 37 years    Gastritis     Hypertension     Pneumonia 06/2022    Prediabetes     A1c 6.1% on 9/29/19    Prediabetes     Severe obesity (BMI >= 40)     Vertigo     Vitamin D deficiency       Medications Ordered Prior to Encounter[1]     Review of Systems    Objective:       Vitals:    06/24/25 1455   BP: 118/68   Patient Position: Sitting   Pulse: 83   Resp: 18   SpO2: (!) 90%  Comment: 2 liters of o2   Weight: 130.3 kg (287 lb 4.2 oz)   Height: 5' 5" (1.651 m)       Physical Exam   Constitutional: She is oriented to person, place, and time. She appears well-developed and well-nourished. No distress. She is obese.   HENT:   Head: " Normocephalic.   Mouth/Throat: Oropharynx is clear and moist.   Cardiovascular: Normal rate and regular rhythm.   Pulmonary/Chest: Effort normal. No respiratory distress. She has decreased breath sounds. She has no wheezes. She has no rhonchi. She has no rales.   Musculoskeletal:         General: No edema.      Cervical back: Normal range of motion and neck supple.   Neurological: She is alert and oriented to person, place, and time.   Skin: Skin is warm and dry.   Psychiatric: She has a normal mood and affect.   Vitals reviewed.    Personal Diagnostic Review              Assessment/Plan:       1. Chronic respiratory failure with hypoxia  -     Stress test, pulmonary; Future; Expected date: 06/24/2025  -     PFT - related Arterial Blood Gas; Future    2. Obstructive lung disease  -     Complete PFT with bronchodilator; Future    3. Asthma, unspecified asthma severity, unspecified whether complicated, unspecified whether persistent  -     Complete PFT with bronchodilator; Future    4. TUTU (obstructive sleep apnea)  Overview:  Moderate Obstructive sleep apnea: AHI was 26.2/hr and nocturnal hypoxemia - requiring supplemental oxygen      5. Pneumonia due to infectious organism, unspecified laterality, unspecified part of lung  -     CT Chest Without Contrast; Future; Expected date: 06/24/2025    6. Anxiety  -     LORazepam (ATIVAN) 1 MG tablet; Take 1 tablet (1 mg total) by mouth every 6 (six) hours as needed for Anxiety.  Dispense: 2 tablet; Refill: 0      Assessment & Plan    IMPRESSION:  - Assessed ongoing oxygen dependency and respiratory issues following recent pneumonia and COVID infections.  - Determined need for updated pulmonary testing to evaluate current oxygen requirements and lung function.  - Evaluated current medication regimen.  - Plan for CT with anxiety medication due to patient's anxiety about enclosed spaces, clarified that CT is not a full MRI and is less enclosed.    CHRONIC RESPIRATORY FAILURE  WITH HYPOXIA:  - 6mwd next visit    OBSTRUCTIVE LUNG DISEASE:  - Baylee to avoid workplace environments with high levels of smoking.  - Ordered pulmonary function test.  - Ordered pulmonary stress test.  - Ordered CT Lungs.      ASTHMA, UNSPECIFIED ASTHMA SEVERITY, UNSPECIFIED WHETHER COMPLICATED, UNSPECIFIED WHETHER PERSISTENT:  - Ordered pulmonary function test.  - Ordered pulmonary stress test.      PNEUMONIA DUE TO INFECTIOUS ORGANISM, UNSPECIFIED LATERALITY, UNSPECIFIED PART OF LUNG:  - Ordered CT Lungs.    TUTU  - will order new CPAP after pulmonary function testing and ABG complete.  -continue sleeping with oxygen    ANXIETY:  - Started Ativan 1 mg, 1 tablets to be taken before CT.          Follow up for chest ct, pft, walk, abg next available.    Discussed diagnosis, its evaluation, treatment and usual course. All questions answered.    Patient verbalized understanding of plan and left in no acute distress    Thank you for the courtesy of participating in the care of this patient    MARIFER [facility]ll[facility]Ch[facility]    This note was generated with the assistance of ambient listening technology. Verbal consent was obtained by the patient and accompanying visitor(s) for the recording of patient appointment to facilitate this note. I attest to having reviewed and edited the generated note for accuracy, though some syntax or spelling errors may persist. Please contact the author of this note for any clarification.            [1]   Current Outpatient Medications on File Prior to Visit   Medication Sig Dispense Refill    acetaminophen (TYLENOL) 325 MG tablet Take 2 tablets (650 mg total) by mouth every 6 (six) hours as needed for Pain.  0    albuterol (PROAIR HFA) 90 mcg/actuation inhaler USE 2 INHALATIONS EVERY 6 HOURS AS NEEDED FOR WHEEZING 25.5 g 11    amLODIPine (NORVASC) 5 MG tablet Take 1 tablet (5 mg total) by mouth once daily. 90 tablet 3    atorvastatin (LIPITOR) 40 MG tablet Take 1 tablet (40  mg total) by mouth once daily. 90 tablet 3    blood sugar diagnostic Strp To check BG 2 times daily, to use with insurance preferred meter 100 each 11    capsaicin (ZOSTRIX) 0.025 % cream Apply topically 2 (two) times daily. 25 g 2    ELDERBERRY FRUIT ORAL Take 50 mg by mouth once daily.      fluticasone propionate (FLONASE) 50 mcg/actuation nasal spray 1 spray (50 mcg total) by Each Nostril route once daily. 16 g 3    fluticasone-salmeterol diskus inhaler 500-50 mcg Inhale 1 puff into the lungs 2 (two) times daily. 60 each 11    glipiZIDE (GLUCOTROL) 2.5 MG TR24 Take 1 tablet (2.5 mg total) by mouth daily with breakfast. 90 tablet 3    ketorolac (TORADOL) 10 mg tablet Take 1 tablet (10 mg total) by mouth every 6 to 8 hours as needed for Pain. 20 tablet 2    lancets Misc To check BG 2 times daily, to use with insurance preferred meter 100 each 11    mv-min/iron/folic/calcium/vitK (WOMEN'S MULTIVITAMIN ORAL) Take 1 tablet by mouth once daily.      predniSONE (DELTASONE) 20 MG tablet Take 2 today then one daily 6 tablet 0    pulse oximeter (PULSE OXIMETER) device by Apply Externally route 2 (two) times a day. Use twice daily at 8 AM and 3 PM and [facility]. 1 each 0    vitamin D (VITAMIN D3) 1000 units Tab Take 1,000 Units by mouth once daily.      albuterol-ipratropium (DUO-NEB) 2.5 mg-0.5 mg/3 mL nebulizer solution Take 3 mLs by nebulization every 6 (six) hours as needed for Wheezing. Rescue 75 mL 5    blood-glucose meter kit To check BG 2 times daily, to use with insurance preferred meter 1 each 0    gabapentin (NEURONTIN) 100 MG capsule Take 1 capsule (100 mg total) by mouth 3 (three) times daily as needed (back pain). 90 capsule 5     No current facility-administered medications on file prior to visit.

## 2025-06-26 ENCOUNTER — TELEPHONE (OUTPATIENT)
Dept: PAIN MEDICINE | Facility: CLINIC | Age: 65
End: 2025-06-26
Payer: OTHER GOVERNMENT

## 2025-06-26 ENCOUNTER — RESULTS FOLLOW-UP (OUTPATIENT)
Dept: INTERNAL MEDICINE | Facility: CLINIC | Age: 65
End: 2025-06-26

## 2025-06-26 NOTE — TELEPHONE ENCOUNTER
Returned call to patient, she received call to reschedule appt on 7/2/25.  Provider will be in procedure at that time.  [facility]  will call her to reschedule.

## 2025-07-28 ENCOUNTER — TELEPHONE (OUTPATIENT)
Dept: PULMONOLOGY | Facility: CLINIC | Age: 65
End: 2025-07-28
Payer: OTHER GOVERNMENT

## 2025-07-28 NOTE — TELEPHONE ENCOUNTER
Returned call back to Alexa. Stated she sent the wrong form over. Stated she faxed over the correct form. Staff informed her once the form is received provider will sign and fax the form over. Alexa expressed understanding

## 2025-07-29 DIAGNOSIS — R06.02 SOB (SHORTNESS OF BREATH): Primary | ICD-10-CM

## 2025-07-30 ENCOUNTER — TELEPHONE (OUTPATIENT)
Dept: PULMONOLOGY | Facility: CLINIC | Age: 65
End: 2025-07-30
Payer: OTHER GOVERNMENT

## 2025-07-30 NOTE — TELEPHONE ENCOUNTER
Returned call back to [facility]. Spoke with Alexa. Verified needed information for patients oxygen order

## 2025-08-11 PROBLEM — J18.9 PNEUMONIA DUE TO INFECTIOUS ORGANISM: Status: RESOLVED | Noted: 2025-06-24 | Resolved: 2025-08-11

## 2025-08-13 ENCOUNTER — PATIENT OUTREACH (OUTPATIENT)
Dept: ADMINISTRATIVE | Facility: HOSPITAL | Age: 65
End: 2025-08-13
Payer: OTHER GOVERNMENT

## 2025-08-21 ENCOUNTER — TELEPHONE (OUTPATIENT)
Dept: PULMONOLOGY | Facility: CLINIC | Age: 65
End: 2025-08-21
Payer: OTHER GOVERNMENT